# Patient Record
Sex: MALE | Race: WHITE | NOT HISPANIC OR LATINO | URBAN - METROPOLITAN AREA
[De-identification: names, ages, dates, MRNs, and addresses within clinical notes are randomized per-mention and may not be internally consistent; named-entity substitution may affect disease eponyms.]

---

## 2020-10-19 RX ORDER — RILUZOLE 50 MG/1
1 TABLET ORAL
Qty: 0 | Refills: 0 | DISCHARGE
Start: 2020-10-19

## 2020-11-09 ENCOUNTER — INPATIENT (INPATIENT)
Facility: HOSPITAL | Age: 70
LOS: 2 days | Discharge: HOME | DRG: 683 | End: 2020-11-12
Attending: FAMILY MEDICINE | Admitting: FAMILY MEDICINE
Payer: MEDICARE

## 2020-11-09 VITALS
TEMPERATURE: 98 F | RESPIRATION RATE: 18 BRPM | DIASTOLIC BLOOD PRESSURE: 78 MMHG | SYSTOLIC BLOOD PRESSURE: 139 MMHG | HEIGHT: 68 IN | OXYGEN SATURATION: 96 % | WEIGHT: 184.97 LBS | HEART RATE: 54 BPM

## 2020-11-09 DIAGNOSIS — G12.21 AMYOTROPHIC LATERAL SCLEROSIS: ICD-10-CM

## 2020-11-09 DIAGNOSIS — I10 ESSENTIAL (PRIMARY) HYPERTENSION: ICD-10-CM

## 2020-11-09 DIAGNOSIS — Z95.5 PRESENCE OF CORONARY ANGIOPLASTY IMPLANT AND GRAFT: Chronic | ICD-10-CM

## 2020-11-09 DIAGNOSIS — N28.9 DISORDER OF KIDNEY AND URETER, UNSPECIFIED: ICD-10-CM

## 2020-11-09 DIAGNOSIS — Z90.89 ACQUIRED ABSENCE OF OTHER ORGANS: Chronic | ICD-10-CM

## 2020-11-09 DIAGNOSIS — R33.9 RETENTION OF URINE, UNSPECIFIED: ICD-10-CM

## 2020-11-09 DIAGNOSIS — N40.1 BENIGN PROSTATIC HYPERPLASIA WITH LOWER URINARY TRACT SYMPTOMS: ICD-10-CM

## 2020-11-09 LAB
ALBUMIN SERPL ELPH-MCNC: 3.5 G/DL — SIGNIFICANT CHANGE UP (ref 3.3–5)
ALP SERPL-CCNC: 65 U/L — SIGNIFICANT CHANGE UP (ref 40–120)
ALT FLD-CCNC: 46 U/L — SIGNIFICANT CHANGE UP (ref 12–78)
ANION GAP SERPL CALC-SCNC: 10 MMOL/L — SIGNIFICANT CHANGE UP (ref 5–17)
APPEARANCE UR: ABNORMAL
AST SERPL-CCNC: 36 U/L — SIGNIFICANT CHANGE UP (ref 15–37)
BACTERIA # UR AUTO: ABNORMAL
BASOPHILS # BLD AUTO: 0 K/UL — SIGNIFICANT CHANGE UP (ref 0–0.2)
BASOPHILS NFR BLD AUTO: 0 % — SIGNIFICANT CHANGE UP (ref 0–2)
BILIRUB SERPL-MCNC: 1.2 MG/DL — SIGNIFICANT CHANGE UP (ref 0.2–1.2)
BILIRUB UR-MCNC: NEGATIVE — SIGNIFICANT CHANGE UP
BUN SERPL-MCNC: 97 MG/DL — HIGH (ref 7–23)
CALCIUM SERPL-MCNC: 10.2 MG/DL — HIGH (ref 8.5–10.1)
CHLORIDE SERPL-SCNC: 104 MMOL/L — SIGNIFICANT CHANGE UP (ref 96–108)
CO2 SERPL-SCNC: 22 MMOL/L — SIGNIFICANT CHANGE UP (ref 22–31)
COLOR SPEC: YELLOW — SIGNIFICANT CHANGE UP
CREAT SERPL-MCNC: 6.3 MG/DL — HIGH (ref 0.5–1.3)
DIFF PNL FLD: ABNORMAL
EOSINOPHIL # BLD AUTO: 0 K/UL — SIGNIFICANT CHANGE UP (ref 0–0.5)
EOSINOPHIL NFR BLD AUTO: 0 % — SIGNIFICANT CHANGE UP (ref 0–6)
GLUCOSE SERPL-MCNC: 125 MG/DL — HIGH (ref 70–99)
GLUCOSE UR QL: NEGATIVE — SIGNIFICANT CHANGE UP
HCT VFR BLD CALC: 44.1 % — SIGNIFICANT CHANGE UP (ref 39–50)
HGB BLD-MCNC: 15.3 G/DL — SIGNIFICANT CHANGE UP (ref 13–17)
KETONES UR-MCNC: NEGATIVE — SIGNIFICANT CHANGE UP
LEUKOCYTE ESTERASE UR-ACNC: ABNORMAL
LYMPHOCYTES # BLD AUTO: 0.95 K/UL — LOW (ref 1–3.3)
LYMPHOCYTES # BLD AUTO: 8 % — LOW (ref 13–44)
MCHC RBC-ENTMCNC: 30.8 PG — SIGNIFICANT CHANGE UP (ref 27–34)
MCHC RBC-ENTMCNC: 34.7 GM/DL — SIGNIFICANT CHANGE UP (ref 32–36)
MCV RBC AUTO: 88.7 FL — SIGNIFICANT CHANGE UP (ref 80–100)
MONOCYTES # BLD AUTO: 0.47 K/UL — SIGNIFICANT CHANGE UP (ref 0–0.9)
MONOCYTES NFR BLD AUTO: 4 % — SIGNIFICANT CHANGE UP (ref 2–14)
NEUTROPHILS # BLD AUTO: 10.45 K/UL — HIGH (ref 1.8–7.4)
NEUTROPHILS NFR BLD AUTO: 87 % — HIGH (ref 43–77)
NITRITE UR-MCNC: NEGATIVE — SIGNIFICANT CHANGE UP
NRBC # BLD: SIGNIFICANT CHANGE UP /100 WBCS (ref 0–0)
PH UR: 5 — SIGNIFICANT CHANGE UP (ref 5–8)
PLATELET # BLD AUTO: 210 K/UL — SIGNIFICANT CHANGE UP (ref 150–400)
POTASSIUM SERPL-MCNC: 4.8 MMOL/L — SIGNIFICANT CHANGE UP (ref 3.5–5.3)
POTASSIUM SERPL-SCNC: 4.8 MMOL/L — SIGNIFICANT CHANGE UP (ref 3.5–5.3)
PROT SERPL-MCNC: 7.2 G/DL — SIGNIFICANT CHANGE UP (ref 6–8.3)
PROT UR-MCNC: 100
RBC # BLD: 4.97 M/UL — SIGNIFICANT CHANGE UP (ref 4.2–5.8)
RBC # FLD: 12.6 % — SIGNIFICANT CHANGE UP (ref 10.3–14.5)
RBC CASTS # UR COMP ASSIST: ABNORMAL /HPF (ref 0–4)
SARS-COV-2 RNA SPEC QL NAA+PROBE: SIGNIFICANT CHANGE UP
SODIUM SERPL-SCNC: 136 MMOL/L — SIGNIFICANT CHANGE UP (ref 135–145)
SP GR SPEC: 1.02 — SIGNIFICANT CHANGE UP (ref 1.01–1.02)
UROBILINOGEN FLD QL: NEGATIVE — SIGNIFICANT CHANGE UP
WBC # BLD: 11.87 K/UL — HIGH (ref 3.8–10.5)
WBC # FLD AUTO: 11.87 K/UL — HIGH (ref 3.8–10.5)
WBC UR QL: SIGNIFICANT CHANGE UP /HPF (ref 0–5)

## 2020-11-09 PROCEDURE — 76775 US EXAM ABDO BACK WALL LIM: CPT | Mod: 26

## 2020-11-09 PROCEDURE — 93010 ELECTROCARDIOGRAM REPORT: CPT

## 2020-11-09 PROCEDURE — 71045 X-RAY EXAM CHEST 1 VIEW: CPT | Mod: 26

## 2020-11-09 PROCEDURE — 99285 EMERGENCY DEPT VISIT HI MDM: CPT

## 2020-11-09 RX ORDER — METOPROLOL TARTRATE 50 MG
50 TABLET ORAL DAILY
Refills: 0 | Status: DISCONTINUED | OUTPATIENT
Start: 2020-11-09 | End: 2020-11-11

## 2020-11-09 RX ORDER — TAMSULOSIN HYDROCHLORIDE 0.4 MG/1
0.4 CAPSULE ORAL AT BEDTIME
Refills: 0 | Status: DISCONTINUED | OUTPATIENT
Start: 2020-11-09 | End: 2020-11-11

## 2020-11-09 RX ORDER — CLOPIDOGREL BISULFATE 75 MG/1
75 TABLET, FILM COATED ORAL DAILY
Refills: 0 | Status: DISCONTINUED | OUTPATIENT
Start: 2020-11-09 | End: 2020-11-12

## 2020-11-09 RX ORDER — METOPROLOL TARTRATE 50 MG
1 TABLET ORAL
Qty: 0 | Refills: 0 | DISCHARGE

## 2020-11-09 RX ORDER — SODIUM CHLORIDE 9 MG/ML
1000 INJECTION INTRAMUSCULAR; INTRAVENOUS; SUBCUTANEOUS ONCE
Refills: 0 | Status: COMPLETED | OUTPATIENT
Start: 2020-11-09 | End: 2020-11-09

## 2020-11-09 RX ORDER — ROSUVASTATIN CALCIUM 5 MG/1
1 TABLET ORAL
Qty: 0 | Refills: 0 | DISCHARGE

## 2020-11-09 RX ORDER — SODIUM CHLORIDE 9 MG/ML
1000 INJECTION INTRAMUSCULAR; INTRAVENOUS; SUBCUTANEOUS
Refills: 0 | Status: DISCONTINUED | OUTPATIENT
Start: 2020-11-09 | End: 2020-11-10

## 2020-11-09 RX ORDER — ATORVASTATIN CALCIUM 80 MG/1
40 TABLET, FILM COATED ORAL AT BEDTIME
Refills: 0 | Status: DISCONTINUED | OUTPATIENT
Start: 2020-11-09 | End: 2020-11-12

## 2020-11-09 RX ORDER — FENOFIBRATE,MICRONIZED 130 MG
145 CAPSULE ORAL DAILY
Refills: 0 | Status: DISCONTINUED | OUTPATIENT
Start: 2020-11-09 | End: 2020-11-12

## 2020-11-09 RX ORDER — CEFTRIAXONE 500 MG/1
1000 INJECTION, POWDER, FOR SOLUTION INTRAMUSCULAR; INTRAVENOUS ONCE
Refills: 0 | Status: COMPLETED | OUTPATIENT
Start: 2020-11-09 | End: 2020-11-09

## 2020-11-09 RX ADMIN — ATORVASTATIN CALCIUM 40 MILLIGRAM(S): 80 TABLET, FILM COATED ORAL at 22:43

## 2020-11-09 RX ADMIN — SODIUM CHLORIDE 100 MILLILITER(S): 9 INJECTION INTRAMUSCULAR; INTRAVENOUS; SUBCUTANEOUS at 22:43

## 2020-11-09 RX ADMIN — CEFTRIAXONE 100 MILLIGRAM(S): 500 INJECTION, POWDER, FOR SOLUTION INTRAMUSCULAR; INTRAVENOUS at 15:37

## 2020-11-09 RX ADMIN — CEFTRIAXONE 1000 MILLIGRAM(S): 500 INJECTION, POWDER, FOR SOLUTION INTRAMUSCULAR; INTRAVENOUS at 16:34

## 2020-11-09 RX ADMIN — TAMSULOSIN HYDROCHLORIDE 0.4 MILLIGRAM(S): 0.4 CAPSULE ORAL at 22:43

## 2020-11-09 RX ADMIN — SODIUM CHLORIDE 1000 MILLILITER(S): 9 INJECTION INTRAMUSCULAR; INTRAVENOUS; SUBCUTANEOUS at 15:38

## 2020-11-09 NOTE — H&P ADULT - PROBLEM SELECTOR PLAN 1
MARLEY post obstructive, jo placed.   Renal consult called, follow up recs.   Patient not fluid overloaded, gentle hydration.

## 2020-11-09 NOTE — PATIENT PROFILE ADULT - NSPROGENSOURCEINFO_GEN_A_NUR
Chronic GERD  Evaluate with EGD bx for surveillance of BE      Summers's esophagus  EGD with bx    Personal history of colonic polyps  Evaluate with Colonoscopy for surveillance, 45 min, Suprep split        patient

## 2020-11-09 NOTE — ED ADULT NURSE REASSESSMENT NOTE - NS ED NURSE REASSESS COMMENT FT1
Pt is calm and cooperative. Wife appears anxious and is pacing. Wife along with pt were educated about the disease process by Dr. Singleton and RN. Manager notified.

## 2020-11-09 NOTE — ED PROVIDER NOTE - OBJECTIVE STATEMENT
69 yo male with h/o CAD with stent, HTN, HLD, BPH presents to the ED c/o urinary retention x 4 days. Patient admits he ran out of prescription for tamsulosin x 1 week. Associated with abdominal distention and discomfort. Patient also c/o constipation x 4 days. Patient also c/o generalized weakness and fatigue. Denies fever, chills, chest pain, sob, vomiting, diarrhea, flank pain. Patient currently being worked up with neurology for possible ALS. no back pain, no fall or trauma.   pmd: Dr. Curtis Zavala

## 2020-11-09 NOTE — ED PROVIDER NOTE - CLINICAL SUMMARY MEDICAL DECISION MAKING FREE TEXT BOX
69 yo male with h/o CAD with stent, HTN, HLD, BPH presents to the ED c/o urinary retention x 4 days. Patient admits he ran out of prescription for tamsulosin x 1 week. Associated with abdominal distention and discomfort. Patient also c/o constipation x 4 days. Patient also c/o generalized weakness and fatigue. Denies fever, chills, chest pain, sob, vomiting, diarrhea, flank pain. Patient currently being worked up with neurology for possible ALS. A/P: urinary retention, jo place. + renal insuffiency. Case discussed with Dr. Ayers, renal aware of consult. Case discussed with Dr. Otero, will admit to Dr. Culver

## 2020-11-09 NOTE — H&P ADULT - NSICDXPASTMEDICALHX_GEN_ALL_CORE_FT
PAST MEDICAL HISTORY:  BPH (benign prostatic hyperplasia)     Hyperlipidemia     Hypertension     Myocardial infarct

## 2020-11-09 NOTE — H&P ADULT - HISTORY OF PRESENT ILLNESS
71 yo male with h/o cad s/p stent, htn, hld, bph presents to the ED c/o urinary retention x 4 days.   Patient reports he ran out of Tamulosin, associated with abdominal distention and discomfort with constipation.   Patient endorses loss of appetite. Denies fever, chills, chest pain, sob, vomiting, diarrhea, flank pain. Patient currently being worked up with neurology for possible ALS. 71 yo male with h/o cad s/p stent, htn, hld, bph presents to the ED c/o urinary retention x 4 days.   Patient reports he ran out of Tamulosin, associated with abdominal distention and discomfort with constipation.   Patient endorses loss of appetite. Denies fever, chills, chest pain, sob, vomiting, diarrhea, flank pain. Patient currently being worked up with neurology for possible ALS.     In thee d patient noted to have MARLEY with urine retention, jo was placed. 69 yo male with h/o cad s/p stent, htn, hld, bph presents to the ED c/o urinary retention x 4 days.   Patient reports he ran out of Tamulosin, associated with abdominal distention and discomfort with constipation.   Patient endorses loss of appetite. Denies fever, chills, chest pain, sob, vomiting, diarrhea, flank pain. Patient currently being worked up with neurology for possible ALS.     In the ed patient noted to have MARLEY with urine retention, jo was placed.

## 2020-11-09 NOTE — PROGRESS NOTE ADULT - SUBJECTIVE AND OBJECTIVE BOX
Renal consult called for MARLEY. Pt with post renal causes of MARLEY.   Shukla placed. Good UO. Labs as ordered.   Full consult to follow. Thank you.

## 2020-11-09 NOTE — H&P ADULT - ASSESSMENT
69 yo male with h/o cad s/p stent, htn, hld, bph presents to the ED c/o urinary retention x 4 days.

## 2020-11-10 LAB
ALBUMIN SERPL ELPH-MCNC: 2.8 G/DL — LOW (ref 3.3–5)
ALP SERPL-CCNC: 54 U/L — SIGNIFICANT CHANGE UP (ref 40–120)
ALT FLD-CCNC: 38 U/L — SIGNIFICANT CHANGE UP (ref 12–78)
ANION GAP SERPL CALC-SCNC: 3 MMOL/L — LOW (ref 5–17)
AST SERPL-CCNC: 26 U/L — SIGNIFICANT CHANGE UP (ref 15–37)
BILIRUB SERPL-MCNC: 1 MG/DL — SIGNIFICANT CHANGE UP (ref 0.2–1.2)
BUN SERPL-MCNC: 48 MG/DL — HIGH (ref 7–23)
CALCIUM SERPL-MCNC: 9.4 MG/DL — SIGNIFICANT CHANGE UP (ref 8.5–10.1)
CHLORIDE SERPL-SCNC: 112 MMOL/L — HIGH (ref 96–108)
CO2 SERPL-SCNC: 28 MMOL/L — SIGNIFICANT CHANGE UP (ref 22–31)
CREAT SERPL-MCNC: 1.3 MG/DL — SIGNIFICANT CHANGE UP (ref 0.5–1.3)
GLUCOSE SERPL-MCNC: 93 MG/DL — SIGNIFICANT CHANGE UP (ref 70–99)
HCT VFR BLD CALC: 39.9 % — SIGNIFICANT CHANGE UP (ref 39–50)
HCV AB S/CO SERPL IA: 0.08 S/CO — SIGNIFICANT CHANGE UP (ref 0–0.99)
HCV AB SERPL-IMP: SIGNIFICANT CHANGE UP
HGB BLD-MCNC: 13.3 G/DL — SIGNIFICANT CHANGE UP (ref 13–17)
MCHC RBC-ENTMCNC: 30.4 PG — SIGNIFICANT CHANGE UP (ref 27–34)
MCHC RBC-ENTMCNC: 33.3 GM/DL — SIGNIFICANT CHANGE UP (ref 32–36)
MCV RBC AUTO: 91.1 FL — SIGNIFICANT CHANGE UP (ref 80–100)
NRBC # BLD: 0 /100 WBCS — SIGNIFICANT CHANGE UP (ref 0–0)
PHOSPHATE SERPL-MCNC: 2.1 MG/DL — LOW (ref 2.5–4.5)
PLATELET # BLD AUTO: 173 K/UL — SIGNIFICANT CHANGE UP (ref 150–400)
POTASSIUM SERPL-MCNC: 3.9 MMOL/L — SIGNIFICANT CHANGE UP (ref 3.5–5.3)
POTASSIUM SERPL-SCNC: 3.9 MMOL/L — SIGNIFICANT CHANGE UP (ref 3.5–5.3)
PROT SERPL-MCNC: 5.8 G/DL — LOW (ref 6–8.3)
RBC # BLD: 4.38 M/UL — SIGNIFICANT CHANGE UP (ref 4.2–5.8)
RBC # FLD: 12.2 % — SIGNIFICANT CHANGE UP (ref 10.3–14.5)
SARS-COV-2 IGG SERPL QL IA: NEGATIVE — SIGNIFICANT CHANGE UP
SARS-COV-2 IGM SERPL IA-ACNC: 0.09 INDEX — SIGNIFICANT CHANGE UP
SODIUM SERPL-SCNC: 143 MMOL/L — SIGNIFICANT CHANGE UP (ref 135–145)
URATE SERPL-MCNC: 5.4 MG/DL — SIGNIFICANT CHANGE UP (ref 3.4–8.8)
WBC # BLD: 7.32 K/UL — SIGNIFICANT CHANGE UP (ref 3.8–10.5)
WBC # FLD AUTO: 7.32 K/UL — SIGNIFICANT CHANGE UP (ref 3.8–10.5)

## 2020-11-10 RX ORDER — SODIUM CHLORIDE 9 MG/ML
1000 INJECTION, SOLUTION INTRAVENOUS
Refills: 0 | Status: DISCONTINUED | OUTPATIENT
Start: 2020-11-10 | End: 2020-11-11

## 2020-11-10 RX ADMIN — SODIUM CHLORIDE 60 MILLILITER(S): 9 INJECTION, SOLUTION INTRAVENOUS at 11:59

## 2020-11-10 RX ADMIN — ATORVASTATIN CALCIUM 40 MILLIGRAM(S): 80 TABLET, FILM COATED ORAL at 21:30

## 2020-11-10 RX ADMIN — SODIUM CHLORIDE 100 MILLILITER(S): 9 INJECTION INTRAMUSCULAR; INTRAVENOUS; SUBCUTANEOUS at 07:50

## 2020-11-10 RX ADMIN — CLOPIDOGREL BISULFATE 75 MILLIGRAM(S): 75 TABLET, FILM COATED ORAL at 11:58

## 2020-11-10 RX ADMIN — TAMSULOSIN HYDROCHLORIDE 0.4 MILLIGRAM(S): 0.4 CAPSULE ORAL at 21:30

## 2020-11-10 RX ADMIN — Medication 50 MILLIGRAM(S): at 05:43

## 2020-11-10 RX ADMIN — Medication 145 MILLIGRAM(S): at 11:59

## 2020-11-10 NOTE — PROGRESS NOTE ADULT - ASSESSMENT
71 yo male with h/o cad s/p stent, htn, hld, bph presents to the ED c/o urinary retention x 4 days.

## 2020-11-10 NOTE — CONSULT NOTE ADULT - ASSESSMENT
MARLEY: Post renal causes, On ACEI.   Hypertension  BPH    Improved renal indices with jo. Continue IVF. Change to half NS. Hold ACEI for one more day. Monitor BP trend. Titrate BP meds as needed. Salt restriction.    evaluation. Avoid nephrotoxic meds as possible. Will follow electrolytes and renal function trend.   Further recommendations pending clinical course. Thank you for the courtesy of this referral.

## 2020-11-10 NOTE — PROGRESS NOTE ADULT - SUBJECTIVE AND OBJECTIVE BOX
Patient is a 70y old  Male who presents with a chief complaint of     SUBJECTIVE / OVERNIGHT EVENTS: no events over night     MEDICATIONS  (STANDING):  atorvastatin 40 milliGRAM(s) Oral at bedtime  clopidogrel Tablet 75 milliGRAM(s) Oral daily  fenofibrate Tablet 145 milliGRAM(s) Oral daily  metoprolol tartrate 50 milliGRAM(s) Oral daily  sodium chloride 0.45%. 1000 milliLiter(s) (60 mL/Hr) IV Continuous <Continuous>  tamsulosin 0.4 milliGRAM(s) Oral at bedtime    MEDICATIONS  (PRN):      CAPILLARY BLOOD GLUCOSE        I&O's Summary    09 Nov 2020 07:01  -  10 Nov 2020 07:00  --------------------------------------------------------  IN: 1100 mL / OUT: 1200 mL / NET: -100 mL    10 Nov 2020 07:01  -  10 Nov 2020 17:05  --------------------------------------------------------  IN: 1240 mL / OUT: 700 mL / NET: 540 mL      T(C): 36.9 (11-10-20 @ 12:36), Max: 36.9 (11-10-20 @ 05:28)  HR: 63 (11-10-20 @ 12:36) (63 - 66)  BP: 117/71 (11-10-20 @ 12:36) (113/64 - 117/71)  RR: 20 (11-10-20 @ 12:36) (18 - 20)  SpO2: 95% (11-10-20 @ 12:36) (92% - 95%)    PHYSICAL EXAM:  GENERAL: NAD, well-developed  HEAD:  Atraumatic, Normocephalic  EYES: EOMI, PERRLA, conjunctiva and sclera clear  NECK: Supple, No JVD  CHEST/LUNG: Clear to auscultation bilaterally; No wheeze  HEART: Regular rate and rhythm; No murmurs, rubs, or gallops  ABDOMEN: Soft, Nontender, Nondistended; Bowel sounds present  EXTREMITIES:  2+ Peripheral Pulses, No clubbing, cyanosis, or edema  PSYCH: AAOx3  NEUROLOGY: non-focal  SKIN: No rashes or lesions                          13.3   7.32  )-----------( 173      ( 10 Nov 2020 08:28 )             39.9           LIVER FUNCTIONS - ( 10 Nov 2020 08:28 )  Alb: 2.8 g/dL / Pro: 5.8 g/dL / ALK PHOS: 54 U/L / ALT: 38 U/L / AST: 26 U/L / GGT: x             143|112|48<93  3.9|28|1.30  9.4,--,2.1  11-10 @ 08:28        RADIOLOGY & ADDITIONAL TESTS:    Imaging Personally Reviewed:    Consultant(s) Notes Reviewed:      Care Discussed with Consultants/Other Providers:

## 2020-11-10 NOTE — PROGRESS NOTE ADULT - PROBLEM SELECTOR PLAN 1
MARLEY post obstructive, jo placed, resolving.   Renal following. Patient not fluid overloaded, gentle hydration.  D/C planning on jo.

## 2020-11-10 NOTE — PHYSICAL THERAPY INITIAL EVALUATION ADULT - ADDITIONAL COMMENTS
Patient lives in private ranch home with wife, 3 DILCIA with railings.  Patient was independent in all ADLs and ambulated independently without device

## 2020-11-11 LAB
ANION GAP SERPL CALC-SCNC: 5 MMOL/L — SIGNIFICANT CHANGE UP (ref 5–17)
BUN SERPL-MCNC: 30 MG/DL — HIGH (ref 7–23)
CALCIUM SERPL-MCNC: 9.3 MG/DL — SIGNIFICANT CHANGE UP (ref 8.5–10.1)
CHLORIDE SERPL-SCNC: 106 MMOL/L — SIGNIFICANT CHANGE UP (ref 96–108)
CO2 SERPL-SCNC: 28 MMOL/L — SIGNIFICANT CHANGE UP (ref 22–31)
CREAT SERPL-MCNC: 1 MG/DL — SIGNIFICANT CHANGE UP (ref 0.5–1.3)
CULTURE RESULTS: SIGNIFICANT CHANGE UP
GLUCOSE SERPL-MCNC: 116 MG/DL — HIGH (ref 70–99)
HCT VFR BLD CALC: 45.2 % — SIGNIFICANT CHANGE UP (ref 39–50)
HGB BLD-MCNC: 15.4 G/DL — SIGNIFICANT CHANGE UP (ref 13–17)
MCHC RBC-ENTMCNC: 30.7 PG — SIGNIFICANT CHANGE UP (ref 27–34)
MCHC RBC-ENTMCNC: 34.1 GM/DL — SIGNIFICANT CHANGE UP (ref 32–36)
MCV RBC AUTO: 90.2 FL — SIGNIFICANT CHANGE UP (ref 80–100)
NRBC # BLD: 0 /100 WBCS — SIGNIFICANT CHANGE UP (ref 0–0)
PLATELET # BLD AUTO: 179 K/UL — SIGNIFICANT CHANGE UP (ref 150–400)
POTASSIUM SERPL-MCNC: 3.8 MMOL/L — SIGNIFICANT CHANGE UP (ref 3.5–5.3)
POTASSIUM SERPL-SCNC: 3.8 MMOL/L — SIGNIFICANT CHANGE UP (ref 3.5–5.3)
RBC # BLD: 5.01 M/UL — SIGNIFICANT CHANGE UP (ref 4.2–5.8)
RBC # FLD: 12.3 % — SIGNIFICANT CHANGE UP (ref 10.3–14.5)
SODIUM SERPL-SCNC: 139 MMOL/L — SIGNIFICANT CHANGE UP (ref 135–145)
SPECIMEN SOURCE: SIGNIFICANT CHANGE UP
WBC # BLD: 6.76 K/UL — SIGNIFICANT CHANGE UP (ref 3.8–10.5)
WBC # FLD AUTO: 6.76 K/UL — SIGNIFICANT CHANGE UP (ref 3.8–10.5)

## 2020-11-11 RX ORDER — METOPROLOL TARTRATE 50 MG
50 TABLET ORAL DAILY
Refills: 0 | Status: DISCONTINUED | OUTPATIENT
Start: 2020-11-12 | End: 2020-11-12

## 2020-11-11 RX ORDER — LISINOPRIL 2.5 MG/1
2.5 TABLET ORAL DAILY
Refills: 0 | Status: DISCONTINUED | OUTPATIENT
Start: 2020-11-11 | End: 2020-11-12

## 2020-11-11 RX ORDER — TAMSULOSIN HYDROCHLORIDE 0.4 MG/1
0.8 CAPSULE ORAL AT BEDTIME
Refills: 0 | Status: DISCONTINUED | OUTPATIENT
Start: 2020-11-11 | End: 2020-11-12

## 2020-11-11 RX ADMIN — TAMSULOSIN HYDROCHLORIDE 0.8 MILLIGRAM(S): 0.4 CAPSULE ORAL at 21:50

## 2020-11-11 RX ADMIN — CLOPIDOGREL BISULFATE 75 MILLIGRAM(S): 75 TABLET, FILM COATED ORAL at 12:07

## 2020-11-11 RX ADMIN — Medication 145 MILLIGRAM(S): at 12:07

## 2020-11-11 RX ADMIN — Medication 50 MILLIGRAM(S): at 06:26

## 2020-11-11 RX ADMIN — ATORVASTATIN CALCIUM 40 MILLIGRAM(S): 80 TABLET, FILM COATED ORAL at 21:50

## 2020-11-11 RX ADMIN — SODIUM CHLORIDE 60 MILLILITER(S): 9 INJECTION, SOLUTION INTRAVENOUS at 06:34

## 2020-11-11 RX ADMIN — LISINOPRIL 2.5 MILLIGRAM(S): 2.5 TABLET ORAL at 21:51

## 2020-11-11 NOTE — DISCHARGE NOTE NURSING/CASE MANAGEMENT/SOCIAL WORK - PATIENT PORTAL LINK FT
You can access the FollowMyHealth Patient Portal offered by [facility] by registering at the following website: [email or web address]. By joining [employer]’s FollowMyHealth portal, you will also be able to view your health information using other applications (apps) compatible with our system.

## 2020-11-11 NOTE — PROGRESS NOTE ADULT - SUBJECTIVE AND OBJECTIVE BOX
Patient is a 70y old  Male who presents with a chief complaint of   Patient seen in follow up for MARLEY, obstructive uropathy.    Improved renal indices.        PAST MEDICAL HISTORY:  BPH (benign prostatic hyperplasia)    Hyperlipidemia    Hypertension    Myocardial infarct      MEDICATIONS  (STANDING):  atorvastatin 40 milliGRAM(s) Oral at bedtime  clopidogrel Tablet 75 milliGRAM(s) Oral daily  fenofibrate Tablet 145 milliGRAM(s) Oral daily  metoprolol tartrate 50 milliGRAM(s) Oral daily  sodium chloride 0.45%. 1000 milliLiter(s) (60 mL/Hr) IV Continuous <Continuous>  tamsulosin 0.4 milliGRAM(s) Oral at bedtime    MEDICATIONS  (PRN):    T(C): 36.6 (20 @ 05:32), Max: 36.9 (20 @ 21:20)  HR: 62 (20 @ 05:32) (57 - 66)  BP: 124/64 (20 @ 05:32) (113/64 - 133/71)  RR: 16 (20 @ 05:32) (16 - 20)  SpO2: 93% (20 @ 05:32) (92% - 95%)  Wt(kg): --  I&O's Detail    10 Nov 2020 07:01  -  2020 07:00  --------------------------------------------------------  IN:    Oral Fluid: 750 mL    sodium chloride 0.45%: 1080 mL    sodium chloride 0.9%: 500 mL  Total IN: 2330 mL    OUT:    Indwelling Catheter - Urethral (mL): 2350 mL  Total OUT: 2350 mL    Total NET: -20 mL      2020 07:01  -  2020 09:08  --------------------------------------------------------  IN:    Oral Fluid: 240 mL  Total IN: 240 mL    OUT:  Total OUT: 0 mL    Total NET: 240 mL          PHYSICAL EXAM:  General: No distress  Respiratory: b/l air entry  Cardiovascular: S1 S2  Gastrointestinal: soft  Extremities:  no edema                              15.4   6.76  )-----------( 179      ( 2020 08:46 )             45.2         139  |  106  |  30<H>  ----------------------------<  116<H>  3.8   |  28  |  1.00    Ca    9.3      2020 08:46  Phos  2.1     11-10    TPro  5.8<L>  /  Alb  2.8<L>  /  TBili  1.0  /  DBili  x   /  AST  26  /  ALT  38  /  AlkPhos  54  11-10        LIVER FUNCTIONS - ( 10 Nov 2020 08:28 )  Alb: 2.8 g/dL / Pro: 5.8 g/dL / ALK PHOS: 54 U/L / ALT: 38 U/L / AST: 26 U/L / GGT: x           Urinalysis Basic - ( 2020 13:52 )    Color: Yellow / Appearance: Turbid / S.020 / pH: x  Gluc: x / Ketone: Negative  / Bili: Negative / Urobili: Negative   Blood: x / Protein: 100 / Nitrite: Negative   Leuk Esterase: Moderate / RBC: 6-10 /HPF / WBC TNTC /HPF   Sq Epi: x / Non Sq Epi: x / Bacteria: TNTC        Sodium, Serum: 139 ( @ 08:46)  Sodium, Serum: 143 (11-10 @ 08:28)  Sodium, Serum: 136 ( 14:03)    Creatinine, Serum: 1.00 ( 08:46)  Creatinine, Serum: 1.30 (11-10 @ 08:28)  Creatinine, Serum: 6.30 ( 14:03)    Potassium, Serum: 3.8 ( 08:46)  Potassium, Serum: 3.9 (11-10 @ 08:28)  Potassium, Serum: 4.8 ( 14:03)    Hemoglobin: 15.4 ( 08:46)  Hemoglobin: 13.3 (11-10 @ 08:28)  Hemoglobin: 15.3 ( 14:03)

## 2020-11-11 NOTE — PROGRESS NOTE ADULT - ASSESSMENT
MARLEY: Post renal causes, On ACEI.   Hypertension  BPH    Improved renal indices with jo. Will d/c IVF. Resume ACEI. Monitor BP trend. Titrate BP meds as needed. Salt restriction.    evaluation as out pt. ? TOV as out pt. Avoid nephrotoxic meds as possible. D/c planing.

## 2020-11-11 NOTE — PROGRESS NOTE ADULT - PROBLEM SELECTOR PLAN 1
MARLEY post obstructive, jo placed, resolving.   Renal following. Patient not fluid overloaded, gentle hydration.

## 2020-11-11 NOTE — PROGRESS NOTE ADULT - SUBJECTIVE AND OBJECTIVE BOX
Neurology follow up note    SHARITA [id number]      Interval History:    Patient feels ok no new complaints.    MEDICATIONS    atorvastatin 40 milliGRAM(s) Oral at bedtime  clopidogrel Tablet 75 milliGRAM(s) Oral daily  fenofibrate Tablet 145 milliGRAM(s) Oral daily  lisinopril 2.5 milliGRAM(s) Oral daily  metoprolol tartrate 50 milliGRAM(s) Oral daily  tamsulosin 0.8 milliGRAM(s) Oral at bedtime      Allergies    fish (Short breath; Anaphylaxis)  No Known Drug Allergies    Intolerances            Vital Signs Last 24 Hrs  T(C): 36.8 (2020 13:20), Max: 36.9 (10 Nov 2020 21:15)  T(F): 98.3 (:20), Max: 98.4 (10 Nov 2020 21:15)  HR: 62 (:20) (62 - 63)  BP: 106/65 (:20) (106/65 - 133/71)  BP(mean): --  RR: 16 (:20) (16 - 18)  SpO2: 95% (:20) (93% - 95%)        REVIEW OF SYSTEMS:  Constitutional:  The patient denies fever, chills, or night sweats.  Head:  No headache.  Eyes:  No double vision or blurry vision.  Ears:  No ringing in the ears.  Neck:  No neck pain.  Respiratory:  No shortness of breath.  Cardiovascular:  No chest pain.  Abdomen:  No nausea, vomiting, or abdominal pain, but abdominal distention.  Genitourinary:  Positive problems passing his urine.  General:  Positive history of weakness of his right hand and positive history of increased salivation.    PHYSICAL EXAMINATION:  HEENT:  Head:  Normocephalic and atraumatic.  Eyes:  No scleral icterus.  Ears:  Hearing bilaterally intact.  NECK:  Supple.  RESPIRATORY:  Good air entry bilaterally.  CARDIOVASCULAR:  S1 and S2 heard.  ABDOMEN:  Soft and nontender.  EXTREMITIES:  No clubbing or cyanosis were noted.      NEUROLOGIC:  The patient is awake, alert, and oriented x3.  Extraocular movements were intact.  Pupils were equal, round, and reactive bilaterally, 3 mm to 2 mm.  Speech was fluent.  Smile was symmetric.  Motor:  Bilateral proximally bilateral upper 5/5, right hand grasp was 4/5.  The patient does have thenar atrophy of his right hand.  Bilateral lower extremities were 4+/5.  Sensory:  Bilateral upper and lower intact to light touch.  No fasciculations were noted.          LABS:  CBC Full  -  ( 2020 08:46 )  WBC Count : 6.76 K/uL  RBC Count : 5.01 M/uL  Hemoglobin : 15.4 g/dL  Hematocrit : 45.2 %  Platelet Count - Automated : 179 K/uL  Mean Cell Volume : 90.2 fl  Mean Cell Hemoglobin : 30.7 pg  Mean Cell Hemoglobin Concentration : 34.1 gm/dL  Auto Neutrophil # : x  Auto Lymphocyte # : x  Auto Monocyte # : x  Auto Eosinophil # : x  Auto Basophil # : x  Auto Neutrophil % : x  Auto Lymphocyte % : x  Auto Monocyte % : x  Auto Eosinophil % : x  Auto Basophil % : x    Urinalysis Basic - ( 2020 13:52 )    Color: Yellow / Appearance: Turbid / S.020 / pH: x  Gluc: x / Ketone: Negative  / Bili: Negative / Urobili: Negative   Blood: x / Protein: 100 / Nitrite: Negative   Leuk Esterase: Moderate / RBC: 6-10 /HPF / WBC TNTC /HPF   Sq Epi: x / Non Sq Epi: x / Bacteria: TNTC      -    139  |  106  |  30<H>  ----------------------------<  116<H>  3.8   |  28  |  1.00    Ca    9.3      2020 08:46  Phos  2.1     11-10    TPro  5.8<L>  /  Alb  2.8<L>  /  TBili  1.0  /  DBili  x   /  AST  26  /  ALT  38  /  AlkPhos  54  11-10    Hemoglobin A1C:     LIVER FUNCTIONS - ( 10 Nov 2020 08:28 )  Alb: 2.8 g/dL / Pro: 5.8 g/dL / ALK PHOS: 54 U/L / ALT: 38 U/L / AST: 26 U/L / GGT: x           Vitamin B12         RADIOLOGY      ANALYSIS AND PLAN:  This is a 70-year-old with a history of presumed diagnosis of amyotrophic lateral sclerosis.  For history of possibly amyotrophic lateral sclerosis, the patient does have excessive salivation.  Spoke with the patient in regards to restarting his Rilutek.  At present, the patient does not want to start it.  The patient does have a second opinion Neurology appointment at another institution.  The patient's neurologic examination at present appears to be nonfocal.  The patient appears to be neurologic-wise stable.  For acute renal failure, Nephrology follow-up.  For history of hypertension, monitor systolic blood pressure.  For history of high cholesterol, continue on home medications.  From a neurology standpoint only, the patient appears to be stable.  monitor urine out put as needed     Greater than 45 minutes of time was spent with the patient, plan of care, reviewing data, speaking to the family and  50% of the visit was spent counseling and/or coordinating care with multidisciplinary healthcare team

## 2020-11-11 NOTE — PROGRESS NOTE ADULT - SUBJECTIVE AND OBJECTIVE BOX
Patient is a 70y old  Male who presents with a chief complaint of difficulty peeing    INTERVAL /OVERNIGHT EVENTS: still with jo    MEDICATIONS  (STANDING):  atorvastatin 40 milliGRAM(s) Oral at bedtime  clopidogrel Tablet 75 milliGRAM(s) Oral daily  fenofibrate Tablet 145 milliGRAM(s) Oral daily  lisinopril 2.5 milliGRAM(s) Oral daily  metoprolol tartrate 50 milliGRAM(s) Oral daily  tamsulosin 0.8 milliGRAM(s) Oral at bedtime    MEDICATIONS  (PRN):      Allergies    fish (Short breath; Anaphylaxis)  No Known Drug Allergies    Intolerances        REVIEW OF SYSTEMS:  CONSTITUTIONAL: No fever, weight loss, or fatigue  EYES: No eye pain, visual disturbances, or discharge  ENMT:  No difficulty hearing, tinnitus, vertigo; No sinus or throat pain  NECK: No pain or stiffness  RESPIRATORY: No cough, wheezing, chills or hemoptysis; No shortness of breath  CARDIOVASCULAR: No chest pain, palpitations, dizziness, or leg swelling  GASTROINTESTINAL: No abdominal or epigastric pain. No nausea, vomiting, or hematemesis; No diarrhea or constipation. No melena or hematochezia.  GENITOURINARY: No dysuria, frequency, hematuria, or incontinence  NEUROLOGICAL: No headaches, memory loss, loss of strength, numbness, or tremors  SKIN: No itching, burning, rashes, or lesions   LYMPH NODES: No enlarged glands  ENDOCRINE: No heat or cold intolerance; No hair loss; No polydipsia or polyuria  MUSCULOSKELETAL: No joint pain or swelling; No muscle, back, or extremity pain  PSYCHIATRIC: No depression, anxiety, mood swings, or difficulty sleeping  HEME/LYMPH: No easy bruising, or bleeding gums  ALLERGY AND IMMUNOLOGIC: No hives or eczema    Vital Signs Last 24 Hrs  T(C): 36.8 (11 Nov 2020 13:20), Max: 36.9 (10 Nov 2020 21:15)  T(F): 98.3 (11 Nov 2020 13:20), Max: 98.4 (10 Nov 2020 21:15)  HR: 62 (11 Nov 2020 13:20) (62 - 63)  BP: 106/65 (11 Nov 2020 13:20) (106/65 - 133/71)  BP(mean): --  RR: 16 (11 Nov 2020 13:20) (16 - 18)  SpO2: 95% (11 Nov 2020 13:20) (93% - 95%)    PHYSICAL EXAM:  GENERAL: NAD, well-groomed, well-developed  HEAD:  Atraumatic, Normocephalic  EYES: EOMI, PERRLA, conjunctiva and sclera clear  ENMT: No tonsillar erythema, exudates, or enlargement; Moist mucous membranes, Good dentition, No lesions  NECK: Supple, No JVD, Normal thyroid  NERVOUS SYSTEM:  Alert & Oriented X3, Good concentration; Motor Strength 5/5 B/L upper and lower extremities; DTRs 2+ intact and symmetric  CHEST/LUNG: Clear to auscultation bilaterally; No rales, rhonchi, wheezing, or rubs  HEART: Regular rate and rhythm; No murmurs, rubs, or gallops  ABDOMEN: Soft, Nontender, Nondistended; Bowel sounds present  EXTREMITIES:  2+ Peripheral Pulses, No clubbing, cyanosis, or edema  LYMPH: No lymphadenopathy noted  SKIN: No rashes or lesions    LABS:                        15.4   6.76  )-----------( 179      ( 11 Nov 2020 08:46 )             45.2     11 Nov 2020 08:46    139    |  106    |  30     ----------------------------<  116    3.8     |  28     |  1.00     Ca    9.3        11 Nov 2020 08:46          CAPILLARY BLOOD GLUCOSE          RADIOLOGY & ADDITIONAL TESTS:    Notes Reviewed:  [x ] YES  [ ] NO    Care Discussed with Consultants/Other Providers [x ] YES  [ ] NO

## 2020-11-12 VITALS
OXYGEN SATURATION: 95 % | SYSTOLIC BLOOD PRESSURE: 121 MMHG | RESPIRATION RATE: 16 BRPM | TEMPERATURE: 98 F | DIASTOLIC BLOOD PRESSURE: 67 MMHG | HEART RATE: 72 BPM

## 2020-11-12 PROCEDURE — 96365 THER/PROPH/DIAG IV INF INIT: CPT

## 2020-11-12 PROCEDURE — U0003: CPT

## 2020-11-12 PROCEDURE — 36415 COLL VENOUS BLD VENIPUNCTURE: CPT

## 2020-11-12 PROCEDURE — 86769 SARS-COV-2 COVID-19 ANTIBODY: CPT

## 2020-11-12 PROCEDURE — 84100 ASSAY OF PHOSPHORUS: CPT

## 2020-11-12 PROCEDURE — 97161 PT EVAL LOW COMPLEX 20 MIN: CPT

## 2020-11-12 PROCEDURE — 80053 COMPREHEN METABOLIC PANEL: CPT

## 2020-11-12 PROCEDURE — 84550 ASSAY OF BLOOD/URIC ACID: CPT

## 2020-11-12 PROCEDURE — 85027 COMPLETE CBC AUTOMATED: CPT

## 2020-11-12 PROCEDURE — 87086 URINE CULTURE/COLONY COUNT: CPT

## 2020-11-12 PROCEDURE — 81001 URINALYSIS AUTO W/SCOPE: CPT

## 2020-11-12 PROCEDURE — 80048 BASIC METABOLIC PNL TOTAL CA: CPT

## 2020-11-12 PROCEDURE — 85025 COMPLETE CBC W/AUTO DIFF WBC: CPT

## 2020-11-12 PROCEDURE — 71045 X-RAY EXAM CHEST 1 VIEW: CPT

## 2020-11-12 PROCEDURE — 76775 US EXAM ABDO BACK WALL LIM: CPT

## 2020-11-12 PROCEDURE — 93005 ELECTROCARDIOGRAM TRACING: CPT

## 2020-11-12 PROCEDURE — 86803 HEPATITIS C AB TEST: CPT

## 2020-11-12 PROCEDURE — 99285 EMERGENCY DEPT VISIT HI MDM: CPT

## 2020-11-12 RX ADMIN — Medication 145 MILLIGRAM(S): at 13:28

## 2020-11-12 RX ADMIN — Medication 50 MILLIGRAM(S): at 06:22

## 2020-11-12 RX ADMIN — LISINOPRIL 2.5 MILLIGRAM(S): 2.5 TABLET ORAL at 06:22

## 2020-11-12 RX ADMIN — CLOPIDOGREL BISULFATE 75 MILLIGRAM(S): 75 TABLET, FILM COATED ORAL at 13:28

## 2020-11-12 NOTE — DISCHARGE NOTE PROVIDER - NSDCCPCAREPLAN_GEN_ALL_CORE_FT
PRINCIPAL DISCHARGE DIAGNOSIS  Diagnosis: Renal insufficiency  Assessment and Plan of Treatment: follow up with kidney Dr. DEVLIN      SECONDARY DISCHARGE DIAGNOSES  Diagnosis: Urinary retention  Assessment and Plan of Treatment:

## 2020-11-12 NOTE — PROGRESS NOTE ADULT - SUBJECTIVE AND OBJECTIVE BOX
Neurology follow up note    SHARITA [id number]      Interval History:    Patient feels ok no new complaints.    MEDICATIONS    atorvastatin 40 milliGRAM(s) Oral at bedtime  clopidogrel Tablet 75 milliGRAM(s) Oral daily  fenofibrate Tablet 145 milliGRAM(s) Oral daily  lisinopril 2.5 milliGRAM(s) Oral daily  metoprolol tartrate 50 milliGRAM(s) Oral daily  tamsulosin 0.8 milliGRAM(s) Oral at bedtime      Allergies    fish (Short breath; Anaphylaxis)  No Known Drug Allergies    Intolerances            Vital Signs Last 24 Hrs  T(C): 36.6 (12 Nov 2020 04:47), Max: 36.8 (11 Nov 2020 13:20)  T(F): 97.9 (12 Nov 2020 04:47), Max: 98.3 (11 Nov 2020 13:20)  HR: 69 (12 Nov 2020 04:47) (62 - 69)  BP: 106/65 (12 Nov 2020 04:47) (106/65 - 106/65)  BP(mean): --  RR: 16 (12 Nov 2020 04:47) (16 - 16)  SpO2: 94% (12 Nov 2020 04:47) (94% - 95%)        REVIEW OF SYSTEMS:  Constitutional:  The patient denies fever, chills, or night sweats.  Head:  No headache.  Eyes:  No double vision or blurry vision.  Ears:  No ringing in the ears.  Neck:  No neck pain.  Respiratory:  No shortness of breath.  Cardiovascular:  No chest pain.  Abdomen:  No nausea, vomiting, or abdominal pain, but abdominal distention.  Genitourinary:  Positive problems passing his urine.  General:  Positive history of weakness of his right hand and positive history of increased salivation.    PHYSICAL EXAMINATION:  HEENT:  Head:  Normocephalic and atraumatic.  Eyes:  No scleral icterus.  Ears:  Hearing bilaterally intact.  NECK:  Supple.  RESPIRATORY:  Good air entry bilaterally.  CARDIOVASCULAR:  S1 and S2 heard.  ABDOMEN:  Soft and nontender.  EXTREMITIES:  No clubbing or cyanosis were noted.      NEUROLOGIC:  The patient is awake, alert, and oriented x3.  Extraocular movements were intact.  Pupils were equal, round, and reactive bilaterally, 3 mm to 2 mm.  Speech was fluent.  Smile was symmetric.  Motor:  Bilateral proximally bilateral upper 5/5, right hand grasp was 4/5.  The patient does have thenar atrophy of his right hand.  Bilateral lower extremities were 4+/5.  Sensory:  Bilateral upper and lower intact to light touch.  No fasciculations were noted.           LABS:  CBC Full  -  ( 11 Nov 2020 08:46 )  WBC Count : 6.76 K/uL  RBC Count : 5.01 M/uL  Hemoglobin : 15.4 g/dL  Hematocrit : 45.2 %  Platelet Count - Automated : 179 K/uL  Mean Cell Volume : 90.2 fl  Mean Cell Hemoglobin : 30.7 pg  Mean Cell Hemoglobin Concentration : 34.1 gm/dL  Auto Neutrophil # : x  Auto Lymphocyte # : x  Auto Monocyte # : x  Auto Eosinophil # : x  Auto Basophil # : x  Auto Neutrophil % : x  Auto Lymphocyte % : x  Auto Monocyte % : x  Auto Eosinophil % : x  Auto Basophil % : x      11-11    139  |  106  |  30<H>  ----------------------------<  116<H>  3.8   |  28  |  1.00    Ca    9.3      11 Nov 2020 08:46      Hemoglobin A1C:       Vitamin B12         RADIOLOGY      ANALYSIS AND PLAN:  This is a 70-year-old with a history of presumed diagnosis of amyotrophic lateral sclerosis.  For history of possibly amyotrophic lateral sclerosis, the patient does have excessive salivation.  Spoke with the patient in regards to restarting his Rilutek.  At present, the patient does not want to start it.  The patient does have a second opinion Neurology appointment at another institution.  The patient's neurologic examination at present appears to be nonfocal.  The patient appears to be neurologic-wise stable.  For acute renal failure, Nephrology follow-up.  For history of hypertension, monitor systolic blood pressure.  For history of high cholesterol, continue on home medications.  From a neurology standpoint only, the patient appears to be stable.  monitor urine out put as needed   no new events    Greater than 45 minutes of time was spent with the patient, plan of care, reviewing data, speaking to the family and  50% of the visit was spent counseling and/or coordinating care with multidisciplinary healthcare team

## 2020-11-12 NOTE — PROGRESS NOTE ADULT - SUBJECTIVE AND OBJECTIVE BOX
Patient is a 70y old  Male who presents with a chief complaint of   Patient seen in follow up for MARLEY, obstructive uropathy.    Improved renal indices. TOV today.        PAST MEDICAL HISTORY:  BPH (benign prostatic hyperplasia)    Hyperlipidemia    Hypertension    Myocardial infarct          MEDICATIONS  (STANDING):  atorvastatin 40 milliGRAM(s) Oral at bedtime  clopidogrel Tablet 75 milliGRAM(s) Oral daily  fenofibrate Tablet 145 milliGRAM(s) Oral daily  lisinopril 2.5 milliGRAM(s) Oral daily  metoprolol tartrate 50 milliGRAM(s) Oral daily  tamsulosin 0.8 milliGRAM(s) Oral at bedtime    MEDICATIONS  (PRN):    T(C): 36.6 (11-12-20 @ 04:47), Max: 36.9 (11-10-20 @ 12:36)  HR: 69 (11-12-20 @ 04:47) (62 - 69)  BP: 106/65 (11-12-20 @ 04:47) (106/65 - 133/71)  RR: 16 (11-12-20 @ 04:47)  SpO2: 94% (11-12-20 @ 04:47)  Wt(kg): --  I&O's Detail    11 Nov 2020 07:01  -  12 Nov 2020 07:00  --------------------------------------------------------  IN:    Oral Fluid: 620 mL  Total IN: 620 mL    OUT:    Indwelling Catheter - Urethral (mL): 1975 mL  Total OUT: 1975 mL    Total NET: -1355 mL      12 Nov 2020 07:01  -  12 Nov 2020 10:27  --------------------------------------------------------  IN:  Total IN: 0 mL    OUT:    Indwelling Catheter - Urethral (mL): 350 mL  Total OUT: 350 mL    Total NET: -350 mL          PHYSICAL EXAM:  General: No distress  Respiratory: b/l air entry  Cardiovascular: S1 S2  Gastrointestinal: soft  Extremities:  no edema                          LABORATORY:                        15.4   6.76  )-----------( 179      ( 11 Nov 2020 08:46 )             45.2     11-11    139  |  106  |  30<H>  ----------------------------<  116<H>  3.8   |  28  |  1.00    Ca    9.3      11 Nov 2020 08:46      Sodium, Serum: 139 mmol/L (11-11 @ 08:46)    Potassium, Serum: 3.8 mmol/L (11-11 @ 08:46)    Hemoglobin: 15.4 g/dL (11-11 @ 08:46)  Hemoglobin: 13.3 g/dL (11-10 @ 08:28)  Hemoglobin: 15.3 g/dL (11-09 @ 14:03)    Creatinine, Serum 1.00 (11-11 @ 08:46)  Creatinine, Serum 1.30 (11-10 @ 08:28)  Creatinine, Serum 6.30 (11-09 @ 14:03)

## 2020-11-12 NOTE — PROGRESS NOTE ADULT - ASSESSMENT
MARLEY: Post renal causes, On ACEI.   Hypertension  BPH    Improved renal indices. TOV today. To continue current meds. Monitor BP trend. Titrate BP meds as needed. Salt restriction.    follow up as out pt. Avoid nephrotoxic meds as possible. D/c planing if stable.

## 2020-11-12 NOTE — DISCHARGE NOTE PROVIDER - HOSPITAL COURSE
admitted for urinary retention  non compliant with FLOMAX  jo for urinary drainage  MARLEY - IV hydration  DC after renal clearance

## 2020-11-12 NOTE — DISCHARGE NOTE PROVIDER - CARE PROVIDERS DIRECT ADDRESSES
[email or web address],DirectAddress_Unknown,DirectAddress_Unknown

## 2020-11-12 NOTE — DISCHARGE NOTE PROVIDER - PROVIDER TOKENS
PROVIDER:[TOKEN:[[id number]]],PROVIDER:[TOKEN:[[id number]]],PROVIDER:[TOKEN:[[id number]]]

## 2020-11-12 NOTE — DISCHARGE NOTE PROVIDER - CARE PROVIDER_API CALL
Curtis Zavala)  Internal Medicine  [address] [place] [address]  Phone: [phone]  Fax: [phone]  Follow Up Time:     Srinivas Ayers  MEDICINE  [address], [address]  [place] [address]  Phone: [phone]  Fax: [phone]  Follow Up Time:     Clint Ruiz)  Urology  [address], [address]  [place] [address]  Phone: [phone]  Fax: [phone]  Follow Up Time:

## 2020-11-12 NOTE — DISCHARGE NOTE PROVIDER - NSDCHHNEEDSERVICE_GEN_ALL_CORE
Teaching and training/Observation and assessment/Rehabilitation services/Medication teaching and assessment

## 2020-11-12 NOTE — DISCHARGE NOTE PROVIDER - NSDCMRMEDTOKEN_GEN_ALL_CORE_FT
Crestor 40 mg oral tablet: 1 tab(s) orally once a day  fenofibrate 160 mg oral tablet: 1 tab(s) orally once a day  lisinopril 2.5 mg oral tablet: 1 tab(s) orally once a day  metoprolol tartrate 50 mg oral tablet: 1 tab(s) orally once a day  Plavix 75 mg oral tablet: 1 tab(s) orally once a day    *as per patient, he takes 1/2 tablet daily  riluzole 50 mg oral tablet: 1 tab(s) orally once daily for 3 days and then 1 tab(s) orally twice daily.    *Pt states he discontinued use due to nausea.  tamsulosin 0.4 mg oral capsule: 1 cap(s) orally once a day

## 2020-11-12 NOTE — CHART NOTE - NSCHARTNOTEFT_GEN_A_CORE
Do you have Advance Directives (HCP / LV / Organ donation / Documentation of oral advance Directive):   (  x  )  yes    (      )    NO                                                                            Do you have LV - Living will :                                                                                                                                             (    )  yes    (  x    )   No    Do you have HCP - Health Care Proxy:                                                                                                                            (   x  )  yes   (       ) N0    Do you have DNR- Do Not Resuscitate :                                                                                                                           (      )  yes  (    x    )  No    Do you have DNI- Do Not intubate  :                                                                                                                               (      )  yes   (  x     ) No    Do you have MOLST - Medical orders for Life sustaining treatment  :                                                                    (      ) yes    (    x   ) No    Decision Maker :  (  x   ) Patient     (      )  HCA   (     ) Public Health Law Surrogate     (      ) Surrogate  (       ) Guardian    Goals of Care :  (   x   )   Complete Care     (       ) No Limitations                              (       )   Comfort Care       (       )  Hospice                               (      )   Limited medical Intervention / s    Medical Interventions :   (   x     )   CPR       (        )  DNR                                               (    x    )  Intubation with MV - Mechanical Ventilation  (   x   ) BIPAP/CPAP    (         )   DNI                                               (     x    )  Artificial Nutrition -  IVF, TPN / PPN, Tube Feeds             (         )   No Feeding Tube                                                (    x    ) Use Antibiotics                         (          ) No Antibiotics                                                (    x     ) Blood and Blood Products     (         )   No Blood or Blood products                                                (    x      )  Dialysis                                    (         )  No Dialysis                                                (          )  Medical Management only  (         )  No Invasive Interventions or Surgery  Time spent :                        (   x    ) up to 30 minutes                       (           )   more than 30 minutes  ACP reviewed and discussed

## 2020-12-31 ENCOUNTER — EMERGENCY (EMERGENCY)
Facility: HOSPITAL | Age: 70
LOS: 1 days | Discharge: HOME | End: 2020-12-31
Attending: EMERGENCY MEDICINE | Admitting: EMERGENCY MEDICINE
Payer: MEDICARE

## 2020-12-31 VITALS
HEART RATE: 76 BPM | TEMPERATURE: 98 F | DIASTOLIC BLOOD PRESSURE: 70 MMHG | OXYGEN SATURATION: 97 % | RESPIRATION RATE: 16 BRPM | SYSTOLIC BLOOD PRESSURE: 126 MMHG

## 2020-12-31 VITALS
WEIGHT: 171.08 LBS | TEMPERATURE: 97 F | SYSTOLIC BLOOD PRESSURE: 168 MMHG | OXYGEN SATURATION: 96 % | HEIGHT: 68 IN | DIASTOLIC BLOOD PRESSURE: 91 MMHG | HEART RATE: 88 BPM | RESPIRATION RATE: 18 BRPM

## 2020-12-31 DIAGNOSIS — Z90.89 ACQUIRED ABSENCE OF OTHER ORGANS: Chronic | ICD-10-CM

## 2020-12-31 DIAGNOSIS — Z95.5 PRESENCE OF CORONARY ANGIOPLASTY IMPLANT AND GRAFT: Chronic | ICD-10-CM

## 2020-12-31 PROCEDURE — 99283 EMERGENCY DEPT VISIT LOW MDM: CPT | Mod: 25

## 2020-12-31 PROCEDURE — 51702 INSERT TEMP BLADDER CATH: CPT

## 2020-12-31 NOTE — ED PROVIDER NOTE - NSFOLLOWUPINSTRUCTIONS_ED_ALL_ED_FT
Follow up with Dr. Ruiz on 1/6 as planned. Return to ED if jo becomes clogged, fever, chills, blood in urine or ANY other concerns.

## 2020-12-31 NOTE — ED PROVIDER NOTE - CHPI ED SYMPTOMS NEG
no blood in stool/no dysuria/no fever/no hematuria/no nausea/no vomiting/no burning urination/no chills

## 2020-12-31 NOTE — ED ADULT NURSE REASSESSMENT NOTE - NS ED NURSE REASSESS COMMENT FT1
PA ordered for Shukla catheter as existing F/C catheter cannot be flushed and clogged; inserted 16 g F/C draining to bedside urine bag drained 500 cc of urine; patient refused to change to leg bag as stated he needs a bigger bag when he sleeps.

## 2020-12-31 NOTE — ED PROVIDER NOTE - CARE PROVIDER_API CALL
Clint Ruiz)  Urology  [address], [address]  [place] [address]  Phone: [phone]  Fax: [phone]  Follow Up Time: 1-3 Days

## 2020-12-31 NOTE — ED PROVIDER NOTE - OBJECTIVE STATEMENT
70 male with h/o urinary retention and BPH present to ED c/o blocked jo catheter. Has appt with Dr. Ruiz on 1/6/2021. Pat states was draining fine and then began feeling discomfort after dinner this evening. + associated abdominal distention. Denies fever, chills.

## 2020-12-31 NOTE — ED ADULT NURSE NOTE - OBJECTIVE STATEMENT
Patient presents to Ed with complaint of suprapubic pain and urinary retention started 2 hours ago received patient with jo catheter noted with cloudy urine sediments in the drainage bag as stated it was inserted by Dr. Ruiz 3 weeks ago and was prescribed antibiotic waiting for MD evaluation at this time. Patient presents to Ed with complaint of suprapubic pain and urinary retention started 2 hours ago received patient with jo catheter noted with cloudy urine sediments in the drainage bag as stated it was inserted by Dr. Ruiz 3 weeks ago and was prescribed antibiotic waiting for MD evaluation at this time. Bladder scanned patient noted with 200 cc of urine.

## 2020-12-31 NOTE — ED PROVIDER NOTE - PATIENT PORTAL LINK FT
You can access the FollowMyHealth Patient Portal offered by [facility] by registering at the following website: [email or web address]. By joining [employer]’s FollowMyHealth portal, you will also be able to view your health information using other applications (apps) compatible with our system.

## 2020-12-31 NOTE — ED PROVIDER NOTE - ATTENDING CONTRIBUTION TO CARE
71 y/o M with c/o jo malfunction.  pt with indwelling jo cath for urinary retention.  Urology Dr. Ruiz    PE: well appearing, no distress  heart/lungs wnl  abd soft, mild suprapubic tenderness to palpation  jo in place    flush jo or replace

## 2021-01-01 PROBLEM — I10 ESSENTIAL (PRIMARY) HYPERTENSION: Chronic | Status: ACTIVE | Noted: 2020-11-09

## 2021-01-01 PROBLEM — N40.0 BENIGN PROSTATIC HYPERPLASIA WITHOUT LOWER URINARY TRACT SYMPTOMS: Chronic | Status: ACTIVE | Noted: 2020-11-09

## 2021-01-01 PROBLEM — E78.5 HYPERLIPIDEMIA, UNSPECIFIED: Chronic | Status: ACTIVE | Noted: 2020-11-09

## 2021-06-26 ENCOUNTER — EMERGENCY (EMERGENCY)
Facility: HOSPITAL | Age: 71
LOS: 1 days | Discharge: HOME | End: 2021-06-26
Attending: EMERGENCY MEDICINE | Admitting: EMERGENCY MEDICINE
Payer: MEDICARE

## 2021-06-26 VITALS
TEMPERATURE: 98 F | WEIGHT: 164.91 LBS | RESPIRATION RATE: 18 BRPM | HEIGHT: 68 IN | DIASTOLIC BLOOD PRESSURE: 79 MMHG | SYSTOLIC BLOOD PRESSURE: 95 MMHG | HEART RATE: 99 BPM | OXYGEN SATURATION: 98 %

## 2021-06-26 VITALS
RESPIRATION RATE: 18 BRPM | OXYGEN SATURATION: 99 % | SYSTOLIC BLOOD PRESSURE: 98 MMHG | TEMPERATURE: 98 F | HEART RATE: 96 BPM | DIASTOLIC BLOOD PRESSURE: 74 MMHG

## 2021-06-26 DIAGNOSIS — Z95.5 PRESENCE OF CORONARY ANGIOPLASTY IMPLANT AND GRAFT: Chronic | ICD-10-CM

## 2021-06-26 DIAGNOSIS — Z90.89 ACQUIRED ABSENCE OF OTHER ORGANS: Chronic | ICD-10-CM

## 2021-06-26 LAB
ALBUMIN SERPL ELPH-MCNC: 4.3 G/DL — SIGNIFICANT CHANGE UP (ref 3.3–5)
ALP SERPL-CCNC: 68 U/L — SIGNIFICANT CHANGE UP (ref 40–120)
ALT FLD-CCNC: 31 U/L — SIGNIFICANT CHANGE UP (ref 12–78)
ANION GAP SERPL CALC-SCNC: 8 MMOL/L — SIGNIFICANT CHANGE UP (ref 5–17)
AST SERPL-CCNC: 26 U/L — SIGNIFICANT CHANGE UP (ref 15–37)
BASOPHILS # BLD AUTO: 0.05 K/UL — SIGNIFICANT CHANGE UP (ref 0–0.2)
BASOPHILS NFR BLD AUTO: 0.5 % — SIGNIFICANT CHANGE UP (ref 0–2)
BILIRUB SERPL-MCNC: 0.7 MG/DL — SIGNIFICANT CHANGE UP (ref 0.2–1.2)
BUN SERPL-MCNC: 39 MG/DL — HIGH (ref 7–23)
CALCIUM SERPL-MCNC: 10.6 MG/DL — HIGH (ref 8.5–10.1)
CHLORIDE SERPL-SCNC: 107 MMOL/L — SIGNIFICANT CHANGE UP (ref 96–108)
CO2 SERPL-SCNC: 27 MMOL/L — SIGNIFICANT CHANGE UP (ref 22–31)
CREAT SERPL-MCNC: 1.1 MG/DL — SIGNIFICANT CHANGE UP (ref 0.5–1.3)
EOSINOPHIL # BLD AUTO: 0.04 K/UL — SIGNIFICANT CHANGE UP (ref 0–0.5)
EOSINOPHIL NFR BLD AUTO: 0.4 % — SIGNIFICANT CHANGE UP (ref 0–6)
GLUCOSE SERPL-MCNC: 114 MG/DL — HIGH (ref 70–99)
HCT VFR BLD CALC: 45.5 % — SIGNIFICANT CHANGE UP (ref 39–50)
HGB BLD-MCNC: 15.1 G/DL — SIGNIFICANT CHANGE UP (ref 13–17)
IMM GRANULOCYTES NFR BLD AUTO: 0.3 % — SIGNIFICANT CHANGE UP (ref 0–1.5)
LIDOCAIN IGE QN: 114 U/L — SIGNIFICANT CHANGE UP (ref 73–393)
LYMPHOCYTES # BLD AUTO: 0.7 K/UL — LOW (ref 1–3.3)
LYMPHOCYTES # BLD AUTO: 6.6 % — LOW (ref 13–44)
MCHC RBC-ENTMCNC: 30.8 PG — SIGNIFICANT CHANGE UP (ref 27–34)
MCHC RBC-ENTMCNC: 33.2 GM/DL — SIGNIFICANT CHANGE UP (ref 32–36)
MCV RBC AUTO: 92.9 FL — SIGNIFICANT CHANGE UP (ref 80–100)
MONOCYTES # BLD AUTO: 0.48 K/UL — SIGNIFICANT CHANGE UP (ref 0–0.9)
MONOCYTES NFR BLD AUTO: 4.5 % — SIGNIFICANT CHANGE UP (ref 2–14)
NEUTROPHILS # BLD AUTO: 9.25 K/UL — HIGH (ref 1.8–7.4)
NEUTROPHILS NFR BLD AUTO: 87.7 % — HIGH (ref 43–77)
NRBC # BLD: 0 /100 WBCS — SIGNIFICANT CHANGE UP (ref 0–0)
PLATELET # BLD AUTO: 238 K/UL — SIGNIFICANT CHANGE UP (ref 150–400)
POTASSIUM SERPL-MCNC: 4.1 MMOL/L — SIGNIFICANT CHANGE UP (ref 3.5–5.3)
POTASSIUM SERPL-SCNC: 4.1 MMOL/L — SIGNIFICANT CHANGE UP (ref 3.5–5.3)
PROT SERPL-MCNC: 8 G/DL — SIGNIFICANT CHANGE UP (ref 6–8.3)
RBC # BLD: 4.9 M/UL — SIGNIFICANT CHANGE UP (ref 4.2–5.8)
RBC # FLD: 12.9 % — SIGNIFICANT CHANGE UP (ref 10.3–14.5)
SODIUM SERPL-SCNC: 142 MMOL/L — SIGNIFICANT CHANGE UP (ref 135–145)
WBC # BLD: 10.55 K/UL — HIGH (ref 3.8–10.5)
WBC # FLD AUTO: 10.55 K/UL — HIGH (ref 3.8–10.5)

## 2021-06-26 PROCEDURE — 80053 COMPREHEN METABOLIC PANEL: CPT

## 2021-06-26 PROCEDURE — 85025 COMPLETE CBC W/AUTO DIFF WBC: CPT

## 2021-06-26 PROCEDURE — 96360 HYDRATION IV INFUSION INIT: CPT | Mod: XU

## 2021-06-26 PROCEDURE — 93010 ELECTROCARDIOGRAM REPORT: CPT

## 2021-06-26 PROCEDURE — 83690 ASSAY OF LIPASE: CPT

## 2021-06-26 PROCEDURE — 74177 CT ABD & PELVIS W/CONTRAST: CPT | Mod: 26,MB

## 2021-06-26 PROCEDURE — 99284 EMERGENCY DEPT VISIT MOD MDM: CPT | Mod: 25

## 2021-06-26 PROCEDURE — 93005 ELECTROCARDIOGRAM TRACING: CPT

## 2021-06-26 PROCEDURE — 99284 EMERGENCY DEPT VISIT MOD MDM: CPT

## 2021-06-26 PROCEDURE — 36415 COLL VENOUS BLD VENIPUNCTURE: CPT

## 2021-06-26 PROCEDURE — 96361 HYDRATE IV INFUSION ADD-ON: CPT

## 2021-06-26 PROCEDURE — 74177 CT ABD & PELVIS W/CONTRAST: CPT | Mod: MB

## 2021-06-26 RX ORDER — MULTIVIT WITH MIN/MFOLATE/K2 340-15/3 G
1 POWDER (GRAM) ORAL ONCE
Refills: 0 | Status: COMPLETED | OUTPATIENT
Start: 2021-06-26 | End: 2021-06-26

## 2021-06-26 RX ORDER — SODIUM CHLORIDE 9 MG/ML
1000 INJECTION INTRAMUSCULAR; INTRAVENOUS; SUBCUTANEOUS ONCE
Refills: 0 | Status: COMPLETED | OUTPATIENT
Start: 2021-06-26 | End: 2021-06-26

## 2021-06-26 RX ADMIN — SODIUM CHLORIDE 1000 MILLILITER(S): 9 INJECTION INTRAMUSCULAR; INTRAVENOUS; SUBCUTANEOUS at 12:46

## 2021-06-26 RX ADMIN — Medication 1 BOTTLE: at 15:35

## 2021-06-26 RX ADMIN — SODIUM CHLORIDE 1000 MILLILITER(S): 9 INJECTION INTRAMUSCULAR; INTRAVENOUS; SUBCUTANEOUS at 15:19

## 2021-06-26 NOTE — ED PROVIDER NOTE - NSFOLLOWUPINSTRUCTIONS_ED_ALL_ED_FT
Constipation    Constipation is when a person has fewer than three bowel movements a week, has difficulty having a bowel movement, or has stools that are dry, hard, or larger than normal. Other symptoms can include abdominal pain or bloating. As people grow older, constipation is more common. A low-fiber diet, not taking in enough fluids, and taking certain medicines, including opioid painkillers, may make constipation worse. Treatment varies but may include dietary modifications (more fiber-rich foods), lifestyle modifications, and possible medications.     SEEK IMMEDIATE MEDICAL CARE IF YOU HAVE ANY OF THE FOLLOWING SYMPTOMS: bright red blood in your stool, constipation for longer than 4 days, abdominal or rectal pain, unexplained weight loss, or inability to pass gas.      follow up with Dr Ruiz as scheduled  follow up with GI Dr Busch   call monday to arrange follow up  drink fluids, add fiber to diet  recommend over the counter miralax ad directed for constipation  any concerns, worsening symptoms return to ED

## 2021-06-26 NOTE — ED PROVIDER NOTE - PROVIDER TOKENS
PROVIDER:[TOKEN:[[id number]],FOLLOWUP:[1-3 Days]],PROVIDER:[TOKEN:[[id number]],FOLLOWUP:[1-3 Days]]

## 2021-06-26 NOTE — ED PROVIDER NOTE - PATIENT PORTAL LINK FT
You can access the FollowMyHealth Patient Portal offered by [facility] by registering at the following website: [email or web address]. By joining [employer]’s FollowMyHealth portal, you will also be able to view your health information using other applications (apps) compatible with our system.

## 2021-06-26 NOTE — ED ADULT NURSE NOTE - PMH
ALS (amyotrophic lateral sclerosis)    BPH (benign prostatic hyperplasia)    Hyperlipidemia    Hypertension    Myocardial infarct

## 2021-06-26 NOTE — ED ADULT NURSE NOTE - CHIEF COMPLAINT QUOTE
Patient is a 70yo male complaining of constipation. Patient states he had a bowel movement a few days ago. Patient has tried over the counter remedies

## 2021-06-26 NOTE — ED PROVIDER NOTE - CLINICAL SUMMARY MEDICAL DECISION MAKING FREE TEXT BOX
constipation, dull lower abdominal pain x 5 days, hx of hernia surgery many years ago, hx of ALS, will obtain labs, ct, give ivf, reassess

## 2021-06-26 NOTE — ED PROVIDER NOTE - OBJECTIVE STATEMENT
71 y male presents with constipation, lower abdominal pain x 5 days, denies nvd, states he has tried several otc medications for the constipation, but is only having small bowel movements.  states he had hernia surgery 20 years ago.  nonsmoker, no etoh.   No GI    PMH: ALS (amyotrophic lateral sclerosis)    BPH (benign prostatic hyperplasia)    Hyperlipidemia    Hypertension    Myocardial infarct

## 2021-06-26 NOTE — ED PROVIDER NOTE - PROGRESS NOTE DETAILS
Reevaluated patient at bedside.  Patient feeling much improved.  Discussed the results of all diagnostic testing in ED and copies of all reports given.   An opportunity to ask questions was given.  Discussed the importance of prompt, close medical follow-up.  Patient will return with any changes, concerns or persistent / worsening symptoms.  Understanding of all instructions verbalized.  patient states he had bowel movement in ED, feels better and is requesting discharge, wife at bedside. discussed result of ct bladder diverticulum vs abscess,  patient states he has no bladder symptoms, patients jo catheter was changed in ED, wife states patient has appt with Dr Ruiz on Tuesday, copy of results given.  advised if any concerns, worsening symptoms return to ED.  given information for Dr Busch, call monday to arrange follow up

## 2021-06-26 NOTE — ED ADULT NURSE REASSESSMENT NOTE - NS ED NURSE REASSESS COMMENT FT1
pt updated on the plan on care, MD at this bedside, pt denies any constipation anymore. pt was able to elimate w/o any problems. pt resting comfortably, ,nad noted, will continue to monitor

## 2021-06-26 NOTE — ED ADULT NURSE REASSESSMENT NOTE - NS ED NURSE REASSESS COMMENT FT1
pt is A&Ox4, pt denies any chest pain, dizziness, headache, sob at this moment. pt presented to the ER for impacted constipation, abd soft on touch, non distended/ nontender. pt resting comfortably, nad noted, resp even and unlabored, will continue to monitor

## 2021-06-26 NOTE — ED PROVIDER NOTE - ATTENDING CONTRIBUTION TO CARE
72 yo white male awoke this morning with crampy abdominal pains and inability to easily have BM. No nausea and or vomiting. Exam revealed white male in NAD with soft non tender abdomen. I agree with plan and management outlined by PA.

## 2021-07-29 ENCOUNTER — INPATIENT (INPATIENT)
Facility: HOSPITAL | Age: 71
LOS: 6 days | Discharge: NURSING, REHAB OR LONG-TERM CARE | DRG: 853 | End: 2021-08-05
Attending: FAMILY MEDICINE | Admitting: HOSPITALIST
Payer: MEDICARE

## 2021-07-29 VITALS
TEMPERATURE: 98 F | DIASTOLIC BLOOD PRESSURE: 69 MMHG | RESPIRATION RATE: 18 BRPM | HEIGHT: 68 IN | HEART RATE: 116 BPM | OXYGEN SATURATION: 90 % | SYSTOLIC BLOOD PRESSURE: 102 MMHG

## 2021-07-29 DIAGNOSIS — E78.5 HYPERLIPIDEMIA, UNSPECIFIED: ICD-10-CM

## 2021-07-29 DIAGNOSIS — Z95.5 PRESENCE OF CORONARY ANGIOPLASTY IMPLANT AND GRAFT: Chronic | ICD-10-CM

## 2021-07-29 DIAGNOSIS — Z90.89 ACQUIRED ABSENCE OF OTHER ORGANS: Chronic | ICD-10-CM

## 2021-07-29 DIAGNOSIS — N17.9 ACUTE KIDNEY FAILURE, UNSPECIFIED: ICD-10-CM

## 2021-07-29 DIAGNOSIS — Z98.890 OTHER SPECIFIED POSTPROCEDURAL STATES: Chronic | ICD-10-CM

## 2021-07-29 DIAGNOSIS — G12.21 AMYOTROPHIC LATERAL SCLEROSIS: ICD-10-CM

## 2021-07-29 DIAGNOSIS — I10 ESSENTIAL (PRIMARY) HYPERTENSION: ICD-10-CM

## 2021-07-29 DIAGNOSIS — Z93.1 GASTROSTOMY STATUS: ICD-10-CM

## 2021-07-29 DIAGNOSIS — I25.10 ATHEROSCLEROTIC HEART DISEASE OF NATIVE CORONARY ARTERY WITHOUT ANGINA PECTORIS: ICD-10-CM

## 2021-07-29 DIAGNOSIS — N39.0 URINARY TRACT INFECTION, SITE NOT SPECIFIED: ICD-10-CM

## 2021-07-29 DIAGNOSIS — Z71.89 OTHER SPECIFIED COUNSELING: ICD-10-CM

## 2021-07-29 DIAGNOSIS — E87.0 HYPEROSMOLALITY AND HYPERNATREMIA: ICD-10-CM

## 2021-07-29 DIAGNOSIS — N20.1 CALCULUS OF URETER: ICD-10-CM

## 2021-07-29 DIAGNOSIS — Z29.9 ENCOUNTER FOR PROPHYLACTIC MEASURES, UNSPECIFIED: ICD-10-CM

## 2021-07-29 DIAGNOSIS — A41.9 SEPSIS, UNSPECIFIED ORGANISM: ICD-10-CM

## 2021-07-29 DIAGNOSIS — J69.0 PNEUMONITIS DUE TO INHALATION OF FOOD AND VOMIT: ICD-10-CM

## 2021-07-29 LAB
ALBUMIN SERPL ELPH-MCNC: 2.3 G/DL — LOW (ref 3.3–5)
ALP SERPL-CCNC: 223 U/L — HIGH (ref 40–120)
ALT FLD-CCNC: 57 U/L — SIGNIFICANT CHANGE UP (ref 12–78)
ANION GAP SERPL CALC-SCNC: 12 MMOL/L — SIGNIFICANT CHANGE UP (ref 5–17)
APPEARANCE UR: ABNORMAL
APTT BLD: 28.4 SEC — SIGNIFICANT CHANGE UP (ref 27.5–35.5)
AST SERPL-CCNC: 70 U/L — HIGH (ref 15–37)
BASE EXCESS BLDA CALC-SCNC: 0.4 MMOL/L — SIGNIFICANT CHANGE UP (ref -2–2)
BASOPHILS # BLD AUTO: 0 K/UL — SIGNIFICANT CHANGE UP (ref 0–0.2)
BASOPHILS NFR BLD AUTO: 0 % — SIGNIFICANT CHANGE UP (ref 0–2)
BILIRUB SERPL-MCNC: 1.8 MG/DL — HIGH (ref 0.2–1.2)
BILIRUB UR-MCNC: NEGATIVE — SIGNIFICANT CHANGE UP
BLOOD GAS COMMENTS ARTERIAL: SIGNIFICANT CHANGE UP
BUN SERPL-MCNC: 113 MG/DL — HIGH (ref 7–23)
CALCIUM SERPL-MCNC: 10.1 MG/DL — SIGNIFICANT CHANGE UP (ref 8.5–10.1)
CHLORIDE SERPL-SCNC: 111 MMOL/L — HIGH (ref 96–108)
CO2 SERPL-SCNC: 24 MMOL/L — SIGNIFICANT CHANGE UP (ref 22–31)
COLOR SPEC: ABNORMAL
CREAT SERPL-MCNC: 5.7 MG/DL — HIGH (ref 0.5–1.3)
DIFF PNL FLD: ABNORMAL
EOSINOPHIL # BLD AUTO: 0 K/UL — SIGNIFICANT CHANGE UP (ref 0–0.5)
EOSINOPHIL NFR BLD AUTO: 0 % — SIGNIFICANT CHANGE UP (ref 0–6)
GLUCOSE SERPL-MCNC: 292 MG/DL — HIGH (ref 70–99)
GLUCOSE UR QL: NEGATIVE — SIGNIFICANT CHANGE UP
HCO3 BLDA-SCNC: 25 MMOL/L — SIGNIFICANT CHANGE UP (ref 23–27)
HCT VFR BLD CALC: 44.5 % — SIGNIFICANT CHANGE UP (ref 39–50)
HGB BLD-MCNC: 14.5 G/DL — SIGNIFICANT CHANGE UP (ref 13–17)
HOROWITZ INDEX BLDA+IHG-RTO: 100 — SIGNIFICANT CHANGE UP
INR BLD: 1 RATIO — SIGNIFICANT CHANGE UP (ref 0.88–1.16)
KETONES UR-MCNC: ABNORMAL
LACTATE SERPL-SCNC: 2.1 MMOL/L — HIGH (ref 0.7–2)
LACTATE SERPL-SCNC: 2.7 MMOL/L — HIGH (ref 0.7–2)
LEUKOCYTE ESTERASE UR-ACNC: ABNORMAL
LYMPHOCYTES # BLD AUTO: 0.29 K/UL — LOW (ref 1–3.3)
LYMPHOCYTES # BLD AUTO: 1 % — LOW (ref 13–44)
MCHC RBC-ENTMCNC: 30.5 PG — SIGNIFICANT CHANGE UP (ref 27–34)
MCHC RBC-ENTMCNC: 32.6 GM/DL — SIGNIFICANT CHANGE UP (ref 32–36)
MCV RBC AUTO: 93.7 FL — SIGNIFICANT CHANGE UP (ref 80–100)
MONOCYTES # BLD AUTO: 0.88 K/UL — SIGNIFICANT CHANGE UP (ref 0–0.9)
MONOCYTES NFR BLD AUTO: 3 % — SIGNIFICANT CHANGE UP (ref 2–14)
NEUTROPHILS # BLD AUTO: 27.41 K/UL — HIGH (ref 1.8–7.4)
NEUTROPHILS NFR BLD AUTO: 85 % — HIGH (ref 43–77)
NITRITE UR-MCNC: NEGATIVE — SIGNIFICANT CHANGE UP
NRBC # BLD: SIGNIFICANT CHANGE UP /100 WBCS (ref 0–0)
PCO2 BLDA: 38 MMHG — SIGNIFICANT CHANGE UP (ref 32–46)
PH BLDA: 7.42 — SIGNIFICANT CHANGE UP (ref 7.35–7.45)
PH UR: 5 — SIGNIFICANT CHANGE UP (ref 5–8)
PLATELET # BLD AUTO: 154 K/UL — SIGNIFICANT CHANGE UP (ref 150–400)
PO2 BLDA: 76 MMHG — SIGNIFICANT CHANGE UP (ref 74–108)
POTASSIUM SERPL-MCNC: 3.8 MMOL/L — SIGNIFICANT CHANGE UP (ref 3.5–5.3)
POTASSIUM SERPL-SCNC: 3.8 MMOL/L — SIGNIFICANT CHANGE UP (ref 3.5–5.3)
PROCALCITONIN SERPL-MCNC: 301.8 NG/ML — SIGNIFICANT CHANGE UP (ref 0–0.04)
PROT SERPL-MCNC: 7.4 G/DL — SIGNIFICANT CHANGE UP (ref 6–8.3)
PROT UR-MCNC: 100
PROTHROM AB SERPL-ACNC: 11.7 SEC — SIGNIFICANT CHANGE UP (ref 10.6–13.6)
RAPID RVP RESULT: SIGNIFICANT CHANGE UP
RBC # BLD: 4.75 M/UL — SIGNIFICANT CHANGE UP (ref 4.2–5.8)
RBC # FLD: 13.2 % — SIGNIFICANT CHANGE UP (ref 10.3–14.5)
SAO2 % BLDA: 96 % — SIGNIFICANT CHANGE UP (ref 92–96)
SARS-COV-2 RNA SPEC QL NAA+PROBE: SIGNIFICANT CHANGE UP
SODIUM SERPL-SCNC: 147 MMOL/L — HIGH (ref 135–145)
SP GR SPEC: 1.02 — SIGNIFICANT CHANGE UP (ref 1.01–1.02)
UROBILINOGEN FLD QL: 1
WBC # BLD: 29.47 K/UL — HIGH (ref 3.8–10.5)
WBC # FLD AUTO: 29.47 K/UL — HIGH (ref 3.8–10.5)

## 2021-07-29 PROCEDURE — 99291 CRITICAL CARE FIRST HOUR: CPT

## 2021-07-29 PROCEDURE — 71250 CT THORAX DX C-: CPT | Mod: 26

## 2021-07-29 PROCEDURE — 76775 US EXAM ABDO BACK WALL LIM: CPT | Mod: 26

## 2021-07-29 PROCEDURE — 76604 US EXAM CHEST: CPT | Mod: 26

## 2021-07-29 PROCEDURE — 93010 ELECTROCARDIOGRAM REPORT: CPT

## 2021-07-29 PROCEDURE — 71045 X-RAY EXAM CHEST 1 VIEW: CPT | Mod: 26

## 2021-07-29 PROCEDURE — 99285 EMERGENCY DEPT VISIT HI MDM: CPT | Mod: CS

## 2021-07-29 PROCEDURE — 74176 CT ABD & PELVIS W/O CONTRAST: CPT | Mod: 26

## 2021-07-29 PROCEDURE — 93308 TTE F-UP OR LMTD: CPT | Mod: 26

## 2021-07-29 PROCEDURE — 99223 1ST HOSP IP/OBS HIGH 75: CPT | Mod: GC

## 2021-07-29 PROCEDURE — 99223 1ST HOSP IP/OBS HIGH 75: CPT

## 2021-07-29 RX ORDER — SODIUM CHLORIDE 9 MG/ML
1000 INJECTION INTRAMUSCULAR; INTRAVENOUS; SUBCUTANEOUS ONCE
Refills: 0 | Status: COMPLETED | OUTPATIENT
Start: 2021-07-29 | End: 2021-07-29

## 2021-07-29 RX ORDER — PIPERACILLIN AND TAZOBACTAM 4; .5 G/20ML; G/20ML
3.38 INJECTION, POWDER, LYOPHILIZED, FOR SOLUTION INTRAVENOUS ONCE
Refills: 0 | Status: COMPLETED | OUTPATIENT
Start: 2021-07-29 | End: 2021-07-29

## 2021-07-29 RX ORDER — ACETAMINOPHEN 500 MG
1000 TABLET ORAL ONCE
Refills: 0 | Status: COMPLETED | OUTPATIENT
Start: 2021-07-29 | End: 2021-07-29

## 2021-07-29 RX ORDER — SODIUM CHLORIDE 9 MG/ML
2000 INJECTION, SOLUTION INTRAVENOUS ONCE
Refills: 0 | Status: DISCONTINUED | OUTPATIENT
Start: 2021-07-29 | End: 2021-07-31

## 2021-07-29 RX ORDER — LANOLIN ALCOHOL/MO/W.PET/CERES
3 CREAM (GRAM) TOPICAL AT BEDTIME
Refills: 0 | Status: DISCONTINUED | OUTPATIENT
Start: 2021-07-29 | End: 2021-08-03

## 2021-07-29 RX ORDER — PANTOPRAZOLE SODIUM 20 MG/1
40 TABLET, DELAYED RELEASE ORAL DAILY
Refills: 0 | Status: DISCONTINUED | OUTPATIENT
Start: 2021-07-29 | End: 2021-07-30

## 2021-07-29 RX ORDER — METOPROLOL TARTRATE 50 MG
50 TABLET ORAL DAILY
Refills: 0 | Status: DISCONTINUED | OUTPATIENT
Start: 2021-07-29 | End: 2021-07-29

## 2021-07-29 RX ORDER — ACETAMINOPHEN 500 MG
650 TABLET ORAL EVERY 6 HOURS
Refills: 0 | Status: DISCONTINUED | OUTPATIENT
Start: 2021-07-29 | End: 2021-08-05

## 2021-07-29 RX ORDER — SODIUM CHLORIDE 9 MG/ML
1000 INJECTION INTRAMUSCULAR; INTRAVENOUS; SUBCUTANEOUS ONCE
Refills: 0 | Status: DISCONTINUED | OUTPATIENT
Start: 2021-07-29 | End: 2021-07-31

## 2021-07-29 RX ORDER — ONDANSETRON 8 MG/1
4 TABLET, FILM COATED ORAL EVERY 8 HOURS
Refills: 0 | Status: DISCONTINUED | OUTPATIENT
Start: 2021-07-29 | End: 2021-08-03

## 2021-07-29 RX ORDER — ATORVASTATIN CALCIUM 80 MG/1
80 TABLET, FILM COATED ORAL AT BEDTIME
Refills: 0 | Status: DISCONTINUED | OUTPATIENT
Start: 2021-07-29 | End: 2021-08-05

## 2021-07-29 RX ORDER — SODIUM CHLORIDE 9 MG/ML
1000 INJECTION, SOLUTION INTRAVENOUS
Refills: 0 | Status: DISCONTINUED | OUTPATIENT
Start: 2021-07-29 | End: 2021-07-30

## 2021-07-29 RX ORDER — CHLORHEXIDINE GLUCONATE 213 G/1000ML
1 SOLUTION TOPICAL
Refills: 0 | Status: DISCONTINUED | OUTPATIENT
Start: 2021-07-29 | End: 2021-08-02

## 2021-07-29 RX ORDER — PIPERACILLIN AND TAZOBACTAM 4; .5 G/20ML; G/20ML
INJECTION, POWDER, LYOPHILIZED, FOR SOLUTION INTRAVENOUS
Refills: 0 | Status: DISCONTINUED | OUTPATIENT
Start: 2021-07-29 | End: 2021-08-01

## 2021-07-29 RX ORDER — PIPERACILLIN AND TAZOBACTAM 4; .5 G/20ML; G/20ML
3.38 INJECTION, POWDER, LYOPHILIZED, FOR SOLUTION INTRAVENOUS EVERY 12 HOURS
Refills: 0 | Status: DISCONTINUED | OUTPATIENT
Start: 2021-07-30 | End: 2021-08-01

## 2021-07-29 RX ORDER — TAMSULOSIN HYDROCHLORIDE 0.4 MG/1
1 CAPSULE ORAL
Qty: 0 | Refills: 0 | DISCHARGE

## 2021-07-29 RX ORDER — FENOFIBRATE,MICRONIZED 130 MG
145 CAPSULE ORAL AT BEDTIME
Refills: 0 | Status: DISCONTINUED | OUTPATIENT
Start: 2021-07-29 | End: 2021-07-29

## 2021-07-29 RX ORDER — CLOPIDOGREL BISULFATE 75 MG/1
75 TABLET, FILM COATED ORAL DAILY
Refills: 0 | Status: DISCONTINUED | OUTPATIENT
Start: 2021-07-29 | End: 2021-07-29

## 2021-07-29 RX ORDER — CLOPIDOGREL BISULFATE 75 MG/1
75 TABLET, FILM COATED ORAL DAILY
Refills: 0 | Status: DISCONTINUED | OUTPATIENT
Start: 2021-07-29 | End: 2021-08-03

## 2021-07-29 RX ORDER — SODIUM CHLORIDE 9 MG/ML
1000 INJECTION INTRAMUSCULAR; INTRAVENOUS; SUBCUTANEOUS
Refills: 0 | Status: DISCONTINUED | OUTPATIENT
Start: 2021-07-29 | End: 2021-07-29

## 2021-07-29 RX ORDER — SODIUM CHLORIDE 9 MG/ML
1000 INJECTION, SOLUTION INTRAVENOUS
Refills: 0 | Status: DISCONTINUED | OUTPATIENT
Start: 2021-07-29 | End: 2021-07-29

## 2021-07-29 RX ORDER — CEFTRIAXONE 500 MG/1
1000 INJECTION, POWDER, FOR SOLUTION INTRAMUSCULAR; INTRAVENOUS ONCE
Refills: 0 | Status: COMPLETED | OUTPATIENT
Start: 2021-07-29 | End: 2021-07-29

## 2021-07-29 RX ORDER — ESCITALOPRAM OXALATE 10 MG/1
10 TABLET, FILM COATED ORAL DAILY
Refills: 0 | Status: DISCONTINUED | OUTPATIENT
Start: 2021-07-29 | End: 2021-08-05

## 2021-07-29 RX ADMIN — CEFTRIAXONE 100 MILLIGRAM(S): 500 INJECTION, POWDER, FOR SOLUTION INTRAMUSCULAR; INTRAVENOUS at 13:48

## 2021-07-29 RX ADMIN — SODIUM CHLORIDE 100 MILLILITER(S): 9 INJECTION, SOLUTION INTRAVENOUS at 22:43

## 2021-07-29 RX ADMIN — Medication 400 MILLIGRAM(S): at 21:08

## 2021-07-29 RX ADMIN — PIPERACILLIN AND TAZOBACTAM 25 GRAM(S): 4; .5 INJECTION, POWDER, LYOPHILIZED, FOR SOLUTION INTRAVENOUS at 17:00

## 2021-07-29 RX ADMIN — SODIUM CHLORIDE 150 MILLILITER(S): 9 INJECTION, SOLUTION INTRAVENOUS at 21:10

## 2021-07-29 RX ADMIN — ATORVASTATIN CALCIUM 80 MILLIGRAM(S): 80 TABLET, FILM COATED ORAL at 22:14

## 2021-07-29 RX ADMIN — SODIUM CHLORIDE 1000 MILLILITER(S): 9 INJECTION INTRAMUSCULAR; INTRAVENOUS; SUBCUTANEOUS at 13:48

## 2021-07-29 NOTE — CONSULT NOTE ADULT - ATTENDING COMMENTS
71M h/o ALS, neurogenic bladder (self-caths), CAD s/p stent (2006), HTN, HLD, BPH p/w fever, SOB, and urinary symptoms. Per son, pt had PEG placed ~2wks ago but still was eating by mouth and had been aspirating more. Noted worsening respiratory status throughout the past week prompting ED visit. Labs notable for leukocytosis of 30, lactate 2.7, Cr 5.7 (baseline 1.1), +UA with CT chest/abd/pelvis with bibasilar aspiration PNA and left sided 7mm stone with hydroureteronephrosis with b/l mandie-nephric stranding. Received 1L NS and ceftriaxone x1. Afebrile, normotensive, tachypnic and sat 95% on venturi mask.     Problem List  - Sepsis 2/2 UTI/pyelo with left sided 7mm obstructing stones  - Multifocal pneumonia 2/2 aspiration  - Acute kidney injury  - lactic acidosis  - chronic dysphagia with PEG  - neurogenic bladder    Plan  - to go to OR with urology for stent and relief of obstruction  - give additional 2L LR now  - continue zosyn pending urine culture; follow-up blood cultures  - obtain sputum culture if able  - admit to ICU after OR, further recs to be given post-procedure
The patient is profoundly septic.  He is in mild shock.  This is likely from an obstructing ureteral stone.  He will need an emergent ureteral stent placement tonight.  He is optimized from a cardiac standpoint as best as possible.  Continue IV fluids in order to maintain his hemodynamics.  Low threshold for pressor support. Abx per primary  No signs of ischemia on his EKG.  His hypoxia is largely driven by possible aspiration pneumonia.  Does not appear to be volume overloaded.  Continue single antiplatelet for CAD.  Hold all hypertensive medications at this time.  Continue supplemental oxygen with Ventimask.  If worsens may need BiPAP therapy.  Will need ICU evaluation.  He is at extremely high risk for decompensation.    Upon my evaluation, this patient is at high risk for imminent or life threatening deterioration due to septic shock  and other active medical issues which require my direct attention, intervention, and personal management.  I have personally spent >30 minutes  of critical care time exclusive of time spent on separate billing procedures. This includes review of laboratory data, radiology results, discussion with primary team\patient, and monitoring for potential decompensation. Interventions were performed as documented above.

## 2021-07-29 NOTE — CONSULT NOTE ADULT - ASSESSMENT
72 yo M PMHx ALS, CAD (s/p MI 1 stent placed 2006), HTN, HLD, BPH presents to [facility] with fever, SOB, urinary symptoms. Found to have b/l PNA, and septic urologic stone.     Plan discussed with ICU attending Dr. Lozano     Problem List:  1) acute hypoxic respiratory failure  2) Severe sepsis   3) dehydration  4) ARF  5) Hypernatremia  6) UTI/pyelo  7) Hydronephrosis L   8) Aspiration PNA    Admit to ICU level of care after OR and recovery   Appears severely dehydrated, needs IVF, bolus additional 2L LR now then continue at 150ml/hr   Cover with IV abx, zosyn should cover, no reported history of MDR, nothing in chart as well   Also with aspiration PNA, b/l infiltrates on CXR  Obtain blood and urine cultures   Place jo for I/os  7mm stone with hydronephrosis  going to the OR now for stent placement   Hypernatremia should improve with IVF  Trend labs, lactate, Cr

## 2021-07-29 NOTE — H&P ADULT - PROBLEM SELECTOR PLAN 7
Hypotension upon admission due to sepsis   - hold lisinopril and metoprolol due to hypotension CAD, s/p MI in 2006, with one stent placed   - on plavix   - continue plavix daily CAD, s/p MI in 2006, with one stent placed   - on plavix   - continue plavix daily  - [facility] consulted PEG tube placement, on tube feedings at home  - history taken from wife, who is unsure which feedings he receives; she admits that he dose take PO but liquids/broths  - will hold feedings for now, pending nutrition consult PEG tube placement, on tube feedings at home  - history taken from wife, who is unsure which feedings he receives; she admits that he dose take PO but liquids/broths  - will hold feedings for now due to aspiration risk  - patient takes Nutrin 1.5 up to 5 times a day, with goal 2000 calories/day PEG tube placement, on tube feedings at home  - history taken from wife, who is unsure which feedings he receives; she admits that he dose take PO but liquids/broths  - will hold feedings for now due to aspiration risk  - patient takes Nutrin 1.5 up to 5 times a day, with goal 2000 calories/day  - IV PPI PEG tube placement, on tube feedings at hom[facility] placed 2 weeks ago at [facility] due to progressive ALS and nutritional supplementation as per son.   - will hold feedings for now due to aspiration risk  - patient takes Nutrin 1.5 up to 5 times a day, with goal 2000 calories/day (as per son Caesar)   - IV PPI

## 2021-07-29 NOTE — ED ADULT NURSE NOTE - OBJECTIVE STATEMENT
Pt BIBA from home for shortness of breath and dark foul smelling urine - pt with hx of ALS family caring for pt at home - pt is nonverbal but alert and oriented - pt with PEG in place

## 2021-07-29 NOTE — H&P ADULT - NSHPSOCIALHISTORY_GEN_ALL_CORE
Lives with wife, son at home  Ambulates with walker/cane  ADLS with assistance   Diet: liquids, broths, boost  COVID X 2 moderna Lives with wife, son at home  Ambulates with walker/cane  ADLS with assistance   Diet: liquids, broths, boost  COVID X 2 moderna  denies tobacco, alcohol or drug use

## 2021-07-29 NOTE — H&P ADULT - PROBLEM SELECTOR PLAN 6
CAD, s/p MI in 2006, with one stent placed   - on plavix   - continue plavix daily PEG tube placement, on tube feedings at home  - history taken from wife, who is unsure which feedings he receieves; she admits that he dose take PO but liquids/broths  - will hold feedings for now, pending nutrition consult PEG tube placement, on tube feedings at home  - history taken from wife, who is unsure which feedings he receives; she admits that he dose take PO but liquids/broths  - will hold feedings for now, pending nutrition consult Complaining of worsening generalized weakness, likely in the setting of sepsis and urinary tract infection, however, patient not currently on riluzole therapy as per spouse, due to intolerance;  - Neuro consulted [facility]  - PT consulted Complaining of worsening generalized weakness, likely in the setting of sepsis and urinary tract infection, however, patient not currently on riluzole therapy as per spouse, due to intolerance  - Neuro consulted [facility]  - PT consulted

## 2021-07-29 NOTE — CONSULT NOTE ADULT - ASSESSMENT
70 yo M PMHx ALS, CAD (s/p MI 1 stent placed 2006 on DAPT), HTN, HLD, BPH admitted for urosepsis    #Urosepsis   - Pt profoundly septic upon admission with source UTI vs aspiration PNA  - Tachycardic with generalized weakness/diaphoresis in setting of sepsis  - CT Chest/Abomen/Pelvis: Bibasilar aspiration pneumonia. Fluid-filled upper thoracic esophagus may reflect gastroesophageal reflux.  Mild left hydroureteronephrosis secondary to a 7 mm left UVJ calculus. Bilateral nonspecific perirenal stranding. Correlate clinically for infection  Prostatomegaly.  - IV antibiotics and fluids per primary team     #CAD  - hx of CAD s/p stent in 2006  - Continue home plavix    #HTN  - Hx of HTN on metoprolol and Lisinopril at home  - Can hold in setting of soft BP, latest BP per flowsheets 102/69  - Avoid Acei or arbs especially with MARLEY  - Monitor hemodynamics     #HLD   - Continue home statin     - Monitor and replete lytes, keep K>4, Mg>2.  - Other cardiovascular workup will depend on clinical course.  - All other workup per primary team.  - Will continue to follow.               72 yo M PMHx ALS, CAD (s/p MI 1 stent placed 2006), HTN, HLD, BPH admitted for urosepsis    #Urosepsis   - Pt profoundly septic upon admission with source UTI vs aspiration PNA  - Tachycardic with generalized weakness/diaphoresis in setting of sepsis  - CT Chest/Abomen/Pelvis: Bibasilar aspiration pneumonia. Fluid-filled upper thoracic esophagus may reflect gastroesophageal reflux.  Mild left hydroureteronephrosis secondary to a 7 mm left UVJ calculus. Bilateral nonspecific perirenal stranding. Correlate clinically for infection  Prostatomegaly.  - IV antibiotics and fluids per primary team   - Recommend ICU consult    #CAD  - hx of CAD s/p stent in 2006  - Continue home plavix    #HTN  - Hx of HTN on metoprolol and Lisinopril at home  - Can hold in setting of soft BP, latest BP per flowsheets 102/69  - Avoid Acei or arbs especially with MARLEY  - Monitor hemodynamics     #HLD   - Continue home statin     - Monitor and replete lytes, keep K>4, Mg>2.  - Other cardiovascular workup will depend on clinical course.  - All other workup per primary team.  - Will continue to follow.               70 yo M PMHx ALS, CAD (s/p MI 1 stent placed 2006), HTN, HLD, BPH admitted for urosepsis    #Urosepsis   - Pt profoundly septic upon admission with source UTI vs aspiration PNA  - Tachycardic with generalized weakness/diaphoresis in setting of sepsis  - CT Chest/Abomen/Pelvis: Bibasilar aspiration pneumonia. Fluid-filled upper thoracic esophagus may reflect gastroesophageal reflux.  Mild left hydroureteronephrosis secondary to a 7 mm left UVJ calculus. Bilateral nonspecific perirenal stranding. Correlate clinically for infection  Prostatomegaly.  - IV antibiotics and fluids per primary team   - Continue O2 support and needed, wean as tolerated   - Recommend ICU consult    #CAD  - hx of CAD s/p stent in 2006  - Continue home plavix    #HTN  - Hx of HTN on metoprolol and Lisinopril at home  - Can hold in setting of soft BP, latest BP per flowsheets 102/69  - Avoid Acei or arbs especially with MARLEY  - Monitor hemodynamics     #HLD   - Continue home statin     - Monitor and replete lytes, keep K>4, Mg>2.  - Other cardiovascular workup will depend on clinical course.  - All other workup per primary team.  - Will continue to follow.               72 yo M PMHx ALS, CAD (s/p MI 1 stent placed 2006), HTN, HLD, BPH admitted for urosepsis with septic stone    #Urosepsis   - Pt profoundly septic upon admission with source UTI vs aspiration PNA  - Tachycardic with generalized weakness/diaphoresis in setting of sepsis  - CT Chest/Abomen/Pelvis: Bibasilar aspiration pneumonia. Fluid-filled upper thoracic esophagus may reflect gastroesophageal reflux.  Mild left hydroureteronephrosis secondary to a 7 mm left UVJ calculus. Bilateral nonspecific perirenal stranding. Correlate clinically for infection  Prostatomegaly.  - IV antibiotics and fluids per primary team   - To OR with emergent left ureteral stent insertion with urology   - Patient to be closely monitored in the ICU after procedure   - Continue O2 support and needed, wean as tolerated   - Pt has no active ischemia, decompensated heart failure, unstable arrythmia, or severe stenotic valvular disease. In the setting of an emergent procedure he is optimized from cardiovascular standpoint to proceed with emergent procedure     #CAD  - hx of CAD s/p stent in 2006, on plavix at home  - Plan for OR today for emergent procedure as above  - Hold plavix, can continue with single anti-plt agent post procedurally , ASA 81 is sufficient   - Check TTE    #HTN  - Hx of HTN on metoprolol and Lisinopril at home  - Can hold in setting of hypotension  - Avoid Acei or arbs especially with MARLEY  - Monitor hemodynamics closely, low threshold for pressors if needed     #HLD   - Continue home statin     - Monitor and replete lytes, keep K>4, Mg>2.  - Other cardiovascular workup will depend on clinical course.  - All other workup per primary team.  - Will continue to follow.             72 yo M PMHx ALS, CAD (s/p MI 1 stent placed 2006), HTN, HLD, BPH admitted for urosepsis with septic stone    #Urosepsis   - Pt profoundly septic upon admission with source UTI +/- aspiration PNA  - Tachycardic in setting of sepsis  - CT Chest/Abomen/Pelvis: Bibasilar aspiration pneumonia. Fluid-filled upper thoracic esophagus may reflect gastroesophageal reflux.  Mild left hydroureteronephrosis secondary to a 7 mm left UVJ calculus. Bilateral nonspecific perirenal stranding. Correlate clinically for infection  Prostatomegaly.  - IV antibiotics and fluids per primary team   - Possible OR for emergent left ureteral stent insertion with urology   - Pt has no active ischemia, decompensated heart failure, unstable arrythmia, or severe stenotic valvular disease. In the setting of an emergent procedure he is optimized from cardiovascular standpoint to proceed with emergent procedure   - Patient to be closely monitored in the ICU after procedure   - Continue O2 support and needed, wean as tolerated       #CAD  - hx of CAD s/p stent in 2006, on plavix at home  - Plan for OR today for emergent procedure as above  -  continue with single anti-plt agent post procedurally , ASA 81 is sufficient   - Check TTE    #HTN  - Hx of HTN on metoprolol and Lisinopril at home  - Can hold in setting of hypotension  - Avoid Acei or arbs especially with MARLEY  - Monitor hemodynamics closely, low threshold for pressors if needed     #HLD   - Continue home statin     - Monitor and replete lytes, keep K>4, Mg>2.  - Other cardiovascular workup will depend on clinical course.  - All other workup per primary team.  - Will continue to follow.             72 yo M PMHx ALS, CAD (s/p MI 1 stent placed 2006), HTN, HLD, BPH admitted for urosepsis with obstructing stone    #Urosepsis   - Pt profoundly septic upon admission with source UTI +/- aspiration PNA  - Tachycardic in setting of sepsis  - CT Chest/Abomen/Pelvis: Bibasilar aspiration pneumonia. Fluid-filled upper thoracic esophagus may reflect gastroesophageal reflux.  Mild left hydroureteronephrosis secondary to a 7 mm left UVJ calculus. Bilateral nonspecific perirenal stranding. Correlate clinically for infection  Prostatomegaly.  - IV antibiotics and fluids per primary team   - Possible OR for emergent left ureteral stent insertion with urology   - Pt has no active ischemia, decompensated heart failure, unstable arrythmia, or severe stenotic valvular disease. In the setting of an emergent procedure he is optimized from cardiovascular standpoint to proceed with emergent procedure   - Patient to be closely monitored in the ICU after procedure   - Continue O2 support and needed, wean as tolerated       #CAD  - hx of CAD s/p stent in 2006, on plavix at home  - Plan for OR today for emergent procedure as above  -  continue with single anti-plt agent post procedurally , ASA 81 is sufficient   - Check TTE    #HTN  - Hx of HTN on metoprolol and Lisinopril at home  - Can hold in setting of hypotension  - Avoid Acei or arbs especially with MARLEY  - Monitor hemodynamics closely, low threshold for pressors if needed     #HLD   - Continue home statin     - Monitor and replete lytes, keep K>4, Mg>2.  - Other cardiovascular workup will depend on clinical course.  - All other workup per primary team.  - Will continue to follow.

## 2021-07-29 NOTE — PROGRESS NOTE ADULT - SUBJECTIVE AND OBJECTIVE BOX
CHIEF COMPLAINT: Sepsis/renal failure/left ureteral stone    HISTORY OF PRESENT ILLNESS:    The patient is a 71 year old male with sepsis secondary to a left distal ureteral calculus. Patient has a h/o ALS and chronic urinary retention. He presented to the ED with increased weakness and diaphoresis. Evaluation in the ED demonstrated leukocytosis, increased lactate and renal failure. CT stone study demonstrated a 7 mm left distal ureteral calculus. On chest CT he was also noted to have b/l aspiration pneumonia. He has received Zosyn.    PAST MEDICAL & SURGICAL HISTORY:  Myocardial infarct    Hypertension    Hyperlipidemia    BPH (benign prostatic hyperplasia)    ALS (amyotrophic lateral sclerosis)    S/P tonsillectomy    S/P coronary artery stent placement    History of hip surgery    History of hernia repair  &gt; 20 years ago    H/O shoulder surgery        REVIEW OF SYSTEMS:    CONSTITUTIONAL: Weakness, fevers and chills  RESPIRATORY: No cough, wheezing, hemoptysis; No shortness of breath  CARDIOVASCULAR: No chest pain or palpitations  GASTROINTESTINAL: PEG  GENITOURINARY: Shukla in place  NEUROLOGICAL: No numbness or weakness  SKIN: No itching, burning, rashes, or lesions   All other review of systems is negative unless indicated above.    MEDICATIONS  (STANDING):  atorvastatin 80 milliGRAM(s) Oral at bedtime  chlorhexidine 2% Cloths 1 Application(s) Topical <User Schedule>  clopidogrel Tablet 75 milliGRAM(s) Oral daily  escitalopram 10 milliGRAM(s) Oral daily  lactated ringers Bolus 2000 milliLiter(s) IV Bolus once  lactated ringers. 1000 milliLiter(s) (100 mL/Hr) IV Continuous <Continuous>  pantoprazole  Injectable 40 milliGRAM(s) IV Push daily  piperacillin/tazobactam IVPB..      piperacillin/tazobactam IVPB.. 3.375 Gram(s) IV Intermittent once  sodium chloride 0.9% Bolus 1000 milliLiter(s) IV Bolus once    MEDICATIONS  (PRN):  acetaminophen   Tablet .. 650 milliGRAM(s) Oral every 6 hours PRN Temp greater or equal to 38.5C (101.3F), Mild Pain (1 - 3)  aluminum hydroxide/magnesium hydroxide/simethicone Suspension 30 milliLiter(s) Oral every 4 hours PRN Dyspepsia  melatonin 3 milliGRAM(s) Oral at bedtime PRN Insomnia  ondansetron Injectable 4 milliGRAM(s) IV Push every 8 hours PRN Nausea and/or Vomiting      Allergies    fish (Short breath; Anaphylaxis)  No Known Drug Allergies    Intolerances        SOCIAL HISTORY:    FAMILY HISTORY:  FHx: myocardial infarction (Father)    FHx: hyperlipidemia        Vital Signs Last 24 Hrs  T(C): 37.3 (2021 13:20), Max: 37.3 (2021 13:20)  T(F): 99.2 (2021 13:20), Max: 99.2 (2021 13:20)  HR: 96 (2021 17:40) (96 - 116)  BP: 124/87 (2021 17:40) (102/69 - 124/87)  BP(mean): --  RR: 26 (2021 17:40) (18 - 26)  SpO2: 95% (2021 17:40) (90% - 95%)    PHYSICAL EXAM:    Constitutional: NAD, well-developed  Back: Normal spine flexure, No CVA tenderness  Abd: Soft, NT/ND, No CVAT. PREEG in place  : Normal phallus,open meatus,bilateral descended testes, no masses  Skin: No rashes    LABS:                        14.5   29.47 )-----------( 154      ( 2021 13:35 )             44.5         147<H>  |  111<H>  |  113<H>  ----------------------------<  292<H>  3.8   |  24  |  5.70<H>    Ca    10.1      2021 13:35    TPro  7.4  /  Alb  2.3<L>  /  TBili  1.8<H>  /  DBili  x   /  AST  70<H>  /  ALT  57  /  AlkPhos  223<H>  07-29    PT/INR - ( 2021 13:35 )   PT: 11.7 sec;   INR: 1.00 ratio         PTT - ( 2021 13:35 )  PTT:28.4 sec  Urinalysis Basic - ( 2021 13:34 )    Color: Orange / Appearance: Turbid / S.020 / pH: x  Gluc: x / Ketone: Trace  / Bili: Negative / Urobili: 1   Blood: x / Protein: 100 / Nitrite: Negative   Leuk Esterase: Moderate / RBC: 25-50 /HPF / WBC >50   Sq Epi: x / Non Sq Epi: Occasional / Bacteria: TNTC      RADIOLOGY & ADDITIONAL STUDIES:    < from: CT Abdomen and Pelvis w/ IV Cont (21 @ 13:50) >    EXAM:  CT ABDOMEN AND PELVIS IC                            PROCEDURE DATE:  2021          INTERPRETATION:  CLINICAL INFORMATION: Abdominal pain and constipation    COMPARISON: None.    CONTRAST/COMPLICATIONS:  IV Contrast: Omnipaque 350  90 cc administered   10 cc discarded  Oral Contrast: NONE  Complications: None reported at time of study completion    PROCEDURE:  CT of the Abdomen and Pelvis was performed.  Sagittal and coronal reformats were performed.    FINDINGS:  LOWER CHEST: Clearlung bases. Coronary artery calcifications.    LIVER: Within normal limits.  BILE DUCTS: Normal caliber.  GALLBLADDER: Within normal limits.  SPLEEN: Within normal limits.  PANCREAS: Within normal limits.  ADRENALS: Within normal limits.  KIDNEYS/URETERS: 8 mm right renal hypodensity, too small to further characterize. No hydronephrosis. Nonobstructive left intrarenal calculi measuring up to 3 mm.    BLADDER: Shukla catheter. 5 x 2.4 cm fluid collection at the left lateral aspect of the urinary bladder, question diverticulum or abscess.  REPRODUCTIVE ORGANS: Prostate is enlarged.    BOWEL: Small hiatal hernia. No bowel obstruction. Appendix is normal.  PERITONEUM: No ascites.  VESSELS: Atherosclerotic changes.  RETROPERITONEUM/LYMPH NODES: No lymphadenopathy.  ABDOMINAL WALL: Fat-containing umbilical and left inguinal hernias.  BONES: Degenerative changes.    IMPRESSION:  No bowel obstruction.  Fluid collection at the left lateral aspect of the urinary bladder (5 x 2.4 cm). Finding may represent a urinary bladder diverticulum or pelvic abscess. Ultrasound may be obtained for further assessment.            FORTINO PEREZ MD; Attending Radiologist  This document has been electronically signed. 2021  2:11PM    < end of copied text >

## 2021-07-29 NOTE — ED PROVIDER NOTE - CONSTITUTIONAL, MLM
normal... Weak appearing white male, awake, unable to speak with any strength to voice in moderate apparent distress.

## 2021-07-29 NOTE — ED ADULT TRIAGE NOTE - MEANS OF ARRIVAL
Called pt, relayed Dr. Tian's message. Pt saw Naa Sprague NP at [facility] today, pt states Naa ordered MRI Brain/ CT Brain.   stretcher

## 2021-07-29 NOTE — H&P ADULT - PROBLEM SELECTOR PROBLEM 6
Hypertension CAD (coronary artery disease) S/P percutaneous endoscopic gastrostomy (PEG) tube placement Amyotrophic lateral sclerosis (ALS)

## 2021-07-29 NOTE — H&P ADULT - NSHPPHYSICALEXAM_GEN_ALL_CORE
VITALS:   T(C): 37.3 (07-29-21 @ 13:20), Max: 37.3 (07-29-21 @ 13:20)  HR: 116 (07-29-21 @ 12:57) (116 - 116)  BP: 102/69 (07-29-21 @ 12:57) (102/69 - 102/69)  RR: 18 (07-29-21 @ 12:57) (18 - 18)  SpO2: 90% (07-29-21 @ 12:57) (90% - 90%)    GENERAL: ill appearing, thin elderly male, + short of breath   EYES: conjunctiva and sclera clear  ENT: Moist mucous membranes  NECK: Supple, No JVD  CHEST/LUNG: Clear to auscultation bilaterally; No rales, rhonchi, wheezing, or rubs. + labored respirations   HEART: + tachycardic; No murmurs, rubs, or gallops  ABDOMEN: BSx4; Soft, nontender, nondistended, + PEG tube   EXTREMITIES:  2+ Peripheral Pulses, brisk capillary refill. No clubbing, cyanosis, or edema  NERVOUS SYSTEM:  A&Ox3, sensation intact, decreased muscle strenth in the right upper extremity VITALS:   T(C): 37.3 (07-29-21 @ 13:20), Max: 37.3 (07-29-21 @ 13:20)  HR: 116 (07-29-21 @ 12:57) (116 - 116)  BP: 102/69 (07-29-21 @ 12:57) (102/69 - 102/69)  RR: 18 (07-29-21 @ 12:57) (18 - 18)  SpO2: 90% (07-29-21 @ 12:57) (90% - 90%)    GENERAL: ill appearing, thin elderly male, + short of breath   EYES: conjunctiva and sclera clear  ENT: Moist mucous membranes  NECK: Supple, No JVD  CHEST/LUNG: Clear to auscultation bilaterally; No rales, rhonchi, wheezing, or rubs. + labored respirations   HEART: + tachycardic; No murmurs, rubs, or gallops  ABDOMEN: BSx4; Soft, nontender, nondistended, + PEG tube   EXTREMITIES:  2+ Peripheral Pulses, brisk capillary refill. No clubbing, cyanosis, or edema  NERVOUS SYSTEM:  A&Ox3, sensation intact, decreased muscle strenth in the right upper extremity, Cn 2-12 intact

## 2021-07-29 NOTE — H&P ADULT - PROBLEM SELECTOR PLAN 2
BUN/Cr: 113/5.70 (baseline appears to be 39/1.1)   - fu urine lytes   - likely in the setting of sepsis, hypoperfusion   - continue IVF   - will hold ramipril, fenofibrate and avoid nephrotoxic agents  - Nephro [facility] consulted CT Chest showing possible aspiration pneumonia   - start zosyn for anaerobic coverage   - aspiration precautions CT Chest showing possible aspiration pneumonia   - start zosyn for anaerobic coverage   - aspiration precautions  - NPO except meds  - fu speech and swallow

## 2021-07-29 NOTE — BRIEF OPERATIVE NOTE - NSICDXBRIEFPROCEDURE_GEN_ALL_CORE_FT
PROCEDURES:  Insertion, ureteral stent 29-Jul-2021 19:47:05 left and retrograde pyelogram Roe Verduzco

## 2021-07-29 NOTE — H&P ADULT - NSHPREVIEWOFSYSTEMS_GEN_ALL_CORE
Constitutional: ADMITs to fever, chills, sweating  HEENT: denies headache, dizziness, or lightheadedness  Respiratory: ADMITs to SOB, + productive cough,   Cardiovascular: denies CP, palpitations  Gastrointestinal: ADMITS to nausea, denies vomiting, diarrhea, constipation, abdominal pain  Genitourinary: denies painful urination, increased frequency, urgency, or bloody urine  Musculoskeletal: ADMITs to muscle weakness   Neurologic: denies loss of sensation, numbness, or tingling, facial droop or focal weakness   ROS negative except as noted above As per patients son Eleazar and wife  Constitutional: ADMITs to fever, chills, sweating  HEENT: denies headache, dizziness, or lightheadedness  Respiratory: ADMITs to SOB, + productive cough,   Cardiovascular: denies CP, palpitations  Gastrointestinal: ADMITS to nausea, denies vomiting, diarrhea, constipation, abdominal pain, melena, hematochezia  Genitourinary: denies painful urination, increased frequency, urgency, or bloody urine  Musculoskeletal: ADMITs to muscle weakness   Neurologic: denies loss of sensation, numbness, or tingling, facial droop or focal weakness   ROS negative except as noted above

## 2021-07-29 NOTE — H&P ADULT - PROBLEM SELECTOR PLAN 4
Complaining of worsening generalized weakness, likely in the setting of sepsis and urinary tract infection, however, patient not currently on riluzole therapy as per spouse, unknown; spouse admitted to 2 falls in the past several   - Neuro consulted [facility] BUN/Cr: 113/5.70 (baseline appears to be 39/1.1)   - fu urine lytes   - likely in the setting of sepsis, hypoperfusion   - continue IVF   - will hold ramipril, fenofibrate and avoid nephrotoxic agents  - Nephro [facility]ria consulted

## 2021-07-29 NOTE — ED PROVIDER NOTE - CARE PLAN
Principal Discharge DX:	Sepsis due to urinary tract infection  Secondary Diagnosis:	MARLEY (acute kidney injury)  Secondary Diagnosis:	ALS (amyotrophic lateral sclerosis)

## 2021-07-29 NOTE — H&P ADULT - PROBLEM SELECTOR PLAN 8
On crestor for hyperlipidemia and fenofibrate for hypertriglyceridemia   - continue crestor 40 mg --> lipitor 80 mg therapeutic interchange   - hold fenofibrate due to MARLEY Hypotension upon admission due to sepsis   - hold lisinopril and metoprolol due to hypotension CAD, s/p MI in 2006, with one stent placed   - on plavix   - continue plavix daily  - [facility] consulted CAD, s/p MI in 2006, with one stent placed   - on plavix   - continue plavix daily  - [facility] consulted  - will fu EKG

## 2021-07-29 NOTE — CONSULT NOTE ADULT - SUBJECTIVE AND OBJECTIVE BOX
[facility]  INFECTIOUS DISEASES   [address] [place] [address]  Tel: [phone]     Fax: [phone]  =======================================================    [id number]  SHARITA HOUGH     CC: shortness of breath     HPI:  72 yo man with PMH of HTN, BPH, CAD and ALS with PEG, was admitted with not looking right, change in urine color, diaphoresis and shortness of breath. She states that fell coming out of the bathroom 2 days ago, fell on his buttocks, no head injury. Since then he has felt generalized weakness, and profuse diaphoresis. Prior to this he was in his usual state of health.   Patient has a history of ALS previously on riluzole which was stopped due to intolerance. Patient had a PEG tube and began to straight cath several months ago due to progressive ALS. He uses a walker and cane at home with no difficulty until 2 days ago.   As per the spouse, his urine looked cloudy, dark yellow with odor. Denies hematuria.   Of note, patient last admitted to [place]  --> 2020 for urinary retention, jo placed.   In ED Spo2 was 70a5 so placed on NRB  WBC 29k  UA with high WBC >50 and also RBC 25-50  CT Chest/Abomen/Pelvis: Bibasilar aspiration pneumonia. Fluid-filled upper thoracic esophagus may reflect gastroesophageal reflux.  Mild left hydroureteronephrosis secondary to a 7 mm left UVJ calculus. Bilateral nonspecific perirenal stranding. Correlate clinically for infection, Prostatomegaly.      PAST MEDICAL & SURGICAL HISTORY:  Myocardial infarct  Hypertension  Hyperlipidemia  BPH (benign prostatic hyperplasia)  ALS (amyotrophic lateral sclerosis)  S/P tonsillectomy  S/P coronary artery stent placement  History of hip surgery  History of hernia repair  &gt; 20 years ago  H/O shoulder surgery    Social Hx: No smoking, ETOH Or drugs     FAMILY HISTORY:  FHx: myocardial infarction (Father)    FHx: hyperlipidemia    Allergies  fish (Short breath; Anaphylaxis)  No Known Drug Allergies    Antibiotics:     piperacillin/tazobactam IVPB.. 3.375 Gram(s) IV Intermittent once     REVIEW OF SYSTEMS:  CONSTITUTIONAL:  No Fever or chills, + diaphoresis   HEENT:  No diplopia or blurred vision.  No sore throat or runny nose.  CARDIOVASCULAR:  No chest pain, +SOB.  RESPIRATORY:  No cough, +shortness of breath  GASTROINTESTINAL:  No nausea, vomiting or diarrhea.  GENITOURINARY:  cloudy urine with odor   MUSCULOSKELETAL:  no joint aches, no muscle pain  SKIN:  No change in skin, hair or nails.  NEUROLOGIC: weakness in all extremities due to ALS  PSYCHIATRIC:  No disorder of thought or mood.  ENDOCRINE:  No heat or cold intolerance, polyuria or polydipsia.  HEMATOLOGICAL:  No easy bruising or bleeding.     Physical Exam:  Vital Signs Last 24 Hrs  T(C): 37.3 (2021 13:20), Max: 37.3 (2021 13:20)  T(F): 99.2 (2021 13:20), Max: 99.2 (2021 13:20)  HR: 116 (2021 12:57) (116 - 116)  BP: 102/69 (2021 12:57) (102/69 - 102/69)  RR: 18 (2021 12:57) (18 - 18)  SpO2: 90% (2021 12:57) (90% - 90%)  Height (cm): 172.7 ( @ 12:57)  GEN: NAD, NRB mask   HEENT: normocephalic and atraumatic. EOMI. PERRL.    NECK: Supple.  No lymphadenopathy   LUNGS: Clear to auscultation.  HEART: Regular rate and rhythm without murmur.  ABDOMEN: Soft, nontender, and nondistended.  Positive bowel sounds.  PEG in place   : No CVA tenderness, jo with cloudy urine   EXTREMITIES: Muscular atrophy, motor weakness in all extremities   NEUROLOGIC: grossly intact.  PSYCHIATRIC: Appropriate affect .  SKIN: No rash    Labs:      147<H>  |  111<H>  |  113<H>  ----------------------------<  292<H>  3.8   |  24  |  5.70<H>    Ca    10.1      2021 13:35    TPro  7.4  /  Alb  2.3<L>  /  TBili  1.8<H>  /  DBili  x   /  AST  70<H>  /  ALT  57  /  AlkPhos  223<H>                          14.5   29.47 )-----------( 154      ( 2021 13:35 )             44.5     PT/INR - ( 2021 13:35 )   PT: 11.7 sec;   INR: 1.00 ratio    PTT - ( 2021 13:35 )  PTT:28.4 sec  Urinalysis Basic - ( 2021 13:34 )    Color: Orange / Appearance: Turbid / S.020 / pH: x  Gluc: x / Ketone: Trace  / Bili: Negative / Urobili: 1   Blood: x / Protein: 100 / Nitrite: Negative   Leuk Esterase: Moderate / RBC: 25-50 /HPF / WBC >50   Sq Epi: x / Non Sq Epi: Occasional / Bacteria: TNTC    LIVER FUNCTIONS - ( 2021 13:35 )  Alb: 2.3 g/dL / Pro: 7.4 g/dL / ALK PHOS: 223 U/L / ALT: 57 U/L / AST: 70 U/L / GGT: x           ABG - ( 2021 13:44 )  pH, Arterial: 7.42  pH, Blood: x     /  pCO2: 38    /  pO2: 76    / HCO3: 25    / Base Excess: 0.4   /  SaO2: 96        Procalcitonin, Serum: 301.8 ng/mL (21 @ 13:35)    SARS-CoV-2: NotDetec (21 @ 13:33)  Rapid RVP Result: NotDetec (21 @ 13:33)    RECENT CULTURES:   @ 13:33      NotDetec    All imaging and other data have been reviewed.  < from: CT Chest No Cont (21 @ 15:12) >  IMPRESSION:  Bibasilar aspiration pneumonia.  Fluid-filled upper thoracic esophagus may reflect gastroesophageal reflux.  Mild left hydroureteronephrosis secondary to a 7 mm left UVJ calculus.  Bilateral nonspecific perirenal stranding. Correlate clinically for infection  Prostatomegaly.    Assessment and Plan:   72 yo man with PMH of HTN, BPH, CAD and ALS with PEG, was admitted with not looking right, change in urine color, diaphoresis and shortness of breath.  Had a fall 2 days ago since then not feeling well, possibly related to sepsis due to UTI/pyelonephritis and aspiration pneumonia.   As per the spouse, his urine looked cloudy, dark yellow with odor. Denies hematuria.   Of note, patient last admitted to [place]  --> 2020 for urinary retention, jo placed.   In ED Spo2 was 70a5 so placed on NRB  WBC 29k  Procalcitonin 301.8  UA with high WBC >50 and also RBC 25-50  CT Chest/Abomen/Pelvis: Bibasilar aspiration pneumonia. Fluid-filled upper thoracic esophagus may reflect gastroesophageal reflux.  Mild left hydroureteronephrosis secondary to a 7 mm left UVJ calculus. Bilateral nonspecific perirenal stranding. Correlate clinically for infection, Prostatomegaly.    1- Aspiration pneumonia  2- UTI/pyelonephritis  3- Renal stone with left hydronephrosis   4- MARLEY  5- ALS    Recommendations:  - Blood culture x 2   - Follow urine culture   - Zosyn 3.375gm q12 adjusted for renal function  - Renal and urology consults   - Follow WBC, Creat and lactate     Thank you for courtesy of this consult.     Will follow.    D/W Dr. Tapia.     Santhosh Burton MD  Division of Infectious Diseases   Cell [phone] between 8am and 6pm   After 6pm and weekends please call ID service at [phone].

## 2021-07-29 NOTE — H&P ADULT - PROBLEM SELECTOR PROBLEM 7
Hyperlipidemia Hypertension CAD (coronary artery disease) S/P percutaneous endoscopic gastrostomy (PEG) tube placement

## 2021-07-29 NOTE — H&P ADULT - PROBLEM SELECTOR PLAN 10
Will hold chemical prophylaxis in the setting of hematuria   - SCDs for now On crestor for hyperlipidemia and fenofibrate for hypertriglyceridemia   - continue crestor 40 mg --> lipitor 80 mg therapeutic interchange   - hold fenofibrate due to MARLEY On crestor for hyperlipidemia and fenofibrate for hypertriglyceridemia   - continue crestor 40 mg --> lipitor 80 mg therapeutic interchange   - hold fenofibrate due to MARLEY    11. Prophylactic measures  - SCDs for now due to hematuria    12. Goals of care conversation  - Patient has advanced directives, health care proxy, Son Caesar   - Full code for now   - son to bring paper work tomorrow On crestor for hyperlipidemia and fenofibrate for hypertriglyceridemia   - continue crestor 40 mg --> lipitor 80 mg therapeutic interchange   - hold fenofibrate due to MARLEY    11. Prophylactic measures  - SCDs for now due to hematuria    12. Goals of care conversation  - Patient has advanced directives, health care proxy, Son Caesar   - Full code for now   - son to bring paper work tomorrow    13. Nephrolithiasis  - Mild left hydroureteronephrosis secondary to a 7 mm left UVJ calculus. Bilateral nonspecific perirenal stranding.   - uro consulted, adarsh

## 2021-07-29 NOTE — CONSULT NOTE ADULT - SUBJECTIVE AND OBJECTIVE BOX
Patient is a 71y old  Male who presents with a chief complaint of Sepsis, UTI (2021 16:53)      HPI: 72 yo M PMHx ALS, CAD (s/p MI 1 stent placed ), HTN, HLD, BPH presents to [facility] with fever, SOB, urinary symptoms. Found to have b/l PNA, and septic urologic stone.     Patient seen and examined at the bedside. He is ill appearing, mildly SOB. Since MOnday he has been more lethargic and more SOB. He just had a PEG placed about 1 week ago and has not used it much yet. He is still having oral diet,. also straight caths himself for urine. No alcohol use, no smoking. Allergies to Fish.     PAST MEDICAL & SURGICAL HISTORY:  Myocardial infarct    Hypertension    Hyperlipidemia    BPH (benign prostatic hyperplasia)    ALS (amyotrophic lateral sclerosis)    S/P tonsillectomy    S/P coronary artery stent placement    History of hip surgery    History of hernia repair  &gt; 20 years ago    H/O shoulder surgery        Review of Systems:  CONSTITUTIONAL: + fever, chills, fatigue  EYES: No eye pain, visual disturbances, or discharge  ENMT:  No difficulty hearing, tinnitus, vertigo; No sinus or throat pain  NECK: No pain or stiffness  RESPIRATORY: No cough, wheezing, chills or hemoptysis; + shortness of breath  CARDIOVASCULAR: No chest pain, palpitations, dizziness, or leg swelling  GASTROINTESTINAL: No abdominal or epigastric pain. No nausea, vomiting, or hematemesis; No diarrhea or constipation. No melena or hematochezia.  GENITOURINARY: No dysuria, frequency, hematuria, or incontinence  NEUROLOGICAL: No headaches, memory loss, loss of strength, numbness, or tremors  SKIN: No itching, burning, rashes, or lesions   MUSCULOSKELETAL: No joint pain or swelling; No muscle, back, or extremity pain  PSYCHIATRIC: No depression, anxiety, mood swings, or difficulty sleeping      Medications:  piperacillin/tazobactam IVPB..      piperacillin/tazobactam IVPB.. 3.375 Gram(s) IV Intermittent once    acetaminophen   Tablet .. 650 milliGRAM(s) Oral every 6 hours PRN  escitalopram 10 milliGRAM(s) Oral daily  melatonin 3 milliGRAM(s) Oral at bedtime PRN  ondansetron Injectable 4 milliGRAM(s) IV Push every 8 hours DE    clopidogrel Tablet 75 milliGRAM(s) Oral daily  aluminum hydroxide/magnesium hydroxide/simethicone Suspension 30 milliLiter(s) Oral every 4 hours PRN  pantoprazole  Injectable 40 milliGRAM(s) IV Push daily    atorvastatin 80 milliGRAM(s) Oral at bedtime  lactated ringers Bolus 2000 milliLiter(s) IV Bolus once  lactated ringers. 1000 milliLiter(s) IV Continuous <Continuous>  sodium chloride 0.9% Bolus 1000 milliLiter(s) IV Bolus once      chlorhexidine 2% Cloths 1 Application(s) Topical <User Schedule>      ICU Vital Signs Last 24 Hrs  T(C): 37.3 (2021 13:20), Max: 37.3 (2021 13:20)  T(F): 99.2 (2021 13:20), Max: 99.2 (2021 13:20)  HR: 116 (2021 12:57) (116 - 116)  BP: 102/69 (2021 12:57) (102/69 - 102/69)    RR: 18 (2021 12:57) (18 - 18)  SpO2: 90% (2021 12:57) (90% - 90%)      ABG - ( 2021 13:44 )  pH, Arterial: 7.42  pH, Blood: x     /  pCO2: 38    /  pO2: 76    / HCO3: 25    / Base Excess: 0.4   /  SaO2: 96                  I&O's Detail        LABS:                        14.5   29.47 )-----------( 154      ( 2021 13:35 )             44.5         147<H>  |  111<H>  |  113<H>  ----------------------------<  292<H>  3.8   |  24  |  5.70<H>    Ca    10.1      2021 13:35    TPro  7.4  /  Alb  2.3<L>  /  TBili  1.8<H>  /  DBili  x   /  AST  70<H>  /  ALT  57  /  AlkPhos  223<H>            CAPILLARY BLOOD GLUCOSE        PT/INR - ( 2021 13:35 )   PT: 11.7 sec;   INR: 1.00 ratio         PTT - ( 2021 13:35 )  PTT:28.4 sec  Urinalysis Basic - ( 2021 13:34 )    Color: Orange / Appearance: Turbid / S.020 / pH: x  Gluc: x / Ketone: Trace  / Bili: Negative / Urobili: 1   Blood: x / Protein: 100 / Nitrite: Negative   Leuk Esterase: Moderate / RBC: 25-50 /HPF / WBC >50   Sq Epi: x / Non Sq Epi: Occasional / Bacteria: TNTC      CULTURES:  Rapid RVP Result: NotDetec (21 @ 13:33)      Physical Examination:    General: Ill appearing, lying in bed    HEENT: Pupils equal, reactive to light.  Symmetric.    PULM: rales b/l bases     CVS: Regular rate and rhythm, no murmurs, rubs, or gallops    ABD: Soft, nondistended, nontender, normoactive bowel sounds, mild erythema around PEG site     EXT: No edema, nontender    SKIN: Warm and well perfused    NEURO: Awake and alert      RADIOLOGY: EXAM:  CT CHEST                            PROCEDURE DATE:  2021          INTERPRETATION:  CLINICAL INFORMATION: Sepsis    COMPARISON: None.    CONTRAST/COMPLICATIONS:  IV Contrast: NONE  Oral Contrast: NONE  Complications: None reported at time of study completion    PROCEDURE:  CT of the Chest, Abdomen and Pelvis was performed.  Sagittal and coronal reformats were performed.    FINDINGS:  CHEST:  LUNGS AND LARGE AIRWAYS: Patent central airways. Extensive bibasilar dependent airspace infiltrates consistent with bilateral aspiration pneumonia  PLEURA: No pleural effusion.  VESSELS: Nonaneurysmal.  HEART: Coronary artery calcification. No pericardial effusion.  MEDIASTINUM AND GABRIELA: Mild fluid distention of the upper thoracic esophagussuggesting possible gastroesophageal reflux  CHEST WALL AND LOWER NECK: Within normal limits.    ABDOMEN AND PELVIS:  LIVER: Within normal limits.  BILE DUCTS: Normal caliber.  GALLBLADDER: Within normal limits.  SPLEEN: Within normal limits.  PANCREAS: Within normal limits.  ADRENALS: Within normal limits.  KIDNEYS/URETERS: Mild left hydroureteronephrosis secondary to a 7 mm left UVJ calculus. Punctate nonobstructive left renal calculus. Bilateral nonspecific perirenal stranding. Correlate for UTI.    BLADDER: Decompressed with Shukla. Fluid-filled left lateral bladder diverticulum.  REPRODUCTIVE ORGANS: Enlarged prostate    BOWEL: No bowel obstruction or inflammation. Gastrostomy tube in situ. Appendix normal  PERITONEUM: No ascites.  VESSELS: Within normal limits.  RETROPERITONEUM/LYMPH NODES: No lymphadenopathy.  ABDOMINAL WALL: Fat-containing left inguinal hernia. Tiny fat-containing umbilical hernia.  BONES: Degenerative change.    IMPRESSION:  Bibasilar aspiration pneumonia.  Fluid-filled upper thoracic esophagus may reflect gastroesophageal reflux.  Mild left hydroureteronephrosis secondary to a 7 mm left UVJ calculus.  Bilateral nonspecific perirenal stranding. Correlate clinically for infection  Prostatomegaly.  Additional findings as discussed    --- End of Report ---      AMANDEEP CLARK MD; Attending Radiologist  This document has been electronically signed. 2021  3:45PM      CRITICAL CARE TIME SPENT: 73 minutes assessing presenting problems of acute illness, which pose high probability of life threatening deterioration or end organ damage/dysfunction, as well as medical decision making including initiating plan of care, reviewing data, reviewing radiologic exams, discussing with multidisciplinary team,  discussing goals of care with patient/family, and writing this note.  Non-inclusive of procedures performed,

## 2021-07-29 NOTE — CONSULT NOTE ADULT - SUBJECTIVE AND OBJECTIVE BOX
Patient is a 71y old  Male who presents with a chief complaint of Sepsis, UTI (2021 17:47)    HPI:  70 yo M PMHx ALS, CAD (s/p MI 1 stent placed  on DAPT), HTN, HLD, BPH presenting with diaphoresis, shortness of breath. History taken by Shadia (wife) who lives with him. She states that fell coming out of the bathroom 2 days ago, fell on his buttocks, no head injury. Since then he has felt generalized weakness, and profuse diaphoresis. Prior to this he was in his usual state of health.   Patient has a history of ALS previously on riluzole which was stopped due to intolerance. Patient had a PEG tube and began to straight cath several months ago due to progressive ALS. He uses a walker and cane at home with no difficulty until 2 days ago.     As per the spouse, his urine looked cloudy, dark yellow with odor. Denies hematuria.     Of note, patient last admitted to [facility]  --> 2020 for urinary retention, jo placed, likely cause BPH and noncompliance with flomax.     ED Course:   Vital Signs: /69  T 98 F SpO2 70% on RA --> 90% on NRB   EKG: normal sinus rhythm, no acute ST or T wave changes   Labs:   White Count: 29.47, Lactate 2.7, BUN/Cr: 113/5.70 Alk Phos 223   Imaging:   CXR: Scattered and lower lung field interstitial infiltrates are presently seen anterior greater on the right at this time. These are new since 2020.  IMPRESSION: Bilateral infiltrates as above.    CT Chest/Abomen/Pelvis: Bibasilar aspiration pneumonia. Fluid-filled upper thoracic esophagus may reflect gastroesophageal reflux.  Mild left hydroureteronephrosis secondary to a 7 mm left UVJ calculus. Bilateral nonspecific perirenal stranding. Correlate clinically for infection  Prostatomegaly.    Renal consult called for MARLEY. History obtained from chart and patient's wife at beside.       PAST MEDICAL HISTORY:  Myocardial infarct    Hypertension    Hyperlipidemia    BPH (benign prostatic hyperplasia)    ALS (amyotrophic lateral sclerosis)        PAST SURGICAL HISTORY:  S/P tonsillectomy    S/P coronary artery stent placement    History of hip surgery    History of hernia repair    H/O shoulder surgery        FAMILY HISTORY:  FHx: myocardial infarction (Father)    FHx: hyperlipidemia        SOCIAL HISTORY: No smoking or alcohol use     Allergies    fish (Short breath; Anaphylaxis)  No Known Drug Allergies    Intolerances      Home Medications:  Crestor 40 mg oral tablet: 1 tab(s) orally once a day (2021 16:38)  fenofibrate 160 mg oral tablet: 1 tab(s) orally once a day (2021 16:38)  Lexapro 10 mg oral tablet: 1 tab(s) orally once a day (2021 16:38)  lisinopril 2.5 mg oral tablet: 1 tab(s) orally once a day (2021 16:38)  metoprolol tartrate 50 mg oral tablet: 1 tab(s) orally once a day (2021 16:38)  Plavix 75 mg oral tablet: 1 tab(s) orally once a day    *as per patient, he takes 1/2 tablet daily (2021 16:38)    MEDICATIONS  (STANDING):  atorvastatin 80 milliGRAM(s) Oral at bedtime  chlorhexidine 2% Cloths 1 Application(s) Topical <User Schedule>  clopidogrel Tablet 75 milliGRAM(s) Oral daily  escitalopram 10 milliGRAM(s) Oral daily  lactated ringers Bolus 2000 milliLiter(s) IV Bolus once  lactated ringers. 1000 milliLiter(s) (150 mL/Hr) IV Continuous <Continuous>  pantoprazole  Injectable 40 milliGRAM(s) IV Push daily  piperacillin/tazobactam IVPB..      piperacillin/tazobactam IVPB.. 3.375 Gram(s) IV Intermittent once  sodium chloride 0.9% Bolus 1000 milliLiter(s) IV Bolus once    MEDICATIONS  (PRN):  acetaminophen   Tablet .. 650 milliGRAM(s) Oral every 6 hours PRN Temp greater or equal to 38.5C (101.3F), Mild Pain (1 - 3)  aluminum hydroxide/magnesium hydroxide/simethicone Suspension 30 milliLiter(s) Oral every 4 hours PRN Dyspepsia  melatonin 3 milliGRAM(s) Oral at bedtime PRN Insomnia  ondansetron Injectable 4 milliGRAM(s) IV Push every 8 hours PRN Nausea and/or Vomiting      REVIEW OF SYSTEMS:  General: weak  Respiratory: + SOB  Cardiovascular: No CP or Palpitations	  Gastrointestinal: No nausea, Vomiting. No diarrhea  Genitourinary: No urinary complaints	  Musculoskeletal: No new rash or lesions		  all other systems negative    T(F): 98 (07-29-21 @ 17:29), Max: 99.2 (21 @ 13:20)  HR: 96 (21 @ 17:40) (95 - 116)  BP: 124/87 (21 @ 17:40) (102/69 - 125/58)  RR: 26 (21 @ 17:40) (18 - 26)  SpO2: 95% (21 @ 17:40) (90% - 95%)  Wt(kg): --    PHYSICAL EXAM:  General: NAD  Respiratory: b/l air entry  Cardiovascular: S1 S2  Gastrointestinal: soft  Extremities: no edema            147<H>  |  111<H>  |  113<H>  ----------------------------<  292<H>  3.8   |  24  |  5.70<H>    Ca    10.1      2021 13:35    T Pro  7.4  /  Alb  2.3<L>  /  T Bili  1.8<H>  /  D Bili  x   /  AST  70<H>  /  ALT  57  /  Alk Phos  223<H>                            14.5   29.47 )-----------( 154      ( 2021 13:35 )             44.5       Hemoglobin: 14.5 g/dL ( @ 13:35)  Hematocrit: 44.5 % ( @ 13:35)  Calcium, Total Serum: 10.1 mg/dL ( @ 13:35)  Blood Urea Nitrogen, Serum: 113 mg/dL ( @ 13:35)      Creatinine, Serum: 5.70 ( @ 13:35)      Urinalysis Basic - ( 2021 13:34 )    Color: Orange / Appearance: Turbid / S.020 / pH: x  Gluc: x / Ketone: Trace  / Bili: Negative / Urobili: 1   Blood: x / Protein: 100 / Nitrite: Negative   Leuk Esterase: Moderate / RBC: 25-50 /HPF / WBC >50   Sq Epi: x / Non Sq Epi: Occasional / Bacteria: TNTC      LIVER FUNCTIONS - ( 2021 13:35 )  Alb: 2.3 g/dL / Pro: 7.4 g/dL / ALK PHOS: 223 U/L / ALT: 57 U/L / AST: 70 U/L / GGT: x                     ABG - ( 2021 13:44 )  pH, Arterial: 7.42  pH, Blood: x     /  pCO2: 38    /  pO2: 76    / HCO3: 25    / Base Excess: 0.4   /  SaO2: 96              < from: CT Chest No Cont (21 @ 15:12) >    EXAM:  CT CHEST                            PROCEDURE DATE:  2021          INTERPRETATION:  CLINICAL INFORMATION: Sepsis    COMPARISON: None.    CONTRAST/COMPLICATIONS:  IV Contrast: NONE  Oral Contrast: NONE  Complications: None reported at time of study completion    PROCEDURE:  CT of the Chest, Abdomen and Pelvis was performed.  Sagittal and coronal reformats were performed.    FINDINGS:  CHEST:  LUNGS AND LARGE AIRWAYS: Patent central airways. Extensive bibasilar dependent airspace infiltrates consistent with bilateral aspiration pneumonia  PLEURA: No pleural effusion.  VESSELS: Nonaneurysmal.  HEART: Coronary artery calcification. No pericardial effusion.  MEDIASTINUM AND GABRIELA: Mild fluid distention of the upper thoracic esophagussuggesting possible gastroesophageal reflux  CHEST WALL AND LOWER NECK: Within normal limits.    ABDOMEN AND PELVIS:  LIVER: Within normal limits.  BILE DUCTS: Normal caliber.  GALLBLADDER: Within normal limits.  SPLEEN: Within normal limits.  PANCREAS: Within normal limits.  ADRENALS: Within normal limits.  KIDNEYS/URETERS: Mild left hydroureteronephrosis secondary to a 7 mm left UVJ calculus. Punctate nonobstructive left renal calculus. Bilateral nonspecific perirenal stranding. Correlate for UTI.    BLADDER: Decompressed with Jo. Fluid-filled left lateral bladder diverticulum.  REPRODUCTIVE ORGANS: Enlarged prostate    BOWEL: No bowel obstruction or inflammation. Gastrostomy tube in situ. Appendix normal  PERITONEUM: No ascites.  VESSELS: Within normal limits.  RETROPERITONEUM/LYMPH NODES: No lymphadenopathy.  ABDOMINAL WALL: Fat-containing left inguinal hernia. Tiny fat-containing umbilical hernia.  BONES: Degenerative change.    IMPRESSION:  Bibasilar aspiration pneumonia.  Fluid-filled upper thoracic esophagus may reflect gastroesophageal reflux.  Mild left hydroureteronephrosis secondary to a 7 mm left UVJ calculus.  Bilateral nonspecific perirenal stranding. Correlate clinically for infection  Prostatomegaly.  Additional findings as discussed    --- End of Report ---            AMANDEEP CLARK MD; Attending Radiologist  This document has been electronically signed. 2021  3:45PM    < end of copied text >  < from: US Renal (21 @ 15:37) >    EXAM:  US KIDNEY(S)                            PROCEDURE DATE:  2021          INTERPRETATION:  CLINICAL INFORMATION: Worsening renal failure    COMPARISON: 2020 and CT scan of earlier today.    TECHNIQUE: Sonography of the kidneys    FINDINGS:    Right kidney: 12.1 cm cm. No renal mass, hydronephrosis or calculi. A simple cyst in the upper to midpole measures 1.2 x 1.0 x 1.1 cm.    Left kidney: 12.4 cm. No renal mass or calculi. There is mild left hydronephrosis      IMPRESSION:    Mild left hydronephrosis as seen on the CAT scan of earlier today  Small right renal cyst again appreciated    --- End of Report ---            HERRERA LE MD; Attending Radiologist  This document has been electronically signed. 2021  3:49PM    < end of copied text >  < from: Xray Chest 1 View-PORTABLE IMMEDIATE (21 @ 14:10) >    EXAM:  XR CHEST PORTABLE IMMED 1V                            PROCEDURE DATE:  2021          INTERPRETATION:  AP semierect chest on 2021 at 1:58 PM. Patient has sepsis.    Heart magnified by technique.    Scattered and lower lung field interstitial infiltrates are presently seen anterior greater on the right at this time. These are new since 2020.    IMPRESSION: Bilateral infiltrates as above.    --- End of Report ---            CINTHIA MCKEON MD; Attending Radiologist  This document has been electronically signed. 2021  2:36PM    < end of copied text >

## 2021-07-29 NOTE — H&P ADULT - NSICDXFAMILYHX_GEN_ALL_CORE_FT
FAMILY HISTORY:  FHx: hyperlipidemia    Father  Still living? Unknown  FHx: myocardial infarction, Age at diagnosis: Age Unknown

## 2021-07-29 NOTE — CONSULT NOTE ADULT - ASSESSMENT
MARLEY: Prerenal azotemia, ATN (hemodynamic, Sepsis), Obstructive uropathy, On ACEI  Left UVJ calculus  R/o sepsis`  ALS    IV hydration. Check cultures, IV abx.  evaluation. Strict I & O. Avoid nephrotoxic meds as possible.   Will follow electrolytes and renal function trend. D/w family at bedside.   Further recommendations pending clinical course. Thank you for the courtesy of this referral.

## 2021-07-29 NOTE — H&P ADULT - PROBLEM SELECTOR PLAN 5
Complaining of worsening generalized weakness, likely in the setting of sepsis and urinary tract infection, however, patient not currently on riluzole therapy as per spouse, unknown; spouse admitted to 2 falls in the past several   - Neuro consulted [facility] Complaining of worsening generalized weakness, likely in the setting of sepsis and urinary tract infection, however, patient not currently on riluzole therapy as per spouse, due to intolerance;  - Neuro consulted Syl[facility]  - PT consulted [facility] 147 on admission  - likely in the setting of dehydration  - start IVF maintenance   - [place]ph[place] alexander Consulted

## 2021-07-29 NOTE — ED ADULT TRIAGE NOTE - CHIEF COMPLAINT QUOTE
pt to ED via ambulance from home  per wife, pt short of breath, has dark, cloudy urine and pt "feels warm" - per EMS, pt O2 sat in 70s on room air  vaccinated 2 doses Moderna

## 2021-07-29 NOTE — H&P ADULT - PROBLEM SELECTOR PROBLEM 5
CAD (coronary artery disease) Medical Necessity Clause: This procedure was medically necessary because the lesions that were treated were: Consent: The patient's consent was obtained including but not limited to risks of crusting, scabbing, blistering, scarring, darker or lighter pigmentary change, recurrence, incomplete removal and infection. Medical Necessity Information: It is in your best interest to select a reason for this procedure from the list below. All of these items fulfill various CMS LCD requirements except the new and changing color options. Add 52 Modifier (Optional): no Detail Level: Detailed Post-Care Instructions: I reviewed with the patient in detail post-care instructions. Patient is to wear sunprotection, and avoid picking at any of the treated lesions. Pt may apply Vaseline to crusted or scabbing areas. Duration Of Freeze Thaw-Cycle (Seconds): 0 Number Of Freeze-Thaw Cycles: 2 freeze-thaw cycles Amyotrophic lateral sclerosis (ALS) Hypernatremia

## 2021-07-29 NOTE — H&P ADULT - PROBLEM SELECTOR PLAN 1
Meets Sepsis Criteria: Lactate 2.7, , RR > 20, White Count 29.7   - source likely urinary tract infection  - s/p 2 L NS bolus in the ED   - start zosyn (renally dosed) 3.375 g q6 h X 7 days for broad spectrum coverage   - start  cc/hour maintenance fluids   - tyelenol PRN for fever   - ID Micheal Consulted appreciate recs Meets Sepsis Criteria: Lactate 2.7, , RR > 20, White Count 29.7   - source likely urinary tract infection  - s/p 2 L NS bolus in the ED   - start zosyn (renally dosed) 3.375 g q12 h X 7 days for broad spectrum coverage   - start  cc/hour maintenance fluids   - tyelenol PRN for fever   - ID Micheal Consulted appreciate recs Meets Sepsis Criteria: Lactate 2.7, , RR > 20, White Count 29.7   - source likely urinary tract infection  - s/p 2 L NS bolus in the ED   - start zosyn (renally dosed) 3.375 g q12 h X 7 days for broad spectrum coverage   - fu blood/urine cultures   - start  cc/hour maintenance fluids   - tyelenol PRN for fever   - ID Micheal Consulted appreciate recs Meets Sepsis Criteria: Lactate 2.7, , RR > 20, White Count 29.7   - source likely urinary tract infection  - s/p 2 L NS bolus in the ED   - start zosyn (renally dosed) 3.375 g q12 h X 7 days for broad spectrum coverage   - fu blood/urine cultures . F/u repeat lactic acid  - start  cc/hour maintenance fluids   - tyelenol PRN for fever   - ID Micheal Consulted appreciate recs Meets Sepsis Criteria: Lactate 2.7, , RR > 20, White Count 29.7   - source likely urinary tract infection  - s/p 2 L NS bolus in the ED   - start zosyn (renally dosed) 3.375 g q12 h X 7 days for broad spectrum coverage   - fu blood/urine cultures . F/u repeat lactic acid  - start  cc/hour maintenance fluids   - fu repeat lactate   - tyelenol PRN for fever   - ID Micheal Consulted appreciate recs  - ICU consulted Meets Sepsis Criteria: Lactate 2.7, , RR > 20, White Count 29.7   - source likely urinary tract infection + aspiration pneumonia   - s/p 2 L NS bolus in the ED   - start zosyn (renally dosed) 3.375 g q12 h X 7 days for broad spectrum coverage   - fu blood/urine cultures . F/u repeat lactic acid  - start  cc/hour maintenance fluids   - fu repeat lactate   - tyelenol PRN for fever   - ID Micheal Consulted appreciate recs  - ICU consulted

## 2021-07-29 NOTE — H&P ADULT - NSICDXPASTSURGICALHX_GEN_ALL_CORE_FT
PAST SURGICAL HISTORY:  S/P coronary artery stent placement     S/P tonsillectomy      PAST SURGICAL HISTORY:  H/O shoulder surgery     History of hernia repair > 20 years ago    History of hip surgery     S/P coronary artery stent placement     S/P tonsillectomy

## 2021-07-29 NOTE — ED PROVIDER NOTE - NEUROLOGICAL, MLM
No acute focal motor or sensory deficits but globally/diffusely weak 3/5 proximal and distal all extremities.

## 2021-07-29 NOTE — ED PROVIDER NOTE - CLINICAL SUMMARY MEDICAL DECISION MAKING FREE TEXT BOX
Weakness, subjective fever and cloudy urine in this male adult with ALS requiring evaluation, labs, imaging and IVFs and meds.

## 2021-07-29 NOTE — ED PROVIDER NOTE - OBJECTIVE STATEMENT
72 yo white male with H/O ALS (amyotrophic lateral sclerosis)    BPH (benign prostatic hyperplasia)    Hyperlipidemia    Hypertension and    Myocardial infarct who was at his baseline state up to a week ago at which time he was able to walk w/o assistance, speak, bath, toilet and feed himself.  Patient did have a non syncopal fall a few days ago and was able to get up himself. Over the past 2-3 days it was noted that his urine was cloudy and that he seemed to be slightly SOB and may be running a fever. In addition it was noted that he rapidly was weakening and now unable to walk, talk well or feed himself and he may be a little SOB. No vomiting, diarrhea, cough, chills.

## 2021-07-29 NOTE — CONSULT NOTE ADULT - SUBJECTIVE AND OBJECTIVE BOX
Patient is a 71y old  Male who presents with a chief complaint of Sepsis, UTI (2021 15:13)    Charting in progress    HPI:  70 yo M PMHx ALS, CAD (s/p MI 1 stent placed  on DAPT), HTN, HLD, BPH presenting with diaphoresis, shortness of breath. History taken by Shadia (wife) who lives with him. She states that fell coming out of the bathroom 2 days ago, fell on his buttocks, no head injury. Since then he has felt generalized weakness, and profuse diaphoresis. Prior to this he was in his usual state of health.   Patient has a history of ALS previously on riluzole which was stopped due to intolerance. Patient had a PEG tube and began to straight cath several months ago due to progressive ALS. He uses a walker and cane at home with no difficulty until 2 days ago.     As per the spouse, his urine looked cloudy, dark yellow with odor. Denies hematuria.     Of note, patient last admitted to [facility]  -->  for urinary retention, jo placed, likely cause BPH and noncompliance with flomax.     ED Course:   Vital Signs: /69  T 98 F SpO2 70% on RA --> 90% on NRB   EKG:   Labs:   White Count: 29.47, Lactate 2.7, BUN/Cr: 113/5.70 Alk Phos 223   Imaging:   CXR: Scattered and lower lung field interstitial infiltrates are presently seen anterior greater on the right at this time. These are new since 2020.  IMPRESSION: Bilateral infiltrates as above.    CT Chest/Abomen/Pelvis: Bibasilar aspiration pneumonia. Fluid-filled upper thoracic esophagus may reflect gastroesophageal reflux.  Mild left hydroureteronephrosis secondary to a 7 mm left UVJ calculus. Bilateral nonspecific perirenal stranding. Correlate clinically for infection  Prostatomegaly.    Given:   2 L NS Bolus, Rocephin X 1   Jo Placed in ED  (2021 15:13)      PAST MEDICAL & SURGICAL HISTORY:  Myocardial infarct    Hypertension    Hyperlipidemia    BPH (benign prostatic hyperplasia)    ALS (amyotrophic lateral sclerosis)    S/P tonsillectomy    S/P coronary artery stent placement                  MEDICATIONS  (STANDING):  atorvastatin 80 milliGRAM(s) Oral at bedtime  clopidogrel Tablet 75 milliGRAM(s) Oral daily  escitalopram 10 milliGRAM(s) Oral daily  lactated ringers. 1000 milliLiter(s) (100 mL/Hr) IV Continuous <Continuous>  piperacillin/tazobactam IVPB..      piperacillin/tazobactam IVPB.. 3.375 Gram(s) IV Intermittent once  sodium chloride 0.9% Bolus 1000 milliLiter(s) IV Bolus once    MEDICATIONS  (PRN):  acetaminophen   Tablet .. 650 milliGRAM(s) Oral every 6 hours PRN Temp greater or equal to 38.5C (101.3F), Mild Pain (1 - 3)  aluminum hydroxide/magnesium hydroxide/simethicone Suspension 30 milliLiter(s) Oral every 4 hours PRN Dyspepsia  melatonin 3 milliGRAM(s) Oral at bedtime PRN Insomnia  ondansetron Injectable 4 milliGRAM(s) IV Push every 8 hours PRN Nausea and/or Vomiting      FAMILY HISTORY:    Denies Family history of CAD or early MI      Constitutional: denies fever, chills  HEENT: denies blurry vision, difficulty hearing  Respiratory: denies SOB, WILEY, cough  Cardiovascular: denies CP, palpitations, orthopnea, PND, LE edema  Gastrointestinal: denies nausea, vomiting, abdominal pain  Genitourinary: denies urinary changes  Skin: Denies rashes, itching  Neurologic: denies headache, weakness, dizziness  Hematology/Oncology: denies bleeding, easy bruising  ROS negative except as noted above      SOCIAL HISTORY:    No tobacco, Alcohol or Ddrug use    Vital Signs Last 24 Hrs  T(C): 37.3 (2021 13:20), Max: 37.3 (2021 13:20)  T(F): 99.2 (2021 13:20), Max: 99.2 (2021 13:20)  HR: 116 (2021 12:57) (116 - 116)  BP: 102/69 (2021 12:57) (102/69 - 102/69)  BP(mean): --  RR: 18 (2021 12:57) (18 - 18)  SpO2: 90% (2021 12:57) (90% - 90%)    Physical Exam:  General: Well developed, well nourished, NAD  HEENT: NCAT, PERRLA, EOMI bl, moist mucous membranes   Neck: Supple, nontender, no mass  Neurology: A&Ox3, nonfocal, sensation intact   Respiratory: CTA B/L, No W/R/R  CV: RRR, +S1/S2, no murmurs, rubs or gallops  Abdominal: Soft, NT, ND +BSx4, no palpable masses  Extremities: No C/C/E, + peripheral pulses  MSK: Normal ROM, no joint erythema or warmth, no joint swelling   Heme: No obvious ecchymosis or petechiae   Skin: warm, dry, normal color      ECG:    I&O's Detail      LABS:                        14.5   29.47 )-----------( 154      ( 2021 13:35 )             44.5         147<H>  |  111<H>  |  113<H>  ----------------------------<  292<H>  3.8   |  24  |  5.70<H>    Ca    10.1      2021 13:35    TPro  7.4  /  Alb  2.3<L>  /  TBili  1.8<H>  /  DBili  x   /  AST  70<H>  /  ALT  57  /  AlkPhos  223<H>  07-        PT/INR - ( 2021 13:35 )   PT: 11.7 sec;   INR: 1.00 ratio         PTT - ( 2021 13:35 )  PTT:28.4 sec  Urinalysis Basic - ( 2021 13:34 )    Color: Orange / Appearance: Turbid / S.020 / pH: x  Gluc: x / Ketone: Trace  / Bili: Negative / Urobili: 1   Blood: x / Protein: 100 / Nitrite: Negative   Leuk Esterase: Moderate / RBC: 25-50 /HPF / WBC >50   Sq Epi: x / Non Sq Epi: Occasional / Bacteria: TNTC      I&O's Summary    BNP  RADIOLOGY & ADDITIONAL STUDIES: Patient is a 71y old  Male who presents with a chief complaint of Sepsis, UTI (2021 15:13)    Charting in progress    HPI:  72 yo M PMHx ALS, CAD (s/p MI 1 stent placed ), HTN, HLD, BPH presenting with diaphoresis, shortness of breath. History taken by Shadia (wife) who lives with him. She states that fell coming out of the bathroom 2 days ago, fell on his buttocks, no head injury. Since then he has felt generalized weakness, and profuse diaphoresis. Prior to this he was in his usual state of health.   Patient has a history of ALS previously on riluzole which was stopped due to intolerance. Patient had a PEG tube and began to straight cath several months ago due to progressive ALS. He uses a walker and cane at home with no difficulty until 2 days ago.     As per the spouse, his urine looked cloudy, dark yellow with odor. Denies hematuria.     Of note, patient last admitted to [facility]  -->  for urinary retention, jo placed, likely cause BPH and noncompliance with flomax.     ED Course:   Vital Signs: /69  T 98 F SpO2 70% on RA --> 90% on NRB   EKG:   Labs:   White Count: 29.47, Lactate 2.7, BUN/Cr: 113/5.70 Alk Phos 223   Imaging:   CXR: Scattered and lower lung field interstitial infiltrates are presently seen anterior greater on the right at this time. These are new since 2020.  IMPRESSION: Bilateral infiltrates as above.    CT Chest/Abomen/Pelvis: Bibasilar aspiration pneumonia. Fluid-filled upper thoracic esophagus may reflect gastroesophageal reflux.  Mild left hydroureteronephrosis secondary to a 7 mm left UVJ calculus. Bilateral nonspecific perirenal stranding. Correlate clinically for infection  Prostatomegaly.    Given:   2 L NS Bolus, Rocephin X 1   Jo Placed in ED  (2021 15:13)      PAST MEDICAL & SURGICAL HISTORY:  Myocardial infarct    Hypertension    Hyperlipidemia    BPH (benign prostatic hyperplasia)    ALS (amyotrophic lateral sclerosis)    S/P tonsillectomy    S/P coronary artery stent placement                  MEDICATIONS  (STANDING):  atorvastatin 80 milliGRAM(s) Oral at bedtime  clopidogrel Tablet 75 milliGRAM(s) Oral daily  escitalopram 10 milliGRAM(s) Oral daily  lactated ringers. 1000 milliLiter(s) (100 mL/Hr) IV Continuous <Continuous>  piperacillin/tazobactam IVPB..      piperacillin/tazobactam IVPB.. 3.375 Gram(s) IV Intermittent once  sodium chloride 0.9% Bolus 1000 milliLiter(s) IV Bolus once    MEDICATIONS  (PRN):  acetaminophen   Tablet .. 650 milliGRAM(s) Oral every 6 hours PRN Temp greater or equal to 38.5C (101.3F), Mild Pain (1 - 3)  aluminum hydroxide/magnesium hydroxide/simethicone Suspension 30 milliLiter(s) Oral every 4 hours PRN Dyspepsia  melatonin 3 milliGRAM(s) Oral at bedtime PRN Insomnia  ondansetron Injectable 4 milliGRAM(s) IV Push every 8 hours PRN Nausea and/or Vomiting      FAMILY HISTORY:    Family history of CAD       Constitutional: denies fever, chills  HEENT: denies blurry vision, difficulty hearing  Respiratory: denies SOB, WILEY, cough  Cardiovascular: denies CP, palpitations, orthopnea, PND, LE edema  Gastrointestinal: denies nausea, vomiting, abdominal pain  Genitourinary: denies urinary changes  Skin: Denies rashes, itching  Neurologic: denies headache, weakness, dizziness  Hematology/Oncology: denies bleeding, easy bruising  ROS negative except as noted above      SOCIAL HISTORY:    No tobacco, Alcohol or Ddrug use    Vital Signs Last 24 Hrs  T(C): 37.3 (2021 13:20), Max: 37.3 (2021 13:20)  T(F): 99.2 (2021 13:20), Max: 99.2 (2021 13:20)  HR: 116 (2021 12:57) (116 - 116)  BP: 102/69 (2021 12:57) (102/69 - 102/69)  BP(mean): --  RR: 18 (2021 12:57) (18 - 18)  SpO2: 90% (2021 12:57) (90% - 90%)    Physical Exam:  General: appears short of breath on venturi mask   HEENT: NCAT, EOMI bl  Neck: Supple, nontender, no mass  Neurology: A&Ox3, sensation intact   Lung: CTA B/L, No W/R/R, labored breathing on venturi mask   Heart: tachycardic, +S1/S2, no murmurs, rubs or gallops  GI: Soft, NT, ND, +PEG tube  Lymph: No C/C/E, + peripheral pulses  Skin: warm, dry, normal color      ECG:  NSR @ 96 bpm, LAD, possible anterior infarct age undetermined    I&O's Detail      LABS:                        14.5   29.47 )-----------( 154      ( 2021 13:35 )             44.5         147<H>  |  111<H>  |  113<H>  ----------------------------<  292<H>  3.8   |  24  |  5.70<H>    Ca    10.1      2021 13:35    TPro  7.4  /  Alb  2.3<L>  /  TBili  1.8<H>  /  DBili  x   /  AST  70<H>  /  ALT  57  /  AlkPhos  223<H>          PT/INR - ( 2021 13:35 )   PT: 11.7 sec;   INR: 1.00 ratio         PTT - ( 2021 13:35 )  PTT:28.4 sec  Urinalysis Basic - ( 2021 13:34 )    Color: Orange / Appearance: Turbid / S.020 / pH: x  Gluc: x / Ketone: Trace  / Bili: Negative / Urobili: 1   Blood: x / Protein: 100 / Nitrite: Negative   Leuk Esterase: Moderate / RBC: 25-50 /HPF / WBC >50   Sq Epi: x / Non Sq Epi: Occasional / Bacteria: TNTC      I&O's Summary    BNP  RADIOLOGY & ADDITIONAL STUDIES:  < from: CT Chest No Cont (21 @ 15:12) >  EXAM:  CT CHEST                            PROCEDURE DATE:  2021          INTERPRETATION:  CLINICAL INFORMATION: Sepsis    COMPARISON: None.    CONTRAST/COMPLICATIONS:  IV Contrast: NONE  Oral Contrast: NONE  Complications: None reported at time of study completion    PROCEDURE:  CT of the Chest, Abdomen and Pelvis was performed.  Sagittal and coronal reformats were performed.    FINDINGS:  CHEST:  LUNGS AND LARGE AIRWAYS: Patent central airways. Extensive bibasilar dependent airspace infiltrates consistent with bilateral aspiration pneumonia  PLEURA: No pleural effusion.  VESSELS: Nonaneurysmal.  HEART: Coronary artery calcification. No pericardial effusion.  MEDIASTINUM AND GABRIELA: Mild fluid distention of the upper thoracic esophagussuggesting possible gastroesophageal reflux  CHEST WALL AND LOWER NECK: Within normal limits.    ABDOMEN AND PELVIS:  LIVER: Within normal limits.  BILE DUCTS: Normal caliber.  GALLBLADDER: Within normal limits.  SPLEEN: Within normal limits.  PANCREAS: Within normal limits.  ADRENALS: Within normal limits.  KIDNEYS/URETERS: Mild left hydroureteronephrosis secondary to a 7 mm left UVJ calculus. Punctate nonobstructive left renal calculus. Bilateral nonspecific perirenal stranding. Correlate for UTI.    BLADDER: Decompressed with Jo. Fluid-filled left lateral bladder diverticulum.  REPRODUCTIVE ORGANS: Enlarged prostate    BOWEL: No bowel obstruction or inflammation. Gastrostomy tube in situ. Appendix normal  PERITONEUM: No ascites.  VESSELS: Within normal limits.  RETROPERITONEUM/LYMPH NODES: No lymphadenopathy.  ABDOMINAL WALL: Fat-containing left inguinal hernia. Tiny fat-containing umbilical hernia.  BONES: Degenerative change.    IMPRESSION:  Bibasilar aspiration pneumonia.  Fluid-filled upper thoracic esophagus may reflect gastroesophageal reflux.  Mild left hydroureteronephrosis secondary to a 7 mm left UVJ calculus.  Bilateral nonspecific perirenal stranding. Correlate clinically for infection  Prostatomegaly.  Additional findings as discussed    --- End of Report ---            AMANDEEP CLARK MD; Attending Radiologist  This document has been electronically signed. 2021  3:45PM    < end of copied text >   Patient is a 71y old  Male who presents with a chief complaint of Sepsis, UTI (2021 15:13)    HPI:  70 yo M PMHx ALS, CAD (s/p MI 1 stent placed ), HTN, HLD, BPH presenting with diaphoresis, shortness of breath. History taken by Shadia (wife) who lives with him. She states that fell coming out of the bathroom 2 days ago, fell on his buttocks, no head injury. Since then he has felt generalized weakness, and profuse diaphoresis. Prior to this he was in his usual state of health.   Patient has a history of ALS previously on riluzole which was stopped due to intolerance. Patient had a PEG tube and began to straight cath several months ago due to progressive ALS. He uses a walker and cane at home with no difficulty until 2 days ago.     As per the spouse, his urine looked cloudy, dark yellow with odor. Denies hematuria.     Of note, patient last admitted to [facility]  -->  for urinary retention, jo placed, likely cause BPH and noncompliance with flomax.     ED Course:   Vital Signs: /69  T 98 F SpO2 70% on RA --> 90% on NRB   EKG:   Labs:   White Count: 29.47, Lactate 2.7, BUN/Cr: 113/5.70 Alk Phos 223   Imaging:   CXR: Scattered and lower lung field interstitial infiltrates are presently seen anterior greater on the right at this time. These are new since 2020.  IMPRESSION: Bilateral infiltrates as above.    CT Chest/Abomen/Pelvis: Bibasilar aspiration pneumonia. Fluid-filled upper thoracic esophagus may reflect gastroesophageal reflux.  Mild left hydroureteronephrosis secondary to a 7 mm left UVJ calculus. Bilateral nonspecific perirenal stranding. Correlate clinically for infection  Prostatomegaly.    Given:   2 L NS Bolus, Rocephin X 1   Jo Placed in ED  (2021 15:13)      PAST MEDICAL & SURGICAL HISTORY:  Myocardial infarct    Hypertension    Hyperlipidemia    BPH (benign prostatic hyperplasia)    ALS (amyotrophic lateral sclerosis)    S/P tonsillectomy    S/P coronary artery stent placement                  MEDICATIONS  (STANDING):  atorvastatin 80 milliGRAM(s) Oral at bedtime  clopidogrel Tablet 75 milliGRAM(s) Oral daily  escitalopram 10 milliGRAM(s) Oral daily  lactated ringers. 1000 milliLiter(s) (100 mL/Hr) IV Continuous <Continuous>  piperacillin/tazobactam IVPB..      piperacillin/tazobactam IVPB.. 3.375 Gram(s) IV Intermittent once  sodium chloride 0.9% Bolus 1000 milliLiter(s) IV Bolus once    MEDICATIONS  (PRN):  acetaminophen   Tablet .. 650 milliGRAM(s) Oral every 6 hours PRN Temp greater or equal to 38.5C (101.3F), Mild Pain (1 - 3)  aluminum hydroxide/magnesium hydroxide/simethicone Suspension 30 milliLiter(s) Oral every 4 hours PRN Dyspepsia  melatonin 3 milliGRAM(s) Oral at bedtime PRN Insomnia  ondansetron Injectable 4 milliGRAM(s) IV Push every 8 hours PRN Nausea and/or Vomiting      FAMILY HISTORY:    Family history of CAD       Constitutional: denies fever, chills  HEENT: denies blurry vision, difficulty hearing  Respiratory: denies SOB, WILEY, cough  Cardiovascular: denies CP, palpitations, orthopnea, PND, LE edema  Gastrointestinal: denies nausea, vomiting, abdominal pain  Genitourinary: denies urinary changes  Skin: Denies rashes, itching  Neurologic: denies headache, weakness, dizziness  Hematology/Oncology: denies bleeding, easy bruising  ROS negative except as noted above      SOCIAL HISTORY:    No tobacco, Alcohol or Ddrug use    Vital Signs Last 24 Hrs  T(C): 37.3 (2021 13:20), Max: 37.3 (2021 13:20)  T(F): 99.2 (2021 13:20), Max: 99.2 (2021 13:20)  HR: 116 (2021 12:57) (116 - 116)  BP: 102/69 (2021 12:57) (102/69 - 102/69)  BP(mean): --  RR: 18 (2021 12:57) (18 - 18)  SpO2: 90% (2021 12:57) (90% - 90%)    Physical Exam:  General: appears short of breath on venturi mask   HEENT: NCAT, EOMI bl  Neck: Supple, nontender, no mass  Neurology: A&Ox3, sensation intact   Lung: CTA B/L, No W/R/R, labored breathing on venturi mask   Heart: tachycardic, +S1/S2, no murmurs, rubs or gallops  GI: Soft, NT, ND, +PEG tube  Lymph: No C/C/E, + peripheral pulses  Skin: warm, dry, normal color      ECG:  NSR @ 96 bpm, LAD, possible anterior infarct age undetermined    I&O's Detail      LABS:                        14.5   29.47 )-----------( 154      ( 2021 13:35 )             44.5         147<H>  |  111<H>  |  113<H>  ----------------------------<  292<H>  3.8   |  24  |  5.70<H>    Ca    10.1      2021 13:35    TPro  7.4  /  Alb  2.3<L>  /  TBili  1.8<H>  /  DBili  x   /  AST  70<H>  /  ALT  57  /  AlkPhos  223<H>          PT/INR - ( 2021 13:35 )   PT: 11.7 sec;   INR: 1.00 ratio         PTT - ( 2021 13:35 )  PTT:28.4 sec  Urinalysis Basic - ( 2021 13:34 )    Color: Orange / Appearance: Turbid / S.020 / pH: x  Gluc: x / Ketone: Trace  / Bili: Negative / Urobili: 1   Blood: x / Protein: 100 / Nitrite: Negative   Leuk Esterase: Moderate / RBC: 25-50 /HPF / WBC >50   Sq Epi: x / Non Sq Epi: Occasional / Bacteria: TNTC      I&O's Summary    BNP  RADIOLOGY & ADDITIONAL STUDIES:  < from: CT Chest No Cont (21 @ 15:12) >  EXAM:  CT CHEST                            PROCEDURE DATE:  2021          INTERPRETATION:  CLINICAL INFORMATION: Sepsis    COMPARISON: None.    CONTRAST/COMPLICATIONS:  IV Contrast: NONE  Oral Contrast: NONE  Complications: None reported at time of study completion    PROCEDURE:  CT of the Chest, Abdomen and Pelvis was performed.  Sagittal and coronal reformats were performed.    FINDINGS:  CHEST:  LUNGS AND LARGE AIRWAYS: Patent central airways. Extensive bibasilar dependent airspace infiltrates consistent with bilateral aspiration pneumonia  PLEURA: No pleural effusion.  VESSELS: Nonaneurysmal.  HEART: Coronary artery calcification. No pericardial effusion.  MEDIASTINUM AND GABRIELA: Mild fluid distention of the upper thoracic esophagussuggesting possible gastroesophageal reflux  CHEST WALL AND LOWER NECK: Within normal limits.    ABDOMEN AND PELVIS:  LIVER: Within normal limits.  BILE DUCTS: Normal caliber.  GALLBLADDER: Within normal limits.  SPLEEN: Within normal limits.  PANCREAS: Within normal limits.  ADRENALS: Within normal limits.  KIDNEYS/URETERS: Mild left hydroureteronephrosis secondary to a 7 mm left UVJ calculus. Punctate nonobstructive left renal calculus. Bilateral nonspecific perirenal stranding. Correlate for UTI.    BLADDER: Decompressed with Jo. Fluid-filled left lateral bladder diverticulum.  REPRODUCTIVE ORGANS: Enlarged prostate    BOWEL: No bowel obstruction or inflammation. Gastrostomy tube in situ. Appendix normal  PERITONEUM: No ascites.  VESSELS: Within normal limits.  RETROPERITONEUM/LYMPH NODES: No lymphadenopathy.  ABDOMINAL WALL: Fat-containing left inguinal hernia. Tiny fat-containing umbilical hernia.  BONES: Degenerative change.    IMPRESSION:  Bibasilar aspiration pneumonia.  Fluid-filled upper thoracic esophagus may reflect gastroesophageal reflux.  Mild left hydroureteronephrosis secondary to a 7 mm left UVJ calculus.  Bilateral nonspecific perirenal stranding. Correlate clinically for infection  Prostatomegaly.  Additional findings as discussed    --- End of Report ---            AMANDEEP CLARK MD; Attending Radiologist  This document has been electronically signed. 2021  3:45PM    < end of copied text >   Patient is a 71y old  Male who presents with a chief complaint of Sepsis, UTI (2021 15:13)    HPI: HPI limited as pt on ventimask and dyspneic  70 yo M PMHx ALS, CAD (s/p MI 1 stent placed ), HTN, HLD, BPH presenting with diaphoresis, shortness of breath. History taken by Shadia (wife) who lives with him. She states that fell coming out of the bathroom 2 days ago, fell on his buttocks, no head injury. Since then he has felt generalized weakness, and profuse diaphoresis. Prior to this he was in his usual state of health.   Patient has a history of ALS previously on riluzole which was stopped due to intolerance. Patient had a PEG tube and began to straight cath several months ago due to progressive ALS. He uses a walker and cane at home with no difficulty until 2 days ago.     As per the spouse, his urine looked cloudy, dark yellow with odor. Denies hematuria.     Of note, patient last admitted to [facility]  -->  for urinary retention, jo placed, likely cause BPH and noncompliance with flomax.     ED Course:   Vital Signs: /69  T 98 F SpO2 70% on RA --> 90% on NRB   EKG:   Labs:   White Count: 29.47, Lactate 2.7, BUN/Cr: 113/5.70 Alk Phos 223   Imaging:   CXR: Scattered and lower lung field interstitial infiltrates are presently seen anterior greater on the right at this time. These are new since 2020.  IMPRESSION: Bilateral infiltrates as above.    CT Chest/Abomen/Pelvis: Bibasilar aspiration pneumonia. Fluid-filled upper thoracic esophagus may reflect gastroesophageal reflux.  Mild left hydroureteronephrosis secondary to a 7 mm left UVJ calculus. Bilateral nonspecific perirenal stranding. Correlate clinically for infection  Prostatomegaly.    Given:   2 L NS Bolus, Rocephin X 1   Jo Placed in ED  (2021 15:13)      PAST MEDICAL & SURGICAL HISTORY:  Myocardial infarct    Hypertension    Hyperlipidemia    BPH (benign prostatic hyperplasia)    ALS (amyotrophic lateral sclerosis)    S/P tonsillectomy    S/P coronary artery stent placement                  MEDICATIONS  (STANDING):  atorvastatin 80 milliGRAM(s) Oral at bedtime  clopidogrel Tablet 75 milliGRAM(s) Oral daily  escitalopram 10 milliGRAM(s) Oral daily  lactated ringers. 1000 milliLiter(s) (100 mL/Hr) IV Continuous <Continuous>  piperacillin/tazobactam IVPB..      piperacillin/tazobactam IVPB.. 3.375 Gram(s) IV Intermittent once  sodium chloride 0.9% Bolus 1000 milliLiter(s) IV Bolus once    MEDICATIONS  (PRN):  acetaminophen   Tablet .. 650 milliGRAM(s) Oral every 6 hours PRN Temp greater or equal to 38.5C (101.3F), Mild Pain (1 - 3)  aluminum hydroxide/magnesium hydroxide/simethicone Suspension 30 milliLiter(s) Oral every 4 hours PRN Dyspepsia  melatonin 3 milliGRAM(s) Oral at bedtime PRN Insomnia  ondansetron Injectable 4 milliGRAM(s) IV Push every 8 hours PRN Nausea and/or Vomiting      FAMILY HISTORY:    Family history of CAD   NO scd    Constitutional: denies fever, chills  HEENT: denies blurry vision, difficulty hearing  Respiratory: denies SOB, WILEY, cough  Cardiovascular: denies CP, palpitations, orthopnea, PND, LE edema  Gastrointestinal: denies nausea, vomiting, abdominal pain  Genitourinary: denies urinary changes  Skin: Denies rashes, itching  Neurologic: denies headache, weakness, dizziness  Hematology/Oncology: denies bleeding, easy bruising  ROS negative except as noted above      SOCIAL HISTORY:    No tobacco, Alcohol or Ddrug use    Vital Signs Last 24 Hrs  T(C): 37.3 (2021 13:20), Max: 37.3 (2021 13:20)  T(F): 99.2 (2021 13:20), Max: 99.2 (2021 13:20)  HR: 116 (2021 12:57) (116 - 116)  BP: 102/69 (2021 12:57) (102/69 - 102/69)  BP(mean): --  RR: 18 (2021 12:57) (18 - 18)  SpO2: 90% (2021 12:57) (90% - 90%)    Physical Exam:  General: appears short of breath on venturi mask   HEENT: NCAT, EOMI bl  Neck: Supple, nontender, no mass  Neurology: A&Ox3, sensation intact   Lung: Decreased breath sounds b/l. No rales, crackles or wheeze appreciated. poor effort  Heart: tachycardic, +S1/S2, no murmurs, rubs or gallops  GI: Soft, NT, ND, +PEG tube  Lymph: No C/C/E, + peripheral pulses  Skin: warm, dry, normal color  psych unable to assess       ECG:  NSR @ 96 bpm, LAD, possible anterior infarct age undetermined    I&O's Detail      LABS:                        14.5   29.47 )-----------( 154      ( 2021 13:35 )             44.5         147<H>  |  111<H>  |  113<H>  ----------------------------<  292<H>  3.8   |  24  |  5.70<H>    Ca    10.1      2021 13:35    TPro  7.4  /  Alb  2.3<L>  /  TBili  1.8<H>  /  DBili  x   /  AST  70<H>  /  ALT  57  /  AlkPhos  223<H>          PT/INR - ( 2021 13:35 )   PT: 11.7 sec;   INR: 1.00 ratio         PTT - ( 2021 13:35 )  PTT:28.4 sec  Urinalysis Basic - ( 2021 13:34 )    Color: Orange / Appearance: Turbid / S.020 / pH: x  Gluc: x / Ketone: Trace  / Bili: Negative / Urobili: 1   Blood: x / Protein: 100 / Nitrite: Negative   Leuk Esterase: Moderate / RBC: 25-50 /HPF / WBC >50   Sq Epi: x / Non Sq Epi: Occasional / Bacteria: TNTC      I&O's Summary    BNP  RADIOLOGY & ADDITIONAL STUDIES:  < from: CT Chest No Cont (21 @ 15:12) >  EXAM:  CT CHEST                            PROCEDURE DATE:  2021          INTERPRETATION:  CLINICAL INFORMATION: Sepsis    COMPARISON: None.    CONTRAST/COMPLICATIONS:  IV Contrast: NONE  Oral Contrast: NONE  Complications: None reported at time of study completion    PROCEDURE:  CT of the Chest, Abdomen and Pelvis was performed.  Sagittal and coronal reformats were performed.    FINDINGS:  CHEST:  LUNGS AND LARGE AIRWAYS: Patent central airways. Extensive bibasilar dependent airspace infiltrates consistent with bilateral aspiration pneumonia  PLEURA: No pleural effusion.  VESSELS: Nonaneurysmal.  HEART: Coronary artery calcification. No pericardial effusion.  MEDIASTINUM AND GABRIELA: Mild fluid distention of the upper thoracic esophagussuggesting possible gastroesophageal reflux  CHEST WALL AND LOWER NECK: Within normal limits.    ABDOMEN AND PELVIS:  LIVER: Within normal limits.  BILE DUCTS: Normal caliber.  GALLBLADDER: Within normal limits.  SPLEEN: Within normal limits.  PANCREAS: Within normal limits.  ADRENALS: Within normal limits.  KIDNEYS/URETERS: Mild left hydroureteronephrosis secondary to a 7 mm left UVJ calculus. Punctate nonobstructive left renal calculus. Bilateral nonspecific perirenal stranding. Correlate for UTI.    BLADDER: Decompressed with Jo. Fluid-filled left lateral bladder diverticulum.  REPRODUCTIVE ORGANS: Enlarged prostate    BOWEL: No bowel obstruction or inflammation. Gastrostomy tube in situ. Appendix normal  PERITONEUM: No ascites.  VESSELS: Within normal limits.  RETROPERITONEUM/LYMPH NODES: No lymphadenopathy.  ABDOMINAL WALL: Fat-containing left inguinal hernia. Tiny fat-containing umbilical hernia.  BONES: Degenerative change.    IMPRESSION:  Bibasilar aspiration pneumonia.  Fluid-filled upper thoracic esophagus may reflect gastroesophageal reflux.  Mild left hydroureteronephrosis secondary to a 7 mm left UVJ calculus.  Bilateral nonspecific perirenal stranding. Correlate clinically for infection  Prostatomegaly.  Additional findings as discussed    --- End of Report ---            AMANDEEP CLARK MD; Attending Radiologist  This document has been electronically signed. 2021  3:45PM    < end of copied text >

## 2021-07-29 NOTE — H&P ADULT - ATTENDING COMMENTS
no 70 yo M PMHx ALS, CAD (s/p MI 1 stent placed 2006 on DAPT), HTN, HLD, BPH admitted for urosepsis.     -admit due to sepsis 2/2 UTI  -continue ZOsyn which will cover for aspiration PNA and UTI  -urology consulted due to presence of 7mm UVJ stone and elevated WBC Count and lactic acid. Patient of Dr Castellanos.   -trend WBC count and monitor for fever. F/u culture results  -trend lactic acid  -ICU consulted 70 yo M PMHx ALS, CAD (s/p MI 1 stent placed 2006 on DAPT), HTN, HLD, BPH admitted for urosepsis.     -admit due to sepsis 2/2 UTI  -continue ZOsyn which will cover for aspiration PNA and UTI  -urology consulted due to presence of 7mm UVJ stone and elevated WBC Count and lactic acid. Discussed with Dr Verduzco.   -trend WBC count and monitor for fever. F/u culture results  -trend lactic acid  -ICU consulted 72 yo M PMHx ALS, CAD (s/p MI 1 stent placed 2006 on DAPT), HTN, HLD, BPH admitted for urosepsis.     -admit due to sepsis 2/2 UTI  -continue ZOsyn which will cover for aspiration PNA and UTI  -urology consulted due to presence of 7mm UVJ stone and elevated WBC Count and lactic acid. Discussed with Dr Verduzco.   Patient is medically optimized for ureteral stent placement if clinically indicated.   -trend WBC count and monitor for fever. F/u culture results  -trend lactic acid  -ICU consulted

## 2021-07-29 NOTE — H&P ADULT - PROBLEM SELECTOR PLAN 9
Will hold chemical prophylaxis in the setting of hematuria   - SCDs for now On crestor for hyperlipidemia and fenofibrate for hypertriglyceridemia   - continue crestor 40 mg --> lipitor 80 mg therapeutic interchange   - hold fenofibrate due to MARLEY Hypotension upon admission due to sepsis   - hold lisinopril and metoprolol due to hypotension

## 2021-07-29 NOTE — H&P ADULT - PROBLEM SELECTOR PLAN 3
UA: moderate LE and large blood   - s/p rocephin X 1 in the ED   - start zosyn q 6 h (renally dosed)   - THUY Burton consulted appreciate recs UA: moderate LE and large blood   - s/p rocephin X 1 in the ED   - start zosyn q 12 h (renally dosed)   - THUY Burton consulted appreciate recs UA: moderate LE and large blood   - s/p rocephin X 1 in the ED   - start zosyn q 12 h (renally dosed)   -consult urology due to presence of kidney stone  - THUY Burton consulted appreciate recs UA: moderate LE and large blood   - CT showing 7 mm calculus, causing   - s/p rocephin X 1 in the ED   - start zosyn q 12 h (renally dosed)   -consult urology due to presence of kidney stone  - THUY Burton consulted appreciate recs UA: moderate LE and large blood   - CT showing 7 mm calculus, uro consulted; emergent OR for septic stone   - s/p rocephin X 1 in the ED   - start zosyn q 12 h (renally dosed)   - THUY Burton consulted appreciate recs

## 2021-07-29 NOTE — H&P ADULT - HISTORY OF PRESENT ILLNESS
72 yo M PMHx ALS, CAD (s/p MI 1 stent placed 2006 on DAPT), HTN, HLD, BPH presenting with diaphoresis, shortness of breath. History taken by Shadia (wife) who lives with him. She states that fell coming out of the bathroom 2 days ago, fell on his buttocks, no head injury. Since then he has felt generalized weakness, and profuse diaphoresis. Prior to this he was in his usual state of health.   Patient has a history of ALS previously on riluzole which was stopped due to intolerance. Patient had a PEG tube and began to straight cath several months ago due to progressive ALS. He uses a walker and cane at home with no difficulty until 2 days ago.     As per the spouse, his urine looked cloudy, dark yellow with odor. Denies hematuria.     ED Course:   Vital Signs:  70 yo M PMHx ALS, CAD (s/p MI 1 stent placed 2006 on DAPT), HTN, HLD, BPH presenting with diaphoresis, shortness of breath. History taken by Shadia (wife) who lives with him. She states that fell coming out of the bathroom 2 days ago, fell on his buttocks, no head injury. Since then he has felt generalized weakness, and profuse diaphoresis. Prior to this he was in his usual state of health.   Patient has a history of ALS previously on riluzole which was stopped due to intolerance. Patient had a PEG tube and began to straight cath several months ago due to progressive ALS. He uses a walker and cane at home with no difficulty until 2 days ago.     As per the spouse, his urine looked cloudy, dark yellow with odor. Denies hematuria.     ED Course:   Vital Signs: /69  T 98 F SpO2 70% on RA --> 90% on NRB   Labs:   White Count: 29.47, Lactate 2.7, BUN/Cr: 113/5.70 Alk Phos 223   Imaging:   CXR: Scattered and lower lung field interstitial infiltrates are presently seen anterior greater on the right at this time. These are new since November 9, 2020.  IMPRESSION: Bilateral infiltrates as above.    CT Chest/Abomen/Pelvis: possible aspiration pneumonia     Given:   2 L NS Bolus, Rocephin X 1   Shukla Placed in ED  72 yo M PMHx ALS, CAD (s/p MI 1 stent placed 2006 on DAPT), HTN, HLD, BPH presenting with diaphoresis, shortness of breath. History taken by Shadia (wife) who lives with him. She states that fell coming out of the bathroom 2 days ago, fell on his buttocks, no head injury. Since then he has felt generalized weakness, and profuse diaphoresis. Prior to this he was in his usual state of health.   Patient has a history of ALS previously on riluzole which was stopped due to intolerance. Patient had a PEG tube and began to straight cath several months ago due to progressive ALS. He uses a walker and cane at home with no difficulty until 2 days ago.     As per the spouse, his urine looked cloudy, dark yellow with odor. Denies hematuria.     ED Course:   Vital Signs: /69  T 98 F SpO2 70% on RA --> 90% on NRB   EKG:   Labs:   White Count: 29.47, Lactate 2.7, BUN/Cr: 113/5.70 Alk Phos 223   Imaging:   CXR: Scattered and lower lung field interstitial infiltrates are presently seen anterior greater on the right at this time. These are new since November 9, 2020.  IMPRESSION: Bilateral infiltrates as above.    CT Chest/Abomen/Pelvis: Bibasilar aspiration pneumonia. Fluid-filled upper thoracic esophagus may reflect gastroesophageal reflux.  Mild left hydroureteronephrosis secondary to a 7 mm left UVJ calculus. Bilateral nonspecific perirenal stranding. Correlate clinically for infection  Prostatomegaly.    Given:   2 L NS Bolus, Rocephin X 1   Shukla Placed in ED  70 yo M PMHx ALS, CAD (s/p MI 1 stent placed 2006 on DAPT), HTN, HLD, BPH presenting with diaphoresis, shortness of breath. History taken by Shadia (wife) who lives with him. She states that fell coming out of the bathroom 2 days ago, fell on his buttocks, no head injury. Since then he has felt generalized weakness, and profuse diaphoresis. Prior to this he was in his usual state of health.   Patient has a history of ALS previously on riluzole which was stopped due to intolerance. Patient had a PEG tube and began to straight cath several months ago due to progressive ALS. He uses a walker and cane at home with no difficulty until 2 days ago.     As per the spouse, his urine looked cloudy, dark yellow with odor. Denies hematuria.   Of note, patient last admitted to [facility] for urinary retention, jo placed, likely cause BPH and noncompliance with flomax.     ED Course:   Vital Signs: /69  T 98 F SpO2 70% on RA --> 90% on NRB   EKG:   Labs:   White Count: 29.47, Lactate 2.7, BUN/Cr: 113/5.70 Alk Phos 223   Imaging:   CXR: Scattered and lower lung field interstitial infiltrates are presently seen anterior greater on the right at this time. These are new since November 9, 2020.  IMPRESSION: Bilateral infiltrates as above.    CT Chest/Abomen/Pelvis: Bibasilar aspiration pneumonia. Fluid-filled upper thoracic esophagus may reflect gastroesophageal reflux.  Mild left hydroureteronephrosis secondary to a 7 mm left UVJ calculus. Bilateral nonspecific perirenal stranding. Correlate clinically for infection  Prostatomegaly.    Given:   2 L NS Bolus, Rocephin X 1   Jo Placed in ED  70 yo M PMHx ALS, CAD (s/p MI 1 stent placed 2006 on DAPT), HTN, HLD, BPH presenting with diaphoresis, shortness of breath. History taken by Shadia (wife) who lives with him. She states that fell coming out of the bathroom 2 days ago, fell on his buttocks, no head injury. Since then he has felt generalized weakness, and profuse diaphoresis. Prior to this he was in his usual state of health.   Patient has a history of ALS previously on riluzole which was stopped due to intolerance. Patient had a PEG tube and began to straight cath several months ago due to progressive ALS. He uses a walker and cane at home with no difficulty until 2 days ago.     As per the spouse, his urine looked cloudy, dark yellow with odor. Denies hematuria.     Of note, patient last admitted to [facility] [place] 11/9 --> 11/12 for urinary retention, jo placed, likely cause BPH and noncompliance with flomax.     ED Course:   Vital Signs: /69  T 98 F SpO2 70% on RA --> 90% on NRB   EKG:   Labs:   White Count: 29.47, Lactate 2.7, BUN/Cr: 113/5.70 Alk Phos 223   Imaging:   CXR: Scattered and lower lung field interstitial infiltrates are presently seen anterior greater on the right at this time. These are new since November 9, 2020.  IMPRESSION: Bilateral infiltrates as above.    CT Chest/Abomen/Pelvis: Bibasilar aspiration pneumonia. Fluid-filled upper thoracic esophagus may reflect gastroesophageal reflux.  Mild left hydroureteronephrosis secondary to a 7 mm left UVJ calculus. Bilateral nonspecific perirenal stranding. Correlate clinically for infection  Prostatomegaly.    Given:   2 L NS Bolus, Rocephin X 1   Jo Placed in ED  70 yo M PMHx ALS, CAD (s/p MI 1 stent placed 2006 on DAPT), HTN, HLD, BPH presenting with diaphoresis, shortness of breath. History taken by Shadia (wife) who lives with him. She states that fell coming out of the bathroom 2 days ago, fell on his buttocks, no head injury. Since then he has felt generalized weakness, and profuse diaphoresis. Prior to this he was in his usual state of health.   Patient has a history of ALS previously on riluzole which was stopped due to intolerance. Patient had a PEG tube and began to straight cath several months ago due to progressive ALS. He uses a walker and cane at home with no difficulty until 2 days ago.     As per the spouse, his urine looked cloudy, dark yellow with odor. Denies hematuria.     Of note, patient last admitted to [facility] 11/9 --> 11/12/2020 for urinary retention, jo placed, likely cause BPH and noncompliance with flomax.     ED Course:   Vital Signs: /69  T 98 F SpO2 70% on RA --> 90% on NRB   EKG:   Labs:   White Count: 29.47, Lactate 2.7, BUN/Cr: 113/5.70 Alk Phos 223   Imaging:   CXR: Scattered and lower lung field interstitial infiltrates are presently seen anterior greater on the right at this time. These are new since November 9, 2020.  IMPRESSION: Bilateral infiltrates as above.    CT Chest/Abomen/Pelvis: Bibasilar aspiration pneumonia. Fluid-filled upper thoracic esophagus may reflect gastroesophageal reflux.  Mild left hydroureteronephrosis secondary to a 7 mm left UVJ calculus. Bilateral nonspecific perirenal stranding. Correlate clinically for infection  Prostatomegaly.    Given:   2 L NS Bolus, Rocephin X 1   Jo Placed in ED  70 yo M PMHx ALS, CAD (s/p MI 1 stent placed 2006 on DAPT), HTN, HLD, BPH presenting with diaphoresis, shortness of breath. History taken by Shadia (wife) who lives with him. She states that fell coming out of the bathroom 2 days ago, fell on his buttocks, no head injury. Since then he has felt generalized weakness, and profuse diaphoresis. Prior to this he was in his usual state of health.   Patient has a history of ALS previously on riluzole which was stopped due to intolerance. Patient had a PEG tube and began to straight cath several months ago due to progressive ALS. He uses a walker and cane at home with no difficulty until 2 days ago.     As per the spouse, his urine looked cloudy, dark yellow with odor. Denies hematuria.     Of note, patient last admitted to [facility] 11/9 --> 11/12/2020 for urinary retention, jo placed, likely cause BPH and noncompliance with flomax.     ED Course:   Vital Signs: /69  T 98 F SpO2 70% on RA --> 90% on NRB   Labs:   White Count: 29.47, Lactate 2.7, BUN/Cr: 113/5.70 Alk Phos 223   Imaging:   CXR: Scattered and lower lung field interstitial infiltrates are presently seen anterior greater on the right at this time. These are new since November 9, 2020.  IMPRESSION: Bilateral infiltrates as above.    CT Chest/Abomen/Pelvis: Bibasilar aspiration pneumonia. Fluid-filled upper thoracic esophagus may reflect gastroesophageal reflux.  Mild left hydroureteronephrosis secondary to a 7 mm left UVJ calculus. Bilateral nonspecific perirenal stranding. Correlate clinically for infection  Prostatomegaly.    Given:   2 L NS Bolus, Rocephin X 1   Jo Placed in ED  72 yo M PMHx ALS, CAD (s/p MI 1 stent placed 2006 on DAPT), HTN, HLD, BPH presenting with diaphoresis, shortness of breath. History taken by Shadia (wife) who lives with him. She states that fell coming out of the bathroom 2 days ago, fell on his buttocks, no head injury. Since then he has felt generalized weakness, and profuse diaphoresis. Prior to this he was in his usual state of health.   Patient has a history of ALS previously on riluzole which was stopped due to intolerance. Patient had a PEG tube and began to straight cath several months ago due to progressive ALS. He uses a walker and cane at home with no difficulty until 2 days ago.     As per the spouse, his urine looked cloudy, dark yellow with odor. Denies hematuria.     Of note, patient last admitted to [facility] 11/9 --> 11/12/2020 for urinary retention, jo placed, likely cause BPH and noncompliance with flomax.     ED Course:   Vital Signs: /69  T 98 F SpO2 70% on RA --> 90% on NRB   EKG: normal sinus rhythm, no acute ST or T wave changes   Labs:   White Count: 29.47, Lactate 2.7, BUN/Cr: 113/5.70 Alk Phos 223   Imaging:   CXR: Scattered and lower lung field interstitial infiltrates are presently seen anterior greater on the right at this time. These are new since November 9, 2020.  IMPRESSION: Bilateral infiltrates as above.    CT Chest/Abomen/Pelvis: Bibasilar aspiration pneumonia. Fluid-filled upper thoracic esophagus may reflect gastroesophageal reflux.  Mild left hydroureteronephrosis secondary to a 7 mm left UVJ calculus. Bilateral nonspecific perirenal stranding. Correlate clinically for infection  Prostatomegaly.    Given:   2 L NS Bolus, Rocephin X 1   Jo Placed in ED

## 2021-07-30 LAB
-  STREPTOCOCCUS ANGINOSUS GROUP: SIGNIFICANT CHANGE UP
ALBUMIN SERPL ELPH-MCNC: 1.6 G/DL — LOW (ref 3.3–5)
ALP SERPL-CCNC: 215 U/L — HIGH (ref 40–120)
ALT FLD-CCNC: 45 U/L — SIGNIFICANT CHANGE UP (ref 12–78)
ANION GAP SERPL CALC-SCNC: 10 MMOL/L — SIGNIFICANT CHANGE UP (ref 5–17)
AST SERPL-CCNC: 63 U/L — HIGH (ref 15–37)
BASOPHILS # BLD AUTO: 0.04 K/UL — SIGNIFICANT CHANGE UP (ref 0–0.2)
BASOPHILS NFR BLD AUTO: 0.2 % — SIGNIFICANT CHANGE UP (ref 0–2)
BILIRUB SERPL-MCNC: 1.2 MG/DL — SIGNIFICANT CHANGE UP (ref 0.2–1.2)
BUN SERPL-MCNC: 120 MG/DL — HIGH (ref 7–23)
CALCIUM SERPL-MCNC: 9.2 MG/DL — SIGNIFICANT CHANGE UP (ref 8.5–10.1)
CHLORIDE SERPL-SCNC: 113 MMOL/L — HIGH (ref 96–108)
CO2 SERPL-SCNC: 25 MMOL/L — SIGNIFICANT CHANGE UP (ref 22–31)
COVID-19 SPIKE DOMAIN AB INTERP: POSITIVE
COVID-19 SPIKE DOMAIN ANTIBODY RESULT: >250 U/ML — HIGH
CREAT ?TM UR-MCNC: 58 MG/DL — SIGNIFICANT CHANGE UP
CREAT SERPL-MCNC: 5.7 MG/DL — HIGH (ref 0.5–1.3)
EOSINOPHIL # BLD AUTO: 0 K/UL — SIGNIFICANT CHANGE UP (ref 0–0.5)
EOSINOPHIL NFR BLD AUTO: 0 % — SIGNIFICANT CHANGE UP (ref 0–6)
GLUCOSE SERPL-MCNC: 142 MG/DL — HIGH (ref 70–99)
GRAM STN FLD: SIGNIFICANT CHANGE UP
GRAM STN FLD: SIGNIFICANT CHANGE UP
HCT VFR BLD CALC: 37.6 % — LOW (ref 39–50)
HGB BLD-MCNC: 12.3 G/DL — LOW (ref 13–17)
IMM GRANULOCYTES NFR BLD AUTO: 1.2 % — SIGNIFICANT CHANGE UP (ref 0–1.5)
K OXYTOCA DNA BLD POS QL NAA+NON-PROBE: SIGNIFICANT CHANGE UP
LACTATE SERPL-SCNC: 1.4 MMOL/L — SIGNIFICANT CHANGE UP (ref 0.7–2)
LYMPHOCYTES # BLD AUTO: 0.31 K/UL — LOW (ref 1–3.3)
LYMPHOCYTES # BLD AUTO: 1.5 % — LOW (ref 13–44)
MAGNESIUM SERPL-MCNC: 2.6 MG/DL — SIGNIFICANT CHANGE UP (ref 1.6–2.6)
MCHC RBC-ENTMCNC: 30.9 PG — SIGNIFICANT CHANGE UP (ref 27–34)
MCHC RBC-ENTMCNC: 32.7 GM/DL — SIGNIFICANT CHANGE UP (ref 32–36)
MCV RBC AUTO: 94.5 FL — SIGNIFICANT CHANGE UP (ref 80–100)
METHOD TYPE: SIGNIFICANT CHANGE UP
MONOCYTES # BLD AUTO: 0.54 K/UL — SIGNIFICANT CHANGE UP (ref 0–0.9)
MONOCYTES NFR BLD AUTO: 2.6 % — SIGNIFICANT CHANGE UP (ref 2–14)
NEUTROPHILS # BLD AUTO: 19.3 K/UL — HIGH (ref 1.8–7.4)
NEUTROPHILS NFR BLD AUTO: 94.5 % — HIGH (ref 43–77)
NRBC # BLD: 0 /100 WBCS — SIGNIFICANT CHANGE UP (ref 0–0)
PHOSPHATE SERPL-MCNC: 3.3 MG/DL — SIGNIFICANT CHANGE UP (ref 2.5–4.5)
PLATELET # BLD AUTO: 119 K/UL — LOW (ref 150–400)
POTASSIUM SERPL-MCNC: 4 MMOL/L — SIGNIFICANT CHANGE UP (ref 3.5–5.3)
POTASSIUM SERPL-SCNC: 4 MMOL/L — SIGNIFICANT CHANGE UP (ref 3.5–5.3)
PROT SERPL-MCNC: 5.8 G/DL — LOW (ref 6–8.3)
RBC # BLD: 3.98 M/UL — LOW (ref 4.2–5.8)
RBC # FLD: 13.1 % — SIGNIFICANT CHANGE UP (ref 10.3–14.5)
SARS-COV-2 IGG+IGM SERPL QL IA: >250 U/ML — HIGH
SARS-COV-2 IGG+IGM SERPL QL IA: POSITIVE
SODIUM SERPL-SCNC: 148 MMOL/L — HIGH (ref 135–145)
SODIUM UR-SCNC: 79 MMOL/L — SIGNIFICANT CHANGE UP
SPECIMEN SOURCE: SIGNIFICANT CHANGE UP
WBC # BLD: 20.44 K/UL — HIGH (ref 3.8–10.5)
WBC # FLD AUTO: 20.44 K/UL — HIGH (ref 3.8–10.5)

## 2021-07-30 PROCEDURE — 99233 SBSQ HOSP IP/OBS HIGH 50: CPT

## 2021-07-30 PROCEDURE — 93010 ELECTROCARDIOGRAM REPORT: CPT

## 2021-07-30 PROCEDURE — 71045 X-RAY EXAM CHEST 1 VIEW: CPT | Mod: 26

## 2021-07-30 RX ORDER — HEPARIN SODIUM 5000 [USP'U]/ML
5000 INJECTION INTRAVENOUS; SUBCUTANEOUS EVERY 8 HOURS
Refills: 0 | Status: DISCONTINUED | OUTPATIENT
Start: 2021-07-30 | End: 2021-08-03

## 2021-07-30 RX ADMIN — PIPERACILLIN AND TAZOBACTAM 25 GRAM(S): 4; .5 INJECTION, POWDER, LYOPHILIZED, FOR SOLUTION INTRAVENOUS at 05:15

## 2021-07-30 RX ADMIN — CHLORHEXIDINE GLUCONATE 1 APPLICATION(S): 213 SOLUTION TOPICAL at 05:35

## 2021-07-30 RX ADMIN — CLOPIDOGREL BISULFATE 75 MILLIGRAM(S): 75 TABLET, FILM COATED ORAL at 14:46

## 2021-07-30 RX ADMIN — HEPARIN SODIUM 5000 UNIT(S): 5000 INJECTION INTRAVENOUS; SUBCUTANEOUS at 21:19

## 2021-07-30 RX ADMIN — ATORVASTATIN CALCIUM 80 MILLIGRAM(S): 80 TABLET, FILM COATED ORAL at 21:19

## 2021-07-30 RX ADMIN — SODIUM CHLORIDE 100 MILLILITER(S): 9 INJECTION, SOLUTION INTRAVENOUS at 04:21

## 2021-07-30 RX ADMIN — HEPARIN SODIUM 5000 UNIT(S): 5000 INJECTION INTRAVENOUS; SUBCUTANEOUS at 14:46

## 2021-07-30 RX ADMIN — PIPERACILLIN AND TAZOBACTAM 25 GRAM(S): 4; .5 INJECTION, POWDER, LYOPHILIZED, FOR SOLUTION INTRAVENOUS at 18:11

## 2021-07-30 RX ADMIN — ESCITALOPRAM OXALATE 10 MILLIGRAM(S): 10 TABLET, FILM COATED ORAL at 14:46

## 2021-07-30 NOTE — DIETITIAN INITIAL EVALUATION ADULT. - PROBLEM SELECTOR PLAN 4
BUN/Cr: 113/5.70 (baseline appears to be 39/1.1)   - fu urine lytes   - likely in the setting of sepsis, hypoperfusion   - continue IVF   - will hold ramipril, fenofibrate and avoid nephrotoxic agents  - Nephro Andria consulted

## 2021-07-30 NOTE — DIETITIAN INITIAL EVALUATION ADULT. - PROBLEM SELECTOR PLAN 2
CT Chest showing possible aspiration pneumonia   - start zosyn for anaerobic coverage   - aspiration precautions  - NPO except meds  - fu speech and swallow

## 2021-07-30 NOTE — PROGRESS NOTE ADULT - SUBJECTIVE AND OBJECTIVE BOX
Neurology follow up note    SHARITA [id number]      Interval History:    Patient feels better    Allergies    fish (Short breath; Anaphylaxis)  No Known Drug Allergies    Intolerances        MEDICATIONS    acetaminophen   Tablet .. 650 milliGRAM(s) Oral every 6 hours PRN  aluminum hydroxide/magnesium hydroxide/simethicone Suspension 30 milliLiter(s) Oral every 4 hours PRN  atorvastatin 80 milliGRAM(s) Oral at bedtime  chlorhexidine 2% Cloths 1 Application(s) Topical <User Schedule>  clopidogrel Tablet 75 milliGRAM(s) Oral daily  escitalopram 10 milliGRAM(s) Oral daily  lactated ringers Bolus 2000 milliLiter(s) IV Bolus once  lactated ringers. 1000 milliLiter(s) IV Continuous <Continuous>  melatonin 3 milliGRAM(s) Oral at bedtime PRN  ondansetron Injectable 4 milliGRAM(s) IV Push every 8 hours PRN  pantoprazole  Injectable 40 milliGRAM(s) IV Push daily  piperacillin/tazobactam IVPB..      piperacillin/tazobactam IVPB.. 3.375 Gram(s) IV Intermittent every 12 hours  sodium chloride 0.9% Bolus 1000 milliLiter(s) IV Bolus once          Height (cm): 165.1 ( @ 20:55)  Weight (kg): 73.4 ( @ 20:55)  BMI (kg/m2): 26.9 ( @ 20:55)    Vital Signs Last 24 Hrs  T(C): 36.6 (2021 08:26), Max: 38.2 (2021 20:58)  T(F): 97.9 (2021 08:26), Max: 100.8 (2021 20:58)  HR: 81 (2021 07:00) (81 - 137)  BP: 107/58 (2021 07:00) (92/54 - 165/90)  BP(mean): 77 (2021 07:00) (66 - 108)  RR: 25 (2021 07:00) (18 - 39)  SpO2: 100% (2021 07:00) (90% - 100%)    REVIEW OF SYSTEMS:  Slightly limited secondary to the patient's speech is hypophonic, but had been in a normal state of health, but in the last few days has been having increased generalized weakness with decreased oral intake. feels better less SOB and stronger  today      PHYSICAL EXAMINATION:   HEENT:  Head:  Normocephalic, atraumatic.  Eyes:  No scleral icterus.  Ears:  Hearing intact.  NECK:  Supple.  RESPIRATORY:  Decreased breath sounds bilaterally.  CARDIOVASCULAR:  S1 and S2 heard.  ABDOMEN:  Soft and nontender.  EXTREMITIES:  No clubbing or cyanosis was noted.      NEUROLOGIC:  The patient was awake and alert.  Extraocular movements were intact.  The patient has slight bilateral temporal wasting.  Speech was hypophonic able to speak few words.  Motor:  Bilateral upper were 4-/5.  The patient has bilateral hand thenar atrophy noted.  Hand grasp bilaterally was 3/5, has decreased range of motion of bilateral hands.  Bilateral lower extremities, hip flexors, knee extensors were 3+/5.  I did not notice any fasciculations in the patient's extremities, but does have a history of fasciculations of his arms and legs.  Sensory:  Bilateral upper and lower intact to light touch.              LABS:  CBC Full  -  ( 2021 05:33 )  WBC Count : 20.44 K/uL  RBC Count : 3.98 M/uL  Hemoglobin : 12.3 g/dL  Hematocrit : 37.6 %  Platelet Count - Automated : 119 K/uL  Mean Cell Volume : 94.5 fl  Mean Cell Hemoglobin : 30.9 pg  Mean Cell Hemoglobin Concentration : 32.7 gm/dL  Auto Neutrophil # : 19.30 K/uL  Auto Lymphocyte # : 0.31 K/uL  Auto Monocyte # : 0.54 K/uL  Auto Eosinophil # : 0.00 K/uL  Auto Basophil # : 0.04 K/uL  Auto Neutrophil % : 94.5 %  Auto Lymphocyte % : 1.5 %  Auto Monocyte % : 2.6 %  Auto Eosinophil % : 0.0 %  Auto Basophil % : 0.2 %    Urinalysis Basic - ( 2021 13:34 )    Color: Orange / Appearance: Turbid / S.020 / pH: x  Gluc: x / Ketone: Trace  / Bili: Negative / Urobili: 1   Blood: x / Protein: 100 / Nitrite: Negative   Leuk Esterase: Moderate / RBC: 25-50 /HPF / WBC >50   Sq Epi: x / Non Sq Epi: Occasional / Bacteria: TNTC      07-    148<H>  |  113<H>  |  120<H>  ----------------------------<  142<H>  4.0   |  25  |  5.70<H>    Ca    9.2      2021 05:33  Phos  3.3       Mg     2.6         TPro  5.8<L>  /  Alb  1.6<L>  /  TBili  1.2  /  DBili  x   /  AST  63<H>  /  ALT  45  /  AlkPhos  215<H>      Hemoglobin A1C:     LIVER FUNCTIONS - ( 2021 05:33 )  Alb: 1.6 g/dL / Pro: 5.8 g/dL / ALK PHOS: 215 U/L / ALT: 45 U/L / AST: 63 U/L / GGT: x           Vitamin B12   PT/INR - ( 2021 13:35 )   PT: 11.7 sec;   INR: 1.00 ratio         PTT - ( 2021 13:35 )  PTT:28.4 sec      RADIOLOGY    ANALYSIS AND PLAN:  This is a 71-year-old with an episode of altered mental status in regards to lethargy and history of amyotrophic lateral sclerosis.  For episode of altered mental status in regards to lethargy, suspect most likely metabolic encephalopathy which is multifactorial secondary to underlying acute kidney injury, underlying possibly urinary tract infection, and pneumonia, which can lead to generalized weakness.  Amyotrophic lateral sclerosis is more of a steady progressive disease rather than acute onset of symptoms.  For possibly septic type process, antibiotics as needed.  For history of amyotrophic lateral sclerosis, supportive therapy.  For history of hypertension, monitor systolic blood pressure.  For hyperlipidemia, continue the patient on statin.  For history of secretions, as per my conversation with the family, Lexapro does apparently appear to help him, continue that if possible.  For acute kidney failure, monitor renal function    Spoke with two sons  The primary contact will be sonCaesar, [phone] 23  Second son, Eduard, [phone].    Greater than 45 minutes of time was spent with the patient, plan of care, reviewing data, with greater than 50% of the visit was spent counseling and/or coordinating care with multidisciplinary healthcare team

## 2021-07-30 NOTE — PROGRESS NOTE ADULT - SUBJECTIVE AND OBJECTIVE BOX
Patient is a 71y old  Male who presents with a chief complaint of AMS  (30 Jul 2021 09:06)      INTERVAL HPI/OVERNIGHT EVENTS: Seen and examined. Awake, alert, non verbal but nods to all ROS except nausea     MEDICATIONS  (STANDING):  atorvastatin 80 milliGRAM(s) Oral at bedtime  chlorhexidine 2% Cloths 1 Application(s) Topical <User Schedule>  clopidogrel Tablet 75 milliGRAM(s) Oral daily  escitalopram 10 milliGRAM(s) Oral daily  lactated ringers Bolus 2000 milliLiter(s) IV Bolus once  lactated ringers. 1000 milliLiter(s) (100 mL/Hr) IV Continuous <Continuous>  pantoprazole  Injectable 40 milliGRAM(s) IV Push daily  piperacillin/tazobactam IVPB..      piperacillin/tazobactam IVPB.. 3.375 Gram(s) IV Intermittent every 12 hours  sodium chloride 0.9% Bolus 1000 milliLiter(s) IV Bolus once    MEDICATIONS  (PRN):  acetaminophen   Tablet .. 650 milliGRAM(s) Oral every 6 hours PRN Temp greater or equal to 38.5C (101.3F), Mild Pain (1 - 3)  aluminum hydroxide/magnesium hydroxide/simethicone Suspension 30 milliLiter(s) Oral every 4 hours PRN Dyspepsia  melatonin 3 milliGRAM(s) Oral at bedtime PRN Insomnia  ondansetron Injectable 4 milliGRAM(s) IV Push every 8 hours PRN Nausea and/or Vomiting      Allergies    fish (Short breath; Anaphylaxis)  No Known Drug Allergies    Intolerances        REVIEW OF SYSTEMS:  Patient nods no to all ROS except yes for nausea   Vital Signs Last 24 Hrs  T(C): 36.6 (30 Jul 2021 08:26), Max: 38.2 (29 Jul 2021 20:58)  T(F): 97.9 (30 Jul 2021 08:26), Max: 100.8 (29 Jul 2021 20:58)  HR: 81 (30 Jul 2021 07:00) (81 - 137)  BP: 107/58 (30 Jul 2021 07:00) (92/54 - 165/90)  BP(mean): 77 (30 Jul 2021 07:00) (66 - 108)  RR: 25 (30 Jul 2021 07:00) (18 - 39)  SpO2: 100% (30 Jul 2021 07:00) (90% - 100%)    PHYSICAL EXAM:  GENERAL: NAD, Ill appearing  HEAD:  Atraumatic, Normocephalic  EYES: EOMI, PERRLA, conjunctiva and sclera clear  ENMT: No tonsillar erythema, exudates, or enlargement; Moist mucous membranes  NECK: Supple, No JVD, Normal thyroid  NERVOUS SYSTEM:  Alert & Awake, CN 2-12 grossly intact  CHEST/LUNG: Clear to auscultation bilaterally; No rales, rhonchi, wheezing, or rubs  HEART: S1S2+, Regular rate and rhythm  ABDOMEN: Soft, Nontender, Nondistended; Bowel sounds present  EXTREMITIES:  2+ Peripheral Pulses, No clubbing, cyanosis  LYMPH: No lymphadenopathy noted  SKIN: No rashes, warm, dry     LABS:                        12.3   20.44 )-----------( 119      ( 30 Jul 2021 05:33 )             37.6     30 Jul 2021 05:33    148    |  113    |  120    ----------------------------<  142    4.0     |  25     |  5.70     Ca    9.2        30 Jul 2021 05:33  Phos  3.3       30 Jul 2021 05:33  Mg     2.6       30 Jul 2021 05:33    TPro  5.8    /  Alb  1.6    /  TBili  1.2    /  DBili  x      /  AST  63     /  ALT  45     /  AlkPhos  215    30 Jul 2021 05:33    PT/INR - ( 29 Jul 2021 13:35 )   PT: 11.7 sec;   INR: 1.00 ratio         PTT - ( 29 Jul 2021 13:35 )  PTT:28.4 sec  CAPILLARY BLOOD GLUCOSE        BLOOD CULTURE    RADIOLOGY & ADDITIONAL TESTS:    Imaging Personally Reviewed:  [ ] YES     Consultant(s) Notes Reviewed:      Care Discussed with Consultants/Other Providers:

## 2021-07-30 NOTE — PROGRESS NOTE ADULT - PROBLEM SELECTOR PLAN 6
Complaining of worsening generalized weakness, likely in the setting of sepsis and urinary tract infection, however, patient not currently on riluzole therapy as per spouse, due to intolerance  - Neuro consulted Sylvester  - PT consulted

## 2021-07-30 NOTE — DIETITIAN INITIAL EVALUATION ADULT. - PROBLEM SELECTOR PLAN 8
CAD, s/p MI in 2006, with one stent placed   - on plavix   - continue plavix daily  - [facility] consulted

## 2021-07-30 NOTE — PROGRESS NOTE ADULT - PROBLEM SELECTOR PLAN 7
PEG tube placement, on tube feedings at homel placed 2 weeks ago at [facility] due to progressive ALS and nutritional supplementation as per son.   - will hold feedings for now due to aspiration risk  - patient takes Nutrin 1.5 up to 5 times a day, with goal 2000 calories/day (as per son Caesar)   - IV PPI

## 2021-07-30 NOTE — DIETITIAN INITIAL EVALUATION ADULT. - PROBLEM SELECTOR PLAN 10
On crestor for hyperlipidemia and fenofibrate for hypertriglyceridemia   - continue crestor 40 mg --> lipitor 80 mg therapeutic interchange   - hold fenofibrate due to MARLEY    11. Prophylactic measures  - SCDs for now due to hematuria    12. Goals of care conversation  - Patient has advanced directives, health care proxy, Son Caesar   - Full code for now   - son to bring paper work tomorrow    13. Nephrolithiasis  - Mild left hydroureteronephrosis secondary to a 7 mm left UVJ calculus. Bilateral nonspecific perirenal stranding.   - uro consulted, adarsh

## 2021-07-30 NOTE — PROGRESS NOTE ADULT - ASSESSMENT
MARLEY: Prerenal azotemia, ATN (hemodynamic, Sepsis), Obstructive uropathy, On ACEI  Left UVJ calculus  R/o sepsis`  ALS    s/p ureteral stent placement. IV hydration. IV abx.  follow up. Strict I & O. Avoid nephrotoxic meds as possible.   Will follow electrolytes and renal function trend. Avoid nephrotoxic meds as possible. Avoid ACEI, ARB, NSAIDs and IV contrast.

## 2021-07-30 NOTE — SWALLOW BEDSIDE ASSESSMENT ADULT - SWALLOW EVAL: DIAGNOSIS
1. The patient demonstrated a mild-moderate oral dysphagia for honey thick liquids via teaspoon marked by reduced stripping of the bolus from the utensil with subsequent prolonged oral manipulation resulting in delayed bolus collection/cohesion and delayed AP transit time. 2. The patient demonstrated a moderate pharyngeal dysphagia for honey thick liquids marked by a suspected delayed pharyngeal swallow trigger with weak hyolaryngeal elevation noted upon digital palpation resulting in multiple swallows suggestive of pharyngeal stasis and/or airway penetration, +facial grimacing during deglutition, and patient reporting complaints of pharyngeal stasis. *It should be noted that the patient refused puree, nectar thick, and thin liquid PO trials despite maximum verbal encouragement and prompting.

## 2021-07-30 NOTE — PROGRESS NOTE ADULT - ASSESSMENT
72 yo M PMHx ALS, CAD (s/p MI 1 stent placed 2006 on DAPT), HTN, HLD, BPH presented with AMS and shortness of breath, admitted for  sepsis secondary to aspiration pneumonia, UTI/pyelonephritis,  Renal stone with left hydronephrosis s/p stent placement by Urolgy, MARLEY

## 2021-07-30 NOTE — DIETITIAN INITIAL EVALUATION ADULT. - PROBLEM SELECTOR PLAN 7
PEG tube placement, on tube feedings at homel placed 2 weeks ago at [facility] due to progressive ALS and nutritional supplementation as per son.   - will hold feedings for now due to aspiration risk  - patient takes Nutrin 1.5 up to 5 times a day, with goal 2000 calories/day (as per son Caesar)   - IV PPI

## 2021-07-30 NOTE — SWALLOW BEDSIDE ASSESSMENT ADULT - SWALLOW EVAL: RECOMMENDED DIET
1. Initiate PEG as primary means of nutrition/hydration, 2. Oral nutrition/hydration is contraindicated at this time given patient is at an increased nutritional and aspiration risk at this time

## 2021-07-30 NOTE — PROGRESS NOTE ADULT - SUBJECTIVE AND OBJECTIVE BOX
Patient is a 71y old  Male who presents with a chief complaint of Sepsis, UTI (2021 12:52)  Patient seen in follow up for MARLEY.        PAST MEDICAL HISTORY:  Myocardial infarct    Hypertension    Hyperlipidemia    BPH (benign prostatic hyperplasia)    ALS (amyotrophic lateral sclerosis)      MEDICATIONS  (STANDING):  atorvastatin 80 milliGRAM(s) Oral at bedtime  chlorhexidine 2% Cloths 1 Application(s) Topical <User Schedule>  clopidogrel Tablet 75 milliGRAM(s) Oral daily  escitalopram 10 milliGRAM(s) Oral daily  heparin   Injectable 5000 Unit(s) SubCutaneous every 8 hours  lactated ringers Bolus 2000 milliLiter(s) IV Bolus once  piperacillin/tazobactam IVPB..      piperacillin/tazobactam IVPB.. 3.375 Gram(s) IV Intermittent every 12 hours  sodium chloride 0.9% Bolus 1000 milliLiter(s) IV Bolus once    MEDICATIONS  (PRN):  acetaminophen   Tablet .. 650 milliGRAM(s) Oral every 6 hours PRN Temp greater or equal to 38.5C (101.3F), Mild Pain (1 - 3)  aluminum hydroxide/magnesium hydroxide/simethicone Suspension 30 milliLiter(s) Oral every 4 hours PRN Dyspepsia  melatonin 3 milliGRAM(s) Oral at bedtime PRN Insomnia  ondansetron Injectable 4 milliGRAM(s) IV Push every 8 hours PRN Nausea and/or Vomiting    T(C): 36.7 (21 @ 15:22), Max: 38.2 (21 @ 20:58)  HR: 81 (21 @ 15:00) (71 - 137)  BP: 127/69 (21 @ 15:00) (92/54 - 165/90)  RR: 31 (21 @ 15:00) (18 - 39)  SpO2: 94% (21 @ 15:00) (90% - 100%)  Wt(kg): --  I&O's Detail    2021 07:01  -  2021 07:00  --------------------------------------------------------  IN:    IV PiggyBack: 75 mL    IV PiggyBack: 100 mL    Lactated Ringers: 150 mL    Lactated Ringers: 1000 mL    Oral Fluid: 40 mL  Total IN: 1365 mL    OUT:    Indwelling Catheter - Urethral (mL): 200 mL  Total OUT: 200 mL    Total NET: 1165 mL      2021 07:01  -  2021 15:35  --------------------------------------------------------  IN:    Free Water: 250 mL    Lactated Ringers: 400 mL    Osmolite: 260 mL  Total IN: 910 mL    OUT:    Indwelling Catheter - Urethral (mL): 400 mL  Total OUT: 400 mL    Total NET: 510 mL          PHYSICAL EXAM:  General: No distress  Respiratory: b/l air entry  Cardiovascular: S1 S2  Gastrointestinal: soft  Extremities:  no edema                              12.3   20.44 )-----------( 119      ( 2021 05:33 )             37.6         148<H>  |  113<H>  |  120<H>  ----------------------------<  142<H>  4.0   |  25  |  5.70<H>    Ca    9.2      2021 05:33  Phos  3.3       Mg     2.6         TPro  5.8<L>  /  Alb  1.6<L>  /  TBili  1.2  /  DBili  x   /  AST  63<H>  /  ALT  45  /  AlkPhos  215<H>          LIVER FUNCTIONS - ( 2021 05:33 )  Alb: 1.6 g/dL / Pro: 5.8 g/dL / ALK PHOS: 215 U/L / ALT: 45 U/L / AST: 63 U/L / GGT: x           Urinalysis Basic - ( 2021 13:34 )    Color: Orange / Appearance: Turbid / S.020 / pH: x  Gluc: x / Ketone: Trace  / Bili: Negative / Urobili: 1   Blood: x / Protein: 100 / Nitrite: Negative   Leuk Esterase: Moderate / RBC: 25-50 /HPF / WBC >50   Sq Epi: x / Non Sq Epi: Occasional / Bacteria: TNTC      ABG - ( 2021 13:44 )  pH, Arterial: 7.42  pH, Blood: x     /  pCO2: 38    /  pO2: 76    / HCO3: 25    / Base Excess: 0.4   /  SaO2: 96                Sodium, Serum: 148 ( @ 05:33)  Sodium, Serum: 147 ( @ 13:35)    Creatinine, Serum: 5.70 ( @ 05:33)  Creatinine, Serum: 5.70 ( @ 13:35)    Potassium, Serum: 4.0 ( @ 05:33)  Potassium, Serum: 3.8 ( @ 13:35)    Hemoglobin: 12.3 ( @ 05:33)  Hemoglobin: 14.5 ( @ 13:35)

## 2021-07-30 NOTE — PROGRESS NOTE ADULT - ASSESSMENT
70 yo M PMHx ALS, CAD (s/p MI 1 stent placed 2006), HTN, HLD, BPH admitted for urosepsis with obstructing stone.  He is now s/p stent placement for obstructing renal stone.    #Urosepsis   - Pt profoundly septic upon admission with source UTI +/- aspiration PNA  - Tachycardic in setting of sepsis, now improved  - CT Chest/Abomen/Pelvis: Bibasilar aspiration pneumonia. Fluid-filled upper thoracic esophagus may reflect gastroesophageal reflux.  Mild left hydroureteronephrosis secondary to a 7 mm left UVJ calculus. Bilateral nonspecific perirenal stranding. Correlate clinically for infection  Prostatomegaly.  - IV antibiotics and fluids per primary team   - remains in sr overnight without ectopy  - bp has been generally stable overnight, without need for pressors  - Continue O2 support and needed, wean as tolerated   - sig rise in creatinine, hopefully has peaked. Monitor volume closely with ivf.    #CAD  - hx of CAD s/p stent in 2006, on plavix at home  - continue with single anti-plt agent post procedurally ,  - Check routine echocardiogram    #HTN  - Hx of HTN on metoprolol and Lisinopril at home  - Can hold in setting of hypotension  - Avoid Acei or arbs especially with MARLEY  - Monitor hemodynamics closely, low threshold for pressors if needed     #HLD   - Continue home statin     - Monitor and replete lytes, keep K>4, Mg>2.  - Other cardiovascular workup will depend on clinical course.  - All other workup per primary team.  - Will continue to follow.

## 2021-07-30 NOTE — DIETITIAN INITIAL EVALUATION ADULT. - OTHER INFO
Pt admit with sepsis, s/p ureteral stent. Hx ALS, asp Pn, s/p PEG. Sacral st 1 pressure injury. PTA was on Nutren 1.5, ~2000 cals/day with fluids as tolerated po. Seen by SLP today, pt hesitant to participate, rec NPO with TF until pt can pass swallow eval. Discussed with wife/son/RN that additional education/practice with bolus feeds would be helpful so pt does not miss feeds at home. BMI 24. Elevated cr noted with sepsis/MARLEY, but K+/phos acceptable at present.

## 2021-07-30 NOTE — PROGRESS NOTE ADULT - SUBJECTIVE AND OBJECTIVE BOX
[facility]  INFECTIOUS DISEASES   [address] [place] [address]  Tel: [phone]     Fax: [phone]  =======================================================    [id number]  SHARITA HOUGH     Follow up: Urosepsis and aspiration pneumonia    In ICU, last night had left stent placed by urology.   No fever. Awake and alert. Breathing is better, no distress.  No on NC O2 with good sat. BP better.     PAST MEDICAL & SURGICAL HISTORY:  Myocardial infarct  Hypertension  Hyperlipidemia  BPH (benign prostatic hyperplasia)  ALS (amyotrophic lateral sclerosis)  S/P tonsillectomy  S/P coronary artery stent placement  History of hip surgery  History of hernia repair  &gt; 20 years ago  H/O shoulder surgery    Social Hx: No smoking, ETOH Or drugs     FAMILY HISTORY:  FHx: myocardial infarction (Father)    FHx: hyperlipidemia    Allergies  fish (Short breath; Anaphylaxis)  No Known Drug Allergies    Antibiotics:     piperacillin/tazobactam IVPB.. 3.375 Gram(s) IV Intermittent once     REVIEW OF SYSTEMS:  CONSTITUTIONAL:  No Fever or chills, + diaphoresis   HEENT:  No diplopia or blurred vision.  No sore throat or runny nose.  CARDIOVASCULAR:  No chest pain, SOB better.  RESPIRATORY:  No cough, no shortness of breath  GASTROINTESTINAL:  No nausea, vomiting or diarrhea.  GENITOURINARY:  cloudy urine with odor   MUSCULOSKELETAL:  no joint aches, no muscle pain  SKIN:  No change in skin, hair or nails.  NEUROLOGIC: weakness in all extremities due to ALS  PSYCHIATRIC:  No disorder of thought or mood.  ENDOCRINE:  No heat or cold intolerance, polyuria or polydipsia.  HEMATOLOGICAL:  No easy bruising or bleeding.     Physical Exam:  Vital Signs Last 24 Hrs  T(C): 37.3 (29 Jul 2021 13:20), Max: 37.3 (29 Jul 2021 13:20)  T(F): 99.2 (29 Jul 2021 13:20), Max: 99.2 (29 Jul 2021 13:20)  HR: 116 (29 Jul 2021 12:57) (116 - 116)  BP: 102/69 (29 Jul 2021 12:57) (102/69 - 102/69)  RR: 18 (29 Jul 2021 12:57) (18 - 18)  SpO2: 90% (29 Jul 2021 12:57) (90% - 90%)  Height (cm): 172.7 (07-29 @ 12:57)  GEN: NAD,  HEENT: normocephalic and atraumatic. EOMI. PERRL.    NECK: Supple.  No lymphadenopathy   LUNGS: Clear to auscultation.  HEART: Regular rate and rhythm without murmur.  ABDOMEN: Soft, nontender, and nondistended.  Positive bowel sounds.  PEG in place   : No CVA tenderness, jo with cloudy urine   EXTREMITIES: Muscular atrophy, motor weakness in all extremities   NEUROLOGIC: grossly intact.  PSYCHIATRIC: Appropriate affect .  SKIN: No rash    Labs:                        12.3   20.44 )-----------( 119      ( 30 Jul 2021 05:33 )             37.6     07-30    148<H>  |  113<H>  |  120<H>  ----------------------------<  142<H>  4.0   |  25  |  5.70<H>    Ca    9.2      30 Jul 2021 05:33  Phos  3.3     07-30  Mg     2.6     07-30    TPro  5.8<L>  /  Alb  1.6<L>  /  TBili  1.2  /  DBili  x   /  AST  63<H>  /  ALT  45  /  AlkPhos  215<H>  07-30    Culture - Blood (collected 07-29-21 @ 18:10)  Source: .Blood Blood-Peripheral  Gram Stain (07-30-21 @ 10:52):    Growth in anaerobic bottle: Gram Negative Rods    WBC Count: 20.44 K/uL (07-30-21 @ 05:33)  WBC Count: 29.47 K/uL (07-29-21 @ 13:35)    Creatinine, Serum: 5.70 mg/dL (07-30-21 @ 05:33)  Creatinine, Serum: 5.70 mg/dL (07-29-21 @ 13:35)    Procalcitonin, Serum: 301.8 ng/mL (07-29-21 @ 13:35)     SARS-CoV-2: NotDetec (07-29-21 @ 13:33)  Rapid RVP Result: NotDetec (07-29-21 @ 13:33)    All imaging and other data have been reviewed.  < from: CT Chest No Cont (07.29.21 @ 15:12) >  IMPRESSION:  Bibasilar aspiration pneumonia.  Fluid-filled upper thoracic esophagus may reflect gastroesophageal reflux.  Mild left hydroureteronephrosis secondary to a 7 mm left UVJ calculus.  Bilateral nonspecific perirenal stranding. Correlate clinically for infection  Prostatomegaly.    Assessment and Plan:   70 yo man with PMH of HTN, BPH, CAD and ALS with PEG, was admitted with not looking right, change in urine color, diaphoresis and shortness of breath.  Had a fall 2 days ago since then not feeling well, possibly related to sepsis due to UTI/pyelonephritis and aspiration pneumonia.   As per the spouse, his urine looked cloudy, dark yellow with odor. Denies hematuria.   Of note, patient last admitted to [facility] 11/9 --> 11/12/2020 for urinary retention, jo placed.   In ED Spo2 was 70a5 so placed on NRB  WBC 29k  Procalcitonin 301.8  UA with high WBC >50 and also RBC 25-50  CT Chest/Abomen/Pelvis: Bibasilar aspiration pneumonia. Fluid-filled upper thoracic esophagus may reflect gastroesophageal reflux.  Mild left hydroureteronephrosis secondary to a 7 mm left UVJ calculus. Bilateral nonspecific perirenal stranding. Correlate clinically for infection, Prostatomegaly.  S/P Left stent placed by urology on 7/29    1- Aspiration pneumonia  2- UTI/pyelonephritis  3- Renal stone with left hydronephrosis   4- MARLEY  5- ALS    Recommendations:  - Blood culture with GNR will follow ID and sensitivity  - Repeat blood culture tomorrow morning  - Follow urine culture, also UC was sent from OR by urology intraoperatively   - Continue with Zosyn 3.375gm q12 adjusted for renal function until cultures are back  - Follow WBC 29k-->20k,  - Follow Creat still 5.7    Will follow.    D/W Dr. Lozano.     Santhosh Burton MD  Division of Infectious Diseases   Cell [phone] between 8am and 6pm   After 6pm and weekends please call ID service at [phone].

## 2021-07-30 NOTE — DIETITIAN INITIAL EVALUATION ADULT. - PROBLEM SELECTOR PLAN 5
Na 147 on admission  - likely in the setting of dehydration  - start IVF maintenance   - NephAndria alexander Consulted

## 2021-07-30 NOTE — DIETITIAN INITIAL EVALUATION ADULT. - ORAL INTAKE PTA/DIET HISTORY
Per wife, pt was on mostly liquids PTA, ie broth, Boost. PEG placed 2 weeks ago, on bolus feeds 5x day, but wife not comfortable with feeds, only receiving feeds when private aide there. Son does not live home, but states he hopes pt able to tolerate po with supplemental TF at d/c. Pt with wt loss since fall, ~20#.Actual ht 5'8".

## 2021-07-30 NOTE — PROGRESS NOTE ADULT - SUBJECTIVE AND OBJECTIVE BOX
[facility] - Santi Nair, Alfreda, Shara, Kobi, Ashley Perez  Office Number:  [phone]    Patient resting comfortably in bed in NAD.  Laying flat with no respiratory distress.  No complaints of chest pain, dyspnea, palpitations, PND, or orthopnea. says he feels ok  reports some nausea    ROS: negative unless otherwise mentioned.    Telemetry:  sr    MEDICATIONS  (STANDING):  atorvastatin 80 milliGRAM(s) Oral at bedtime  chlorhexidine 2% Cloths 1 Application(s) Topical <User Schedule>  clopidogrel Tablet 75 milliGRAM(s) Oral daily  escitalopram 10 milliGRAM(s) Oral daily  lactated ringers Bolus 2000 milliLiter(s) IV Bolus once  lactated ringers. 1000 milliLiter(s) (100 mL/Hr) IV Continuous <Continuous>  pantoprazole  Injectable 40 milliGRAM(s) IV Push daily  piperacillin/tazobactam IVPB..      piperacillin/tazobactam IVPB.. 3.375 Gram(s) IV Intermittent every 12 hours  sodium chloride 0.9% Bolus 1000 milliLiter(s) IV Bolus once    MEDICATIONS  (PRN):  acetaminophen   Tablet .. 650 milliGRAM(s) Oral every 6 hours PRN Temp greater or equal to 38.5C (101.3F), Mild Pain (1 - 3)  aluminum hydroxide/magnesium hydroxide/simethicone Suspension 30 milliLiter(s) Oral every 4 hours PRN Dyspepsia  melatonin 3 milliGRAM(s) Oral at bedtime PRN Insomnia  ondansetron Injectable 4 milliGRAM(s) IV Push every 8 hours PRN Nausea and/or Vomiting      Allergies    fish (Short breath; Anaphylaxis)  No Known Drug Allergies    Intolerances        Vital Signs Last 24 Hrs  T(C): 36.7 (30 Jul 2021 04:22), Max: 38.2 (29 Jul 2021 20:58)  T(F): 98.1 (30 Jul 2021 04:22), Max: 100.8 (29 Jul 2021 20:58)  HR: 81 (30 Jul 2021 07:00) (81 - 137)  BP: 107/58 (30 Jul 2021 07:00) (92/54 - 165/90)  BP(mean): 77 (30 Jul 2021 07:00) (66 - 108)  RR: 25 (30 Jul 2021 07:00) (18 - 39)  SpO2: 100% (30 Jul 2021 07:00) (90% - 100%)    I&O's Summary    29 Jul 2021 07:01  -  30 Jul 2021 07:00  --------------------------------------------------------  IN: 1365 mL / OUT: 200 mL / NET: 1165 mL        ON EXAM:    General: comfortable, and awake  HEENT: NCAT, EOMI bl  Neck: Supple, nontender, no mass  Neurology: A&Ox3, sensation intact   Lung: Decreased breath sounds b/l. No rales, crackles or wheeze appreciated. poor effort  Heart: tachycardic, +S1/S2, no murmurs, rubs or gallops  GI: Soft, NT, ND, +PEG tube  Lymph: No C/C/E, + peripheral pulses  Skin: warm, dry, normal color  psych: seems awake/appropriate    LABS: All Labs Reviewed:                        12.3   20.44 )-----------( 119      ( 30 Jul 2021 05:33 )             37.6                         14.5   29.47 )-----------( 154      ( 29 Jul 2021 13:35 )             44.5     30 Jul 2021 05:33    148    |  113    |  120    ----------------------------<  142    4.0     |  25     |  5.70   29 Jul 2021 13:35    147    |  111    |  113    ----------------------------<  292    3.8     |  24     |  5.70     Ca    9.2        30 Jul 2021 05:33  Ca    10.1       29 Jul 2021 13:35  Phos  3.3       30 Jul 2021 05:33  Mg     2.6       30 Jul 2021 05:33    TPro  5.8    /  Alb  1.6    /  TBili  1.2    /  DBili  x      /  AST  63     /  ALT  45     /  AlkPhos  215    30 Jul 2021 05:33  TPro  7.4    /  Alb  2.3    /  TBili  1.8    /  DBili  x      /  AST  70     /  ALT  57     /  AlkPhos  223    29 Jul 2021 13:35    PT/INR - ( 29 Jul 2021 13:35 )   PT: 11.7 sec;   INR: 1.00 ratio         PTT - ( 29 Jul 2021 13:35 )  PTT:28.4 sec      Blood Culture:

## 2021-07-30 NOTE — DIETITIAN INITIAL EVALUATION ADULT. - PROBLEM SELECTOR PLAN 1
Meets Sepsis Criteria: Lactate 2.7, , RR > 20, White Count 29.7   - source likely urinary tract infection + aspiration pneumonia   - s/p 2 L NS bolus in the ED   - start zosyn (renally dosed) 3.375 g q12 h X 7 days for broad spectrum coverage   - fu blood/urine cultures . F/u repeat lactic acid  - start  cc/hour maintenance fluids   - fu repeat lactate   - tyelenol PRN for fever   - ID Micheal Consulted appreciate recs  - ICU consulted

## 2021-07-30 NOTE — PROGRESS NOTE ADULT - ASSESSMENT
Patient is a 71M w/PMHx ALS, CAD (MI in 2006, s/p stent x1, on DAPT), HTN, HLD, and BPH who presented to the [facility] ED on 7/29 with shortness of breath and diaphoresis in the context of 2d of generalized weakness. Per wife, patient fell coming out of the bathroom 2 days prior to presentation, fell on his buttocks with no accompanying head trauma. Since then, patient persistently weak and diaphoretic. She also adds that his urine looked cloudy, dark yellow with odor, but denies any accompanying hematuria. Of note, patient last admitted to [facility] 11/9-12/2020 for urinary retention, likely 2/2 BPH and non-compliance with Flomax. Patient has been straight catheter for the last several months and has also had a PEG tube for the same duration. ED labs showed WBC 29.47, Lactate 2.7, BUN/Cr: 113/5.70 Alk Phos 223. CXR with scattered bilateral lower lung field interstitial infiltrates; CT Chest/Abomen/Pelvis with Bibasilar aspiration pneumonia, mild left hydroureteronephrosis secondary to a 7 mm left UVJ calculus, and prostatomegaly. Patient was given 2 L NS Bolus x1 and  Rocephin X 1  and Shukla was placed. Now s/p Left ureteral stent for impacted Left distal ureteral stone POD1, recovering well.     #Neurologic  - Acetaminophen 650 mg PO q6h PRN for pain (Mild 1-3) or temp > 100.4.  - Escitalopram 10 mg PO QD for depression.     #Cardiovascular  - Plavix 75 mg PO QD for CAD.  - Atorvastatin 80 mg PO HS.    #Respiratory  - Decreased NC to 1L.   - Zosyn 3.375 g IV q12h for 7 days for PNA (Day 1/7 = 7/30; End on 8/6).     #RUFE  -  S/p Left ureteral stent for impacted Left distal ureteral stone POD1 recovering well.   - S/p 1L NS, 2L LR.    #Gastrointestinal/Diet  - PEG in place.  - Osmolite, TF at 200 mL bolus q4-5 hours.    #Heme/AC  - SQH 5000U SubQ q8h for DVT ppx.     #Dispotion  - ICU, Possible TTF tomorrow.

## 2021-07-30 NOTE — DIETITIAN NUTRITION RISK NOTIFICATION - TREATMENT: THE FOLLOWING DIET HAS BEEN RECOMMENDED
Diet, NPO with Tube Feed:   Tube Feeding Modality: Gastrostomy  Osmolite 1.5 Kelvin  Total Volume for 24 Hours (mL): 1560  Bolus  Total Volume of Bolus (mL):  260  Total # of Feeds: 5  Tube Feed Frequency: Every 4 hours   Tube Feed Start Time: 06:00  Bolus Feed Rate (mL per Hour): 260   Bolus Feed Duration (in Hours): 1  Bolus Feed Instructions:  start TF at 200 ml bolus feed every 4-5 hrs,increase to goal of 260 ml/feed.( total 1300 ml per day.) Flush with 750 total ml free water/day. Please instruct wife on bolus feeds.  Free Water Flush  Free Water Flush Instructions:  total free water needed/day ~750 ml (07-30-21 @ 10:35) [Pending Verification By Attending]  Diet, NPO:   Except Medications  With Ice Chips/Sips of Water (07-29-21 @ 14:59) [Active]

## 2021-07-30 NOTE — PROGRESS NOTE ADULT - SUBJECTIVE AND OBJECTIVE BOX
Patient is a 71y old  Male who presents with a chief complaint of Sepsis, UTI (2021 07:28)    24 hour events: ***    REVIEW OF SYSTEMS  Constitutional: No fever, chills, fatigue  Neuro: No headache, numbness, weakness  Resp: No cough, wheezing, shortness of breath  CVS: No chest pain, palpitations, leg swelling  GI: No abdominal pain, nausea, vomiting, diarrhea   : No dysuria, frequency, incontinence  Skin: No itching, burning, rashes, or lesions   Msk: No joint pain or swelling  Psych: No depression, anxiety, mood swings  Heme: No bleeding    T(F): 97.9 (21 @ 08:26), Max: 100.8 (21 @ 20:58)  HR: 81 (21 @ 07:00) (81 - 137)  BP: 107/58 (21 @ 07:00) (92/54 - 165/90)  RR: 25 (21 @ 07:00) (18 - 39)  SpO2: 100% (21 @ 07:00) (90% - 100%)  Wt(kg): --            I&O's Summary     @ 07:01  -   @ 07:00  --------------------------------------------------------  IN: 1365 mL / OUT: 200 mL / NET: 1165 mL      PHYSICAL EXAM  General:   CNS:   HEENT:   Resp:   CVS:   Abd:   Ext:   Skin:     MEDICATIONS  piperacillin/tazobactam IVPB..   piperacillin/tazobactam IVPB.. IV Intermittent      atorvastatin Oral      acetaminophen   Tablet .. Oral PRN  escitalopram Oral  melatonin Oral PRN  ondansetron Injectable IV Push PRN      clopidogrel Tablet Oral    aluminum hydroxide/magnesium hydroxide/simethicone Suspension Oral PRN  pantoprazole  Injectable IV Push      lactated ringers Bolus IV Bolus  lactated ringers. IV Continuous  sodium chloride 0.9% Bolus IV Bolus      chlorhexidine 2% Cloths Topical                            12.3   20.44 )-----------( 119      ( 2021 05:33 )             37.6     Bands 8.0        148<H>  |  113<H>  |  120<H>  ----------------------------<  142<H>  4.0   |  25  |  5.70<H>    Ca    9.2      2021 05:33  Phos  3.3       Mg     2.6         TPro  5.8<L>  /  Alb  1.6<L>  /  TBili  1.2  /  DBili  x   /  AST  63<H>  /  ALT  45  /  AlkPhos  215<H>      Lactate 1.4            @ 05:33    Lactate 2.1            @ 17:53    Lactate 2.7            @ 13:35          PT/INR - ( 2021 13:35 )   PT: 11.7 sec;   INR: 1.00 ratio         PTT - ( 2021 13:35 )  PTT:28.4 sec  Urinalysis Basic - ( 2021 13:34 )    Color: Orange / Appearance: Turbid / S.020 / pH: x  Gluc: x / Ketone: Trace  / Bili: Negative / Urobili: 1   Blood: x / Protein: 100 / Nitrite: Negative   Leuk Esterase: Moderate / RBC: 25-50 /HPF / WBC >50   Sq Epi: x / Non Sq Epi: Occasional / Bacteria: TNTC          Rapid RVP Result: NotDetec ( @ 13:33)    Radiology: ***  Bedside lung ultrasound: ***  Bedside ECHO: ***    CENTRAL LINE: Y/N          DATE INSERTED:              REMOVE: Y/N  WILL: Y/N                        DATE INSERTED:              REMOVE: Y/N  A-LINE: Y/N                       DATE INSERTED:              REMOVE: Y/N    GLOBAL ISSUE/BEST PRACTICE  Analgesia:   Sedation:   CAM-ICU:   HOB elevation: yes  Stress ulcer prophylaxis:   VTE prophylaxis:   Glycemic control:   Nutrition:     CODE STATUS: ***  [facility] discussion: Y       Patient is a 71M who presents with a chief complaint of Sepsis, UTI (2021 07:28).    24 Hour Events:  Patient s/p placement of Left ureteral stent for impacted Left distal ureteral stone POD1. Patient endorses mild nausea overnight, but reports non other issues.     REVIEW OF SYSTEMS  Constitutional: No fever, chills, fatigue.  Neuro: No headache, numbness, weakness.  Resp: No cough, wheezing, shortness of breath.  CVS: No chest pain, palpitations, leg swelling.  GI: No abdominal pain, nausea, vomiting, diarrhea.  : No dysuria, frequency, incontinence.    VITAL SIGNS  [Today's VS have been reviewed by provider]  T(F): 97.9 (21 @ 08:26), Max: 100.8 (21 @ 20:58)  HR: 81 (21 @ 07:00) (81 - 137)  BP: 107/58 (21 @ 07:00) (92/54 - 165/90)  RR: 25 (21 @ 07:00) (18 - 39)  SpO2: 100% (21 @ 07:00) (90% - 100%)  Wt(kg): --            I&O's Summary     @ 07:01  -   @ 07:00  --------------------------------------------------------  IN: 1365 mL / OUT: 200 mL / NET: 1165 mL      PHYSICAL EXAM  General: Fatigued and with poor muscular tone and effort c/w dx. ALS. Otherwise, NAD.   CNS: ANOx3, NCAT. Unable/unwilling to speak, uncertain if c/w patient baseline.  HEENT: PERRLA; MMM.  Resp: CTABL; No wheezing, stridor, rhonchi, or rales.  CVS: RRR; Normal S1, S2; No m/r/g.   Abd: Soft, NTND. PEG site clean, dry, and intact without any irritation or other signs of compromise.   Ext: No C/C/E.  Skin: WWP.     MEDICATIONS  piperacillin/tazobactam IVPB..   piperacillin/tazobactam IVPB.. IV Intermittent      atorvastatin Oral      acetaminophen   Tablet .. Oral PRN  escitalopram Oral  melatonin Oral PRN  ondansetron Injectable IV Push PRN      clopidogrel Tablet Oral    aluminum hydroxide/magnesium hydroxide/simethicone Suspension Oral PRN  pantoprazole  Injectable IV Push      lactated ringers Bolus IV Bolus  lactated ringers. IV Continuous  sodium chloride 0.9% Bolus IV Bolus      chlorhexidine 2% Cloths Topical                            12.3   20.44 )-----------( 119      ( 2021 05:33 )             37.6     Bands 8.0    07-30    148<H>  |  113<H>  |  120<H>  ----------------------------<  142<H>  4.0   |  25  |  5.70<H>    Ca    9.2      2021 05:33  Phos  3.3       Mg     2.6         TPro  5.8<L>  /  Alb  1.6<L>  /  TBili  1.2  /  DBili  x   /  AST  63<H>  /  ALT  45  /  AlkPhos  215<H>      Lactate 1.4           30 @ 05:33    Lactate 2.1            @ 17:53    Lactate 2.7            @ 13:35          PT/INR - ( 2021 13:35 )   PT: 11.7 sec;   INR: 1.00 ratio         PTT - ( 2021 13:35 )  PTT:28.4 sec  Urinalysis Basic - ( 2021 13:34 )    Color: Orange / Appearance: Turbid / S.020 / pH: x  Gluc: x / Ketone: Trace  / Bili: Negative / Urobili: 1   Blood: x / Protein: 100 / Nitrite: Negative   Leuk Esterase: Moderate / RBC: 25-50 /HPF / WBC >50   Sq Epi: x / Non Sq Epi: Occasional / Bacteria: TNTC          Rapid RVP Result: NotDetec ( @ 13:33)    Radiology: Yes  < from: US Renal (21 @ 15:37) >  INTERPRETATION:  CLINICAL INFORMATION: Worsening renal failure  COMPARISON: 2020 and CT scan of earlier today.  TECHNIQUE: Sonography of the kidneys  FINDINGS:  Right kidney: 12.1 cm cm. No renal mass, hydronephrosis or calculi. A simple cyst in the upper to midpole measures 1.2 x 1.0 x 1.1 cm.  Left kidney: 12.4 cm. No renal mass or calculi. There is mild left hydronephrosis  IMPRESSION:  Mild left hydronephrosis as seen on the CAT scan of earlier today  Small right renal cyst again appreciated  < end of copied text >    < from: CT Chest No Cont (21 @ 15:12) >  INTERPRETATION:  CLINICAL INFORMATION: Sepsis  COMPARISON: None.  CONTRAST/COMPLICATIONS:  IV Contrast: NONE  Oral Contrast: NONE  Complications: None reported at time of study completion  PROCEDURE:  CT of the Chest, Abdomen and Pelvis was performed.  Sagittal and coronal reformats were performed.  FINDINGS:  CHEST:  LUNGS AND LARGE AIRWAYS: Patent central airways. Extensive bibasilar dependent airspace infiltrates consistent with bilateral aspiration pneumonia  PLEURA: No pleural effusion.  VESSELS: Nonaneurysmal.  HEART: Coronary artery calcification. No pericardial effusion.  MEDIASTINUM AND GABRIELA: Mild fluid distention of the upper thoracic esophagussuggesting possible gastroesophageal reflux  CHEST WALL AND LOWER NECK: Within normal limits.  ABDOMEN AND PELVIS:  LIVER: Within normal limits.  BILE DUCTS: Normal caliber.  GALLBLADDER: Within normal limits.  SPLEEN: Within normal limits.  PANCREAS: Within normal limits.  ADRENALS: Within normal limits.  KIDNEYS/URETERS: Mild left hydroureteronephrosis secondary to a 7 mm left UVJ calculus. Punctate nonobstructive left renal calculus. Bilateral nonspecific perirenal stranding. Correlate for UTI.  BLADDER: Decompressed with Will. Fluid-filled left lateral bladder diverticulum.  REPRODUCTIVE ORGANS: Enlarged prostate  BOWEL: No bowel obstruction or inflammation. Gastrostomy tube in situ. Appendix normal  PERITONEUM: No ascites.  VESSELS: Within normal limits.  RETROPERITONEUM/LYMPH NODES: No lymphadenopathy.  ABDOMINAL WALL: Fat-containing left inguinal hernia. Tiny fat-containing umbilical hernia.  BONES: Degenerative change.  IMPRESSION:  Bibasilar aspiration pneumonia.  Fluid-filled upper thoracic esophagus may reflect gastroesophageal reflux.  Mild left hydroureteronephrosis secondary to a 7 mm left UVJ calculus.  Bilateral nonspecific perirenal stranding. Correlate clinically for infection  Prostatomegaly.  Additional findings as discussed  < end of copied text >    < from: Xray Chest 1 View-PORTABLE IMMEDIATE (21 @ 14:10) >  INTERPRETATION:  AP semierect chest on 2021 at 1:58 PM. Patient has sepsis.  Heart magnified by technique.  Scattered and lower lung field interstitial infiltrates are presently seen anterior greater on the right at this time. These are new since 2020.  IMPRESSION: Bilateral infiltrates as above.  < end of copied text >    Bedside lung ultrasound: No.  Bedside ECHO: No.     CENTRAL LINE: N  WILL: Y  A-LINE: N    GLOBAL ISSUE/BEST PRACTICE  Analgesia: Yes.  Sedation: No.  HOB Elevation: Yes.  Stress Ulcer Prophylaxis: Yes.   VTE Prophylaxis: Yes.   Glycemic Control: NO.    Nutrition:     CODE STATUS: Full Code.

## 2021-07-30 NOTE — SWALLOW BEDSIDE ASSESSMENT ADULT - PHARYNGEAL PHASE
Delayed pharyngeal swallow/Decreased laryngeal elevation/Complaints of pharyngeal stasis/Multiple swallows

## 2021-07-30 NOTE — DIETITIAN INITIAL EVALUATION ADULT. - PROBLEM SELECTOR PLAN 3
UA: moderate LE and large blood   - CT showing 7 mm calculus, uro consulted; emergent OR for septic stone   - s/p rocephin X 1 in the ED   - start zosyn q 12 h (renally dosed)   - THUY Burton consulted appreciate recs

## 2021-07-31 LAB
ALBUMIN SERPL ELPH-MCNC: 1.7 G/DL — LOW (ref 3.3–5)
ALP SERPL-CCNC: 249 U/L — HIGH (ref 40–120)
ALT FLD-CCNC: 49 U/L — SIGNIFICANT CHANGE UP (ref 12–78)
ANION GAP SERPL CALC-SCNC: 8 MMOL/L — SIGNIFICANT CHANGE UP (ref 5–17)
AST SERPL-CCNC: 82 U/L — HIGH (ref 15–37)
BASOPHILS # BLD AUTO: 0.01 K/UL — SIGNIFICANT CHANGE UP (ref 0–0.2)
BASOPHILS NFR BLD AUTO: 0.1 % — SIGNIFICANT CHANGE UP (ref 0–2)
BILIRUB SERPL-MCNC: 0.9 MG/DL — SIGNIFICANT CHANGE UP (ref 0.2–1.2)
BUN SERPL-MCNC: 127 MG/DL — HIGH (ref 7–23)
CALCIUM SERPL-MCNC: 9.7 MG/DL — SIGNIFICANT CHANGE UP (ref 8.5–10.1)
CHLORIDE SERPL-SCNC: 115 MMOL/L — HIGH (ref 96–108)
CO2 SERPL-SCNC: 27 MMOL/L — SIGNIFICANT CHANGE UP (ref 22–31)
CREAT SERPL-MCNC: 4.9 MG/DL — HIGH (ref 0.5–1.3)
CULTURE RESULTS: SIGNIFICANT CHANGE UP
EOSINOPHIL # BLD AUTO: 0.01 K/UL — SIGNIFICANT CHANGE UP (ref 0–0.5)
EOSINOPHIL NFR BLD AUTO: 0.1 % — SIGNIFICANT CHANGE UP (ref 0–6)
GLUCOSE SERPL-MCNC: 186 MG/DL — HIGH (ref 70–99)
GRAM STN FLD: SIGNIFICANT CHANGE UP
GRAM STN FLD: SIGNIFICANT CHANGE UP
HCT VFR BLD CALC: 42.5 % — SIGNIFICANT CHANGE UP (ref 39–50)
HGB BLD-MCNC: 13.6 G/DL — SIGNIFICANT CHANGE UP (ref 13–17)
IMM GRANULOCYTES NFR BLD AUTO: 0.9 % — SIGNIFICANT CHANGE UP (ref 0–1.5)
LYMPHOCYTES # BLD AUTO: 0.35 K/UL — LOW (ref 1–3.3)
LYMPHOCYTES # BLD AUTO: 3.3 % — LOW (ref 13–44)
MAGNESIUM SERPL-MCNC: 2.9 MG/DL — HIGH (ref 1.6–2.6)
MCHC RBC-ENTMCNC: 30.4 PG — SIGNIFICANT CHANGE UP (ref 27–34)
MCHC RBC-ENTMCNC: 32 GM/DL — SIGNIFICANT CHANGE UP (ref 32–36)
MCV RBC AUTO: 95.1 FL — SIGNIFICANT CHANGE UP (ref 80–100)
MONOCYTES # BLD AUTO: 0.35 K/UL — SIGNIFICANT CHANGE UP (ref 0–0.9)
MONOCYTES NFR BLD AUTO: 3.3 % — SIGNIFICANT CHANGE UP (ref 2–14)
NEUTROPHILS # BLD AUTO: 9.78 K/UL — HIGH (ref 1.8–7.4)
NEUTROPHILS NFR BLD AUTO: 92.3 % — HIGH (ref 43–77)
NRBC # BLD: 0 /100 WBCS — SIGNIFICANT CHANGE UP (ref 0–0)
PHOSPHATE SERPL-MCNC: 3.4 MG/DL — SIGNIFICANT CHANGE UP (ref 2.5–4.5)
PLATELET # BLD AUTO: 126 K/UL — LOW (ref 150–400)
POTASSIUM SERPL-MCNC: 3.6 MMOL/L — SIGNIFICANT CHANGE UP (ref 3.5–5.3)
POTASSIUM SERPL-SCNC: 3.6 MMOL/L — SIGNIFICANT CHANGE UP (ref 3.5–5.3)
PROT SERPL-MCNC: 6.3 G/DL — SIGNIFICANT CHANGE UP (ref 6–8.3)
RBC # BLD: 4.47 M/UL — SIGNIFICANT CHANGE UP (ref 4.2–5.8)
RBC # FLD: 13.2 % — SIGNIFICANT CHANGE UP (ref 10.3–14.5)
SODIUM SERPL-SCNC: 150 MMOL/L — HIGH (ref 135–145)
SPECIMEN SOURCE: SIGNIFICANT CHANGE UP
WBC # BLD: 10.6 K/UL — HIGH (ref 3.8–10.5)
WBC # FLD AUTO: 10.6 K/UL — HIGH (ref 3.8–10.5)

## 2021-07-31 PROCEDURE — 99233 SBSQ HOSP IP/OBS HIGH 50: CPT

## 2021-07-31 RX ORDER — SODIUM CHLORIDE 9 MG/ML
1000 INJECTION, SOLUTION INTRAVENOUS
Refills: 0 | Status: DISCONTINUED | OUTPATIENT
Start: 2021-07-31 | End: 2021-08-01

## 2021-07-31 RX ADMIN — PIPERACILLIN AND TAZOBACTAM 25 GRAM(S): 4; .5 INJECTION, POWDER, LYOPHILIZED, FOR SOLUTION INTRAVENOUS at 05:30

## 2021-07-31 RX ADMIN — CHLORHEXIDINE GLUCONATE 1 APPLICATION(S): 213 SOLUTION TOPICAL at 10:07

## 2021-07-31 RX ADMIN — PIPERACILLIN AND TAZOBACTAM 25 GRAM(S): 4; .5 INJECTION, POWDER, LYOPHILIZED, FOR SOLUTION INTRAVENOUS at 17:27

## 2021-07-31 RX ADMIN — CLOPIDOGREL BISULFATE 75 MILLIGRAM(S): 75 TABLET, FILM COATED ORAL at 12:42

## 2021-07-31 RX ADMIN — SODIUM CHLORIDE 75 MILLILITER(S): 9 INJECTION, SOLUTION INTRAVENOUS at 19:56

## 2021-07-31 RX ADMIN — HEPARIN SODIUM 5000 UNIT(S): 5000 INJECTION INTRAVENOUS; SUBCUTANEOUS at 21:38

## 2021-07-31 RX ADMIN — ATORVASTATIN CALCIUM 80 MILLIGRAM(S): 80 TABLET, FILM COATED ORAL at 21:38

## 2021-07-31 RX ADMIN — ESCITALOPRAM OXALATE 10 MILLIGRAM(S): 10 TABLET, FILM COATED ORAL at 12:42

## 2021-07-31 RX ADMIN — HEPARIN SODIUM 5000 UNIT(S): 5000 INJECTION INTRAVENOUS; SUBCUTANEOUS at 05:29

## 2021-07-31 RX ADMIN — HEPARIN SODIUM 5000 UNIT(S): 5000 INJECTION INTRAVENOUS; SUBCUTANEOUS at 14:18

## 2021-07-31 NOTE — PROGRESS NOTE ADULT - ASSESSMENT
70 yo M PMHx ALS, CAD (s/p MI 1 stent placed 2006 on DAPT), HTN, HLD, BPH presented with AMS and shortness of breath, admitted for  sepsis secondary to aspiration pneumonia, UTI/pyelonephritis,  Renal stone with left hydronephrosis s/p stent placement by Urology, MARLEY

## 2021-07-31 NOTE — PROGRESS NOTE ADULT - PROBLEM SELECTOR PLAN 6
Complaining of worsening generalized weakness, likely in the setting of sepsis and urinary tract infection, however, patient not currently on riluzole therapy as per spouse, due to intolerance  - Neuro following   - PT consulted

## 2021-07-31 NOTE — SWALLOW BEDSIDE ASSESSMENT ADULT - DIET PRIOR TO ADMI
Puree with Thin per pt
PEG in place, however, per charting and discussion with patient, patient was consuming PO at home

## 2021-07-31 NOTE — PROGRESS NOTE ADULT - SUBJECTIVE AND OBJECTIVE BOX
INTERVAL HPI/OVERNIGHT EVENTS: mentation improving, no pain    MEDICATIONS  (STANDING):  atorvastatin 80 milliGRAM(s) Oral at bedtime  chlorhexidine 2% Cloths 1 Application(s) Topical <User Schedule>  clopidogrel Tablet 75 milliGRAM(s) Oral daily  escitalopram 10 milliGRAM(s) Oral daily  heparin   Injectable 5000 Unit(s) SubCutaneous every 8 hours  lactated ringers Bolus 2000 milliLiter(s) IV Bolus once  piperacillin/tazobactam IVPB..      piperacillin/tazobactam IVPB.. 3.375 Gram(s) IV Intermittent every 12 hours  sodium chloride 0.9% Bolus 1000 milliLiter(s) IV Bolus once    MEDICATIONS  (PRN):  acetaminophen   Tablet .. 650 milliGRAM(s) Oral every 6 hours PRN Temp greater or equal to 38.5C (101.3F), Mild Pain (1 - 3)  aluminum hydroxide/magnesium hydroxide/simethicone Suspension 30 milliLiter(s) Oral every 4 hours PRN Dyspepsia  melatonin 3 milliGRAM(s) Oral at bedtime PRN Insomnia  ondansetron Injectable 4 milliGRAM(s) IV Push every 8 hours PRN Nausea and/or Vomiting        Vital Signs Last 24 Hrs  T(C): 36.6 (2021 05:29), Max: 36.7 (2021 11:55)  T(F): 97.8 (2021 05:29), Max: 98.1 (2021 11:55)  HR: 81 (2021 05:29) (71 - 82)  BP: 116/70 (2021 05:29) (106/62 - 131/67)  BP(mean): 89 (2021 19:00) (79 - 92)  RR: 18 (2021 05:29) (18 - 33)  SpO2: 92% (2021 05:29) (92% - 100%)    PHYSICAL EXAM:    ABDOMEN: sodt, NT  GENITALIA: jo draining clear urine    LABS:                        13.6   10.60 )-----------( 126      ( 2021 08:14 )             42.5     07-31    150<H>  |  115<H>  |  127<H>  ----------------------------<  186<H>  3.6   |  27  |  4.90<H>    Ca    9.7      2021 08:14  Phos  3.4       Mg     2.9         TPro  6.3  /  Alb  1.7<L>  /  TBili  0.9  /  DBili  x   /  AST  82<H>  /  ALT  49  /  AlkPhos  249<H>      PT/INR - ( 2021 13:35 )   PT: 11.7 sec;   INR: 1.00 ratio         PTT - ( 2021 13:35 )  PTT:28.4 sec  Urinalysis Basic - ( 2021 13:34 )    Color: Orange / Appearance: Turbid / S.020 / pH: x  Gluc: x / Ketone: Trace  / Bili: Negative / Urobili: 1   Blood: x / Protein: 100 / Nitrite: Negative   Leuk Esterase: Moderate / RBC: 25-50 /HPF / WBC >50   Sq Epi: x / Non Sq Epi: Occasional / Bacteria: TNTC      Urine culture:   @ 00:49 --   Moderate Gram Negative Rods  Urine culture:   @ 18:10 --   Growth in anaerobic bottle: Gram Negative Rods  ***Blood Panel PCR results on this specimen are available  approximately 3 hours after the Gram stain result.***  Gram stain, PCR, and/or culture results may not always  correspond due to difference in methodologies.  ************************************************************  This PCR assay was performed by multiplex PCR. This  Assay tests for 66 bacterial and resistance gene targets.  Please refer to the [facility] test directory  at [email or web address] for details.  Urine culture:   @ 17:54 --   >=3 organisms. Probable collection contamination.    Culture - Blood (21 @ 18:10)   - Klebsiella oxytoca: Detec   - Streptococcus anginosus group: Detec   Gram Stain:   Upon re-evaluation of gram stain:   Growth in anaerobic bottle: Gram Negative Rods   Growth in anaerobic bottle: Gram positive cocci in pairs   Specimen Source: .Blood Blood-Peripheral   Organism: Blood Culture PCR   Culture Results:   Growth in anaerobic bottle: Gram Negative Rods   ***Blood Panel PCR results on this specimen are available   approximately 3 hours after the Gram stain result.***   Gram stain, PCR, and/or culture results may not always   correspond due to difference in methodologies.   ************************************************************   This PCR assay was performed by multiplex PCR. This   Assay tests for 66 bacterial and resistance gene targets.   Please refer to the [facility] test directory   at [email or web address] for details.   Organism Identification: Blood Culture PCR   Method Type: PCR       Historical Values  Culture - Blood (21 @ 18:10)   Gram Stain:   Growth in anaerobic bottle: Gram Negative Rods and Gram positive cocci in   pairs   Growth in aerobic bottle: Gram Negative Rods   Specimen Source: .Blood Blood-Peripheral   Culture Results:   Growth in anaerobic bottle: Gram Negative Rods and Gram positive cocci in   pairs   Growth in aerobic bottle: Gram Negative Rods   Culture - Blood (21 @ 18:10)   - Klebsiella oxytoca: Detec   - Streptococcus anginosus group: Detec   Gram Stain:   Upon re-evaluation of gram stain:   Growth in anaerobic bottle: Gram Negative Rods   Growth in anaerobic bottle: Gram positive cocci in pairs   Specimen Source: .Blood Blood-Peripheral   Organism: Blood Culture PCR   Culture Results:   Growth in anaerobic bottle: Gram Negative Rods   ***Blood Panel PCR results on this specimen are available   approximately 3 hours after the Gram stain result.***   Gram stain, PCR, and/or culture results may not always   correspond due to difference in methodologies.   ************************************************************   This PCR assay was performed by multiplex PCR. This   Assay tests for 66 bacterial and resistance gene targets.   Please refer to the [facility] test directory   at [email or web address] for details.   Organism Identification: Blood Culture PCR   Method Type: PCR       RADIOLOGY & ADDITIONAL TESTS:

## 2021-07-31 NOTE — SWALLOW BEDSIDE ASSESSMENT ADULT - COMMENTS
Chart reviewed, attempted to reassess swallow this morning, pt received awake in bed A&A, +O2NC, upon receipt of pt, pt noted to be diaphoretic with increased WOB noted, pt denied SOB and denied via head nod and thumbs up/down when asked yes/no, pt declined to accept PO, RACIEL Tariq notified of current presentation and PO declined by pt.  Pt left as received NAD, call bell placed in right hand.  Discussed with Dr. Trevizo who reported current presentation is consistent with pt since ICU stay, MD reported will continue NPO with nutrition/hydration via PEG and reconsult for swallow evaluation when pt with overall improvement in medical status.  No further follow up at this time.    70 yo M PMHx ALS, CAD (s/p MI 1 stent placed 2006 on DAPT), HTN, HLD, BPH presented with AMS and shortness of breath, admitted for  sepsis secondary to aspiration pneumonia, UTI/pyelonephritis,  Renal stone with left hydronephrosis s/p stent placement by Urology, MARLEY
Consult received and chart reviewed. The patient was seen at bedside this AM for an initial assessment of swallow function, at which time he was awake, though requiring maximum prompting to participate in today's evaluation. Patient currently receiving supplemental O2 via 3L NC in place. SpO2 remained at 100% throughout today's session. The patient communicated with this clinician via head nods, thumbs up/down, and via slow, effortful speech production. The patient denied pain pre/post today's evaluation.    Per charting, the patient is a "70 yo M PMHx ALS, CAD (s/p MI 1 stent placed 2006 on DAPT), HTN, HLD, BPH presented with AMS and shortness of breath, admitted for  sepsis secondary to aspiration pneumonia, UTI/pyelonephritis,  Renal stone with left hydronephrosis s/p stent placement by Urolgy, MARLEY."    WBC is elevated. CT of the chest revealed, "Bibasilar aspiration pneumonia. Fluid-filled upper thoracic esophagus may reflect gastroesophageal reflux. Mild left hydroureteronephrosis secondary to a 7 mm left UVJ calculus. Bilateral nonspecific perirenal stranding. Correlate clinically for infection. Prostatomegaly."    Discussed results and recommendations from this evaluation with the patient, RN, RD on unit, and call out to ICU PA.

## 2021-07-31 NOTE — PROGRESS NOTE ADULT - SUBJECTIVE AND OBJECTIVE BOX
[facility] -- Santi Nair, Shara Gant, Chris Peace, Harvinder Ramirez: Office # [phone]    Follow Up:  Urosepsis, Hx of CAD    Subjective/Observations: Patient seen and examined. Patient awake, alert, diaphoretic shaking head yes/no to questions. No complaints of chest pain, dyspnea, palpitations or dizziness. Tolerating O2 via nasal cannula.     REVIEW OF SYSTEMS: All review of systems is negative for eye, ENT, GI, , allergic, dermatologic, musculoskeletal and neurologic except as described above    PAST MEDICAL & SURGICAL HISTORY:  Myocardial infarct    Hypertension    Hyperlipidemia    BPH (benign prostatic hyperplasia)    ALS (amyotrophic lateral sclerosis)    S/P tonsillectomy    S/P coronary artery stent placement    History of hip surgery    History of hernia repair  &gt; 20 years ago    H/O shoulder surgery        MEDICATIONS  (STANDING):  atorvastatin 80 milliGRAM(s) Oral at bedtime  chlorhexidine 2% Cloths 1 Application(s) Topical <User Schedule>  clopidogrel Tablet 75 milliGRAM(s) Oral daily  escitalopram 10 milliGRAM(s) Oral daily  heparin   Injectable 5000 Unit(s) SubCutaneous every 8 hours  lactated ringers Bolus 2000 milliLiter(s) IV Bolus once  piperacillin/tazobactam IVPB..      piperacillin/tazobactam IVPB.. 3.375 Gram(s) IV Intermittent every 12 hours  sodium chloride 0.9% Bolus 1000 milliLiter(s) IV Bolus once    MEDICATIONS  (PRN):  acetaminophen   Tablet .. 650 milliGRAM(s) Oral every 6 hours PRN Temp greater or equal to 38.5C (101.3F), Mild Pain (1 - 3)  aluminum hydroxide/magnesium hydroxide/simethicone Suspension 30 milliLiter(s) Oral every 4 hours PRN Dyspepsia  melatonin 3 milliGRAM(s) Oral at bedtime PRN Insomnia  ondansetron Injectable 4 milliGRAM(s) IV Push every 8 hours PRN Nausea and/or Vomiting    Allergies    fish (Short breath; Anaphylaxis)  No Known Drug Allergies    Intolerances      Vital Signs Last 24 Hrs  T(C): 36.6 (31 Jul 2021 05:29), Max: 36.7 (30 Jul 2021 15:22)  T(F): 97.8 (31 Jul 2021 05:29), Max: 98.1 (30 Jul 2021 15:22)  HR: 81 (31 Jul 2021 05:29) (76 - 82)  BP: 116/70 (31 Jul 2021 05:29) (112/57 - 131/67)  BP(mean): 89 (30 Jul 2021 19:00) (79 - 92)  RR: 18 (31 Jul 2021 05:29) (18 - 31)  SpO2: 92% (31 Jul 2021 05:29) (92% - 98%)  I&O's Summary    30 Jul 2021 07:01  -  31 Jul 2021 07:00  --------------------------------------------------------  IN: 910 mL / OUT: 1500 mL / NET: -590 mL          TELE: Not on telemetry   PHYSICAL EXAM:  Appearance: NAD, no distress, alert, Well developed   HEENT: Moist Mucous Membranes, Anicteric  Cardiovascular: Regular rate and rhythm, Normal S1 S2, No JVD, No murmurs, No rubs, gallops or clicks  Respiratory: Non-labored, poor effort diminished at bases  No rales, No rhonchi, No wheezing.   Gastrointestinal:  Soft, Non-tender, + BS  Skin: Warm and dry, No visible rashes or ulcers, No ecchymosis, No cyanosis  Musculoskeletal: No clubbing, No cyanosis, No joint swelling/tenderness  Lymph: No peripheral edema.     LABS: All Labs Reviewed:                        13.6   10.60 )-----------( 126      ( 31 Jul 2021 08:14 )             42.5                         12.3   20.44 )-----------( 119      ( 30 Jul 2021 05:33 )             37.6                         14.5   29.47 )-----------( 154      ( 29 Jul 2021 13:35 )             44.5     31 Jul 2021 08:14    150    |  115    |  127    ----------------------------<  186    3.6     |  27     |  4.90   30 Jul 2021 05:33    148    |  113    |  120    ----------------------------<  142    4.0     |  25     |  5.70   29 Jul 2021 13:35    147    |  111    |  113    ----------------------------<  292    3.8     |  24     |  5.70     Ca    9.7        31 Jul 2021 08:14  Ca    9.2        30 Jul 2021 05:33  Ca    10.1       29 Jul 2021 13:35  Phos  3.4       31 Jul 2021 08:14  Phos  3.3       30 Jul 2021 05:33  Mg     2.9       31 Jul 2021 08:14  Mg     2.6       30 Jul 2021 05:33    TPro  6.3    /  Alb  1.7    /  TBili  0.9    /  DBili  x      /  AST  82     /  ALT  49     /  AlkPhos  249    31 Jul 2021 08:14  TPro  5.8    /  Alb  1.6    /  TBili  1.2    /  DBili  x      /  AST  63     /  ALT  45     /  AlkPhos  215    30 Jul 2021 05:33  TPro  7.4    /  Alb  2.3    /  TBili  1.8    /  DBili  x      /  AST  70     /  ALT  57     /  AlkPhos  223    29 Jul 2021 13:35    PT/INR - ( 29 Jul 2021 13:35 )   PT: 11.7 sec;   INR: 1.00 ratio         PTT - ( 29 Jul 2021 13:35 )  PTT:28.4 sec    Lactate, Blood: 1.4 mmol/L (07-30-21 @ 05:33)  Lactate, Blood: 2.1 mmol/L (07-29-21 @ 17:53)  Lactate, Blood: 2.7 mmol/L (07-29-21 @ 13:35)    12 Lead ECG:   Ventricular Rate 76 BPM  Atrial Rate 76 BPM  P-R Interval 136 ms  QRS Duration 80 ms  Q-T Interval 362 ms  QTC Calculation(Bazett) 407 ms  P Axis 39 degrees  R Axis -27 degrees  T Axis 21 degrees  Diagnosis Line Normal sinus rhythm  Inferior infarct , age undetermined  Possible Anterior infarct (cited on or before 29-JUL-2021)  Abnormal ECG  Confirmed by sonya Ramirez ([id number]) on 7/30/2021 4:21:24 PM (07-30-21 @ 08:01)    < from: Xray Chest 1 View- PORTABLE-Urgent (Xray Chest 1 View- PORTABLE-Urgent .) (07.30.21 @ 19:25) >  EXAM:  XR CHEST PORTABLE URGENT 1V                        PROCEDURE DATE:  07/30/2021    INTERPRETATION:  Portable chest radiograph  CLINICAL INFORMATION: Tachypnea.  TECHNIQUE:  Portable  AP view of the chest was obtained.  COMPARISON: 11/9/2020 chest available for review.  FINDINGS:  The lungs show RIGHT perihilar linear airspace disease. Remaining visualized lung grossly clear. No pneumothorax.  The heart and mediastinum size and configuration are within normal limits.  Visualized osseous structures are intact.  IMPRESSION:   RIGHT perihilar linear airspace disease..  --- End ofReport ---  < end of copied text >    < from: CT Chest No Cont (07.29.21 @ 15:12) >    IMPRESSION:  Bibasilar aspiration pneumonia.  Fluid-filled upper thoracic esophagus may reflect gastroesophageal reflux.  Mild left hydroureteronephrosis secondary to a 7 mm left UVJ calculus.  Bilateral nonspecific perirenal stranding. Correlate clinically for infection  Prostatomegaly.  Additional findings as discussed  --- End of Report ---  < end of copied text >

## 2021-07-31 NOTE — PROGRESS NOTE ADULT - SUBJECTIVE AND OBJECTIVE BOX
Resting    Vital Signs Last 24 Hrs  T(C): 36.6 (07-31-21 @ 13:24), Max: 36.6 (07-31-21 @ 05:29)  T(F): 97.8 (07-31-21 @ 13:24), Max: 97.8 (07-31-21 @ 05:29)  HR: 82 (07-31-21 @ 13:24) (81 - 82)  BP: 128/76 (07-31-21 @ 13:24) (116/70 - 129/65)  BP(mean): 89 (07-30-21 @ 19:00) (89 - 89)  RR: 24 (07-31-21 @ 13:24) (18 - 30)  SpO2: 92% (07-31-21 @ 13:24) (92% - 97%)    I&O's Detail    30 Jul 2021 07:01  -  31 Jul 2021 07:00  --------------------------------------------------------  IN:    Free Water: 250 mL    Lactated Ringers: 400 mL    Osmolite: 260 mL  Total IN: 910 mL    OUT:    Indwelling Catheter - Urethral (mL): 1500 mL  Total OUT: 1500 mL    Total NET: -590 mL    31 Jul 2021 07:01  -  31 Jul 2021 18:52  --------------------------------------------------------  OUT:    Indwelling Catheter - Urethral (mL): 1100 mL  Total OUT: 1100 mL    Respiratory: b/l air entry  Cardiovascular: S1 S2  Gastrointestinal: soft, ND  Extremities:  no edema                        13.6   10.60 )-----------( 126      ( 31 Jul 2021 08:14 )             42.5     31 Jul 2021 08:14    150    |  115    |  127    ----------------------------<  186    3.6     |  27     |  4.90     Ca    9.7        31 Jul 2021 08:14  Phos  3.4       31 Jul 2021 08:14  Mg     2.9       31 Jul 2021 08:14    TPro  6.3    /  Alb  1.7    /  TBili  0.9    /  DBili  x      /  AST  82     /  ALT  49     /  AlkPhos  249    31 Jul 2021 08:14    LIVER FUNCTIONS - ( 31 Jul 2021 08:14 )  Alb: 1.7 g/dL / Pro: 6.3 g/dL / ALK PHOS: 249 U/L / ALT: 49 U/L / AST: 82 U/L / GGT: x           Culture - Urine (collected 30 Jul 2021 00:49)  Source: Kidney Kidney  Preliminary Report (31 Jul 2021 13:39):    Moderate Klebsiella oxytoca/Raoutella ornithinolytica    Culture - Blood (collected 29 Jul 2021 18:10)  Source: .Blood Blood-Peripheral  Gram Stain (31 Jul 2021 09:01):    Growth in anaerobic bottle: Gram Negative Rods and Gram positive cocci in    pairs    Growth in aerobic bottle: Gram Negative Rods  Preliminary Report (31 Jul 2021 09:01):    Growth in anaerobic bottle: Gram Negative Rods and Gram positive cocci in    pairs    Growth in aerobic bottle: Gram Negative Rods    Culture - Blood (collected 29 Jul 2021 18:10)  Source: .Blood Blood-Peripheral  Gram Stain (30 Jul 2021 15:15):    Upon re-evaluation of gram stain:    Growth in anaerobic bottle: Gram Negative Rods    Growth in anaerobic bottle: Gram positive cocci in pairs  Preliminary Report (31 Jul 2021 13:38):    Growth in anaerobic bottle: Klebsiella oxytoca/Raoutella ornithinolytica    ***Blood Panel PCR results on this specimen are available    approximately 3 hours after the Gram stain result.***    Gram stain, PCR, and/or culture results may not always    correspond due to difference in methodologies.    ************************************************************    This PCR assay was performed by multiplex PCR. This    Assay tests for 66 bacterial and resistance gene targets.    Please refer to the [facility] test directory    at [email or web address] for details.  Organism: Blood Culture PCR (30 Jul 2021 15:13)  Organism: Blood Culture PCR (30 Jul 2021 15:13)    Culture - Urine (collected 29 Jul 2021 17:54)  Source: Clean Catch Clean Catch (Midstream)  Final Report (31 Jul 2021 10:38):    >=3 organisms. Probable collection contamination.    acetaminophen   Tablet .. 650 milliGRAM(s) Oral every 6 hours PRN  aluminum hydroxide/magnesium hydroxide/simethicone Suspension 30 milliLiter(s) Oral every 4 hours PRN  atorvastatin 80 milliGRAM(s) Oral at bedtime  chlorhexidine 2% Cloths 1 Application(s) Topical <User Schedule>  clopidogrel Tablet 75 milliGRAM(s) Oral daily  escitalopram 10 milliGRAM(s) Oral daily  heparin   Injectable 5000 Unit(s) SubCutaneous every 8 hours  lactated ringers Bolus 2000 milliLiter(s) IV Bolus once  melatonin 3 milliGRAM(s) Oral at bedtime PRN  ondansetron Injectable 4 milliGRAM(s) IV Push every 8 hours PRN  piperacillin/tazobactam IVPB..      piperacillin/tazobactam IVPB.. 3.375 Gram(s) IV Intermittent every 12 hours  sodium chloride 0.9% Bolus 1000 milliLiter(s) IV Bolus once    Home Medications:  Colace 100 mg oral capsule: 1 cap(s) orally once a day, As Needed (30 Jul 2021 09:10)  Crestor 40 mg oral tablet: 1 tab(s) orally once a day (30 Jul 2021 09:10)  fenofibrate 160 mg oral tablet: 1 tab(s) orally once a day (30 Jul 2021 09:10)  Lexapro 10 mg oral tablet: 1 tab(s) orally once a day (30 Jul 2021 09:10)  lisinopril 2.5 mg oral tablet: 1 tab(s) orally once a day (30 Jul 2021 09:10)  metoprolol tartrate 50 mg oral tablet: 1 tab(s) orally once a day (30 Jul 2021 09:10)  Plavix 75 mg oral tablet: 1 tab(s) orally once a day    Assessment and Plan:   	  Obstructive MARLEY exacerbated by ACE as op  Left UVJ calculus  Kleb urosepsis  S/p ureteral stent placement.   Abx per ID  Avoid ACE/ARB  Avoid nephrotoxins  Shukla, I/Os  Free water deficit ~ 3L  D5W at 75cc/hr  Cr is improving  F/u BMP, Mg    [phone]

## 2021-07-31 NOTE — PROGRESS NOTE ADULT - ASSESSMENT
70 yo M PMHx ALS, CAD (s/p MI 1 stent placed 2006), HTN, HLD, BPH admitted for urosepsis with obstructing stone.  He is now s/p stent placement for obstructing renal stone.    Urosepsis   - Pt profoundly septic upon admission with source UTI +/- aspiration PNA  - Tachycardic in setting of sepsis, now improved  - CT Chest/Abomen/Pelvis: Bibasilar aspiration pneumonia. Fluid-filled upper thoracic esophagus may reflect gastroesophageal reflux.  Mild left hydroureteronephrosis secondary to a 7 mm left UVJ calculus. Bilateral nonspecific perirenal stranding. Correlate clinically for infection  Prostatomegaly.  - IV antibiotics and fluids per primary team   - remains in sr overnight without ectopy  - BP stable : 116/70 (07-31-21 @ 05:29) (112/57 - 131/67)  - Continue O2 support and needed, wean as tolerated   - Creatinine trend  downtrending :  <--4.90,  <--5.70,  <--5.70  - sig rise in creatinine, hopefully has peaked.   - Monitor volume closely with IVF    CAD  - hx of CAD s/p stent in 2006,   - Continue Plavix 75 mg QD  - Follow up Echo   #HTN  - BP: 128/76 (07-31-21 @ 13:24) (112/57 - 131/67)  - Hx of HTN on metoprolol and Lisinopril at home  - Holding  in setting of hypotension, restart Metoprolol when able  - Avoid Acei or arbs especially with MARLEY  - Monitor hemodynamics closely, low threshold for pressors if needed     HLD   - Continue home statin     - Monitor and replete lytes, keep K>4, Mg>2.  - All other medical needs as per primary team.  - Other cardiovascular workup will depend on clinical course.  - Will continue to follow.    Laquita Montiel, MS ANP, AGACNP  Nurse Practitioner- Cardiology   Spectra #[phone] 72 yo M PMHx ALS, CAD (s/p MI 1 stent placed 2006), HTN, HLD, BPH admitted for urosepsis with obstructing stone.  He is now s/p stent placement for obstructing renal stone.    Urosepsis   - Pt profoundly septic upon admission with source UTI +/- aspiration PNA  - Tachycardic in setting of sepsis, now improved  - CT Chest/Abomen/Pelvis: Bibasilar aspiration pneumonia. Fluid-filled upper thoracic esophagus may reflect gastroesophageal reflux.  Mild left hydroureteronephrosis secondary to a 7 mm left UVJ calculus. Bilateral nonspecific perirenal stranding. Correlate clinically for infection  - IV antibiotics and fluids per primary team   - remains in sr overnight without ectopy  - BP stable : 116/70 (07-31-21 @ 05:29) (112/57 - 131/67)  - Continue O2 support and needed, wean as tolerated   - Creatinine trend  downtrending :  <--4.90,  <--5.70,  <--5.70  - sig rise in creatinine, hopefully has peaked.   - Monitor volume closely with IVF    CAD  - hx of CAD s/p stent in 2006,   - Continue Plavix 75 mg QD  - routine echo    #HTN  - BP: 128/76 (07-31-21 @ 13:24) (112/57 - 131/67)  - Hx of HTN on metoprolol and Lisinopril at home  - Holding in setting of hypotension, restart Metoprolol when able  - Avoid Acei or arbs especially with MARLEY  - Monitor hemodynamics closely, low threshold for pressors if needed     HLD   - Continue home statin     - Monitor and replete lytes, keep K>4, Mg>2.  - All other medical needs as per primary team.  - Other cardiovascular workup will depend on clinical course.  - Will continue to follow.    Laquita Montiel, MS ANP, AGAP  Nurse Practitioner- Cardiology   Spectra #[phone]

## 2021-07-31 NOTE — PROGRESS NOTE ADULT - PROBLEM SELECTOR PLAN 7
PEG tube placement, on tube feedings at homel placed 2 weeks ago at [facility] due to progressive ALS and nutritional supplementation as per son.   - will hold feedings for now due to aspiration risk  - patient takes Nutrin 1.5 up to 5 times a day, with goal 2000 calories/day (as per son Caesar)   - IV PPI

## 2021-07-31 NOTE — PROGRESS NOTE ADULT - SUBJECTIVE AND OBJECTIVE BOX
Patient is a 71y old  Male who presents with a chief complaint of Sepsis, UTI (30 Jul 2021 15:34)      INTERVAL HPI/OVERNIGHT EVENTS: No new events. Awake, alert, non verbal but nods no to ROS     MEDICATIONS  (STANDING):  atorvastatin 80 milliGRAM(s) Oral at bedtime  chlorhexidine 2% Cloths 1 Application(s) Topical <User Schedule>  clopidogrel Tablet 75 milliGRAM(s) Oral daily  escitalopram 10 milliGRAM(s) Oral daily  heparin   Injectable 5000 Unit(s) SubCutaneous every 8 hours  lactated ringers Bolus 2000 milliLiter(s) IV Bolus once  piperacillin/tazobactam IVPB..      piperacillin/tazobactam IVPB.. 3.375 Gram(s) IV Intermittent every 12 hours  sodium chloride 0.9% Bolus 1000 milliLiter(s) IV Bolus once    MEDICATIONS  (PRN):  acetaminophen   Tablet .. 650 milliGRAM(s) Oral every 6 hours PRN Temp greater or equal to 38.5C (101.3F), Mild Pain (1 - 3)  aluminum hydroxide/magnesium hydroxide/simethicone Suspension 30 milliLiter(s) Oral every 4 hours PRN Dyspepsia  melatonin 3 milliGRAM(s) Oral at bedtime PRN Insomnia  ondansetron Injectable 4 milliGRAM(s) IV Push every 8 hours PRN Nausea and/or Vomiting      Allergies    fish (Short breath; Anaphylaxis)  No Known Drug Allergies    Intolerances        REVIEW OF SYSTEMS:  All 10 systems reviewed with patient, non verbal, wont answer but nods no to all questions   Vital Signs Last 24 Hrs  T(C): 36.6 (31 Jul 2021 05:29), Max: 36.7 (30 Jul 2021 11:55)  T(F): 97.8 (31 Jul 2021 05:29), Max: 98.1 (30 Jul 2021 11:55)  HR: 81 (31 Jul 2021 05:29) (71 - 82)  BP: 116/70 (31 Jul 2021 05:29) (106/58 - 131/67)  BP(mean): 89 (30 Jul 2021 19:00) (77 - 92)  RR: 18 (31 Jul 2021 05:29) (18 - 33)  SpO2: 92% (31 Jul 2021 05:29) (92% - 100%)    PHYSICAL EXAM:  GENERAL: NAD, Ill appearing  HEAD:  Atraumatic, Normocephalic  EYES: EOMI, PERRLA, conjunctiva and sclera clear  ENMT: No tonsillar erythema, exudates, or enlargement; Moist mucous membranes  NECK: Supple, No JVD, Normal thyroid  NERVOUS SYSTEM:  Alert & Awake, CN 2-12 grossly intact  CHEST/LUNG: Clear to auscultation bilaterally; No rales, rhonchi, wheezing, or rubs  HEART: S1S2+, Regular rate and rhythm  ABDOMEN: Soft, Nontender, Nondistended; Bowel sounds present  EXTREMITIES:  2+ Peripheral Pulses, No clubbing, cyanosis  LYMPH: No lymphadenopathy noted  SKIN: No rashes, warm, dry     LABS:                        13.6   10.60 )-----------( 126      ( 31 Jul 2021 08:14 )             42.5     31 Jul 2021 08:14    150    |  115    |  127    ----------------------------<  186    3.6     |  27     |  4.90     Ca    9.7        31 Jul 2021 08:14  Phos  3.4       31 Jul 2021 08:14  Mg     2.9       31 Jul 2021 08:14    TPro  6.3    /  Alb  1.7    /  TBili  0.9    /  DBili  x      /  AST  82     /  ALT  49     /  AlkPhos  249    31 Jul 2021 08:14    PT/INR - ( 29 Jul 2021 13:35 )   PT: 11.7 sec;   INR: 1.00 ratio         PTT - ( 29 Jul 2021 13:35 )  PTT:28.4 sec  CAPILLARY BLOOD GLUCOSE        BLOOD CULTURE  07-29 @ 18:10   Growth in anaerobic bottle: Gram Negative Rods  ***Blood Panel PCR results on this specimen are available  approximately 3 hours after the Gram stain result.***  Gram stain, PCR, and/or culture results may not always  correspond due to difference in methodologies.  ************************************************************  This PCR assay was performed by multiplex PCR. This  Assay tests for 66 bacterial and resistance gene targets.  Please refer to the [facility] test directory  at [email or web address] for details.  --  Blood Culture PCR    RADIOLOGY & ADDITIONAL TESTS:    Imaging Personally Reviewed:  [ ] YES     Consultant(s) Notes Reviewed:      Care Discussed with Consultants/Other Providers:

## 2021-08-01 DIAGNOSIS — R33.9 RETENTION OF URINE, UNSPECIFIED: ICD-10-CM

## 2021-08-01 LAB
-  AMIKACIN: SIGNIFICANT CHANGE UP
-  AMIKACIN: SIGNIFICANT CHANGE UP
-  AMOXICILLIN/CLAVULANIC ACID: SIGNIFICANT CHANGE UP
-  AMPICILLIN/SULBACTAM: SIGNIFICANT CHANGE UP
-  AMPICILLIN/SULBACTAM: SIGNIFICANT CHANGE UP
-  AMPICILLIN: SIGNIFICANT CHANGE UP
-  AMPICILLIN: SIGNIFICANT CHANGE UP
-  AZTREONAM: SIGNIFICANT CHANGE UP
-  AZTREONAM: SIGNIFICANT CHANGE UP
-  CEFAZOLIN: SIGNIFICANT CHANGE UP
-  CEFAZOLIN: SIGNIFICANT CHANGE UP
-  CEFEPIME: SIGNIFICANT CHANGE UP
-  CEFEPIME: SIGNIFICANT CHANGE UP
-  CEFOXITIN: SIGNIFICANT CHANGE UP
-  CEFOXITIN: SIGNIFICANT CHANGE UP
-  CEFTRIAXONE: SIGNIFICANT CHANGE UP
-  CEFTRIAXONE: SIGNIFICANT CHANGE UP
-  CIPROFLOXACIN: SIGNIFICANT CHANGE UP
-  CIPROFLOXACIN: SIGNIFICANT CHANGE UP
-  ERTAPENEM: SIGNIFICANT CHANGE UP
-  ERTAPENEM: SIGNIFICANT CHANGE UP
-  GENTAMICIN: SIGNIFICANT CHANGE UP
-  GENTAMICIN: SIGNIFICANT CHANGE UP
-  IMIPENEM: SIGNIFICANT CHANGE UP
-  IMIPENEM: SIGNIFICANT CHANGE UP
-  LEVOFLOXACIN: SIGNIFICANT CHANGE UP
-  LEVOFLOXACIN: SIGNIFICANT CHANGE UP
-  MEROPENEM: SIGNIFICANT CHANGE UP
-  MEROPENEM: SIGNIFICANT CHANGE UP
-  PIPERACILLIN/TAZOBACTAM: SIGNIFICANT CHANGE UP
-  PIPERACILLIN/TAZOBACTAM: SIGNIFICANT CHANGE UP
-  TIGECYCLINE: SIGNIFICANT CHANGE UP
-  TOBRAMYCIN: SIGNIFICANT CHANGE UP
-  TOBRAMYCIN: SIGNIFICANT CHANGE UP
-  TRIMETHOPRIM/SULFAMETHOXAZOLE: SIGNIFICANT CHANGE UP
-  TRIMETHOPRIM/SULFAMETHOXAZOLE: SIGNIFICANT CHANGE UP
ALBUMIN SERPL ELPH-MCNC: 1.6 G/DL — LOW (ref 3.3–5)
ALP SERPL-CCNC: 260 U/L — HIGH (ref 40–120)
ALT FLD-CCNC: 62 U/L — SIGNIFICANT CHANGE UP (ref 12–78)
ANION GAP SERPL CALC-SCNC: 8 MMOL/L — SIGNIFICANT CHANGE UP (ref 5–17)
AST SERPL-CCNC: 97 U/L — HIGH (ref 15–37)
BASOPHILS # BLD AUTO: 0.01 K/UL — SIGNIFICANT CHANGE UP (ref 0–0.2)
BASOPHILS NFR BLD AUTO: 0.1 % — SIGNIFICANT CHANGE UP (ref 0–2)
BILIRUB SERPL-MCNC: 1 MG/DL — SIGNIFICANT CHANGE UP (ref 0.2–1.2)
BUN SERPL-MCNC: 109 MG/DL — HIGH (ref 7–23)
CALCIUM SERPL-MCNC: 9.3 MG/DL — SIGNIFICANT CHANGE UP (ref 8.4–10.5)
CALCIUM SERPL-MCNC: 9.6 MG/DL — SIGNIFICANT CHANGE UP (ref 8.5–10.1)
CHLORIDE SERPL-SCNC: 118 MMOL/L — HIGH (ref 96–108)
CO2 SERPL-SCNC: 29 MMOL/L — SIGNIFICANT CHANGE UP (ref 22–31)
CREAT SERPL-MCNC: 3.5 MG/DL — HIGH (ref 0.5–1.3)
EOSINOPHIL # BLD AUTO: 0.05 K/UL — SIGNIFICANT CHANGE UP (ref 0–0.5)
EOSINOPHIL NFR BLD AUTO: 0.7 % — SIGNIFICANT CHANGE UP (ref 0–6)
GLUCOSE SERPL-MCNC: 199 MG/DL — HIGH (ref 70–99)
GRAM STN FLD: SIGNIFICANT CHANGE UP
HCT VFR BLD CALC: 39.2 % — SIGNIFICANT CHANGE UP (ref 39–50)
HGB BLD-MCNC: 12.9 G/DL — LOW (ref 13–17)
IMM GRANULOCYTES NFR BLD AUTO: 0.7 % — SIGNIFICANT CHANGE UP (ref 0–1.5)
LYMPHOCYTES # BLD AUTO: 0.41 K/UL — LOW (ref 1–3.3)
LYMPHOCYTES # BLD AUTO: 5.4 % — LOW (ref 13–44)
MAGNESIUM SERPL-MCNC: 2.6 MG/DL — SIGNIFICANT CHANGE UP (ref 1.6–2.6)
MCHC RBC-ENTMCNC: 30.9 PG — SIGNIFICANT CHANGE UP (ref 27–34)
MCHC RBC-ENTMCNC: 32.9 GM/DL — SIGNIFICANT CHANGE UP (ref 32–36)
MCV RBC AUTO: 93.8 FL — SIGNIFICANT CHANGE UP (ref 80–100)
METHOD TYPE: SIGNIFICANT CHANGE UP
METHOD TYPE: SIGNIFICANT CHANGE UP
MONOCYTES # BLD AUTO: 0.52 K/UL — SIGNIFICANT CHANGE UP (ref 0–0.9)
MONOCYTES NFR BLD AUTO: 6.8 % — SIGNIFICANT CHANGE UP (ref 2–14)
NEUTROPHILS # BLD AUTO: 6.6 K/UL — SIGNIFICANT CHANGE UP (ref 1.8–7.4)
NEUTROPHILS NFR BLD AUTO: 86.3 % — HIGH (ref 43–77)
NRBC # BLD: 0 /100 WBCS — SIGNIFICANT CHANGE UP (ref 0–0)
PLATELET # BLD AUTO: 108 K/UL — LOW (ref 150–400)
POTASSIUM SERPL-MCNC: 3.6 MMOL/L — SIGNIFICANT CHANGE UP (ref 3.5–5.3)
POTASSIUM SERPL-SCNC: 3.6 MMOL/L — SIGNIFICANT CHANGE UP (ref 3.5–5.3)
PROT SERPL-MCNC: 5.9 G/DL — LOW (ref 6–8.3)
PTH-INTACT FLD-MCNC: 76 PG/ML — HIGH (ref 15–65)
RBC # BLD: 4.18 M/UL — LOW (ref 4.2–5.8)
RBC # FLD: 13.2 % — SIGNIFICANT CHANGE UP (ref 10.3–14.5)
SODIUM SERPL-SCNC: 155 MMOL/L — HIGH (ref 135–145)
WBC # BLD: 7.64 K/UL — SIGNIFICANT CHANGE UP (ref 3.8–10.5)
WBC # FLD AUTO: 7.64 K/UL — SIGNIFICANT CHANGE UP (ref 3.8–10.5)

## 2021-08-01 PROCEDURE — 99232 SBSQ HOSP IP/OBS MODERATE 35: CPT

## 2021-08-01 PROCEDURE — 99233 SBSQ HOSP IP/OBS HIGH 50: CPT

## 2021-08-01 RX ORDER — SODIUM CHLORIDE 9 MG/ML
1000 INJECTION, SOLUTION INTRAVENOUS
Refills: 0 | Status: DISCONTINUED | OUTPATIENT
Start: 2021-08-01 | End: 2021-08-05

## 2021-08-01 RX ORDER — CEFEPIME 1 G/1
1000 INJECTION, POWDER, FOR SOLUTION INTRAMUSCULAR; INTRAVENOUS ONCE
Refills: 0 | Status: COMPLETED | OUTPATIENT
Start: 2021-08-01 | End: 2021-08-01

## 2021-08-01 RX ORDER — METOPROLOL TARTRATE 50 MG
25 TABLET ORAL DAILY
Refills: 0 | Status: DISCONTINUED | OUTPATIENT
Start: 2021-08-01 | End: 2021-08-05

## 2021-08-01 RX ORDER — CEFEPIME 1 G/1
1000 INJECTION, POWDER, FOR SOLUTION INTRAMUSCULAR; INTRAVENOUS EVERY 24 HOURS
Refills: 0 | Status: DISCONTINUED | OUTPATIENT
Start: 2021-08-02 | End: 2021-08-03

## 2021-08-01 RX ORDER — CEFEPIME 1 G/1
INJECTION, POWDER, FOR SOLUTION INTRAMUSCULAR; INTRAVENOUS
Refills: 0 | Status: DISCONTINUED | OUTPATIENT
Start: 2021-08-01 | End: 2021-08-03

## 2021-08-01 RX ADMIN — SODIUM CHLORIDE 100 MILLILITER(S): 9 INJECTION, SOLUTION INTRAVENOUS at 21:18

## 2021-08-01 RX ADMIN — SODIUM CHLORIDE 75 MILLILITER(S): 9 INJECTION, SOLUTION INTRAVENOUS at 10:17

## 2021-08-01 RX ADMIN — ESCITALOPRAM OXALATE 10 MILLIGRAM(S): 10 TABLET, FILM COATED ORAL at 11:06

## 2021-08-01 RX ADMIN — CHLORHEXIDINE GLUCONATE 1 APPLICATION(S): 213 SOLUTION TOPICAL at 05:07

## 2021-08-01 RX ADMIN — CEFEPIME 100 MILLIGRAM(S): 1 INJECTION, POWDER, FOR SOLUTION INTRAMUSCULAR; INTRAVENOUS at 12:06

## 2021-08-01 RX ADMIN — PIPERACILLIN AND TAZOBACTAM 25 GRAM(S): 4; .5 INJECTION, POWDER, LYOPHILIZED, FOR SOLUTION INTRAVENOUS at 05:07

## 2021-08-01 RX ADMIN — HEPARIN SODIUM 5000 UNIT(S): 5000 INJECTION INTRAVENOUS; SUBCUTANEOUS at 14:35

## 2021-08-01 RX ADMIN — SODIUM CHLORIDE 100 MILLILITER(S): 9 INJECTION, SOLUTION INTRAVENOUS at 14:50

## 2021-08-01 RX ADMIN — CLOPIDOGREL BISULFATE 75 MILLIGRAM(S): 75 TABLET, FILM COATED ORAL at 11:06

## 2021-08-01 RX ADMIN — HEPARIN SODIUM 5000 UNIT(S): 5000 INJECTION INTRAVENOUS; SUBCUTANEOUS at 05:07

## 2021-08-01 RX ADMIN — ATORVASTATIN CALCIUM 80 MILLIGRAM(S): 80 TABLET, FILM COATED ORAL at 21:18

## 2021-08-01 RX ADMIN — HEPARIN SODIUM 5000 UNIT(S): 5000 INJECTION INTRAVENOUS; SUBCUTANEOUS at 21:18

## 2021-08-01 NOTE — PROGRESS NOTE ADULT - ASSESSMENT
72 yo M PMHx ALS, CAD (s/p MI 1 stent placed 2006 on DAPT), HTN, HLD, BPH presented with AMS and shortness of breath, admitted for  sepsis secondary to aspiration pneumonia, UTI/pyelonephritis,  Renal stone with left hydronephrosis s/p stent placement by Urology, MARLEY

## 2021-08-01 NOTE — PROVIDER CONTACT NOTE (CRITICAL VALUE NOTIFICATION) - TEST AND RESULT REPORTED:
Blood Culture - Anaerobic Bottle has positive growth with gram negative rods and gram positive cocci in pairs. Also positive growth in aerobic bottle with gram negative rods
blood culture prelim 7/29 anaerobic gram neg rods and gram pos cocci in pairs
BC from 7/29 growth in anaerobic bottle GNR
+ Blood cultures in anerobic nbwyhfoubu-iywnkvu-Rspvbmaw-ornithinoytica, in aerobic bottle gram + cocci in pairs
lactate 2.7

## 2021-08-01 NOTE — PROGRESS NOTE ADULT - SUBJECTIVE AND OBJECTIVE BOX
INTERVAL HPI/OVERNIGHT EVENTS: awake and alert, cant really speak yet    MEDICATIONS  (STANDING):  atorvastatin 80 milliGRAM(s) Oral at bedtime  chlorhexidine 2% Cloths 1 Application(s) Topical <User Schedule>  clopidogrel Tablet 75 milliGRAM(s) Oral daily  dextrose 5%. 1000 milliLiter(s) (75 mL/Hr) IV Continuous <Continuous>  escitalopram 10 milliGRAM(s) Oral daily  heparin   Injectable 5000 Unit(s) SubCutaneous every 8 hours  piperacillin/tazobactam IVPB..      piperacillin/tazobactam IVPB.. 3.375 Gram(s) IV Intermittent every 12 hours    MEDICATIONS  (PRN):  acetaminophen   Tablet .. 650 milliGRAM(s) Oral every 6 hours PRN Temp greater or equal to 38.5C (101.3F), Mild Pain (1 - 3)  melatonin 3 milliGRAM(s) Oral at bedtime PRN Insomnia  ondansetron Injectable 4 milliGRAM(s) IV Push every 8 hours PRN Nausea and/or Vomiting        Vital Signs Last 24 Hrs  T(C): 36.7 (01 Aug 2021 06:00), Max: 37.1 (31 Jul 2021 19:25)  T(F): 98 (01 Aug 2021 06:00), Max: 98.8 (31 Jul 2021 19:25)  HR: 71 (01 Aug 2021 06:00) (71 - 100)  BP: 141/78 (01 Aug 2021 06:00) (128/76 - 141/78)  BP(mean): --  RR: 19 (01 Aug 2021 06:00) (19 - 24)  SpO2: 93% (01 Aug 2021 06:00) (92% - 93%)    PHYSICAL EXAM:    ABDOMEN: soft, NT  GENITALIA: urine clear via jo    LABS:                        12.9   7.64  )-----------( 108      ( 01 Aug 2021 07:40 )             39.2     08-01    155<H>  |  118<H>  |  109<H>  ----------------------------<  199<H>  3.6   |  29  |  3.50<H>    Ca    9.6      01 Aug 2021 07:40  Phos  3.4     07-31  Mg     2.6     08-01    TPro  5.9<L>  /  Alb  1.6<L>  /  TBili  1.0  /  DBili  x   /  AST  97<H>  /  ALT  62  /  AlkPhos  260<H>  08-01        Urine culture:  07-30 @ 00:49 --   Moderate Klebsiella oxytoca/Raoutella ornithinolytica  Urine culture:  07-29 @ 18:10 --   Growth in anaerobic bottle: Klebsiella oxytoca/Raoutella ornithinolytica  Growth in aerobic bottle: Gram positive cocci in pairs  ***Blood Panel PCR results on this specimen are available  approximately 3 hours after the Gram stain result.***  Gramstain, PCR, and/or culture results may not always  correspond due to difference in methodologies.  ************************************************************  This PCR assay was performed by multiplex PCR. This  Assay tests for 66 bacterial and resistance gene targets.  Please refer to the [facility] test directory  at [email or web address] for details.  Urine culture:  07-29 @ 17:54 --   >=3 organisms. Probable collection contamination.      RADIOLOGY & ADDITIONAL TESTS:

## 2021-08-01 NOTE — PROGRESS NOTE ADULT - PROBLEM SELECTOR PLAN 7
PEG tube placement, on tube feedings at homel placed 2 weeks ago at [facility] due to progressive ALS and nutritional supplementation as per son.   - will hold feedings for now due to aspiration risk  - patient takes Nutrin 1.5 up to 5 times a day, with goal 2000 calories/day (as per son Caesar)   - IV PPI  -Nutrition and S&s consult as patient was some oral feeds at home but rafita has aspiration pneumonia so will be careful PEG tube placement, on tube feedings at homel placed 2 weeks ago at [facility] due to progressive ALS and nutritional supplementation as per son.   - will hold feedings for now due to aspiration risk  - patient takes Nutrin 1.5 up to 5 times a day, with goal 2000 calories/day (as per son Caesar)   - IV PPI  -Nutrition and S&s consult as patient was some oral feeds at home but now has aspiration pneumonia so will be careful  -Daily oral care/ hygiene

## 2021-08-01 NOTE — PROGRESS NOTE ADULT - SUBJECTIVE AND OBJECTIVE BOX
[facility] -- Santi Nair, Shara Gant, Chris Peace Savella, Goodger: Office # [phone]    Follow Up:  Urosepsis Hx of CAD    Subjective/Observations: Patient seen and examined. Patient awake, alert, shaking head yes and no, following commands. Denies chest pain, SOB or palpitations. Tolerating O2 via nasal cannula.      REVIEW OF SYSTEMS: All review of systems is negative for eye, ENT, GI, , allergic, dermatologic, musculoskeletal and neurologic except as described above    PAST MEDICAL & SURGICAL HISTORY:  Myocardial infarct    Hypertension    Hyperlipidemia    BPH (benign prostatic hyperplasia)    ALS (amyotrophic lateral sclerosis)    S/P tonsillectomy    S/P coronary artery stent placement    History of hip surgery    History of hernia repair  &gt; 20 years ago    H/O shoulder surgery        MEDICATIONS  (STANDING):  atorvastatin 80 milliGRAM(s) Oral at bedtime  cefepime   IVPB      chlorhexidine 2% Cloths 1 Application(s) Topical <User Schedule>  clopidogrel Tablet 75 milliGRAM(s) Oral daily  dextrose 5%. 1000 milliLiter(s) (100 mL/Hr) IV Continuous <Continuous>  escitalopram 10 milliGRAM(s) Oral daily  heparin   Injectable 5000 Unit(s) SubCutaneous every 8 hours    MEDICATIONS  (PRN):  acetaminophen   Tablet .. 650 milliGRAM(s) Oral every 6 hours PRN Temp greater or equal to 38.5C (101.3F), Mild Pain (1 - 3)  melatonin 3 milliGRAM(s) Oral at bedtime PRN Insomnia  ondansetron Injectable 4 milliGRAM(s) IV Push every 8 hours PRN Nausea and/or Vomiting    Allergies    fish (Short breath; Anaphylaxis)  No Known Drug Allergies    Intolerances      Vital Signs Last 24 Hrs  T(C): 36.7 (01 Aug 2021 13:07), Max: 37.1 (31 Jul 2021 19:25)  T(F): 98.1 (01 Aug 2021 13:07), Max: 98.8 (31 Jul 2021 19:25)  HR: 77 (01 Aug 2021 13:07) (71 - 100)  BP: 139/76 (01 Aug 2021 13:07) (136/77 - 141/78)  BP(mean): --  RR: 20 (01 Aug 2021 13:07) (19 - 20)  SpO2: 95% (01 Aug 2021 13:07) (93% - 95%)  I&O's Summary    31 Jul 2021 07:01  -  01 Aug 2021 07:00  --------------------------------------------------------  IN: 1630 mL / OUT: 2900 mL / NET: -1270 mL    01 Aug 2021 07:01  -  01 Aug 2021 14:55  --------------------------------------------------------  IN: 0 mL / OUT: 1100 mL / NET: -1100 mL          TELE: Not on telemetry   PHYSICAL EXAM:  Appearance: NAD, no distress, alert, Well developed   HEENT: Moist Mucous Membranes, Anicteric  Cardiovascular: Regular rate and rhythm, Normal S1 S2, No JVD, No murmurs, No rubs, gallops or clicks  Respiratory: Non-labored, Clear to auscultation, No rales, No rhonchi, No wheezing.   Gastrointestinal:  Soft, Non-tender, + BS PEG  Skin: Warm and dry, No visible rashes or ulcers, No ecchymosis, No cyanosis  Musculoskeletal: No clubbing, No cyanosis, No joint swelling/tenderness  Lymph: No peripheral edema.     LABS: All Labs Reviewed:                        12.9   7.64  )-----------( 108      ( 01 Aug 2021 07:40 )             39.2                         13.6   10.60 )-----------( 126      ( 31 Jul 2021 08:14 )             42.5                         12.3   20.44 )-----------( 119      ( 30 Jul 2021 05:33 )             37.6     01 Aug 2021 07:40    155    |  118    |  109    ----------------------------<  199    3.6     |  29     |  3.50   31 Jul 2021 08:14    150    |  115    |  127    ----------------------------<  186    3.6     |  27     |  4.90   30 Jul 2021 05:33    148    |  113    |  120    ----------------------------<  142    4.0     |  25     |  5.70     Ca    9.6        01 Aug 2021 07:40  Ca    9.7        31 Jul 2021 08:14  Ca    9.2        30 Jul 2021 05:33  Phos  3.4       31 Jul 2021 08:14  Phos  3.3       30 Jul 2021 05:33  Mg     2.6       01 Aug 2021 07:40  Mg     2.9       31 Jul 2021 08:14  Mg     2.6       30 Jul 2021 05:33    TPro  5.9    /  Alb  1.6    /  TBili  1.0    /  DBili  x      /  AST  97     /  ALT  62     /  AlkPhos  260    01 Aug 2021 07:40  TPro  6.3    /  Alb  1.7    /  TBili  0.9    /  DBili  x      /  AST  82     /  ALT  49     /  AlkPhos  249    31 Jul 2021 08:14  TPro  5.8    /  Alb  1.6    /  TBili  1.2    /  DBili  x      /  AST  63     /  ALT  45     /  AlkPhos  215    30 Jul 2021 05:33      Lactate, Blood: 1.4 mmol/L (07-30-21 @ 05:33)  Lactate, Blood: 2.1 mmol/L (07-29-21 @ 17:53)    12 Lead ECG:   Ventricular Rate 76 BPM  Atrial Rate 76 BPM  P-R Interval 136 ms  QRS Duration 80 ms  Q-T Interval 362 ms  QTC Calculation(Bazett) 407 ms  P Axis 39 degrees  R Axis -27 degrees  T Axis 21 degrees  Diagnosis Line Normal sinus rhythm  Inferior infarct , age undetermined  Possible Anterior infarct (cited on or before 29-JUL-2021)  Abnormal ECG  Confirmed by sonya Ramirez ([id number]) on 7/30/2021 4:21:24 PM (07-30-21 @ 08:01)    < from: Xray Chest 1 View- PORTABLE-Urgent (Xray Chest 1 View- PORTABLE-Urgent .) (07.30.21 @ 19:25) >  EXAM:  XR CHEST PORTABLE URGENT 1V                        PROCEDURE DATE:  07/30/2021    INTERPRETATION:  Portable chest radiograph  CLINICAL INFORMATION: Tachypnea.  TECHNIQUE:  Portable  AP view of the chest was obtained.  COMPARISON: 11/9/2020 chest available for review.  FINDINGS:  The lungs show RIGHT perihilar linear airspace disease. Remaining visualized lung grossly clear. No pneumothorax.  The heart and mediastinum size and configuration are within normal limits.  Visualized osseous structures are intact.  IMPRESSION:   RIGHT perihilar linear airspace disease..  --- End ofReport ---  < end of copied text >    < from: CT Chest No Cont (07.29.21 @ 15:12) >    IMPRESSION:  Bibasilar aspiration pneumonia.  Fluid-filled upper thoracic esophagus may reflect gastroesophageal reflux.  Mild left hydroureteronephrosis secondary to a 7 mm left UVJ calculus.  Bilateral nonspecific perirenal stranding. Correlate clinically for infection  Prostatomegaly.  Additional findings as discussed  --- End of Report ---  < end of copied text >

## 2021-08-01 NOTE — PROGRESS NOTE ADULT - TIME BILLING
Patient seen and examined at bedside. Chart, meds, labs, vitals reviewed. Prognosis is guarded
Patient seen and examined at bedside. Chart, meds, labs, vitals reviewed. Prognosis is guarded
Patient seen and examined at bedside. Chart, meds, labs, vitals reviewed. D/w ISAIAH Verduzco at bed side.

## 2021-08-01 NOTE — PROGRESS NOTE ADULT - SUBJECTIVE AND OBJECTIVE BOX
Patient is a 71y old  Male who presents with a chief complaint of Sepsis, UTI (31 Jul 2021 18:52)       INTERVAL HPI/OVERNIGHT EVENTS: Patient seen and examined at bedside. Non verbal. No new events     MEDICATIONS  (STANDING):  atorvastatin 80 milliGRAM(s) Oral at bedtime  chlorhexidine 2% Cloths 1 Application(s) Topical <User Schedule>  clopidogrel Tablet 75 milliGRAM(s) Oral daily  dextrose 5%. 1000 milliLiter(s) (75 mL/Hr) IV Continuous <Continuous>  escitalopram 10 milliGRAM(s) Oral daily  heparin   Injectable 5000 Unit(s) SubCutaneous every 8 hours  piperacillin/tazobactam IVPB..      piperacillin/tazobactam IVPB.. 3.375 Gram(s) IV Intermittent every 12 hours    MEDICATIONS  (PRN):  acetaminophen   Tablet .. 650 milliGRAM(s) Oral every 6 hours PRN Temp greater or equal to 38.5C (101.3F), Mild Pain (1 - 3)  melatonin 3 milliGRAM(s) Oral at bedtime PRN Insomnia  ondansetron Injectable 4 milliGRAM(s) IV Push every 8 hours PRN Nausea and/or Vomiting      Allergies    fish (Short breath; Anaphylaxis)  No Known Drug Allergies    Intolerances        REVIEW OF SYSTEMS: Unable to obtain, non verbal but nods no to all ROS     Vital Signs Last 24 Hrs  T(C): 36.7 (01 Aug 2021 06:00), Max: 37.1 (31 Jul 2021 19:25)  T(F): 98 (01 Aug 2021 06:00), Max: 98.8 (31 Jul 2021 19:25)  HR: 71 (01 Aug 2021 06:00) (71 - 100)  BP: 141/78 (01 Aug 2021 06:00) (128/76 - 141/78)  BP(mean): --  RR: 19 (01 Aug 2021 06:00) (19 - 24)  SpO2: 93% (01 Aug 2021 06:00) (92% - 93%)    PHYSICAL EXAM:  GENERAL: NAD, Ill appearing  HEAD:  Atraumatic, Normocephalic  EYES: EOMI, PERRLA, conjunctiva and sclera clear  ENMT: No tonsillar erythema, exudates, or enlargement; Moist mucous membranes  NECK: Supple, No JVD, Normal thyroid  NERVOUS SYSTEM:  Alert & Awake, CN 2-12 grossly intact  CHEST/LUNG: Clear to auscultation bilaterally; No rales, rhonchi, wheezing, or rubs  HEART: S1S2+, Regular rate and rhythm  ABDOMEN: Soft, Nontender, Nondistended; Bowel sounds present  EXTREMITIES:  2+ Peripheral Pulses, No clubbing, cyanosis  LYMPH: No lymphadenopathy noted  SKIN: No rashes, warm, dry     LABS:                        12.9   7.64  )-----------( 108      ( 01 Aug 2021 07:40 )             39.2     01 Aug 2021 07:40    155    |  118    |  109    ----------------------------<  199    3.6     |  29     |  3.50     Ca    9.6        01 Aug 2021 07:40  Mg     2.6       01 Aug 2021 07:40    TPro  5.9    /  Alb  1.6    /  TBili  1.0    /  DBili  x      /  AST  97     /  ALT  62     /  AlkPhos  260    01 Aug 2021 07:40      CAPILLARY BLOOD GLUCOSE        BLOOD CULTURE  07-30 @ 00:49   Moderate Klebsiella oxytoca/Raoutella ornithinolytica  --  Klebsiella oxytoca /Raoutella ornithinolytica  07-29 @ 18:10   Growth in anaerobic bottle: Klebsiella oxytoca/Raoutella ornithinolytica  Growth in aerobic bottle: Gram positive cocci in pairs  ***Blood Panel PCR results on this specimen are available  approximately 3 hours after the Gram stain result.***  Gramstain, PCR, and/or culture results may not always  correspond due to difference in methodologies.  ************************************************************  This PCR assay was performed by multiplex PCR. This  Assay tests for 66 bacterial and resistance gene targets.  Please refer to the [facility] Labs test directory  at [email or web address] for details.  --  Blood Culture PCR  07-29 @ 17:54   >=3 organisms. Probable collection contamination.  --  --    RADIOLOGY & ADDITIONAL TESTS:    Imaging Personally Reviewed:  [ ] YES     Consultant(s) Notes Reviewed:      Care Discussed with Consultants/Other Providers: Patient is a 71y old  Male who presents with a chief complaint of Sepsis, UTI (31 Jul 2021 18:52)       INTERVAL HPI/OVERNIGHT EVENTS: Patient seen and examined at bedside. Non verbal. No new events     MEDICATIONS  (STANDING):  atorvastatin 80 milliGRAM(s) Oral at bedtime  chlorhexidine 2% Cloths 1 Application(s) Topical <User Schedule>  clopidogrel Tablet 75 milliGRAM(s) Oral daily  dextrose 5%. 1000 milliLiter(s) (75 mL/Hr) IV Continuous <Continuous>  escitalopram 10 milliGRAM(s) Oral daily  heparin   Injectable 5000 Unit(s) SubCutaneous every 8 hours  piperacillin/tazobactam IVPB..      piperacillin/tazobactam IVPB.. 3.375 Gram(s) IV Intermittent every 12 hours    MEDICATIONS  (PRN):  acetaminophen   Tablet .. 650 milliGRAM(s) Oral every 6 hours PRN Temp greater or equal to 38.5C (101.3F), Mild Pain (1 - 3)  melatonin 3 milliGRAM(s) Oral at bedtime PRN Insomnia  ondansetron Injectable 4 milliGRAM(s) IV Push every 8 hours PRN Nausea and/or Vomiting      Allergies    fish (Short breath; Anaphylaxis)  No Known Drug Allergies    Intolerances        REVIEW OF SYSTEMS: Unable to obtain, non verbal but nods no to all ROS     Vital Signs Last 24 Hrs  T(C): 36.7 (01 Aug 2021 06:00), Max: 37.1 (31 Jul 2021 19:25)  T(F): 98 (01 Aug 2021 06:00), Max: 98.8 (31 Jul 2021 19:25)  HR: 71 (01 Aug 2021 06:00) (71 - 100)  BP: 141/78 (01 Aug 2021 06:00) (128/76 - 141/78)  BP(mean): --  RR: 19 (01 Aug 2021 06:00) (19 - 24)  SpO2: 93% (01 Aug 2021 06:00) (92% - 93%)    PHYSICAL EXAM:  GENERAL: NAD, Ill appearing  HEAD:  Atraumatic, Normocephalic  EYES: EOMI, PERRLA, conjunctiva and sclera clear  ENMT: No tonsillar erythema, exudates, or enlargement  NECK: Supple, No JVD, Normal thyroid  NERVOUS SYSTEM:  Alert & Awake, CN 2-12 grossly intact  CHEST/LUNG: Clear to auscultation bilaterally; No rales, rhonchi, wheezing, or rubs  HEART: S1S2+, Regular rate and rhythm  ABDOMEN: Soft, Nontender, Nondistended; Bowel sounds present  EXTREMITIES:  2+ Peripheral Pulses, No clubbing, cyanosis  LYMPH: No lymphadenopathy noted  SKIN: No rashes, warm, dry     LABS:                        12.9   7.64  )-----------( 108      ( 01 Aug 2021 07:40 )             39.2     01 Aug 2021 07:40    155    |  118    |  109    ----------------------------<  199    3.6     |  29     |  3.50     Ca    9.6        01 Aug 2021 07:40  Mg     2.6       01 Aug 2021 07:40    TPro  5.9    /  Alb  1.6    /  TBili  1.0    /  DBili  x      /  AST  97     /  ALT  62     /  AlkPhos  260    01 Aug 2021 07:40      CAPILLARY BLOOD GLUCOSE        BLOOD CULTURE  07-30 @ 00:49   Moderate Klebsiella oxytoca/Raoutella ornithinolytica  --  Klebsiella oxytoca /Raoutella ornithinolytica  07-29 @ 18:10   Growth in anaerobic bottle: Klebsiella oxytoca/Raoutella ornithinolytica  Growth in aerobic bottle: Gram positive cocci in pairs  ***Blood Panel PCR results on this specimen are available  approximately 3 hours after the Gram stain result.***  Gramstain, PCR, and/or culture results may not always  correspond due to difference in methodologies.  ************************************************************  This PCR assay was performed by multiplex PCR. This  Assay tests for 66 bacterial and resistance gene targets.  Please refer to the [facility] Labs test directory  at [email or web address] for details.  --  Blood Culture PCR  07-29 @ 17:54   >=3 organisms. Probable collection contamination.  --  --    RADIOLOGY & ADDITIONAL TESTS:    Imaging Personally Reviewed:  [ ] YES     Consultant(s) Notes Reviewed:      Care Discussed with Consultants/Other Providers:

## 2021-08-01 NOTE — PROGRESS NOTE ADULT - SUBJECTIVE AND OBJECTIVE BOX
Resting    Vital Signs Last 24 Hrs  T(C): 36.7 (08-01-21 @ 13:07), Max: 36.8 (07-31-21 @ 21:38)  T(F): 98.1 (08-01-21 @ 13:07), Max: 98.3 (07-31-21 @ 21:38)  HR: 77 (08-01-21 @ 13:07) (71 - 77)  BP: 139/76 (08-01-21 @ 13:07) (139/76 - 141/78)  RR: 20 (08-01-21 @ 13:07) (19 - 20)  SpO2: 95% (08-01-21 @ 13:07) (93% - 95%)    I&O's Detail    31 Jul 2021 07:01  -  01 Aug 2021 07:00  --------------------------------------------------------  IN:    Free Water: 750 mL    IV PiggyBack: 100 mL    Osmolite: 780 mL  Total IN: 1630 mL    OUT:    Indwelling Catheter - Urethral (mL): 2900 mL  Total OUT: 2900 mL    Total NET: -1270 mL    01 Aug 2021 07:01  -  01 Aug 2021 20:14  --------------------------------------------------------  OUT:    Indwelling Catheter - Urethral (mL): 1100 mL  Total OUT: 1100 mL    Respiratory: b/l air entry  Cardiovascular: S1 S2  Gastrointestinal: soft, ND  Extremities:  no edema                                 12.9   7.64  )-----------( 108      ( 01 Aug 2021 07:40 )             39.2     01 Aug 2021 07:40    155    |  118    |  109    ----------------------------<  199    3.6     |  29     |  3.50     Ca    9.6        01 Aug 2021 07:40  Phos  3.4       31 Jul 2021 08:14  Mg     2.6       01 Aug 2021 07:40    TPro  5.9    /  Alb  1.6    /  TBili  1.0    /  DBili  x      /  AST  97     /  ALT  62     /  AlkPhos  260    01 Aug 2021 07:40    LIVER FUNCTIONS - ( 01 Aug 2021 07:40 )  Alb: 1.6 g/dL / Pro: 5.9 g/dL / ALK PHOS: 260 U/L / ALT: 62 U/L / AST: 97 U/L / GGT: x           Culture - Blood (collected 31 Jul 2021 12:23)  Source: .Blood Blood-Venous  Preliminary Report (01 Aug 2021 13:01):    No growth to date.    Culture - Blood (collected 31 Jul 2021 12:23)  Source: .Blood Blood  Preliminary Report (01 Aug 2021 13:01):    No growth to date.    Culture - Urine (collected 30 Jul 2021 00:49)  Source: Kidney Kidney  Preliminary Report (01 Aug 2021 14:12):    Moderate Klebsiella oxytoca/Raoutella ornithinolytica    Numerous Streptococcus constellatus "Susceptibilities not performed"    Few Enterobacter cloacae complex Susceptibility to follow.  Organism: Klebsiella oxytoca /Raoutella ornithinolytica (01 Aug 2021 08:00)  Organism: Klebsiella oxytoca /Raoutella ornithinolytica (01 Aug 2021 08:00)    acetaminophen   Tablet .. 650 milliGRAM(s) Oral every 6 hours PRN  atorvastatin 80 milliGRAM(s) Oral at bedtime  cefepime   IVPB      chlorhexidine 2% Cloths 1 Application(s) Topical <User Schedule>  clopidogrel Tablet 75 milliGRAM(s) Oral daily  dextrose 5%. 1000 milliLiter(s) IV Continuous <Continuous>  escitalopram 10 milliGRAM(s) Oral daily  heparin   Injectable 5000 Unit(s) SubCutaneous every 8 hours  melatonin 3 milliGRAM(s) Oral at bedtime PRN  metoprolol succinate ER 25 milliGRAM(s) Oral daily  ondansetron Injectable 4 milliGRAM(s) IV Push every 8 hours PRN    Home Medications:  Colace 100 mg oral capsule: 1 cap(s) orally once a day, As Needed (30 Jul 2021 09:10)  Crestor 40 mg oral tablet: 1 tab(s) orally once a day (30 Jul 2021 09:10)  fenofibrate 160 mg oral tablet: 1 tab(s) orally once a day (30 Jul 2021 09:10)  Lexapro 10 mg oral tablet: 1 tab(s) orally once a day (30 Jul 2021 09:10)  lisinopril 2.5 mg oral tablet: 1 tab(s) orally once a day (30 Jul 2021 09:10)  metoprolol tartrate 50 mg oral tablet: 1 tab(s) orally once a day (30 Jul 2021 09:10)  Plavix 75 mg oral tablet: 1 tab(s) orally once a day    Assessment and Plan:   	  Obstructive MARLEY exacerbated by ACE as op  Left UVJ calculus  Kleb urosepsis  S/p ureteral stent placement  Cr is improving  Abx per ID  Avoid ACE/ARB  Avoid nephrotoxins  Shukla, I/Os  Free water deficit ~ 4L  D5W at 100cc/hr  F/u BMP, Mg    [phone]

## 2021-08-01 NOTE — PROGRESS NOTE ADULT - SUBJECTIVE AND OBJECTIVE BOX
Neurology Follow up note(COVERING)    SHARITA [id number]    HPI:  70 yo M PMHx ALS, CAD (s/p MI 1 stent placed 2006 on DAPT), HTN, HLD, BPH presenting with diaphoresis, shortness of breath. History taken by Shadia (wife) who lives with him. She states that fell coming out of the bathroom 2 days ago, fell on his buttocks, no head injury. Since then he has felt generalized weakness, and profuse diaphoresis. Prior to this he was in his usual state of health.   Patient has a history of ALS previously on riluzole which was stopped due to intolerance. Patient had a PEG tube and began to straight cath several months ago due to progressive ALS. He uses a walker and cane at home with no difficulty until 2 days ago.     As per the spouse, his urine looked cloudy, dark yellow with odor. Denies hematuria.     Of note, patient last admitted to [place] 11/9 --> 11/12/2020 for urinary retention, jo placed, likely cause BPH and noncompliance with flomax.     ED Course:   Vital Signs: /69  T 98 F SpO2 70% on RA --> 90% on NRB   EKG: normal sinus rhythm, no acute ST or T wave changes   Labs:   White Count: 29.47, Lactate 2.7, BUN/Cr: 113/5.70 Alk Phos 223   Imaging:   CXR: Scattered and lower lung field interstitial infiltrates are presently seen anterior greater on the right at this time. These are new since November 9, 2020.  IMPRESSION: Bilateral infiltrates as above.    CT Chest/Abomen/Pelvis: Bibasilar aspiration pneumonia. Fluid-filled upper thoracic esophagus may reflect gastroesophageal reflux.  Mild left hydroureteronephrosis secondary to a 7 mm left UVJ calculus. Bilateral nonspecific perirenal stranding. Correlate clinically for infection  Prostatomegaly.    Given:   2 L NS Bolus, Rocephin X 1   Jo Placed in ED  (29 Jul 2021 15:13)      Interval History -no new events    Patient is seen, chart was reviewed and case was discussed with the treatment team.  Pt is not in any distress.   Lying on bed comfortably.       Vital Signs Last 24 Hrs  T(C): 36.7 (01 Aug 2021 06:00), Max: 37.1 (31 Jul 2021 19:25)  T(F): 98 (01 Aug 2021 06:00), Max: 98.8 (31 Jul 2021 19:25)  HR: 71 (01 Aug 2021 06:00) (71 - 100)  BP: 141/78 (01 Aug 2021 06:00) (128/76 - 141/78)  BP(mean): --  RR: 19 (01 Aug 2021 06:00) (19 - 24)  SpO2: 93% (01 Aug 2021 06:00) (92% - 93%)        REVIEW OF SYSTEMS:    Constitutional: No fever,  Eyes: No eye pain, visual disturbances, or discharge  ENT:  No difficulty hearing, tinnitus, vertigo; No sinus or throat pain  Neck: No pain or stiffness  Respiratory: No , chills or hemoptysis  Cardiovascular: No chest pain, palpitations,   Gastrointestinal: No abdominal or epigastric pain. No nausea, vomiting or hematemesis; No diarrhea or constipati  Genitourinary: No dysuria, frequency, hematuria or incontinence  Neurological: No headaches,   Psychiatric: No depression, anxiety, mood swings or difficulty sleeping  Musculoskeletal: No joint pain or swelling; No muscle, back or extremity pain  Skin: No itching, burning, rashes or lesions   Lymph Nodes: No enlarged glands  Endocrine: No heat or cold intolerance;  Allergy and Immunologic: No hives or eczema    On Neurological Examination:    Mental Status - Pt is alert, awake, orie. Follows commands well     Speech;  Pt has hypophonic speech      Cranial Nerves - Pupils 3 mm equal and reactive to light, extraocular eye movements intact.   Pt has no  facial asymmetry. Facial sensation is intact.  Tongue - is in midline.    Muscle tone - is normal        Motor Exam -hand grasp 3+/5 rest of UE 4/5  3-4/5 LE    Sensory Exam - Pin prick, temperature, joint position and vibration are intact on either side. Pt withdraws all extremities equally on stimulation. No asymmetry seen. No complaints of tingling, numbness.        coordination:    Finger to nose: normal    Deep tendon Reflexes - 2 plus all over.          Neck Supple -  Yes.     MEDICATIONS    acetaminophen   Tablet .. 650 milliGRAM(s) Oral every 6 hours PRN  atorvastatin 80 milliGRAM(s) Oral at bedtime  cefepime   IVPB      chlorhexidine 2% Cloths 1 Application(s) Topical <User Schedule>  clopidogrel Tablet 75 milliGRAM(s) Oral daily  dextrose 5%. 1000 milliLiter(s) IV Continuous <Continuous>  escitalopram 10 milliGRAM(s) Oral daily  heparin   Injectable 5000 Unit(s) SubCutaneous every 8 hours  melatonin 3 milliGRAM(s) Oral at bedtime PRN  ondansetron Injectable 4 milliGRAM(s) IV Push every 8 hours PRN      Allergies    fish (Short breath; Anaphylaxis)  No Known Drug Allergies    Intolerances        LABS:  CBC Full  -  ( 01 Aug 2021 07:40 )  WBC Count : 7.64 K/uL  RBC Count : 4.18 M/uL  Hemoglobin : 12.9 g/dL  Hematocrit : 39.2 %  Platelet Count - Automated : 108 K/uL  Mean Cell Volume : 93.8 fl  Mean Cell Hemoglobin : 30.9 pg  Mean Cell Hemoglobin Concentration : 32.9 gm/dL  Auto Neutrophil # : 6.60 K/uL  Auto Lymphocyte # : 0.41 K/uL  Auto Monocyte # : 0.52 K/uL  Auto Eosinophil # : 0.05 K/uL   : 0.7 %  Auto Basophil % : 0.1 %      08-01    155<H>  |  118<H>  |  109<H>  ----------------------------<  199<H>  3.6   |  29  |  3.50<H>    Ca    9.6      01 Aug 2021 07:40  Phos  3.4     07-31  Mg     2.6     08-01    TPro  5.9<L>  /  Alb  1.6<L>  /  TBili  1.0  /  DBili  x   /  AST  97<H>  /  ALT  62  /  AlkPhos  260<H>  08-01    Hemoglobin A1C:     Vitamin B12     RADIOLOGY    ASSESSMENT AND PLAN:      SEEN FOR AMS  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  Pain is accessed and addressed  Would continue to follow.

## 2021-08-01 NOTE — PROGRESS NOTE ADULT - ASSESSMENT
72 yo M PMHx ALS, CAD (s/p MI 1 stent placed 2006), HTN, HLD, BPH admitted for urosepsis with obstructing stone.  He is now s/p stent placement for obstructing renal stone.    Urosepsis   - Pt profoundly septic upon admission with source UTI +/- aspiration PNA  - Tachycardic in setting of sepsis, now improved  - CT Chest/Abomen/Pelvis: Bibasilar aspiration pneumonia. Fluid-filled upper thoracic esophagus may reflect gastroesophageal reflux.  Mild left hydroureteronephrosis secondary to a 7 mm left UVJ calculus. Bilateral nonspecific perirenal stranding. Correlate clinically for infection  - IV antibiotics and fluids per primary team   - Remains in sr overnight without ectopy  - BP: 139/76 (08-01-21 @ 13:07) (136/77 - 141/78)  - Continue O2 support and needed, wean as tolerated   - Creatinine trend improving   <--3.50,  <--4.90,  <--5.70,  <--5.70  - Hypernatremia worsening, IVF, free water per renal  - TF per primary team    CAD  - hx of CAD s/p stent in 2006,   - Continue Plavix 75 mg QD  - routine echo    #HTN  - BP: 139/76 (08-01-21 @ 13:07) (136/77 - 141/78)  - Hx of HTN on metoprolol and Lisinopril at home, were holding for hypotension  - Restart Metoprolol 25 mg QD titrate up to home dose 50 mg QD,  - Avoid Acei or arbs especially with MARLEY  - Monitor hemodynamics closely    HLD   - Continue home statin     - Monitor and replete lytes, keep K>4, Mg>2.  - All other medical needs as per primary team.  - Other cardiovascular workup will depend on clinical course.  - Will continue to follow.    Laquita Montiel, MS ANP, AGAP  Nurse Practitioner- Cardiology   Spectra #[phone]

## 2021-08-01 NOTE — PROGRESS NOTE ADULT - PROBLEM SELECTOR PLAN 6
Complaining of worsening generalized weakness, likely in the setting of sepsis and urinary tract infection, however, patient not currently on riluzole therapy as per spouse, due to intolerance  - Neuro following   - PT consulted Complaining of worsening generalized weakness, likely in the setting of sepsis and urinary tract infection, however, patient not currently on riluzole therapy as per spouse, due to intolerance  - Neuro following   - PT consulted  -Functional quadriplegia; Needs assistance with ADL's

## 2021-08-01 NOTE — PROGRESS NOTE ADULT - ASSESSMENT
70 yo man with PMH of HTN, BPH, CAD and ALS with PEG, was admitted with not looking right, change in urine color, diaphoresis and shortness of breath.  Had a fall 2 days ago since then not feeling well, possibly related to sepsis due to UTI/pyelonephritis and aspiration pneumonia.   As per the spouse, his urine looked cloudy, dark yellow with odor. Denies hematuria.   Of note, patient last admitted to [facility] 11/9 --> 11/12/2020 for urinary retention, jo placed.   In ED Spo2 was 70a5 so placed on NRB  WBC 29k  Procalcitonin 301.8  UA with high WBC >50 and also RBC 25-50  CT Chest/Abomen/Pelvis: Bibasilar aspiration pneumonia. Fluid-filled upper thoracic esophagus may reflect gastroesophageal reflux.  Mild left hydroureteronephrosis secondary to a 7 mm left UVJ calculus. Bilateral nonspecific perirenal stranding. Correlate clinically for infection, Prostatomegaly.  S/P Left stent placed by urology on 7/29    3/31: no fevers, WBC normalized, Cr is still high, trending down, spoke with RN to address pt's oral hygiene, Blood cultures and urine culture grew Klebsiella oxytoca/Raoutella ornithinolytica, intermediate to Zosyn IV, changing antibiotics to cefepime according to the sensitivity. Repeat blood cultures are pending.     1- Aspiration pneumonia  2- UTI/pyelonephritis  3- Renal stone with left hydronephrosis   4- MARLEY  5- ALS    Recommendations:  - follow all culture data  - repeat blood cultures are pending   - Follow urine culture, also UC was sent from OR by urology intraoperatively   - stop Zosyn 3.375gm q12   - Follow WBC - normalized   - follow renal function   - start cefepime renally dosed - order is in, adjust dose based on renal function     Discussed with covering medical team

## 2021-08-01 NOTE — PROGRESS NOTE ADULT - SUBJECTIVE AND OBJECTIVE BOX
SHARITA HOUGH  MRN-[id number]    Interval History: The pt was seen and examined earlier, on supplemental O2 via NC, seems a little uncomfortable, pt's mouth is very dry, pt shakes his head when asked if anything bothers him or if he has any pain, unable to speak. The pt is afebrile since 7/29, WBC normalized.     PAST MEDICAL & SURGICAL HISTORY:  Myocardial infarct    Hypertension    Hyperlipidemia    BPH (benign prostatic hyperplasia)    ALS (amyotrophic lateral sclerosis)    S/P tonsillectomy    S/P coronary artery stent placement    History of hip surgery    History of hernia repair  &gt; 20 years ago    H/O shoulder surgery        ROS:    [x ] Unobtainable because: pt unable to speak, shakes his head to my questions if anything bothers him   [ ] All other systems negative    Constitutional: no fever, no chills  Head: no trauma  Eyes: no vision changes, no eye pain  ENT:  no sore throat, no rhinorrhea  Cardiovascular:  no chest pain, no palpitation  Respiratory:  no SOB, no cough  GI:  no abd pain, no vomiting, no diarrhea  urinary: no dysuria, no hematuria, no flank pain  musculoskeletal:  no joint pain, no joint swelling  skin:  no rash  neurology:  no headache, no seizure, no change in mental status  psych: no anxiety, no depression         Allergies  fish (Short breath; Anaphylaxis)  No Known Drug Allergies        ANTIMICROBIALS:  piperacillin/tazobactam IVPB..    piperacillin/tazobactam IVPB.. 3.375 every 12 hours      OTHER MEDS:  acetaminophen   Tablet .. 650 milliGRAM(s) Oral every 6 hours PRN  atorvastatin 80 milliGRAM(s) Oral at bedtime  chlorhexidine 2% Cloths 1 Application(s) Topical <User Schedule>  clopidogrel Tablet 75 milliGRAM(s) Oral daily  dextrose 5%. 1000 milliLiter(s) IV Continuous <Continuous>  escitalopram 10 milliGRAM(s) Oral daily  heparin   Injectable 5000 Unit(s) SubCutaneous every 8 hours  melatonin 3 milliGRAM(s) Oral at bedtime PRN  ondansetron Injectable 4 milliGRAM(s) IV Push every 8 hours PRN      Vital Signs Last 24 Hrs  T(C): 36.7 (01 Aug 2021 06:00), Max: 37.1 (31 Jul 2021 19:25)  T(F): 98 (01 Aug 2021 06:00), Max: 98.8 (31 Jul 2021 19:25)  HR: 71 (01 Aug 2021 06:00) (71 - 100)  BP: 141/78 (01 Aug 2021 06:00) (128/76 - 141/78)  BP(mean): --  RR: 19 (01 Aug 2021 06:00) (19 - 24)  SpO2: 93% (01 Aug 2021 06:00) (92% - 93%)    Physical Exam:  GEN: NAD, on NC, diaphoretic   HEENT: normocephalic and atraumatic. EOMI. PERRL.  Dry mouth  NECK: Supple.  No lymphadenopathy   LUNGS: diminished breath sounds bilaterally, no wheezing or rhonchi  HEART: Regular rate and rhythm without murmur.  ABDOMEN: Soft, mildly tender in RLQ, Positive bowel sounds.  PEG in place   : No CVA tenderness, jo with cloudy urine   EXTREMITIES: Muscular atrophy, motor weakness in all extremities   NEUROLOGIC: grossly intact.  PSYCHIATRIC: Appropriate affect .  SKIN: No rash    WBC Count: 7.64 K/uL (08-01 @ 07:40)  WBC Count: 10.60 K/uL (07-31 @ 08:14)  WBC Count: 20.44 K/uL (07-30 @ 05:33)  WBC Count: 29.47 K/uL (07-29 @ 13:35)                            12.9   7.64  )-----------( 108      ( 01 Aug 2021 07:40 )             39.2       08-01    155<H>  |  118<H>  |  109<H>  ----------------------------<  199<H>  3.6   |  29  |  3.50<H>    Ca    9.6      01 Aug 2021 07:40  Phos  3.4     07-31  Mg     2.6     08-01    TPro  5.9<L>  /  Alb  1.6<L>  /  TBili  1.0  /  DBili  x   /  AST  97<H>  /  ALT  62  /  AlkPhos  260<H>  08-01          Creatinine Trend: 3.50<--, 4.90<--, 5.70<--, 5.70<--      MICROBIOLOGY:  v  Kidney Kidney  07-30-21   Moderate Klebsiella oxytoca/Raoutella ornithinolytica  --  Klebsiella oxytoca /Raoutella ornithinolytica      .Blood Blood-Peripheral  07-29-21   Growth in anaerobic bottle: Klebsiella oxytoca/Raoutella ornithinolytica  Growth in aerobic bottle: Gram positive cocci in pairs  ***Blood Panel PCR results on this specimen are available  approximately 3 hours after the Gram stain result.***  Gramstain, PCR, and/or culture results may not always  correspond due to difference in methodologies.  ************************************************************  This PCR assay was performed by multiplex PCR. This  Assay tests for 66 bacterial and resistance gene targets.  Please refer to the [facility] test directory  at [email or web address] for details.  --  Blood Culture PCR  Klebsiella oxytoca /Raoutella ornithinolytica      Clean Catch Clean Catch (Midstream)  07-29-21   >=3 organisms. Probable collection contamination.  --  --          Rapid RVP Result: NotDete (07-29 @ 13:33)          Procalcitonin, Serum: 301.8 (07-29-21 @ 13:35)    SARS-CoV-2: NotDete (29 Jul 2021 13:33)    RADIOLOGY:    < from: Xray Chest 1 View- PORTABLE-Urgent (Xray Chest 1 View- PORTABLE-Urgent .) (07.30.21 @ 19:25) >    EXAM:  XR CHEST PORTABLE URGENT 1V                            PROCEDURE DATE:  07/30/2021          INTERPRETATION:  Portable chest radiograph    CLINICAL INFORMATION: Tachypnea.    TECHNIQUE:  Portable  AP view of the chest was obtained.    COMPARISON: 11/9/2020 chest available for review.    FINDINGS:    The lungs show RIGHT perihilar linear airspace disease. Remaining visualized lung grossly clear. No pneumothorax.    The heart and mediastinum size and configuration are within normal limits.    Visualized osseous structures are intact.    IMPRESSION:   RIGHT perihilar linear airspace disease..    --- End of Report ---        CINTHIA SYED MD; Attending Radiologist  This document has been electronically signed. Jul 31 2021  8:26A    < end of copied text >

## 2021-08-02 LAB
-  AMIKACIN: SIGNIFICANT CHANGE UP
-  AMOXICILLIN/CLAVULANIC ACID: SIGNIFICANT CHANGE UP
-  AMPICILLIN/SULBACTAM: SIGNIFICANT CHANGE UP
-  AMPICILLIN: SIGNIFICANT CHANGE UP
-  AZTREONAM: SIGNIFICANT CHANGE UP
-  CEFAZOLIN: SIGNIFICANT CHANGE UP
-  CEFEPIME: SIGNIFICANT CHANGE UP
-  CEFOXITIN: SIGNIFICANT CHANGE UP
-  CEFTRIAXONE: SIGNIFICANT CHANGE UP
-  CIPROFLOXACIN: SIGNIFICANT CHANGE UP
-  ERTAPENEM: SIGNIFICANT CHANGE UP
-  GENTAMICIN: SIGNIFICANT CHANGE UP
-  IMIPENEM: SIGNIFICANT CHANGE UP
-  LEVOFLOXACIN: SIGNIFICANT CHANGE UP
-  MEROPENEM: SIGNIFICANT CHANGE UP
-  PIPERACILLIN/TAZOBACTAM: SIGNIFICANT CHANGE UP
-  TIGECYCLINE: SIGNIFICANT CHANGE UP
-  TOBRAMYCIN: SIGNIFICANT CHANGE UP
-  TRIMETHOPRIM/SULFAMETHOXAZOLE: SIGNIFICANT CHANGE UP
ANION GAP SERPL CALC-SCNC: 6 MMOL/L — SIGNIFICANT CHANGE UP (ref 5–17)
BUN SERPL-MCNC: 89 MG/DL — HIGH (ref 7–23)
CALCIUM SERPL-MCNC: 9.5 MG/DL — SIGNIFICANT CHANGE UP (ref 8.5–10.1)
CHLORIDE SERPL-SCNC: 115 MMOL/L — HIGH (ref 96–108)
CO2 SERPL-SCNC: 31 MMOL/L — SIGNIFICANT CHANGE UP (ref 22–31)
CREAT SERPL-MCNC: 2.4 MG/DL — HIGH (ref 0.5–1.3)
CULTURE RESULTS: SIGNIFICANT CHANGE UP
GLUCOSE SERPL-MCNC: 208 MG/DL — HIGH (ref 70–99)
HCT VFR BLD CALC: 40 % — SIGNIFICANT CHANGE UP (ref 39–50)
HGB BLD-MCNC: 12.9 G/DL — LOW (ref 13–17)
MCHC RBC-ENTMCNC: 30.9 PG — SIGNIFICANT CHANGE UP (ref 27–34)
MCHC RBC-ENTMCNC: 32.3 GM/DL — SIGNIFICANT CHANGE UP (ref 32–36)
MCV RBC AUTO: 95.7 FL — SIGNIFICANT CHANGE UP (ref 80–100)
METHOD TYPE: SIGNIFICANT CHANGE UP
NRBC # BLD: 0 /100 WBCS — SIGNIFICANT CHANGE UP (ref 0–0)
ORGANISM # SPEC MICROSCOPIC CNT: SIGNIFICANT CHANGE UP
PLATELET # BLD AUTO: 104 K/UL — LOW (ref 150–400)
POTASSIUM SERPL-MCNC: 3.6 MMOL/L — SIGNIFICANT CHANGE UP (ref 3.5–5.3)
POTASSIUM SERPL-SCNC: 3.6 MMOL/L — SIGNIFICANT CHANGE UP (ref 3.5–5.3)
RBC # BLD: 4.18 M/UL — LOW (ref 4.2–5.8)
RBC # FLD: 13.2 % — SIGNIFICANT CHANGE UP (ref 10.3–14.5)
SODIUM SERPL-SCNC: 152 MMOL/L — HIGH (ref 135–145)
SPECIMEN SOURCE: SIGNIFICANT CHANGE UP
WBC # BLD: 10.46 K/UL — SIGNIFICANT CHANGE UP (ref 3.8–10.5)
WBC # FLD AUTO: 10.46 K/UL — SIGNIFICANT CHANGE UP (ref 3.8–10.5)

## 2021-08-02 PROCEDURE — 99232 SBSQ HOSP IP/OBS MODERATE 35: CPT

## 2021-08-02 RX ORDER — POTASSIUM CHLORIDE 20 MEQ
10 PACKET (EA) ORAL DAILY
Refills: 0 | Status: DISCONTINUED | OUTPATIENT
Start: 2021-08-02 | End: 2021-08-02

## 2021-08-02 RX ORDER — POTASSIUM CHLORIDE 20 MEQ
10 PACKET (EA) ORAL DAILY
Refills: 0 | Status: DISCONTINUED | OUTPATIENT
Start: 2021-08-02 | End: 2021-08-03

## 2021-08-02 RX ADMIN — HEPARIN SODIUM 5000 UNIT(S): 5000 INJECTION INTRAVENOUS; SUBCUTANEOUS at 05:24

## 2021-08-02 RX ADMIN — CEFEPIME 100 MILLIGRAM(S): 1 INJECTION, POWDER, FOR SOLUTION INTRAMUSCULAR; INTRAVENOUS at 11:49

## 2021-08-02 RX ADMIN — HEPARIN SODIUM 5000 UNIT(S): 5000 INJECTION INTRAVENOUS; SUBCUTANEOUS at 13:24

## 2021-08-02 RX ADMIN — ATORVASTATIN CALCIUM 80 MILLIGRAM(S): 80 TABLET, FILM COATED ORAL at 21:00

## 2021-08-02 RX ADMIN — Medication 25 MILLIGRAM(S): at 05:24

## 2021-08-02 RX ADMIN — SODIUM CHLORIDE 100 MILLILITER(S): 9 INJECTION, SOLUTION INTRAVENOUS at 16:00

## 2021-08-02 RX ADMIN — HEPARIN SODIUM 5000 UNIT(S): 5000 INJECTION INTRAVENOUS; SUBCUTANEOUS at 21:00

## 2021-08-02 RX ADMIN — CLOPIDOGREL BISULFATE 75 MILLIGRAM(S): 75 TABLET, FILM COATED ORAL at 11:49

## 2021-08-02 RX ADMIN — Medication 10 MILLIEQUIVALENT(S): at 12:01

## 2021-08-02 RX ADMIN — ESCITALOPRAM OXALATE 10 MILLIGRAM(S): 10 TABLET, FILM COATED ORAL at 11:49

## 2021-08-02 RX ADMIN — SODIUM CHLORIDE 100 MILLILITER(S): 9 INJECTION, SOLUTION INTRAVENOUS at 05:25

## 2021-08-02 NOTE — PROGRESS NOTE ADULT - ASSESSMENT
72 yo M PMHx ALS, CAD (s/p MI 1 stent placed 2006 on DAPT), HTN, HLD, BPH presented with AMS and shortness of breath, admitted for  sepsis secondary to aspiration pneumonia, UTI/pyelonephritis,  Renal stone with left hydronephrosis s/p stent placement by Urology, MARLEY   no

## 2021-08-02 NOTE — PROGRESS NOTE ADULT - SUBJECTIVE AND OBJECTIVE BOX
[facility] -- Santi Nair, Alfreda, Shara, Chris Peace Savella  Office # [phone]      Follow Up:  Urosepsis Hx of CAD    Subjective/Observations: Seen and examined.  Lying flat, resting comfortably.  Arouses easily with no new complaints and denies cp, sob or palpitations.  No signs of orthopnea or PND.  NAD.        REVIEW OF SYSTEMS: All other review of systems is negative unless indicated above    PAST MEDICAL & SURGICAL HISTORY:  Myocardial infarct    Hypertension    Hyperlipidemia    BPH (benign prostatic hyperplasia)    ALS (amyotrophic lateral sclerosis)    S/P tonsillectomy    S/P coronary artery stent placement    History of hip surgery    History of hernia repair  &gt; 20 years ago    H/O shoulder surgery        MEDICATIONS  (STANDING):  atorvastatin 80 milliGRAM(s) Oral at bedtime  cefepime   IVPB      cefepime   IVPB 1000 milliGRAM(s) IV Intermittent every 24 hours  chlorhexidine 2% Cloths 1 Application(s) Topical <User Schedule>  clopidogrel Tablet 75 milliGRAM(s) Oral daily  dextrose 5%. 1000 milliLiter(s) (100 mL/Hr) IV Continuous <Continuous>  escitalopram 10 milliGRAM(s) Oral daily  heparin   Injectable 5000 Unit(s) SubCutaneous every 8 hours  metoprolol succinate ER 25 milliGRAM(s) Oral daily    MEDICATIONS  (PRN):  acetaminophen   Tablet .. 650 milliGRAM(s) Oral every 6 hours PRN Temp greater or equal to 38.5C (101.3F), Mild Pain (1 - 3)  melatonin 3 milliGRAM(s) Oral at bedtime PRN Insomnia  ondansetron Injectable 4 milliGRAM(s) IV Push every 8 hours PRN Nausea and/or Vomiting      Allergies    fish (Short breath; Anaphylaxis)  No Known Drug Allergies    Intolerances            Vital Signs Last 24 Hrs  T(C): 36.7 (02 Aug 2021 05:20), Max: 36.7 (01 Aug 2021 13:07)  T(F): 98.1 (02 Aug 2021 05:20), Max: 98.1 (01 Aug 2021 13:07)  HR: 81 (02 Aug 2021 05:20) (77 - 84)  BP: 132/78 (02 Aug 2021 05:20) (132/78 - 139/76)  BP(mean): --  RR: 16 (02 Aug 2021 05:20) (16 - 20)  SpO2: 94% (02 Aug 2021 05:20) (93% - 95%)    I&O's Summary    01 Aug 2021 07:01  -  02 Aug 2021 07:00  --------------------------------------------------------  IN: 2480 mL / OUT: 2850 mL / NET: -370 mL          PHYSICAL EXAM:  TELE: Not on tele  Constitutional: NAD, lying flat asleep, arouses easily, well-developed, frail  HEENT: Dry Mucous Membranes, sores on lips and mouth area, scabbed, Anicteric  Pulmonary: Non-labored, breath sounds are clear bilaterally, No wheezing, rales or rhonchi  Cardiovascular: Regular, S1 and S2, No murmurs, rubs, gallops or clicks  Gastrointestinal: Bowel Sounds present, soft, nontender.   Genitourinary: Shukla draining clear yellow urine  Lymph: No peripheral edema. No lymphadenopathy.  Skin: No visible rashes or ulcers.  Psych:  Mood & affect flat    LABS: All Labs Reviewed:                        12.9   10.46 )-----------( 104      ( 02 Aug 2021 07:05 )             40.0                         12.9   7.64  )-----------( 108      ( 01 Aug 2021 07:40 )             39.2                         13.6   10.60 )-----------( 126      ( 31 Jul 2021 08:14 )             42.5     02 Aug 2021 07:05    152    |  115    |  89     ----------------------------<  208    3.6     |  31     |  2.40   01 Aug 2021 07:40    155    |  118    |  109    ----------------------------<  199    3.6     |  29     |  3.50   31 Jul 2021 08:14    150    |  115    |  127    ----------------------------<  186    3.6     |  27     |  4.90     Ca    9.5        02 Aug 2021 07:05  Ca    9.6        01 Aug 2021 07:40  Ca    9.7        31 Jul 2021 08:14  Phos  3.4       31 Jul 2021 08:14  Mg     2.6       01 Aug 2021 07:40  Mg     2.9       31 Jul 2021 08:14    TPro  5.9    /  Alb  1.6    /  TBili  1.0    /  DBili  x      /  AST  97     /  ALT  62     /  AlkPhos  260    01 Aug 2021 07:40  TPro  6.3    /  Alb  1.7    /  TBili  0.9    /  DBili  x      /  AST  82     /  ALT  49     /  AlkPhos  249    31 Jul 2021 08:14    < from: 12 Lead ECG (07.30.21 @ 08:01) >  Ventricular Rate 76 BPM    Atrial Rate 76 BPM    P-R Interval 136 ms    QRS Duration 80 ms    Q-T Interval 362 ms    QTC Calculation(Bazett) 407 ms    P Axis 39 degrees    R Axis -27 degrees    T Axis 21 degrees    Diagnosis Line Normal sinus rhythm  Inferior infarct , age undetermined  Possible Anterior infarct (cited on or before 29-JUL-2021)  Abnormal ECG  Confirmed by sonya Ramirez ([id number]) on 7/30/2021 4:21:24 PM    < end of copied text >      < from: Xray Chest 1 View- PORTABLE-Urgent (Xray Chest 1 View- PORTABLE-Urgent .) (07.30.21 @ 19:25) >  EXAM:  XR CHEST PORTABLE URGENT 1V                            PROCEDURE DATE:  07/30/2021          INTERPRETATION:  Portable chest radiograph    CLINICAL INFORMATION: Tachypnea.    TECHNIQUE:  Portable  AP view of the chest was obtained.    COMPARISON: 11/9/2020 chest available for review.    FINDINGS:    The lungs show RIGHT perihilar linear airspace disease. Remaining visualized lung grossly clear. No pneumothorax.    The heart and mediastinum size and configuration are within normal limits.    Visualized osseous structures are intact.    IMPRESSION:   RIGHT perihilar linear airspace disease..    --- End of Report ---        < end of copied text >      < from: US Renal (07.29.21 @ 15:37) >  EXAM:  US KIDNEY(S)                            PROCEDURE DATE:  07/29/2021          INTERPRETATION:  CLINICAL INFORMATION: Worsening renal failure    COMPARISON: 11/9/2020 and CT scan of earlier today.    TECHNIQUE: Sonography of the kidneys    FINDINGS:    Right kidney: 12.1 cm cm. No renal mass, hydronephrosis or calculi. A simple cyst in the upper to midpole measures 1.2 x 1.0 x 1.1 cm.    Left kidney: 12.4 cm. No renal mass or calculi. There is mild left hydronephrosis      IMPRESSION:    Mild left hydronephrosis as seen on the CAT scan of earlier today  Small right renal cyst again appreciated    --- End of Report ---    < end of copied text >

## 2021-08-02 NOTE — PROGRESS NOTE ADULT - SUBJECTIVE AND OBJECTIVE BOX
Patient is a 71y old  Male who presents with a chief complaint of Sepsis, UTI (30 Jul 2021 12:52)  Patient seen in follow up for MARLEY.        PAST MEDICAL HISTORY:  Myocardial infarct    Hypertension    Hyperlipidemia    BPH (benign prostatic hyperplasia)    ALS (amyotrophic lateral sclerosis)       MEDICATIONS  (STANDING):  atorvastatin 80 milliGRAM(s) Oral at bedtime  cefepime   IVPB      cefepime   IVPB 1000 milliGRAM(s) IV Intermittent every 24 hours  clopidogrel Tablet 75 milliGRAM(s) Oral daily  dextrose 5%. 1000 milliLiter(s) (100 mL/Hr) IV Continuous <Continuous>  escitalopram 10 milliGRAM(s) Oral daily  heparin   Injectable 5000 Unit(s) SubCutaneous every 8 hours  metoprolol succinate ER 25 milliGRAM(s) Oral daily  potassium chloride   Powder 10 milliEquivalent(s) Enteral Tube daily    MEDICATIONS  (PRN):  acetaminophen   Tablet .. 650 milliGRAM(s) Oral every 6 hours PRN Temp greater or equal to 38.5C (101.3F), Mild Pain (1 - 3)  melatonin 3 milliGRAM(s) Oral at bedtime PRN Insomnia  ondansetron Injectable 4 milliGRAM(s) IV Push every 8 hours PRN Nausea and/or Vomiting    T(C): 36.7 (08-02-21 @ 12:23), Max: 37.1 (07-31-21 @ 19:25)  HR: 81 (08-02-21 @ 12:23) (71 - 100)  BP: 123/76 (08-02-21 @ 12:23) (123/76 - 141/78)  RR: 20 (08-02-21 @ 12:23)  SpO2: 95% (08-02-21 @ 12:23)  Wt(kg): --  I&O's Detail    01 Aug 2021 07:01  -  02 Aug 2021 07:00  --------------------------------------------------------  IN:    dextrose 5%: 1200 mL    Free Water: 500 mL    Osmolite: 780 mL  Total IN: 2480 mL    OUT:    Indwelling Catheter - Urethral (mL): 2850 mL  Total OUT: 2850 mL    Total NET: -370 mL      02 Aug 2021 07:01  -  02 Aug 2021 16:00  --------------------------------------------------------  IN:    dextrose 5%: 500 mL    Enteral Tube Flush: 60 mL    Free Water: 250 mL    IV PiggyBack: 50 mL    Osmolite: 260 mL  Total IN: 1120 mL    OUT:    Indwelling Catheter - Urethral (mL): 1300 mL  Total OUT: 1300 mL    Total NET: -180 mL              PHYSICAL EXAM:  General: No distress  Respiratory: b/l air entry  Cardiovascular: S1 S2  Gastrointestinal: soft  Extremities:  no edema              LABORATORY:                        12.9   10.46 )-----------( 104      ( 02 Aug 2021 07:05 )             40.0     08-02    152<H>  |  115<H>  |  89<H>  ----------------------------<  208<H>  3.6   |  31  |  2.40<H>    Ca    9.5      02 Aug 2021 07:05  Mg     2.6     08-01    TPro  5.9<L>  /  Alb  1.6<L>  /  TBili  1.0  /  DBili  x   /  AST  97<H>  /  ALT  62  /  AlkPhos  260<H>  08-01    Sodium, Serum: 152 mmol/L (08-02 @ 07:05)  Sodium, Serum: 155 mmol/L (08-01 @ 07:40)    Potassium, Serum: 3.6 mmol/L (08-02 @ 07:05)  Potassium, Serum: 3.6 mmol/L (08-01 @ 07:40)    Hemoglobin: 12.9 g/dL (08-02 @ 07:05)  Hemoglobin: 12.9 g/dL (08-01 @ 07:40)  Hemoglobin: 13.6 g/dL (07-31 @ 08:14)    Creatinine, Serum 2.40 (08-02 @ 07:05)  Creatinine, Serum 3.50 (08-01 @ 07:40)  Creatinine, Serum 4.90 (07-31 @ 08:14)        LIVER FUNCTIONS - ( 01 Aug 2021 07:40 )  Alb: 1.6 g/dL / Pro: 5.9 g/dL / ALK PHOS: 260 U/L / ALT: 62 U/L / AST: 97 U/L / GGT: x

## 2021-08-02 NOTE — PHYSICAL THERAPY INITIAL EVALUATION ADULT - PERTINENT HX OF CURRENT PROBLEM, REHAB EVAL
72 yo M PMHx ALS, CAD (s/p MI 1 stent placed 2006 on DAPT), HTN, HLD, BPH presenting with diaphoresis, shortness of breath. History taken by Shadia (wife) who lives with him. She states that fell coming out of the bathroom 2 days ago, fell on his buttocks, no head injury.

## 2021-08-02 NOTE — PROGRESS NOTE ADULT - ASSESSMENT
MARLEY: Prerenal azotemia, ATN (hemodynamic, Sepsis), Obstructive uropathy, On ACEI  Left UVJ calculus, s/p ureteral stent placement.   R/o sepsis`  ALS    Improving renal indices. To continue current meds. IV abx.  follow up. Strict I & O. Avoid nephrotoxic meds as possible.   Will follow electrolytes and renal function trend. Avoid nephrotoxic meds as possible. Avoid ACEI, ARB, NSAIDs and IV contrast.

## 2021-08-02 NOTE — PROGRESS NOTE ADULT - SUBJECTIVE AND OBJECTIVE BOX
INTERVAL HPI/OVERNIGHT EVENTS: Afebrile    MEDICATIONS  (STANDING):  atorvastatin 80 milliGRAM(s) Oral at bedtime  cefepime   IVPB      cefepime   IVPB 1000 milliGRAM(s) IV Intermittent every 24 hours  chlorhexidine 2% Cloths 1 Application(s) Topical <User Schedule>  clopidogrel Tablet 75 milliGRAM(s) Oral daily  dextrose 5%. 1000 milliLiter(s) (100 mL/Hr) IV Continuous <Continuous>  escitalopram 10 milliGRAM(s) Oral daily  heparin   Injectable 5000 Unit(s) SubCutaneous every 8 hours  metoprolol succinate ER 25 milliGRAM(s) Oral daily    MEDICATIONS  (PRN):  acetaminophen   Tablet .. 650 milliGRAM(s) Oral every 6 hours PRN Temp greater or equal to 38.5C (101.3F), Mild Pain (1 - 3)  melatonin 3 milliGRAM(s) Oral at bedtime PRN Insomnia  ondansetron Injectable 4 milliGRAM(s) IV Push every 8 hours PRN Nausea and/or Vomiting        Vital Signs Last 24 Hrs  T(C): 36.7 (02 Aug 2021 05:20), Max: 36.7 (01 Aug 2021 13:07)  T(F): 98.1 (02 Aug 2021 05:20), Max: 98.1 (01 Aug 2021 13:07)  HR: 81 (02 Aug 2021 05:20) (77 - 84)  BP: 132/78 (02 Aug 2021 05:20) (132/78 - 139/76)  BP(mean): --  RR: 16 (02 Aug 2021 05:20) (16 - 20)  SpO2: 94% (02 Aug 2021 05:20) (93% - 95%)    PHYSICAL EXAM:    ABDOMEN: No CVA tenderness  GENITALIA: Shukla draining well. Urine grossly clear.    LABS:                        12.9   7.64  )-----------( 108      ( 01 Aug 2021 07:40 )             39.2     08-01    155<H>  |  118<H>  |  109<H>  ----------------------------<  199<H>  3.6   |  29  |  3.50<H>    Ca    9.6      01 Aug 2021 07:40  Phos  3.4     07-31  Mg     2.6     08-01    TPro  5.9<L>  /  Alb  1.6<L>  /  TBili  1.0  /  DBili  x   /  AST  97<H>  /  ALT  62  /  AlkPhos  260<H>  08-01        Urine culture:  07-31 @ 12:23 --   No growth to date.  Urine culture:  07-30 @ 00:49 --   Moderate Klebsiella oxytoca/Raoutella ornithinolytica  Numerous Streptococcus constellatus "Susceptibilities not performed"  Few Enterobacter cloacae complex Susceptibility to follow.  Urine culture:  07-29 @ 18:10 --   Growth in anaerobic bottle: Klebsiella oxytoca/Raoutella ornithinolytica  Growth in aerobic bottle: Gram positive cocci in pairs  ***Blood Panel PCR results on this specimen are available  approximately 3 hours after the Gram stain result.***  Gramstain, PCR, and/or culture results may not always  correspond due to difference in methodologies.  ************************************************************  This PCR assay was performed by multiplex PCR. This  Assay tests for 66 bacterial and resistance gene targets.  Please refer to the [facility] test directory  at [email or web address] for details.

## 2021-08-02 NOTE — PROGRESS NOTE ADULT - SUBJECTIVE AND OBJECTIVE BOX
[facility]  INFECTIOUS DISEASES   [address] [place] [address]  Tel: [phone]     Fax: [phone]  =======================================================    [id number]  SHARITA HOUGH     Follow up: Urosepsis and aspiration pneumonia    Lying down, seems comfortable, Urine has gross hematuria. No fever.  Leukocytosis normalized.     PAST MEDICAL & SURGICAL HISTORY:  Myocardial infarct  Hypertension  Hyperlipidemia  BPH (benign prostatic hyperplasia)  ALS (amyotrophic lateral sclerosis)  S/P tonsillectomy  S/P coronary artery stent placement  History of hip surgery  History of hernia repair  &gt; 20 years ago  H/O shoulder surgery    Social Hx: No smoking, ETOH Or drugs     FAMILY HISTORY:  FHx: myocardial infarction (Father)    FHx: hyperlipidemia    Allergies  fish (Short breath; Anaphylaxis)  No Known Drug Allergies    Antibiotics:     piperacillin/tazobactam IVPB.. 3.375 Gram(s) IV Intermittent once     REVIEW OF SYSTEMS:  CONSTITUTIONAL:  No Fever or chills,   HEENT:  No diplopia or blurred vision.  No sore throat or runny nose.  CARDIOVASCULAR:  No chest pain, SOB better.  RESPIRATORY:  No cough, no shortness of breath  GASTROINTESTINAL:  No nausea, vomiting or diarrhea.  GENITOURINARY:  cloudy urine with odor   MUSCULOSKELETAL:  no joint aches, no muscle pain  SKIN:  No change in skin, hair or nails.  NEUROLOGIC: weakness in all extremities due to ALS  PSYCHIATRIC:  No disorder of thought or mood.  ENDOCRINE:  No heat or cold intolerance, polyuria or polydipsia.  HEMATOLOGICAL:  No easy bruising or bleeding.     Physical Exam:  Vital Signs Last 24 Hrs  T(C): 36.7 (02 Aug 2021 12:23), Max: 36.7 (01 Aug 2021 20:59)  T(F): 98.1 (02 Aug 2021 12:23), Max: 98.1 (01 Aug 2021 20:59)  HR: 81 (02 Aug 2021 12:23) (81 - 84)  BP: 123/76 (02 Aug 2021 12:23) (123/76 - 136/76)  BP(mean): --  RR: 20 (02 Aug 2021 12:23) (16 - 20)  SpO2: 95% (02 Aug 2021 12:23) (93% - 95%)  GEN: NAD,  HEENT: normocephalic and atraumatic. EOMI. PERRL.    NECK: Supple.  No lymphadenopathy   LUNGS: Clear to auscultation.  HEART: Regular rate and rhythm without murmur.  ABDOMEN: Soft, nontender, and nondistended.  Positive bowel sounds.  PEG in place   : No CVA tenderness, jo with cloudy urine   EXTREMITIES: Muscular atrophy, motor weakness in all extremities   NEUROLOGIC: grossly intact.  PSYCHIATRIC: Appropriate affect .  SKIN: No rash      Labs:                        12.9   10.46 )-----------( 104      ( 02 Aug 2021 07:05 )             40.0     08-02    152<H>  |  115<H>  |  89<H>  ----------------------------<  208<H>  3.6   |  31  |  2.40<H>    Ca    9.5      02 Aug 2021 07:05  Mg     2.6     08-01    TPro  5.9<L>  /  Alb  1.6<L>  /  TBili  1.0  /  DBili  x   /  AST  97<H>  /  ALT  62  /  AlkPhos  260<H>  08-01    Culture - Blood (collected 07-31-21 @ 12:23)  Source: .Blood Blood-Venous    Culture - Blood (collected 07-31-21 @ 12:23)  Source: .Blood Blood    Culture - Urine (collected 07-30-21 @ 00:49)  Source: Kidney Kidney  Final Report (08-02-21 @ 10:22):    Moderate Klebsiella oxytoca/Raoutella ornithinolytica    Few Enterobacter cloacae complex    Numerous Streptococcus constellatus "Susceptibilities not performed"  Organism: Klebsiella oxytoca /Raoutella ornithinolytica  Enterobacter cloacae complex (08-02-21 @ 10:22)  Organism: Enterobacter cloacae complex (08-02-21 @ 10:22)    Sensitivities:      -  Amikacin: S <=16      -  Amoxicillin/Clavulanic Acid: R 16/8      -  Ampicillin: R 16 These ampicillin results predict results for amoxicillin      -  Ampicillin/Sulbactam: R 8/4 Enterobacter, Citrobacter, and Serratia may develop resistance during prolonged therapy (3-4 days)      -  Aztreonam: S <=4      -  Cefazolin: R >16      -  Cefepime: S <=2      -  Cefoxitin: R >16      -  Ceftriaxone: S <=1 Enterobacter, Citrobacter, and Serratia may develop resistance during prolonged therapy      -  Ciprofloxacin: S <=0.25      -  Ertapenem: S <=0.5      -  Gentamicin: S <=2      -  Imipenem: S <=1      -  Levofloxacin: S <=0.5      -  Meropenem: S <=1      -  Piperacillin/Tazobactam: S <=8      -  Tigecycline: S <=2      -  Tobramycin: S <=2      -  Trimethoprim/Sulfamethoxazole: S <=0.5/9.5      Method Type: ROSEMARIE  Organism: Klebsiella oxytoca /Raoutella ornithinolytica (08-02-21 @ 10:22)    Sensitivities:      -  Amikacin: S <=16      -  Amoxicillin/Clavulanic Acid: S <=8/4      -  Ampicillin: R >16 These ampicillin results predict results for amoxicillin      -  Ampicillin/Sulbactam: R >16/8 Enterobacter, Citrobacter, and Serratia may develop resistance during prolonged therapy (3-4 days)      -  Aztreonam: S <=4      -  Cefazolin: R >16      -  Cefepime: S <=2      -  Cefoxitin: S <=8      -  Ceftriaxone: I 2 Enterobacter, Citrobacter, and Serratia may develop resistance during prolonged therapy      -  Ciprofloxacin: S <=0.25      -  Ertapenem: S <=0.5      -  Gentamicin: S <=2      -  Imipenem: S <=1      -  Levofloxacin: S <=0.5      -  Meropenem: S <=1      -  Piperacillin/Tazobactam: I 64      -  Tigecycline: S <=2      -  Tobramycin: S <=2      -  Trimethoprim/Sulfamethoxazole: S <=0.5/9.5      Method Type: ROSEMARIE    Culture - Blood (collected 07-29-21 @ 18:10)  Source: .Blood Blood-Peripheral  Gram Stain (07-31-21 @ 09:01):    Growth in anaerobic bottle: Gram Negative Rods and Gram positive cocci in    pairs    Growth in aerobic bottle: Gram Negative Rods    Culture - Blood (collected 07-29-21 @ 18:10)  Source: .Blood Blood-Peripheral  Gram Stain (08-01-21 @ 06:58):    Upon re-evaluation of gram stain:    Growth in anaerobic bottle: Gram Negative Rods    Growth in anaerobic bottle: Gram positive cocci in pairs    Growth in aerobic bottle: Gram positive cocci in pairs  Organism: Blood Culture PCR  Klebsiella oxytoca /Raoutella ornithinolytica (08-01-21 @ 09:24)  Organism: Klebsiella oxytoca /Raoutella ornithinolytica (08-01-21 @ 09:24)    Sensitivities:      -  Amikacin: S <=16      -  Ampicillin: R >16 These ampicillin results predict results for amoxicillin      -  Ampicillin/Sulbactam: R >16/8 Enterobacter, Citrobacter, and Serratia may develop resistance during prolonged therapy (3-4 days)      -  Aztreonam: S <=4      -  Cefazolin: R >16 Enterobacter, Citrobacter, and Serratia may develop resistance during prolonged therapy (3-4 days)      -  Cefepime: S <=2      -  Cefoxitin: S <=8      -  Ceftriaxone: S <=1 Enterobacter, Citrobacter, and Serratia may develop resistance during prolonged therapy      -  Ciprofloxacin: S <=0.25      -  Ertapenem: S <=0.5      -  Gentamicin: S <=2      -  Imipenem: S <=1      -  Levofloxacin: S <=0.5      -  Meropenem: S <=1      -  Piperacillin/Tazobactam: I 64      -  Tobramycin: S <=2      -  Trimethoprim/Sulfamethoxazole: S <=0.5/9.5      Method Type: ROSEMARIE  Organism: Blood Culture PCR (07-30-21 @ 15:13)    Sensitivities:      -  Klebsiella oxytoca: Detec      -  Streptococcus anginosus group: Detec      Method Type: PCR    Culture - Urine (collected 07-29-21 @ 17:54)  Source: Clean Catch Clean Catch (Midstream)  Final Report (07-31-21 @ 10:38):    >=3 organisms. Probable collection contamination.    WBC Count: 10.46 K/uL (08-02-21 @ 07:05)  WBC Count: 7.64 K/uL (08-01-21 @ 07:40)  WBC Count: 10.60 K/uL (07-31-21 @ 08:14)  WBC Count: 20.44 K/uL (07-30-21 @ 05:33)  WBC Count: 29.47 K/uL (07-29-21 @ 13:35)    Creatinine, Serum: 2.40 mg/dL (08-02-21 @ 07:05)  Creatinine, Serum: 3.50 mg/dL (08-01-21 @ 07:40)  Creatinine, Serum: 4.90 mg/dL (07-31-21 @ 08:14)  Creatinine, Serum: 5.70 mg/dL (07-30-21 @ 05:33)  Creatinine, Serum: 5.70 mg/dL (07-29-21 @ 13:35)    Procalcitonin, Serum: 301.8 ng/mL (07-29-21 @ 13:35)     SARS-CoV-2: NotDetec (07-29-21 @ 13:33)  Rapid RVP Result: NotDetec (07-29-21 @ 13:33    All imaging and other data have been reviewed.  < from: CT Chest No Cont (07.29.21 @ 15:12) >  IMPRESSION:  Bibasilar aspiration pneumonia.  Fluid-filled upper thoracic esophagus may reflect gastroesophageal reflux.  Mild left hydroureteronephrosis secondary to a 7 mm left UVJ calculus.  Bilateral nonspecific perirenal stranding. Correlate clinically for infection  Prostatomegaly.    Assessment and Plan:   70 yo man with PMH of HTN, BPH, CAD and ALS with PEG, was admitted with not looking right, change in urine color, diaphoresis and shortness of breath.  Had a fall 2 days ago since then not feeling well, possibly related to sepsis due to UTI/pyelonephritis and aspiration pneumonia.   As per the spouse, his urine looked cloudy, dark yellow with odor. Denies hematuria.   Of note, patient last admitted to [place] 11/9 --> 11/12/2020 for urinary retention, jo placed.   In ED Spo2 was 70a5 so placed on NRB  WBC 29k  Procalcitonin 301.8  UA with high WBC >50 and also RBC 25-50  CT Chest/Abomen/Pelvis: Bibasilar aspiration pneumonia. Fluid-filled upper thoracic esophagus may reflect gastroesophageal reflux.  Mild left hydroureteronephrosis secondary to a 7 mm left UVJ calculus. Bilateral nonspecific perirenal stranding. Correlate clinically for infection, Prostatomegaly.  S/P Left stent placed by urology on 7/29  Blood culture 7/29 Klebsiella oxytoca and strep   Urine culture >3 bactereia  Kidney urine (from OR) with Klebsiella, strep and enterobacter   Repeat blood culture on 7/31 negative     1- Aspiration pneumonia  2- UTI/pyelonephritis  3- Renal stone with left hydronephrosis   4- MARLEY  5- ALS    Recommendations:  - Repeat blood culture negative   - Continue Cefepime 1gm daily   - Follow WBC 29k-->10k,  - Follow Creat 2.4, trend it to adjust meds   - Stent management as per urology  - Will treat for total 14 days     Will follow.    Santhosh Burton MD  Division of Infectious Diseases   Cell [phone] between 8am and 6pm   After 6pm and weekends please call ID service at [phone].

## 2021-08-02 NOTE — PROVIDER CONTACT NOTE (OTHER) - ASSESSMENT
Pt awake and alert, in no apparent physical distress. Shukla catheter draining bloody urine, no clots.

## 2021-08-02 NOTE — PROGRESS NOTE ADULT - PROBLEM SELECTOR PLAN 6
Complaining of worsening generalized weakness, likely in the setting of sepsis and urinary tract infection, however, patient not currently on riluzole therapy as per spouse, due to intolerance  - Neuro following   - PT consulted  -Functional quadriplegia; Needs assistance with ADL's  may need MARIBETH

## 2021-08-02 NOTE — PHYSICAL THERAPY INITIAL EVALUATION ADULT - GAIT TRAINING, PT EVAL
Patient will ambulate x 50 feet with RW with supervision in 2 weeks in order to increase mobility at home

## 2021-08-02 NOTE — PHYSICAL THERAPY INITIAL EVALUATION ADULT - GENERAL OBSERVATIONS, REHAB EVAL
Patient received supine in bed, lethargic, requires encouragement to participate with physical therapy, +IV

## 2021-08-02 NOTE — PHYSICAL THERAPY INITIAL EVALUATION ADULT - ADDITIONAL COMMENTS
Patient lives with wife in private home, up until recently was able to ambulate wit RW/SAC without assistance and perform most ADLs independently. Wife assists as needed.

## 2021-08-02 NOTE — PROGRESS NOTE ADULT - SUBJECTIVE AND OBJECTIVE BOX
Patient is a 71y old  Male who presents with a chief complaint of Sepsis, UTI (02 Aug 2021 14:30)      INTERVAL /OVERNIGHT EVENTS: alert awake and weak    MEDICATIONS  (STANDING):  atorvastatin 80 milliGRAM(s) Oral at bedtime  cefepime   IVPB      cefepime   IVPB 1000 milliGRAM(s) IV Intermittent every 24 hours  clopidogrel Tablet 75 milliGRAM(s) Oral daily  dextrose 5%. 1000 milliLiter(s) (100 mL/Hr) IV Continuous <Continuous>  escitalopram 10 milliGRAM(s) Oral daily  heparin   Injectable 5000 Unit(s) SubCutaneous every 8 hours  metoprolol succinate ER 25 milliGRAM(s) Oral daily  potassium chloride   Powder 10 milliEquivalent(s) Enteral Tube daily    MEDICATIONS  (PRN):  acetaminophen   Tablet .. 650 milliGRAM(s) Oral every 6 hours PRN Temp greater or equal to 38.5C (101.3F), Mild Pain (1 - 3)  melatonin 3 milliGRAM(s) Oral at bedtime PRN Insomnia  ondansetron Injectable 4 milliGRAM(s) IV Push every 8 hours PRN Nausea and/or Vomiting      Allergies    fish (Short breath; Anaphylaxis)  No Known Drug Allergies    Intolerances        REVIEW OF SYSTEMS:  denies    Vital Signs Last 24 Hrs  T(C): 36.7 (02 Aug 2021 12:23), Max: 36.7 (01 Aug 2021 20:59)  T(F): 98.1 (02 Aug 2021 12:23), Max: 98.1 (01 Aug 2021 20:59)  HR: 81 (02 Aug 2021 12:23) (81 - 84)  BP: 123/76 (02 Aug 2021 12:23) (123/76 - 136/76)  BP(mean): --  RR: 20 (02 Aug 2021 12:23) (16 - 20)  SpO2: 95% (02 Aug 2021 12:23) (93% - 95%)    PHYSICAL EXAM:  GENERAL: NAD, well-groomed, well-developed  HEAD:  Atraumatic, Normocephalic  EYES: EOMI, PERRLA, conjunctiva and sclera clear  ENMT: No tonsillar erythema, exudates, or enlargement; Moist mucous membranes, Good dentition, No lesions  NECK: Supple, No JVD, Normal thyroid  NERVOUS SYSTEM:  Alert & Oriented X3, Good concentration; Motor Strength 5/5 B/L upper and lower extremities; DTRs 2+ intact and symmetric  CHEST/LUNG: Clear to auscultation bilaterally; No rales, rhonchi, wheezing, or rubs  HEART: Regular rate and rhythm; No murmurs, rubs, or gallops  ABDOMEN: Soft, Nontender, Nondistended; Bowel sounds present  EXTREMITIES:  2+ Peripheral Pulses, No clubbing, cyanosis, or edema  LYMPH: No lymphadenopathy noted  SKIN: No rashes or lesions    LABS:                        12.9   10.46 )-----------( 104      ( 02 Aug 2021 07:05 )             40.0     02 Aug 2021 07:05    152    |  115    |  89     ----------------------------<  208    3.6     |  31     |  2.40     Ca    9.5        02 Aug 2021 07:05          CAPILLARY BLOOD GLUCOSE          RADIOLOGY & ADDITIONAL TESTS:    Notes Reviewed:  [x ] YES  [ ] NO    Care Discussed with Consultants/Other Providers [x ] YES  [ ] NO

## 2021-08-02 NOTE — PROGRESS NOTE ADULT - PROBLEM SELECTOR PLAN 7
PEG tube placement, on tube feedings at [facility] placed 2 weeks ago at [facility] due to progressive ALS and nutritional supplementation as per son.   - will hold feedings for now due to aspiration risk  - patient takes Nutrin 1.5 up to 5 times a day, with goal 2000 calories/day (as per son Caesar)   - IV PPI  -Nutrition and S&s consult as patient was some oral feeds at home but now has aspiration pneumonia so will be careful  -Daily oral care/ hygiene

## 2021-08-02 NOTE — PROGRESS NOTE ADULT - ASSESSMENT
70 yo M PMHx ALS, CAD (s/p MI 1 stent placed 2006), HTN, HLD, BPH admitted for urosepsis with obstructing stone.  He is now s/p stent placement for obstructing renal stone.    Urosepsis   - Pt profoundly septic upon admission with source UTI +/- aspiration PNA  - Tachycardic in setting of sepsis, now improved  - CT Chest/Abomen/Pelvis: Bibasilar aspiration pneumonia. Fluid-filled upper thoracic esophagus may reflect gastroesophageal reflux.  Mild left hydroureteronephrosis secondary to a 7 mm left UVJ calculus. Bilateral nonspecific perirenal stranding. Correlate clinically for infection  - IV antibiotics and fluids per primary team   - Remains in sr overnight without ectopy.  Not on tele.    - HR: 81 (02 Aug 2021 05:20) (77 - 84)  - BP: 132/78 (02 Aug 2021 05:20) (132/78 - 139/76)  - Continue O2 support and needed, wean as tolerated     MARLEY on CKD   - Creatinine trend improving   <--2.40, <--3.50,  <--4.90,  <--5.70,  <--5.70  - Hypernatremia worsening, IVF, free water for free water deficit as per Renal.  Slightly improving today.      Nutrition  - TF per primary team via GT  - Free water deficit managed by Renal/Nutrition    CAD  - hx of CAD s/p stent in 2006,   - Continue Plavix 75 mg QD  - routine echo    #HTN  - BP: 132/78 (02 Aug 2021 05:20) (132/78 - 139/76)  - Hx of HTN on metoprolol and Lisinopril at home, were holding for hypotension  - Tolerateing Metoprolol 25 mg QD titrate up to home dose 50 mg QD,  - Avoid Acei or arbs especially with MARLEY  - Monitor hemodynamics closely    HLD   - Continue home statin     - Monitor and replete lytes, keep K>4, Mg>2.  - All other medical needs as per primary team.  - Other cardiovascular workup will depend on clinical course.  - Will continue to follow.    Maru Wetzel, MS NP-C, AGACNP-BC  Nurse Practitioner- Cardiology   Spectra #[phone]   70 yo M PMHx ALS, CAD (s/p MI 1 stent placed 2006), HTN, HLD, BPH admitted for urosepsis with obstructing stone.  He is now s/p stent placement for obstructing renal stone.    Urosepsis   - Pt profoundly septic upon admission with source UTI +/- aspiration PNA  - Tachycardic in setting of sepsis, now improved  - CT Chest/Abomen/Pelvis: Bibasilar aspiration pneumonia. Fluid-filled upper thoracic esophagus may reflect gastroesophageal reflux.  Mild left hydroureteronephrosis secondary to a 7 mm left UVJ calculus. Bilateral nonspecific perirenal stranding. Correlate clinically for infection  - IV antibiotics and fluids per primary team   - HR: 81 (02 Aug 2021 05:20) (77 - 84)  - BP: 132/78 (02 Aug 2021 05:20) (132/78 - 139/76)  - Continue O2 support and needed, wean as tolerated     MARLEY on CKD   - Creatinine trend improving   <--2.40, <--3.50,  <--4.90,  <--5.70,  <--5.70  - Hypernatremia worsening, IVF, free water for free water deficit as per Renal.  Slightly improving today.      Nutrition  - TF per primary team via GT  - Free water deficit managed by Renal/Nutrition    CAD  - hx of CAD s/p stent in 2006,   - Continue Plavix 75 mg QD  - routine echo    #HTN  - BP: 132/78 (02 Aug 2021 05:20) (132/78 - 139/76)  - Hx of HTN on metoprolol and Lisinopril at home, were holding for hypotension  - Tolerateing Metoprolol 25 mg QD titrate up to home dose 50 mg QD,  - Avoid Acei or arbs especially with MARLEY  - Monitor hemodynamics closely    HLD   - Continue home statin     - Monitor and replete lytes, keep K>4, Mg>2.  - All other medical needs as per primary team.  - Other cardiovascular workup will depend on clinical course.  - Will continue to follow.    Maru Wetzel, MS NP-C, AGACNP-BC  Nurse Practitioner- Cardiology   Spectra #[phone]

## 2021-08-02 NOTE — PROGRESS NOTE ADULT - SUBJECTIVE AND OBJECTIVE BOX
Neurology Follow up note(COVERING)    SHARITA [id number]    HPI:  70 yo M PMHx ALS, CAD (s/p MI 1 stent placed 2006 on DAPT), HTN, HLD, BPH presenting with diaphoresis, shortness of breath. History taken by Shadia (wife) who lives with him. She states that fell coming out of the bathroom 2 days ago, fell on his buttocks, no head injury. Since then he has felt generalized weakness, and profuse diaphoresis. Prior to this he was in his usual state of health.   Patient has a history of ALS previously on riluzole which was stopped due to intolerance. Patient had a PEG tube and began to straight cath several months ago due to progressive ALS. He uses a walker and cane at home with no difficulty until 2 days ago.     As per the spouse, his urine looked cloudy, dark yellow with odor. Denies hematuria.     Of note, patient last admitted to [place] 11/9 --> 11/12/2020 for urinary retention, jo placed, likely cause BPH and noncompliance with flomax.     ED Course:   Vital Signs: /69  T 98 F SpO2 70% on RA --> 90% on NRB   EKG: normal sinus rhythm, no acute ST or T wave changes   Labs:   White Count: 29.47, Lactate 2.7, BUN/Cr: 113/5.70 Alk Phos 223   Imaging:   CXR: Scattered and lower lung field interstitial infiltrates are presently seen anterior greater on the right at this time. These are new since November 9, 2020.  IMPRESSION: Bilateral infiltrates as above.    CT Chest/Abomen/Pelvis: Bibasilar aspiration pneumonia. Fluid-filled upper thoracic esophagus may reflect gastroesophageal reflux.  Mild left hydroureteronephrosis secondary to a 7 mm left UVJ calculus. Bilateral nonspecific perirenal stranding. Correlate clinically for infection  Prostatomegaly.    Given:   2 L NS Bolus, Rocephin X 1   Jo Placed in ED  (29 Jul 2021 15:13)      Interval History -no complaints    Patient is seen, chart was reviewed and case was discussed with the treatment team.  Pt is not in any distress.   Lying on bed comfortably.       `Vital Signs Last 24 Hrs  T(C): 36.7 (02 Aug 2021 12:23), Max: 36.7 (01 Aug 2021 20:59)  T(F): 98.1 (02 Aug 2021 12:23), Max: 98.1 (01 Aug 2021 20:59)  HR: 81 (02 Aug 2021 12:23) (81 - 84)  BP: 123/76 (02 Aug 2021 12:23) (123/76 - 136/76)  BP(mean): --  RR: 20 (02 Aug 2021 12:23) (16 - 20)  SpO2: 95% (02 Aug 2021 12:23) (93% - 95%)        REVIEW OF SYSTEMS:    Constitutional: No fever,  Eyes: No eye pain, visual disturbances, or discharge  ENT:  No difficulty hearing, tinnitus, vertigo; No sinus or throat pain  Neck: No pain or stiffness  Respiratory: No , chills or hemoptysis  Cardiovascular: No chest pain, palpitations,   Gastrointestinal: No abdominal or epigastric pain. No nausea, vomiting or hematemesis; No diarrhea or constipati  Genitourinary: No dysuria, frequency, hematuria or incontinence  Neurological: No headaches,   Psychiatric: No depression, anxiety, mood swings or difficulty sleeping  Musculoskeletal: No joint pain or swelling; No muscle, back or extremity pain  Skin: No itching, burning, rashes or lesions   Lymph Nodes: No enlarged glands  Endocrine: No heat or cold intolerance;  Allergy and Immunologic: No hives or eczema    On Neurological Examination:    Mental Status - Pt is alert, awake, orie. Follows commands well     Speech;  Pt has hypophonic speech      Cranial Nerves - Pupils 3 mm equal and reactive to light, extraocular eye movements intact.   Pt has no  facial asymmetry. Facial sensation is intact.  Tongue - is in midline.    Muscle tone - is normal        Motor Exam -hand grasp 3+/5 rest of UE 4/5  3-4/5 LE    Sensory Exam - Pin prick, temperature, joint position and vibration are intact on either side. Pt withdraws all extremities equally on stimulation. No asymmetry seen. No complaints of tingling, numbness.        coordination:    Finger to nose: normal    Deep tendon Reflexes - 2 plus all over.          Neck Supple -  Yes.     MEDICATIONS    acetaminophen   Tablet .. 650 milliGRAM(s) Oral every 6 hours PRN  atorvastatin 80 milliGRAM(s) Oral at bedtime  cefepime   IVPB      chlorhexidine 2% Cloths 1 Application(s) Topical <User Schedule>  clopidogrel Tablet 75 milliGRAM(s) Oral daily  dextrose 5%. 1000 milliLiter(s) IV Continuous <Continuous>  escitalopram 10 milliGRAM(s) Oral daily  heparin   Injectable 5000 Unit(s) SubCutaneous every 8 hours  melatonin 3 milliGRAM(s) Oral at bedtime PRN  ondansetron Injectable 4 milliGRAM(s) IV Push every 8 hours PRN      Allergies    fish (Short breath; Anaphylaxis)  No Known Drug Allergies    Intolerances        08-02    152<H>  |  115<H>  |  89<H>  ----------------------------<  208<H>  3.6   |  31  |  2.40<H>    Ca    9.5      02 Aug 2021 07:05  Mg     2.6     08-01    TPro  5.9<L>  /  Alb  1.6<L>  /  TBili  1.0  /  DBili  x   /  AST  97<H>  /  ALT  62  /  AlkPhos  260<H>  08-01                          12.9   10.46 )-----------( 104      ( 02 Aug 2021 07:05 )             40.0     Hemoglobin A1C:     Vitamin B12     RADIOLOGY    ASSESSMENT AND PLAN:      SEEN FOR AMS  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  Pain is accessed and addressed  Would continue to follow.

## 2021-08-03 DIAGNOSIS — R31.0 GROSS HEMATURIA: ICD-10-CM

## 2021-08-03 DIAGNOSIS — L60.2 ONYCHOGRYPHOSIS: ICD-10-CM

## 2021-08-03 LAB
-  CEFTRIAXONE: SIGNIFICANT CHANGE UP
-  CLINDAMYCIN: SIGNIFICANT CHANGE UP
-  ERYTHROMYCIN: SIGNIFICANT CHANGE UP
-  LEVOFLOXACIN: SIGNIFICANT CHANGE UP
-  PENICILLIN: SIGNIFICANT CHANGE UP
-  VANCOMYCIN: SIGNIFICANT CHANGE UP
ANION GAP SERPL CALC-SCNC: 5 MMOL/L — SIGNIFICANT CHANGE UP (ref 5–17)
APPEARANCE UR: ABNORMAL
BACTERIA # UR AUTO: ABNORMAL
BASE EXCESS BLDA CALC-SCNC: 5.9 MMOL/L — HIGH (ref -2–2)
BILIRUB UR-MCNC: NEGATIVE — SIGNIFICANT CHANGE UP
BLOOD GAS COMMENTS ARTERIAL: SIGNIFICANT CHANGE UP
BUN SERPL-MCNC: 70 MG/DL — HIGH (ref 7–23)
CALCIUM SERPL-MCNC: 9.3 MG/DL — SIGNIFICANT CHANGE UP (ref 8.5–10.1)
CHLORIDE SERPL-SCNC: 111 MMOL/L — HIGH (ref 96–108)
CO2 SERPL-SCNC: 32 MMOL/L — HIGH (ref 22–31)
COLOR SPEC: ABNORMAL
CREAT SERPL-MCNC: 1.8 MG/DL — HIGH (ref 0.5–1.3)
CULTURE RESULTS: SIGNIFICANT CHANGE UP
DIFF PNL FLD: ABNORMAL
EPI CELLS # UR: NEGATIVE — SIGNIFICANT CHANGE UP
GLUCOSE SERPL-MCNC: 180 MG/DL — HIGH (ref 70–99)
GLUCOSE UR QL: NEGATIVE — SIGNIFICANT CHANGE UP
HCO3 BLDA-SCNC: 30 MMOL/L — HIGH (ref 23–27)
HCT VFR BLD CALC: 40.4 % — SIGNIFICANT CHANGE UP (ref 39–50)
HGB BLD-MCNC: 13 G/DL — SIGNIFICANT CHANGE UP (ref 13–17)
HOROWITZ INDEX BLDA+IHG-RTO: 21 — SIGNIFICANT CHANGE UP
KETONES UR-MCNC: NEGATIVE — SIGNIFICANT CHANGE UP
LEUKOCYTE ESTERASE UR-ACNC: ABNORMAL
METHOD TYPE: SIGNIFICANT CHANGE UP
METHOD TYPE: SIGNIFICANT CHANGE UP
NITRITE UR-MCNC: NEGATIVE — SIGNIFICANT CHANGE UP
PCO2 BLDA: 40 MMHG — SIGNIFICANT CHANGE UP (ref 32–46)
PH BLDA: 7.48 — HIGH (ref 7.35–7.45)
PH UR: 5 — SIGNIFICANT CHANGE UP (ref 5–8)
PO2 BLDA: 68 MMHG — LOW (ref 74–108)
POTASSIUM SERPL-MCNC: 3.8 MMOL/L — SIGNIFICANT CHANGE UP (ref 3.5–5.3)
POTASSIUM SERPL-SCNC: 3.8 MMOL/L — SIGNIFICANT CHANGE UP (ref 3.5–5.3)
PROT UR-MCNC: 100
RBC CASTS # UR COMP ASSIST: >50 /HPF (ref 0–4)
SAO2 % BLDA: 95 % — SIGNIFICANT CHANGE UP (ref 92–96)
SODIUM SERPL-SCNC: 148 MMOL/L — HIGH (ref 135–145)
SP GR SPEC: 1.01 — SIGNIFICANT CHANGE UP (ref 1.01–1.02)
SPECIMEN SOURCE: SIGNIFICANT CHANGE UP
UROBILINOGEN FLD QL: NEGATIVE — SIGNIFICANT CHANGE UP
WBC UR QL: SIGNIFICANT CHANGE UP

## 2021-08-03 PROCEDURE — 99497 ADVNCD CARE PLAN 30 MIN: CPT

## 2021-08-03 PROCEDURE — 99221 1ST HOSP IP/OBS SF/LOW 40: CPT

## 2021-08-03 PROCEDURE — 99232 SBSQ HOSP IP/OBS MODERATE 35: CPT

## 2021-08-03 RX ORDER — CEFEPIME 1 G/1
1000 INJECTION, POWDER, FOR SOLUTION INTRAMUSCULAR; INTRAVENOUS EVERY 12 HOURS
Refills: 0 | Status: DISCONTINUED | OUTPATIENT
Start: 2021-08-03 | End: 2021-08-05

## 2021-08-03 RX ORDER — MULTIVIT-MIN/FERROUS GLUCONATE 9 MG/15 ML
15 LIQUID (ML) ORAL DAILY
Refills: 0 | Status: DISCONTINUED | OUTPATIENT
Start: 2021-08-03 | End: 2021-08-05

## 2021-08-03 RX ORDER — LACTOBACILLUS ACIDOPHILUS 100MM CELL
1 CAPSULE ORAL THREE TIMES A DAY
Refills: 0 | Status: DISCONTINUED | OUTPATIENT
Start: 2021-08-03 | End: 2021-08-05

## 2021-08-03 RX ORDER — POTASSIUM CHLORIDE 20 MEQ
10 PACKET (EA) ORAL
Refills: 0 | Status: DISCONTINUED | OUTPATIENT
Start: 2021-08-03 | End: 2021-08-05

## 2021-08-03 RX ADMIN — SODIUM CHLORIDE 75 MILLILITER(S): 9 INJECTION, SOLUTION INTRAVENOUS at 23:25

## 2021-08-03 RX ADMIN — ATORVASTATIN CALCIUM 80 MILLIGRAM(S): 80 TABLET, FILM COATED ORAL at 21:25

## 2021-08-03 RX ADMIN — CLOPIDOGREL BISULFATE 75 MILLIGRAM(S): 75 TABLET, FILM COATED ORAL at 11:49

## 2021-08-03 RX ADMIN — Medication 25 MILLIGRAM(S): at 05:05

## 2021-08-03 RX ADMIN — Medication 10 MILLIEQUIVALENT(S): at 17:16

## 2021-08-03 RX ADMIN — SODIUM CHLORIDE 100 MILLILITER(S): 9 INJECTION, SOLUTION INTRAVENOUS at 05:19

## 2021-08-03 RX ADMIN — Medication 1 TABLET(S): at 21:25

## 2021-08-03 RX ADMIN — CEFEPIME 100 MILLIGRAM(S): 1 INJECTION, POWDER, FOR SOLUTION INTRAMUSCULAR; INTRAVENOUS at 23:21

## 2021-08-03 RX ADMIN — ESCITALOPRAM OXALATE 10 MILLIGRAM(S): 10 TABLET, FILM COATED ORAL at 11:49

## 2021-08-03 RX ADMIN — CEFEPIME 100 MILLIGRAM(S): 1 INJECTION, POWDER, FOR SOLUTION INTRAMUSCULAR; INTRAVENOUS at 11:48

## 2021-08-03 RX ADMIN — SODIUM CHLORIDE 75 MILLILITER(S): 9 INJECTION, SOLUTION INTRAVENOUS at 15:15

## 2021-08-03 RX ADMIN — Medication 1 TABLET(S): at 15:15

## 2021-08-03 NOTE — OCCUPATIONAL THERAPY INITIAL EVALUATION ADULT - PERTINENT HX OF CURRENT PROBLEM, REHAB EVAL
72 yo male with PMH ALS, recent PEG placement, CAD (s/p MI 1 stent placed 2006), HTN, HLD, BPH presented to [facility] with fever, SOB, urinary symptoms. Pt found to have b/l pneumonia and septic urologic stone. Pt s/p L ureteral stent placement. 72 yo male with PMH ALS, recent PEG placement, CAD (s/p MI 1 stent placed 2006), HTN, HLD, BPH presented to [facility] with fever, SOB, urinary symptoms. Pt found to have b/l pneumonia and septic urologic stone. Pt s/p L ureteral stent placement 7/29/2021.

## 2021-08-03 NOTE — PROGRESS NOTE ADULT - SUBJECTIVE AND OBJECTIVE BOX
Patient is a 71y old  Male who presents with a chief complaint of Sepsis, UTI (03 Aug 2021 16:00)      INTERVAL /OVERNIGHT EVENTS: hematuria overnight, family wants oral feeds    MEDICATIONS  (STANDING):  atorvastatin 80 milliGRAM(s) Oral at bedtime  cefepime   IVPB 1000 milliGRAM(s) IV Intermittent every 12 hours  dextrose 5%. 1000 milliLiter(s) (75 mL/Hr) IV Continuous <Continuous>  escitalopram 10 milliGRAM(s) Oral daily  lactobacillus acidophilus 1 Tablet(s) Oral three times a day  metoprolol succinate ER 25 milliGRAM(s) Oral daily  multivitamin/minerals/iron Oral Solution (CENTRUM) 15 milliLiter(s) Oral daily  potassium chloride   Powder 10 milliEquivalent(s) Enteral Tube two times a day    MEDICATIONS  (PRN):  acetaminophen   Tablet .. 650 milliGRAM(s) Oral every 6 hours PRN Temp greater or equal to 38.5C (101.3F), Mild Pain (1 - 3)      Allergies    fish (Short breath; Anaphylaxis)  No Known Drug Allergies    Intolerances        REVIEW OF SYSTEMS:  unable to obtain    Vital Signs Last 24 Hrs  T(C): 36.8 (03 Aug 2021 12:11), Max: 36.8 (03 Aug 2021 12:11)  T(F): 98.2 (03 Aug 2021 12:11), Max: 98.2 (03 Aug 2021 12:11)  HR: 70 (03 Aug 2021 12:11) (70 - 77)  BP: 129/75 (03 Aug 2021 12:11) (127/75 - 135/73)  BP(mean): --  RR: 18 (03 Aug 2021 12:11) (18 - 18)  SpO2: 96% (03 Aug 2021 12:11) (94% - 96%)    PHYSICAL EXAM:  GENERAL: NAD, well-groomed, well-developed  HEAD:  Atraumatic, Normocephalic  EYES: EOMI, PERRLA, conjunctiva and sclera clear  ENMT: No tonsillar erythema, exudates, or enlargement; Moist mucous membranes, Good dentition, No lesions  NECK: Supple, No JVD, Normal thyroid  NERVOUS SYSTEM:  Alert & awake; Motor Strength 5/5 B/L upper and lower extremities; DTRs 2+ intact and symmetric  CHEST/LUNG: Clear to auscultation bilaterally; No rales, rhonchi, wheezing, or rubs  HEART: Regular rate and rhythm; No murmurs, rubs, or gallops  ABDOMEN: Soft, Nontender, Nondistended; Bowel sounds present  EXTREMITIES:  2+ Peripheral Pulses, No clubbing, cyanosis, or edema  LYMPH: No lymphadenopathy noted  SKIN: No rashes or lesions    LABS:                        13.0   x     )-----------( x        ( 03 Aug 2021 00:31 )             40.4     03 Aug 2021 08:17    148    |  111    |  70     ----------------------------<  180    3.8     |  32     |  1.80     Ca    9.3        03 Aug 2021 08:17        Urinalysis Basic - ( 03 Aug 2021 00:15 )    Color: Red / Appearance: Slightly Turbid / S.010 / pH: x  Gluc: x / Ketone: Negative  / Bili: Negative / Urobili: Negative   Blood: x / Protein: 100 / Nitrite: Negative   Leuk Esterase: Small / RBC: >50 /HPF / WBC 3-5   Sq Epi: x / Non Sq Epi: Negative / Bacteria: Few      CAPILLARY BLOOD GLUCOSE          RADIOLOGY & ADDITIONAL TESTS:    Notes Reviewed:  [ x] YES  [ ] NO    Care Discussed with Consultants/Other Providers [x ] YES  [ ] NO

## 2021-08-03 NOTE — PROGRESS NOTE ADULT - SUBJECTIVE AND OBJECTIVE BOX
[facility]  INFECTIOUS DISEASES   [address] [place] [address]  Tel: [phone]     Fax: [phone]  =======================================================    [id number]  SHARITA HOUGH     Follow up: Urosepsis and aspiration pneumonia    Lying down, awake and alert responding to questions by nodding, comfortable, no fever.   Leukocytosis normalized.     PAST MEDICAL & SURGICAL HISTORY:  Myocardial infarct  Hypertension  Hyperlipidemia  BPH (benign prostatic hyperplasia)  ALS (amyotrophic lateral sclerosis)  S/P tonsillectomy  S/P coronary artery stent placement  History of hip surgery  History of hernia repair  &gt; 20 years ago  H/O shoulder surgery    Social Hx: No smoking, ETOH Or drugs     FAMILY HISTORY:  FHx: myocardial infarction (Father)    FHx: hyperlipidemia    Allergies  fish (Short breath; Anaphylaxis)  No Known Drug Allergies    Antibiotics:     piperacillin/tazobactam IVPB.. 3.375 Gram(s) IV Intermittent once     REVIEW OF SYSTEMS:  CONSTITUTIONAL:  No Fever or chills,   HEENT:  No diplopia or blurred vision.  No sore throat or runny nose.  CARDIOVASCULAR:  No chest pain, SOB better.  RESPIRATORY:  No cough, no shortness of breath  GASTROINTESTINAL:  No nausea, vomiting or diarrhea.  GENITOURINARY:  cloudy urine with odor   MUSCULOSKELETAL:  no joint aches, no muscle pain  SKIN:  No change in skin, hair or nails.  NEUROLOGIC: weakness in all extremities due to ALS  PSYCHIATRIC:  No disorder of thought or mood.  ENDOCRINE:  No heat or cold intolerance, polyuria or polydipsia.  HEMATOLOGICAL:  No easy bruising or bleeding.     Physical Exam:  Vital Signs Last 24 Hrs  T(C): 36.8 (03 Aug 2021 12:11), Max: 36.8 (03 Aug 2021 12:11)  T(F): 98.2 (03 Aug 2021 12:11), Max: 98.2 (03 Aug 2021 12:11)  HR: 70 (03 Aug 2021 12:11) (70 - 77)  BP: 129/75 (03 Aug 2021 12:11) (127/75 - 135/73)  BP(mean): --  RR: 18 (03 Aug 2021 12:11) (18 - 18)  SpO2: 96% (03 Aug 2021 12:11) (94% - 96%)  GEN: NAD,  HEENT: normocephalic and atraumatic. EOMI. PERRL.    NECK: Supple.  No lymphadenopathy   LUNGS: Clear to auscultation.  HEART: Regular rate and rhythm without murmur.  ABDOMEN: Soft, nontender, and nondistended.  Positive bowel sounds.  PEG in place   : No CVA tenderness, jo with cloudy urine   EXTREMITIES: Muscular atrophy, motor weakness in all extremities   NEUROLOGIC: grossly intact.  PSYCHIATRIC: Appropriate affect .  SKIN: No rash    Labs:                        13.0   x     )-----------( x        ( 03 Aug 2021 00:31 )             40.4     08-03    148<H>  |  111<H>  |  70<H>  ----------------------------<  180<H>  3.8   |  32<H>  |  1.80<H>    Ca    9.3      03 Aug 2021 08:17    Culture - Blood (collected 07-31-21 @ 12:23)  Source: .Blood Blood-Venous    Culture - Blood (collected 07-31-21 @ 12:23)  Source: .Blood Blood    Culture - Urine (collected 07-30-21 @ 00:49)  Source: Kidney Kidney  Final Report (08-02-21 @ 10:22):    Moderate Klebsiella oxytoca/Raoutella ornithinolytica    Few Enterobacter cloacae complex    Numerous Streptococcus constellatus "Susceptibilities not performed"  Organism: Klebsiella oxytoca /Raoutella ornithinolytica  Enterobacter cloacae complex (08-02-21 @ 10:22)  Organism: Enterobacter cloacae complex (08-02-21 @ 10:22)    Sensitivities:      -  Amikacin: S <=16      -  Amoxicillin/Clavulanic Acid: R 16/8      -  Ampicillin: R 16 These ampicillin results predict results for amoxicillin      -  Ampicillin/Sulbactam: R 8/4 Enterobacter, Citrobacter, and Serratia may develop resistance during prolonged therapy (3-4 days)      -  Aztreonam: S <=4      -  Cefazolin: R >16      -  Cefepime: S <=2      -  Cefoxitin: R >16      -  Ceftriaxone: S <=1 Enterobacter, Citrobacter, and Serratia may develop resistance during prolonged therapy      -  Ciprofloxacin: S <=0.25      -  Ertapenem: S <=0.5      -  Gentamicin: S <=2      -  Imipenem: S <=1      -  Levofloxacin: S <=0.5      -  Meropenem: S <=1      -  Piperacillin/Tazobactam: S <=8      -  Tigecycline: S <=2      -  Tobramycin: S <=2      -  Trimethoprim/Sulfamethoxazole: S <=0.5/9.5      Method Type: ROSEMARIE  Organism: Klebsiella oxytoca /Raoutella ornithinolytica (08-02-21 @ 10:22)    Sensitivities:      -  Amikacin: S <=16      -  Amoxicillin/Clavulanic Acid: S <=8/4      -  Ampicillin: R >16 These ampicillin results predict results for amoxicillin      -  Ampicillin/Sulbactam: R >16/8 Enterobacter, Citrobacter, and Serratia may develop resistance during prolonged therapy (3-4 days)      -  Aztreonam: S <=4      -  Cefazolin: R >16      -  Cefepime: S <=2      -  Cefoxitin: S <=8      -  Ceftriaxone: I 2 Enterobacter, Citrobacter, and Serratia may develop resistance during prolonged therapy      -  Ciprofloxacin: S <=0.25      -  Ertapenem: S <=0.5      -  Gentamicin: S <=2      -  Imipenem: S <=1      -  Levofloxacin: S <=0.5      -  Meropenem: S <=1      -  Piperacillin/Tazobactam: I 64      -  Tigecycline: S <=2      -  Tobramycin: S <=2      -  Trimethoprim/Sulfamethoxazole: S <=0.5/9.5      Method Type: ROSEMARIE    Culture - Blood (collected 07-29-21 @ 18:10)  Source: .Blood Blood-Peripheral  Gram Stain (07-31-21 @ 09:01):    Growth in anaerobic bottle: Gram Negative Rods and Gram positive cocci in    pairs    Growth in aerobic bottle: Gram Negative Rods  Final Report (08-03-21 @ 13:01):    Growth in aerobic and anaerobic bottles: Klebsiella oxytoca/Raoutella    ornithinolytica    Growth in anaerobic bottle: Streptococcus constellatus    See previous culture [id number]    Culture - Blood (collected 07-29-21 @ 18:10)  Source: .Blood Blood-Peripheral  Gram Stain (08-01-21 @ 06:58):    Upon re-evaluation of gram stain:    Growth in anaerobic bottle: Gram Negative Rods    Growth in anaerobic bottle: Gram positive cocci in pairs    Growth in aerobic bottle: Gram positive cocci in pairs  Organism: Streptococcus constellatus (08-03-21 @ 13:21)    Sensitivities:      -  Clindamycin: S      -  Erythromycin: S      -  Levofloxacin: S      -  Vancomycin: S      Method Type: KB  Organism: Blood Culture PCR  Klebsiella oxytoca /Raoutella ornithinolytica  Streptococcus constellatus (08-03-21 @ 13:14)  Organism: Streptococcus constellatus (08-03-21 @ 13:14)    Sensitivities:      -  Ceftriaxone: S 0.38      -  Penicillin: S 0.094      Method Type: ETEST  Organism: Klebsiella oxytoca /Raoutella ornithinolytica (08-01-21 @ 09:24)    Sensitivities:      -  Amikacin: S <=16      -  Ampicillin: R >16 These ampicillin results predict results for amoxicillin      -  Ampicillin/Sulbactam: R >16/8 Enterobacter, Citrobacter, and Serratia may develop resistance during prolonged therapy (3-4 days)      -  Aztreonam: S <=4      -  Cefazolin: R >16 Enterobacter, Citrobacter, and Serratia may develop resistance during prolonged therapy (3-4 days)      -  Cefepime: S <=2      -  Cefoxitin: S <=8      -  Ceftriaxone: S <=1 Enterobacter, Citrobacter, and Serratia may develop resistance during prolonged therapy      -  Ciprofloxacin: S <=0.25      -  Ertapenem: S <=0.5      -  Gentamicin: S <=2      -  Imipenem: S <=1      -  Levofloxacin: S <=0.5      -  Meropenem: S <=1      -  Piperacillin/Tazobactam: I 64      -  Tobramycin: S <=2      -  Trimethoprim/Sulfamethoxazole: S <=0.5/9.5      Method Type: ROSEMARIE  Organism: Blood Culture PCR (07-30-21 @ 15:13)    Sensitivities:      -  Klebsiella oxytoca: Detec      -  Streptococcus anginosus group: Detec      Method Type: PCR    Culture - Urine (collected 07-29-21 @ 17:54)  Source: Clean Catch Clean Catch (Midstream)  Final Report (07-31-21 @ 10:38):    >=3 organisms. Probable collection contamination.    WBC Count: 10.46 K/uL (08-02-21 @ 07:05)  WBC Count: 7.64 K/uL (08-01-21 @ 07:40)  WBC Count: 10.60 K/uL (07-31-21 @ 08:14)  WBC Count: 20.44 K/uL (07-30-21 @ 05:33)    Creatinine, Serum: 1.80 mg/dL (08-03-21 @ 08:17)  Creatinine, Serum: 2.40 mg/dL (08-02-21 @ 07:05)  Creatinine, Serum: 3.50 mg/dL (08-01-21 @ 07:40)  Creatinine, Serum: 4.90 mg/dL (07-31-21 @ 08:14)  Creatinine, Serum: 5.70 mg/dL (07-30-21 @ 05:33)    Procalcitonin, Serum: 7.60 ng/mL (08-03-21 @ 08:17)  Procalcitonin, Serum: 301.8 ng/mL (07-29-21 @ 13:35)     SARS-CoV-2: NotDetec (07-29-21 @ 13:33)  Rapid RVP Result: NotDetec (07-29-21 @ 13:33)    All imaging and other data have been reviewed.  < from: CT Chest No Cont (07.29.21 @ 15:12) >  IMPRESSION:  Bibasilar aspiration pneumonia.  Fluid-filled upper thoracic esophagus may reflect gastroesophageal reflux.  Mild left hydroureteronephrosis secondary to a 7 mm left UVJ calculus.  Bilateral nonspecific perirenal stranding. Correlate clinically for infection  Prostatomegaly.    Assessment and Plan:   70 yo man with PMH of HTN, BPH, CAD and ALS with PEG, was admitted with not looking right, change in urine color, diaphoresis and shortness of breath.  Had a fall 2 days ago since then not feeling well, possibly related to sepsis due to UTI/pyelonephritis and aspiration pneumonia.   As per the spouse, his urine looked cloudy, dark yellow with odor. Denies hematuria.   Of note, patient last admitted to [facility] 11/9 --> 11/12/2020 for urinary retention, jo placed.   In ED Spo2 was 70a5 so placed on NRB  WBC 29k  Procalcitonin 301.8  UA with high WBC >50 and also RBC 25-50  CT Chest/Abomen/Pelvis: Bibasilar aspiration pneumonia. Fluid-filled upper thoracic esophagus may reflect gastroesophageal reflux.  Mild left hydroureteronephrosis secondary to a 7 mm left UVJ calculus. Bilateral nonspecific perirenal stranding. Correlate clinically for infection, Prostatomegaly.  S/P Left stent placed by urology on 7/29  Blood culture 7/29 Klebsiella oxytoca and strep   Urine culture >3 bactereia  Kidney urine (from OR) with Klebsiella, strep and enterobacter   Repeat blood culture on 7/31 negative     1- Ecoli Bacteremia   2- UTI/pyelonephritis  3- Renal stone with left hydronephrosis   4- MARLEY  5- ALS  6- ?Aspiration Pneumonia    Recommendations:  - Repeat blood culture negative on 7/31  - Continue Cefepime will increase the dose to 1gm q12 since renal function improving   - Follow WBC 29k-->10k,  - Follow Creat 1.8, trend it to adjust meds   - Stent management as per urology, hematuria expected   - Will treat for total 14 days total. Last day 8/11    Will follow.    Santhosh Burton MD  Division of Infectious Diseases   Cell [phone] between 8am and 6pm   After 6pm and weekends please call ID service at [phone].

## 2021-08-03 NOTE — CONSULT NOTE ADULT - CONSULT REQUESTED DATE/TIME
29-Jul-2021 16:53
29-Jul-2021 16:13
29-Jul-2021 17:42
29-Jul-2021 18:09
03-Aug-2021 13:37
03-Aug-2021 08:05

## 2021-08-03 NOTE — OCCUPATIONAL THERAPY INITIAL EVALUATION ADULT - GENERAL OBSERVATIONS, REHAB EVAL
Patient found supine in bed with +IV left UE, +SCD, +Shukla catheter, +PEG, supplemental oxygen. Patient non-verbal during evaluation.

## 2021-08-03 NOTE — OCCUPATIONAL THERAPY INITIAL EVALUATION ADULT - ADDITIONAL COMMENTS
Patient is non-verbal at this time and social info obtained from EMR and spouse. Patient lives in a ranch style house with 2 steps with handrail to enter. Patient has a bathtub with shower doors and grab bars. Patient ambulated using a rolling walker or cane. Until recently patient was able to dress and bathe himself. Patient performed sit to stand and ambulated from bed to bedside chair using RW with min assist x 1 + 1 person to manage equipment. Patient requires assistance with ADL's and transfers due to decreased strength, decreased fine motor skills, decreased endurance and impaired sitting/standing balance.

## 2021-08-03 NOTE — PROGRESS NOTE ADULT - SUBJECTIVE AND OBJECTIVE BOX
INTERVAL HPI/OVERNIGHT EVENTS: developed hematuria overnight, HD stable and labs unchanged    MEDICATIONS  (STANDING):  atorvastatin 80 milliGRAM(s) Oral at bedtime  cefepime   IVPB      cefepime   IVPB 1000 milliGRAM(s) IV Intermittent every 24 hours  clopidogrel Tablet 75 milliGRAM(s) Oral daily  dextrose 5%. 1000 milliLiter(s) (100 mL/Hr) IV Continuous <Continuous>  escitalopram 10 milliGRAM(s) Oral daily  metoprolol succinate ER 25 milliGRAM(s) Oral daily  potassium chloride   Powder 10 milliEquivalent(s) Enteral Tube daily    MEDICATIONS  (PRN):  acetaminophen   Tablet .. 650 milliGRAM(s) Oral every 6 hours PRN Temp greater or equal to 38.5C (101.3F), Mild Pain (1 - 3)  melatonin 3 milliGRAM(s) Oral at bedtime PRN Insomnia  ondansetron Injectable 4 milliGRAM(s) IV Push every 8 hours PRN Nausea and/or Vomiting        Vital Signs Last 24 Hrs  T(C): 36.6 (03 Aug 2021 05:29), Max: 36.7 (02 Aug 2021 12:23)  T(F): 97.9 (03 Aug 2021 05:29), Max: 98.1 (02 Aug 2021 12:23)  HR: 77 (03 Aug 2021 05:29) (71 - 81)  BP: 135/73 (03 Aug 2021 05:29) (123/76 - 135/73)  BP(mean): --  RR: 18 (03 Aug 2021 05:29) (18 - 20)  SpO2: 94% (03 Aug 2021 05:29) (94% - 95%)    PHYSICAL EXAM:    ABDOMEN: soft, NT  GENITALIA: urine punch colored, draining well.    LABS:                        13.0   x     )-----------( x        ( 03 Aug 2021 00:31 )             40.4     08-02    152<H>  |  115<H>  |  89<H>  ----------------------------<  208<H>  3.6   |  31  |  2.40<H>    Ca    9.5      02 Aug 2021 07:05        Urinalysis Basic - ( 03 Aug 2021 00:15 )    Color: Red / Appearance: Slightly Turbid / S.010 / pH: x  Gluc: x / Ketone: Negative  / Bili: Negative / Urobili: Negative   Blood: x / Protein: 100 / Nitrite: Negative   Leuk Esterase: Small / RBC: >50 /HPF / WBC 3-5   Sq Epi: x / Non Sq Epi: Negative / Bacteria: Few      Urine culture:   @ 12:23 --   No growth to date.      RADIOLOGY & ADDITIONAL TESTS:

## 2021-08-03 NOTE — CONSULT NOTE ADULT - SUBJECTIVE AND OBJECTIVE BOX
PULMONARY/CRITICAL CARE        Patient is a 71y old  Male who presents with a chief complaint of Sepsis, UTI (03 Aug 2021 07:55) Possible pneumonia.     BRIEF HOSPITAL COURSE: ***72 yo M PMHx ALS, CAD (s/p MI 1 stent placed ), HTN, HLD, BPH presents to [facility] with fever, SOB, urinary symptoms. Found to have b/l PNA, and septic urologic stone.     Denies SOB. Since MOnday he has been more lethargic and more SOB. He just had a PEG placed about 1 week ago and has not used it much yet. He is still having oral diet,. also straight caths himself for urine. No alcohol use, no smoking. Allergies to Fish.  Now has jo for hematuria.   Was fully verbal prior to admission, now nonverbal.       Events last 24 hours: ***    PAST MEDICAL & SURGICAL HISTORY:  Myocardial infarct    Hypertension    Hyperlipidemia    BPH (benign prostatic hyperplasia)    ALS (amyotrophic lateral sclerosis)    S/P tonsillectomy    S/P coronary artery stent placement    History of hip surgery    History of hernia repair  &gt; 20 years ago    H/O shoulder surgery      Allergies    fish (Short breath; Anaphylaxis)  No Known Drug Allergies    Intolerances      FAMILY HISTORY:/ social: denies cigs, etoh  FHx: myocardial infarction (Father)    FHx: hyperlipidemia        Review of Systems:  CONSTITUTIONAL: No fever, chills, or fatigue  EYES: No eye pain, visual disturbances, or discharge  ENMT:  No difficulty hearing, tinnitus, vertigo; No sinus or throat pain  NECK: No pain or stiffness  RESPIRATORY: No cough, wheezing, chills or hemoptysis; No shortness of breath  CARDIOVASCULAR: No chest pain, palpitations, dizziness, or leg swelling  GASTROINTESTINAL: No abdominal or epigastric pain. No nausea, vomiting, or hematemesis; No diarrhea or constipation. No melena or hematochezia.  GENITOURINARY: No dysuria, frequency, hematuria, or incontinence  NEUROLOGICAL: No headaches, memory loss, has significant loss of strength,   SKIN: No itching, burning, rashes, or lesions   MUSCULOSKELETAL: No joint pain or swelling; No muscle, back, or extremity pain  PSYCHIATRIC: No depression, anxiety, mood swings, or difficulty sleeping      Medications:  cefepime   IVPB      cefepime   IVPB 1000 milliGRAM(s) IV Intermittent every 24 hours    metoprolol succinate ER 25 milliGRAM(s) Oral daily      acetaminophen   Tablet .. 650 milliGRAM(s) Oral every 6 hours PRN  escitalopram 10 milliGRAM(s) Oral daily  melatonin 3 milliGRAM(s) Oral at bedtime PRN  ondansetron Injectable 4 milliGRAM(s) IV Push every 8 hours PRN      clopidogrel Tablet 75 milliGRAM(s) Oral daily        atorvastatin 80 milliGRAM(s) Oral at bedtime    dextrose 5%. 1000 milliLiter(s) IV Continuous <Continuous>  potassium chloride   Powder 10 milliEquivalent(s) Enteral Tube daily                ICU Vital Signs Last 24 Hrs  T(C): 36.6 (03 Aug 2021 05:29), Max: 36.7 (02 Aug 2021 12:23)  T(F): 97.9 (03 Aug 2021 05:29), Max: 98.1 (02 Aug 2021 12:23)  HR: 77 (03 Aug 2021 05:29) (71 - 81)  BP: 135/73 (03 Aug 2021 05:29) (123/76 - 135/73)  BP(mean): --  ABP: --  ABP(mean): --  RR: 18 (03 Aug 2021 05:29) (18 - 20)  SpO2: 94% (03 Aug 2021 05:29) (94% - 95%)    Vital Signs Last 24 Hrs  T(C): 36.6 (03 Aug 2021 05:29), Max: 36.7 (02 Aug 2021 12:23)  T(F): 97.9 (03 Aug 2021 05:29), Max: 98.1 (02 Aug 2021 12:23)  HR: 77 (03 Aug 2021 05:29) (71 - 81)  BP: 135/73 (03 Aug 2021 05:29) (123/76 - 135/73)  BP(mean): --  RR: 18 (03 Aug 2021 05:29) (18 - 20)  SpO2: 94% (03 Aug 2021 05:29) (94% - 95%)        I&O's Detail    02 Aug 2021 07:01  -  03 Aug 2021 07:00  --------------------------------------------------------  IN:    dextrose 5%: 2210 mL    Enteral Tube Flush: 180 mL    Free Water: 950 mL    IV PiggyBack: 50 mL    Osmolite: 1170 mL  Total IN: 4560 mL    OUT:    Indwelling Catheter - Urethral (mL): 2300 mL  Total OUT: 2300 mL    Total NET: 2260 mL            LABS:                        13.0   x     )-----------( x        ( 03 Aug 2021 00:31 )             40.4     08-02    152<H>  |  115<H>  |  89<H>  ----------------------------<  208<H>  3.6   |  31  |  2.40<H>    Ca    9.5      02 Aug 2021 07:05            CAPILLARY BLOOD GLUCOSE          Urinalysis Basic - ( 03 Aug 2021 00:15 )    Color: Red / Appearance: Slightly Turbid / S.010 / pH: x  Gluc: x / Ketone: Negative  / Bili: Negative / Urobili: Negative   Blood: x / Protein: 100 / Nitrite: Negative   Leuk Esterase: Small / RBC: >50 /HPF / WBC 3-5   Sq Epi: x / Non Sq Epi: Negative / Bacteria: Few      CULTURES:  Culture Results:   No growth to date. ( @ 12:23)  Culture Results:   No growth to date. ( @ 12:23)  Culture Results:   Moderate Klebsiella oxytoca/Raoutella ornithinolytica  Few Enterobacter cloacae complex  Numerous Streptococcus constellatus "Susceptibilities not performed" ( @ 00:49)  Culture Results:   Growth in anaerobic bottle: Klebsiella oxytoca/Raoutella ornithinolytica  Growth in aerobic bottle: Streptococcus constellatus  ***Blood Panel PCR results on this specimen are available  approximately 3 hours after the Gram stain result.***  Gram stain, PCR, and/or culture results may not always  correspond due to difference in methodologies.  ************************************************************  This PCR assay was performed by multiplex PCR. This  Assay tests for 66 bacterial and resistance gene targets.  Please refer to the [facility] test directory  at [email or web address] for details. ( @ 18:10)  Culture Results:   Growth in aerobic and anaerobic bottles: Klebsiella oxytoca/Raoutella  ornithinolytica See previous culture [id number]    Growth in anaerobic bottle: Gram positive cocci in pairs ( @ 18:10)  Culture Results:   >=3 organisms. Probable collection contamination. ( @ 17:54)      Physical Examination:    General: No acute distress.  wd male sitting up comfortably in bed    HEENT: Pupils equal, reactive to light.  Symmetric.    PULM: Clear to auscultation bilaterally, decreased bs; no change percussion    CVS: Regular rate and rhythm, no murmurs, rubs, or gallops    ABD: Soft, nondistended, nontender, normoactive bowel sounds, no masses  Peg in place    EXT: No edema, nontender calves    SKIN: Warm and well perfused, no rashes noted.    NEURO: Alert, nonverbal, weakness ext.     RADIOLOGY: ***d< from: Xray Chest 1 View- PORTABLE-Urgent (Xray Chest 1 View- PORTABLE-Urgent .) (21 @ 19:25) >  EXAM:  XR CHEST PORTABLE URGENT 1V                            PROCEDURE DATE:  2021          INTERPRETATION:  Portable chest radiograph    CLINICAL INFORMATION: Tachypnea.    TECHNIQUE:  Portable  AP view of the chest was obtained.    COMPARISON: 2020 chest available for review.    FINDINGS:    The lungs show RIGHT perihilar linear airspace disease. Remaining visualized lung grossly clear. No pneumothorax.    The heart and mediastinum size and configuration are within normal limits.    Visualized osseous structures are intact.    IMPRESSION:   RIGHT perihilar linear airspace disease..    --- End of Report ---          < end of copied text >  < from: CT Chest No Cont (21 @ 15:12) >  EXAM:  CT CHEST                            PROCEDURE DATE:  2021          INTERPRETATION:  CLINICAL INFORMATION: Sepsis    COMPARISON: None.    CONTRAST/COMPLICATIONS:  IV Contrast: NONE  Oral Contrast: NONE  Complications: None reported at time of study completion    PROCEDURE:  CT of the Chest, Abdomen and Pelvis was performed.  Sagittal and coronal reformats were performed.    FINDINGS:  CHEST:  LUNGS AND LARGE AIRWAYS: Patent central airways. Extensive bibasilar dependent airspace infiltrates consistent with bilateral aspiration pneumonia  PLEURA: No pleural effusion.  VESSELS: Nonaneurysmal.  HEART: Coronary artery calcification. No pericardial effusion.  MEDIASTINUM AND GABRIELA: Mild fluid distention of the upper thoracic esophagussuggesting possible gastroesophageal reflux  CHEST WALL AND LOWER NECK: Within normal limits.    ABDOMEN AND PELVIS:  LIVER: Within normal limits.  BILE DUCTS: Normal caliber.  GALLBLADDER: Within normal limits.  SPLEEN: Within normal limits.  PANCREAS: Within normal limits.  ADRENALS: Within normal limits.  KIDNEYS/URETERS: Mild left hydroureteronephrosis secondary to a 7 mm left UVJ calculus. Punctate nonobstructive left renal calculus. Bilateral nonspecific perirenal stranding. Correlate for UTI.    BLADDER: Decompressed with Jo. Fluid-filled left lateral bladder diverticulum.  REPRODUCTIVE ORGANS: Enlarged prostate    BOWEL: No bowel obstruction or inflammation. Gastrostomy tube in situ. Appendix normal  PERITONEUM: No ascites.  VESSELS: Within normal limits.  RETROPERITONEUM/LYMPH NODES: No lymphadenopathy.  ABDOMINAL WALL: Fat-containing left inguinal hernia. Tiny fat-containing umbilical hernia.  BONES: Degenerative change.    IMPRESSION:  Bibasilar aspiration pneumonia.  Fluid-filled upper thoracic esophagus may reflect gastroesophageal reflux.  Mild left hydroureteronephrosis secondary to a 7 mm left UVJ calculus.  Bilateral nonspecific perirenal stranding. Correlate clinically for infection  Prostatomegaly.  Additional findings as discussed    --- End of Report ---    < end of copied text >      CRITICAL CARE TIME SPENT: ***

## 2021-08-03 NOTE — OCCUPATIONAL THERAPY INITIAL EVALUATION ADULT - RANGE OF MOTION EXAMINATION, UPPER EXTREMITY
Muscle strength UE's: grossly 4-/5. Fine motor skills: minimally impaired, opposition: WFL's except digit #'s 4 & 5 ~3/4 range./bilateral UE Active ROM was WFL  (within functional limits)

## 2021-08-03 NOTE — CONSULT NOTE ADULT - CONSULT REASON
hx of CAD
MARLEY
Sepsis, pneumonia and UTI
Severe sepsis
Sharp aseptic debridement for thicken, elongated toenails
ALS  ?Pneumonia/atelectasis  Uti/hematuria

## 2021-08-03 NOTE — PROGRESS NOTE ADULT - PROBLEM SELECTOR PLAN 6
Complaining of worsening generalized weakness, likely in the setting of sepsis and urinary tract infection, however, patient not currently on riluzole therapy as per spouse, due to intolerance  - Neuro following   - PT and OT consulted  -Functional quadriplegia; Needs assistance with ADL's  may need MARIBETH

## 2021-08-03 NOTE — CONSULT NOTE ADULT - PROBLEM SELECTOR RECOMMENDATION 9
- Patient seen and evaluated   - Sharp aseptic debridement with sterile nippers without incident and cleansed with alcohol wipes   - No wounds on the lower extremities appreciated or visualized  - Prevalon offloading boots ordered and will be applied by nursing to prevent any pressure ulcerations   - Patient feet are in good standing, healthy palpable pulses  - Podiatry stable  - Patient should follow up with podiatrist for routine nail care  PODIATRY FOLLOW UP INSTRUCTIONS   1. No wounds   2. Patient is to [facility] with Dr. Diaz or Dr. Woodward within 1 week upon discharge. - Patient seen and evaluated   - Sharp aseptic debridement with sterile nippers without incident and cleansed with alcohol wipes   - No wounds on the lower extremities appreciated or visualized  - Prevalon offloading boots ordered and will be applied by nursing to prevent any pressure ulcerations   - Patient feet are in good standing, healthy palpable pulses  - Podiatry stable  - Patient should follow up with podiatrist for routine nail care  - Podiatry sign off, thank you for the consult, much appreciated   PODIATRY FOLLOW UP INSTRUCTIONS   1. No wounds   2. Patient is to follow up [facility] with Dr. Diaz or Dr. Woodward within 1 week upon discharge.

## 2021-08-03 NOTE — CONSULT NOTE ADULT - ASSESSMENT
Painful elongated thicken, mildly dystrophic nails 1-5 b/l     Sharp aseptic debridement performed with sterile nail nippers without incident, area cleansed with alcohol wipes    Ordered prevalon offloading boots b/l to prevent any pressure ulcerations secondary to prolong bed stay     Patient currently does not have any foot wounds, feet are podiatry stable

## 2021-08-03 NOTE — PROGRESS NOTE ADULT - ASSESSMENT
71 year old male PMHx ALS, CAD (s/p MI 1 stent placed 2006), HTN, HLD, BPH admitted for urosepsis with obstructing stone. now s/p stent placement for obstructing renal stone.    Urosepsis   - Tachycardic in setting of sepsis, now improved  - CT Chest/Abomen/Pelvis: Bibasilar aspiration pneumonia. Fluid-filled upper thoracic esophagus may reflect gastroesophageal reflux.  Mild left hydroureteronephrosis secondary to a 7 mm left UVJ calculus. Bilateral nonspecific perirenal stranding. Correlate clinically for infection  - IV antibiotics and fluids per primary team      MARLEY on CKD   - as per renal   -appears hypovolemic on exam     CAD  - hx of CAD s/p stent in 2006,   - Continue Plavix 75 mg QD  - routine echo    HTN  - 135/73    HLD   - Continue home statin     - Monitor and replete lytes, keep K>4, Mg>2.  - All other medical needs as per primary team.  - Other cardiovascular workup will depend on clinical course.  - Will continue to follow.    Liudmila Solorzano FNP-C  Cardiology NP  [facility] [phone]   71 year old male PMHx ALS, CAD (s/p MI 1 stent placed 2006), HTN, HLD, BPH admitted for urosepsis with obstructing stone. now s/p stent placement for obstructing renal stone.    Urosepsis   - Tachycardic in setting of sepsis, now improved  - CT Chest/Abomen/Pelvis: Bibasilar aspiration pneumonia. Fluid-filled upper thoracic esophagus may reflect gastroesophageal reflux.  Mild left hydroureteronephrosis secondary to a 7 mm left UVJ calculus. Bilateral nonspecific perirenal stranding. Correlate clinically for infection  - IV antibiotics and fluids per primary team      MARLEY on CKD   - as per renal   - appears hypovolemic on exam     CAD  - hx of CAD s/p stent in 2006,   - Continue Plavix 75 mg QD  - routine echo    HTN  - 135/73    HLD   - Continue home statin     - Monitor and replete lytes, keep K>4, Mg>2.  - All other medical needs as per primary team.  - Other cardiovascular workup will depend on clinical course.  - Will continue to follow.    Liudmila Solorzano FNP-C  Cardiology NP  [facility] [phone]

## 2021-08-03 NOTE — PROGRESS NOTE ADULT - PROBLEM SELECTOR PLAN 7
PEG tube placement, on tube feedings at [facility] placed 2 weeks ago at [facility] due to progressive ALS and nutritional supplementation as per son.   - will hold feedings for now due to aspiration risk  - patient takes Nutrin 1.5 up to 5 times a day, with goal 2000 calories/day (as per son Caesar)   - IV PPI  -Nutrition and S&s consult as patient was some oral feeds at home but now has aspiration pneumonia so will be careful  -Daily oral care/ hygiene

## 2021-08-03 NOTE — CONSULT NOTE ADULT - ASSESSMENT
Pt. with ALS, now has likely aspiration pneumonia/UTI/Renal stone.   Will check abg--appears to be losing motor function, and might need nightly bipap.  D/w Dr Ruiz.

## 2021-08-03 NOTE — PROGRESS NOTE ADULT - SUBJECTIVE AND OBJECTIVE BOX
[facility] -- Santi Nair, Alfreda, Shara, Chris Peace Savella  Office # [phone]      Follow Up:    cad  Subjective/Observations:   Seen at bedside, alert, in no acute distress  remains on nasal cannula    REVIEW OF SYSTEMS: All other review of systems is negative unless indicated above    PAST MEDICAL & SURGICAL HISTORY:  Myocardial infarct    Hypertension    Hyperlipidemia    BPH (benign prostatic hyperplasia)    ALS (amyotrophic lateral sclerosis)    S/P tonsillectomy    S/P coronary artery stent placement    History of hip surgery    History of hernia repair  &gt; 20 years ago    H/O shoulder surgery        MEDICATIONS  (STANDING):  atorvastatin 80 milliGRAM(s) Oral at bedtime  cefepime   IVPB      cefepime   IVPB 1000 milliGRAM(s) IV Intermittent every 24 hours  clopidogrel Tablet 75 milliGRAM(s) Oral daily  dextrose 5%. 1000 milliLiter(s) (100 mL/Hr) IV Continuous <Continuous>  escitalopram 10 milliGRAM(s) Oral daily  metoprolol succinate ER 25 milliGRAM(s) Oral daily  potassium chloride   Powder 10 milliEquivalent(s) Enteral Tube daily    MEDICATIONS  (PRN):  acetaminophen   Tablet .. 650 milliGRAM(s) Oral every 6 hours PRN Temp greater or equal to 38.5C (101.3F), Mild Pain (1 - 3)  melatonin 3 milliGRAM(s) Oral at bedtime PRN Insomnia  ondansetron Injectable 4 milliGRAM(s) IV Push every 8 hours PRN Nausea and/or Vomiting      Allergies    fish (Short breath; Anaphylaxis)  No Known Drug Allergies    Intolerances        Vital Signs Last 24 Hrs  T(C): 36.6 (03 Aug 2021 05:29), Max: 36.7 (02 Aug 2021 12:23)  T(F): 97.9 (03 Aug 2021 05:29), Max: 98.1 (02 Aug 2021 12:23)  HR: 77 (03 Aug 2021 05:29) (71 - 81)  BP: 135/73 (03 Aug 2021 05:29) (123/76 - 135/73)  BP(mean): --  RR: 18 (03 Aug 2021 05:29) (18 - 20)  SpO2: 94% (03 Aug 2021 05:29) (94% - 95%)    I&O's Summary    02 Aug 2021 07:01  -  03 Aug 2021 07:00  --------------------------------------------------------  IN: 4560 mL / OUT: 2300 mL / NET: 2260 mL          PHYSICAL EXAM:  TELE: not on tele   Constitutional: NAD, awake and alert, frail appearing   HEENT: DRY Mucous Membranes, Anicteric  Pulmonary: Non-labored, breath sounds are clear bilaterally, No wheezing, crackles or rhonchi  Cardiovascular: Regular, S1 and S2 nl, No murmurs, rubs, gallops or clicks  Gastrointestinal: Bowel Sounds present, soft, nontender.   Lymph: No lymphadenopathy. No peripheral edema.  Skin: No visible rashes or ulcers.  Psych:  Mood & affect appropriate    LABS: All Labs Reviewed:                        13.0   x     )-----------( x        ( 03 Aug 2021 00:31 )             40.4                         12.9   10.46 )-----------( 104      ( 02 Aug 2021 07:05 )             40.0                         12.9   7.64  )-----------( 108      ( 01 Aug 2021 07:40 )             39.2     03 Aug 2021 08:17    148    |  111    |  70     ----------------------------<  180    3.8     |  32     |  1.80   02 Aug 2021 07:05    152    |  115    |  89     ----------------------------<  208    3.6     |  31     |  2.40   01 Aug 2021 07:40    155    |  118    |  109    ----------------------------<  199    3.6     |  29     |  3.50     Ca    9.3        03 Aug 2021 08:17  Ca    9.5        02 Aug 2021 07:05  Ca    9.6        01 Aug 2021 07:40  Mg     2.6       01 Aug 2021 07:40    TPro  5.9    /  Alb  1.6    /  TBili  1.0    /  DBili  x      /  AST  97     /  ALT  62     /  AlkPhos  260    01 Aug 2021 07:40        12 Lead ECG (07.30.21 @ 08:01) >  Ventricular Rate 76 BPM    Atrial Rate 76 BPM    P-R Interval 136 ms    QRS Duration 80 ms    Q-T Interval 362 ms    QTC Calculation(Bazett) 407 ms    P Axis 39 degrees    R Axis -27 degrees    T Axis 21 degrees    Diagnosis Line Normal sinus rhythm  Inferior infarct , age undetermined  Possible Anterior infarct (cited on or before 29-JUL-2021)  Abnormal ECG  Confirmed by sonya Ramirez ([id number]) on 7/30/2021 4:21:24 PM    < end of copied text >      < from: Xray Chest 1 View- PORTABLE-Urgent (Xray Chest 1 View- PORTABLE-Urgent .) (07.30.21 @ 19:25) >  EXAM:  XR CHEST PORTABLE URGENT 1V                            PROCEDURE DATE:  07/30/2021          INTERPRETATION:  Portable chest radiograph    CLINICAL INFORMATION: Tachypnea.    TECHNIQUE:  Portable  AP view of the chest was obtained.    COMPARISON: 11/9/2020 chest available for review.    FINDINGS:    The lungs show RIGHT perihilar linear airspace disease. Remaining visualized lung grossly clear. No pneumothorax.    The heart and mediastinum size and configuration are within normal limits.    Visualized osseous structures are intact.    IMPRESSION:   RIGHT perihilar linear airspace disease..    --- End of Report ---        < end of copied text >      < from: US Renal (07.29.21 @ 15:37) >  EXAM:  US KIDNEY(S)                            PROCEDURE DATE:  07/29/2021          INTERPRETATION:  CLINICAL INFORMATION: Worsening renal failure    COMPARISON: 11/9/2020 and CT scan of earlier today.    TECHNIQUE: Sonography of the kidneys    FINDINGS:    Right kidney: 12.1 cm cm. No renal mass, hydronephrosis or calculi. A simple cyst in the upper to midpole measures 1.2 x 1.0 x 1.1 cm.    Left kidney: 12.4 cm. No renal mass or calculi. There is mild left hydronephrosis      IMPRESSION:    Mild left hydronephrosis as seen on the CAT scan of earlier today  Small right renal cyst again appreciated

## 2021-08-03 NOTE — CONSULT NOTE ADULT - SUBJECTIVE AND OBJECTIVE BOX
HPI:  70 yo M PMHx ALS, CAD (s/p MI 1 stent placed 2006 on DAPT), HTN, HLD, BPH presenting with diaphoresis, shortness of breath. History taken by Shadia (wife) who lives with him. She states that fell coming out of the bathroom 2 days ago, fell on his buttocks, no head injury. Since then he has felt generalized weakness, and profuse diaphoresis. Prior to this he was in his usual state of health.   Patient has a history of ALS previously on riluzole which was stopped due to intolerance. Patient had a PEG tube and began to straight cath several months ago due to progressive ALS. He uses a walker and cane at home with no difficulty until 2 days ago.     As per the spouse, his urine looked cloudy, dark yellow with odor. Denies hematuria.     Of note, patient last admitted to [place] 11/9 --> 11/12/2020 for urinary retention, jo placed, likely cause BPH and noncompliance with flomax.     ED Course:   Vital Signs: /69  T 98 F SpO2 70% on RA --> 90% on NRB   EKG: normal sinus rhythm, no acute ST or T wave changes   Labs:   White Count: 29.47, Lactate 2.7, BUN/Cr: 113/5.70 Alk Phos 223   Imaging:   CXR: Scattered and lower lung field interstitial infiltrates are presently seen anterior greater on the right at this time. These are new since November 9, 2020.  IMPRESSION: Bilateral infiltrates as above.    CT Chest/Abomen/Pelvis: Bibasilar aspiration pneumonia. Fluid-filled upper thoracic esophagus may reflect gastroesophageal reflux.  Mild left hydroureteronephrosis secondary to a 7 mm left UVJ calculus. Bilateral nonspecific perirenal stranding. Correlate clinically for infection  Prostatomegaly.    Given:   2 L NS Bolus, Rocephin X 1   Jo Placed in ED  (29 Jul 2021 15:13)      71y year old Male seen at \[facility]\"" 2NOR 231 W1 for painful elongated, thicken toenails 1-5 b/l. The patient is resting comfortably in his bed. Denies any fever, chills, nausea, vomiting, chest pain, shortness of breath, or calf pain at this time.    REVIEW OF SYSTEMS    PAST MEDICAL & SURGICAL HISTORY:  Myocardial infarct    Hypertension    Hyperlipidemia    BPH (benign prostatic hyperplasia)    ALS (amyotrophic lateral sclerosis)    S/P tonsillectomy    S/P coronary artery stent placement    History of hip surgery    History of hernia repair  &gt; 20 years ago    H/O shoulder surgery        Allergies    fish (Short breath; Anaphylaxis)  No Known Drug Allergies    Intolerances        MEDICATIONS  (STANDING):  atorvastatin 80 milliGRAM(s) Oral at bedtime  cefepime   IVPB      cefepime   IVPB 1000 milliGRAM(s) IV Intermittent every 24 hours  dextrose 5%. 1000 milliLiter(s) (75 mL/Hr) IV Continuous <Continuous>  escitalopram 10 milliGRAM(s) Oral daily  lactobacillus acidophilus 1 Tablet(s) Oral three times a day  metoprolol succinate ER 25 milliGRAM(s) Oral daily  multivitamin/minerals/iron Oral Solution (CENTRUM) 15 milliLiter(s) Oral daily  potassium chloride   Powder 10 milliEquivalent(s) Enteral Tube two times a day    MEDICATIONS  (PRN):  acetaminophen   Tablet .. 650 milliGRAM(s) Oral every 6 hours PRN Temp greater or equal to 38.5C (101.3F), Mild Pain (1 - 3)      Social History:  Lives with wife, son at home  Ambulates with walker/cane  ADLS with assistance   Diet: liquids, broths, boost  COVID X 2 moderna  denies tobacco, alcohol or drug use (29 Jul 2021 15:13)      FAMILY HISTORY:  FHx: myocardial infarction (Father)    FHx: hyperlipidemia        Vital Signs Last 24 Hrs  T(C): 36.8 (03 Aug 2021 12:11), Max: 36.8 (03 Aug 2021 12:11)  T(F): 98.2 (03 Aug 2021 12:11), Max: 98.2 (03 Aug 2021 12:11)  HR: 70 (03 Aug 2021 12:11) (70 - 77)  BP: 129/75 (03 Aug 2021 12:11) (127/75 - 135/73)  BP(mean): --  RR: 18 (03 Aug 2021 12:11) (18 - 18)  SpO2: 96% (03 Aug 2021 12:11) (94% - 96%)    PHYSICAL EXAM:  Vascular: DP & PT palpable bilaterally, Capillary refill 3 seconds, Digital hair present bilaterally, no edema, no erythema   Neurological: Gross epicritic sensation intact   Musculoskeletal: patient able to perform passive ROM of the foot b/l   Dermatological:  1. Elongated, thicken, mildly dystrophic nails 1-5 b/l with minimal subungal debris, interspaces clean, no interdigital maceration  No open wounds or signs of infection     CBC Full  -  ( 03 Aug 2021 00:31 )  WBC Count : x  RBC Count : x  Hemoglobin : 13.0 g/dL  Hematocrit : 40.4 %  Platelet Count - Automated : x  Mean Cell Volume : x  Mean Cell Hemoglobin : x  Mean Cell Hemoglobin Concentration : x  Auto Neutrophil # : x  Auto Lymphocyte # : x  Auto Monocyte # : x  Auto Eosinophil # : x  Auto Basophil # : x  Auto Neutrophil % : x  Auto Lymphocyte % : x  Auto Monocyte % : x  Auto Eosinophil % : x  Auto Basophil % : x      ----------CHEM PANEL----------            Imaging: ----------

## 2021-08-03 NOTE — PROGRESS NOTE ADULT - SUBJECTIVE AND OBJECTIVE BOX
Patient is a 71y old  Male who presents with a chief complaint of Sepsis, UTI (2021 12:52)  Patient seen in follow up for [facility].        PAST MEDICAL HISTORY:  Myocardial infarct    Hypertension    Hyperlipidemia    BPH (benign prostatic hyperplasia)    ALS (amyotrophic lateral sclerosis)       MEDICATIONS  (STANDING):  atorvastatin 80 milliGRAM(s) Oral at bedtime  cefepime   IVPB      cefepime   IVPB 1000 milliGRAM(s) IV Intermittent every 24 hours  clopidogrel Tablet 75 milliGRAM(s) Oral daily  dextrose 5%. 1000 milliLiter(s) (100 mL/Hr) IV Continuous <Continuous>  escitalopram 10 milliGRAM(s) Oral daily  metoprolol succinate ER 25 milliGRAM(s) Oral daily  potassium chloride   Powder 10 milliEquivalent(s) Enteral Tube daily    MEDICATIONS  (PRN):  acetaminophen   Tablet .. 650 milliGRAM(s) Oral every 6 hours PRN Temp greater or equal to 38.5C (101.3F), Mild Pain (1 - 3)  melatonin 3 milliGRAM(s) Oral at bedtime PRN Insomnia  ondansetron Injectable 4 milliGRAM(s) IV Push every 8 hours PRN Nausea and/or Vomiting    T(C): 36.6 (21 @ 05:29), Max: 36.7 (21 @ 13:07)  HR: 77 (21 @ 05:29) (71 - 84)  BP: 135/73 (21 @ 05:29) (123/76 - 139/76)  RR: 18 (21 @ 05:29)  SpO2: 94% (21 @ 05:29)  Wt(kg): --  I&O's Detail    02 Aug 2021 07:01  -  03 Aug 2021 07:00  --------------------------------------------------------  IN:    dextrose 5%: 2210 mL    Enteral Tube Flush: 180 mL    Free Water: 950 mL    IV PiggyBack: 50 mL    Osmolite: 1170 mL  Total IN: 4560 mL    OUT:    Indwelling Catheter - Urethral (mL): 2300 mL  Total OUT: 2300 mL    Total NET: 2260 mL            PHYSICAL EXAM:  General: No distress  Respiratory: b/l air entry  Cardiovascular: S1 S2  Gastrointestinal: soft  Extremities:  no edema              LABORATORY:                        13.0   x     )-----------( x        ( 03 Aug 2021 00:31 )             40.4         148<H>  |  111<H>  |  70<H>  ----------------------------<  180<H>  3.8   |  32<H>  |  1.80<H>    Ca    9.3      03 Aug 2021 08:17      Sodium, Serum: 148 mmol/L ( @ 08:17)  Sodium, Serum: 152 mmol/L ( @ 07:05)    Potassium, Serum: 3.8 mmol/L ( @ 08:17)  Potassium, Serum: 3.6 mmol/L ( @ 07:05)    Hemoglobin: 13.0 g/dL ( @ 00:31)  Hemoglobin: 12.9 g/dL ( @ 07:05)  Hemoglobin: 12.9 g/dL ( @ 07:40)    Creatinine, Serum 1.80 ( @ 08:17)  Creatinine, Serum 2.40 ( @ 07:05)  Creatinine, Serum 3.50 ( @ 07:40)          Urinalysis Basic - ( 03 Aug 2021 00:15 )    Color: Red / Appearance: Slightly Turbid / S.010 / pH: x  Gluc: x / Ketone: Negative  / Bili: Negative / Urobili: Negative   Blood: x / Protein: 100 / Nitrite: Negative   Leuk Esterase: Small / RBC: >50 /HPF / WBC 3-5   Sq Epi: x / Non Sq Epi: Negative / Bacteria: Few      ABG - ( 03 Aug 2021 09:11 )  pH, Arterial: 7.48  pH, Blood: x     /  pCO2: 40    /  pO2: 68    / HCO3: 30    / Base Excess: 5.9   /  SaO2: 95

## 2021-08-03 NOTE — GOALS OF CARE CONVERSATION - ADVANCED CARE PLANNING - CONVERSATION DETAILS
Writer met with pt and spoke to son/HCP Caesar. Reviewed patient's medical and social history as well as events leading to patient's hospitalization. Writer discussed patient's current diagnosis (sepsisALS,BPH,HLD,HTN,MI,CAD stents,PEG),  medical condition and management,  prognosis, and life expectancy. Inquired about patient's wishes regarding extent of medical care to be provided including escalation of medical care into the ICU and use of vasopressor support. In addition, the writer inquired about thoughts regarding cardiopulmnary resuscitation, artificial nutrition and hydration including use of feeding tubes and IVF, antibiotics, and further investigative studies such as blood draws and radiology. Pt and son showed good  insight into medical condition. All questions answered.Pt responds to yes /no questions,after description he said yes to CPR and intubation.Pt has informed his son of how far to go and has a living will.Psychosocial support provided.

## 2021-08-03 NOTE — OCCUPATIONAL THERAPY INITIAL EVALUATION ADULT - PHYSICAL ASSIST/NONPHYSICAL ASSIST: BED TO CHAIR, REHAB EVAL
After Visit Summary   10/25/2018    Sydni Hardin    MRN: [id number]           Patient Information     Date Of Birth          1947        Visit Information        Provider Department      10/25/2018 2:20 PM [facility]sim Moreno [facility]  [facility]        Today's Diagnoses     Segmental and somatic dysfunction of lumbar region    -  1    Acute bilateral low back pain without sciatica        Segmental and somatic dysfunction of thoracic region        Segmental and somatic dysfunction of cervical region           Follow-ups after your visit        Your next 10 appointments already scheduled     Nov 01, 2018  1:30 PM CDT   Return Visit with Maksim Moreno DC   [facility] ([facility])    [address]  [facility] [address]   [phone]            Nov 05, 2018  1:15 PM CST   (Arrive by 1:00 PM)   [facility] with [facility]ibran [facility]arcon MD   [facility]    [address]  [facility] [address]   [phone]            Nov 09, 2018  1:00 PM CST   (Arrive by 12:45 PM)   Return Visit with Moisés Olson MD   [facility] ([facility] )    [address]  [facility] [address] [phone]              Who to contact     If you have questions or need follow up information about today's clinic visit or your schedule please contact  [facility] directly at [phone].  Normal or non-critical lab and imaging results will be communicated to you by MyChart, letter or phone within 4 business days after the clinic has received the results. If you do not hear from us within 7 days, please contact the clinic through MyChart or phone. If you have a critical or abnormal lab result, we will notify you by phone as soon as possible.  Submit refill requests through [employer] or call your pharmacy and they will forward the refill request to us. Please allow 3 business days for your  refill to be completed.          Additional Information About Your Visit        Care EveryWhere ID     This is your Care EveryWhere ID. This could be used by other organizations to access your [facility] medical records  [id number]         Blood Pressure from Last 3 Encounters:   10/02/18 136/88   09/27/18 141/96   09/16/18 143/82    Weight from Last 3 Encounters:   10/02/18 178 lb (80.7 kg)   09/24/18 175 lb 14.8 oz (79.8 kg)   09/10/18 177 lb (80.3 kg)              We Performed the Following     CHIROPRAC MANIP,SPINAL,3-4 REGIONS          Today's Medication Changes          These changes are accurate as of 10/25/18 11:59 PM.  If you have any questions, ask your nurse or doctor.               These medicines have changed or have updated prescriptions.        Dose/Directions    aspirin 81 MG EC tablet   This may have changed:  when to take this   Used for:  S/P total knee replacement using cement, right        Dose:  81 mg   Take 1 tablet (81 mg) by mouth 2 times daily   Refills:  0                Primary Care Provider Office Phone # Fax #    Gibran Alarcon [phone]       [address] [place] [address]        Equal Access to Services     TREVIN KENNEDY AH: Hadii dixon Mcnulty, waaxda aaron, qaybta kaalmada rick, cierra arteaga. So [facility] [phone].    ATENCIÓN: Si habla español, tiene a guaman disposición servicios gratuitos de asistencia lingüística. [facility] [phone].    We comply with applicable federal civil rights laws and [place] laws. We do not discriminate on the basis of race, color, national origin, age, disability, sex, sexual orientation, or gender identity.            Thank you!     Thank you for choosing  [facility]  for your care. Our goal is always to provide you with excellent care. Hearing back from our patients is one way we can continue to improve our services. Please take a few minutes to complete the written  survey that you may receive in the mail after your visit with us. Thank you!             Your Updated Medication List - Protect others around you: Learn how to safely use, store and throw away your medicines at www.disposemymeds.org.          This list is accurate as of 10/25/18 11:59 PM.  Always use your most recent med list.                   Brand Name Dispense Instructions for use Diagnosis    ALPRAZolam 1 MG tablet    XANAX    90 tablet    TAKE 1 TABLET BY MOUTH 3 TIMES DAILY AS NEEDED FOR ANXIETY    Anxiety       aspirin 81 MG EC tablet      Take 1 tablet (81 mg) by mouth 2 times daily    S/P total knee replacement using cement, right       atorvastatin 40 MG tablet    LIPITOR    90 tablet    TAKE 1 TABLET BY MOUTH DAILY    Mixed hyperlipidemia       benazepril 10 MG tablet    LOTENSIN    90 tablet    TAKE 1 TABLET BY MOUTH DAILY    Essential hypertension       benzonatate 200 MG capsule    TESSALON    60 capsule    TAKE 1 CAPSULE BY MOUTH 3 TIMES A DAY AS NEEDED FOR COUGH    Preop general physical exam, Tobacco abuse       budesonide-formoterol 160-4.5 MCG/ACT Inhaler    SYMBICORT    1 Inhaler    Inhale 2 puffs into the lungs 2 times daily. As needed    Asthma       butalbital-acetaminophen-caffeine -40 MG per tablet    FIORICET/ESGIC    30 tablet    Take 1 tablet by mouth every 6 hours as needed    Scalp hematoma, sequela       cimetidine 800 MG tablet    TAGAMET    90 tablet    Take 1 tablet (800 mg) by mouth At Bedtime As needed    Esophageal reflux       doxepin 10 MG capsule    SINEquan    90 capsule    Take 1 capsule (10 mg) by mouth At Bedtime    Primary osteoarthritis of right knee       EPIPEN 2-JAIRO 0.3 MG/0.3ML injection 2-pack   Generic drug:  EPINEPHrine     2 each    INJECT 1 SYRINGE INTRAMUSCULARLY ONCE AS NEEDED FOR ANAPHYLAXIS    Anaphylactic reaction       escitalopram 20 MG tablet    LEXAPRO    180 tablet    TAKE 1 TABLET BY MOUTH 2 TIMES A DAY    Dysthymia       fluconazole 150 MG  tablet    DIFLUCAN    4 tablet    Take 1 tablet (150 mg) by mouth every 3 days    Candidiasis of vulva and vagina       hydrochlorothiazide 25 MG tablet    HYDRODIURIL    90 tablet    TAKE 1 TABLET BY MOUTH DAILY    Benign essential hypertension       ipratropium - albuterol 0.5 mg/2.5 mg/3 mL 0.5-2.5 (3) MG/3ML neb solution    DUONEB    90 mL    Take 1 vial (3 mLs) by nebulization every 4 hours as needed for shortness of breath / dyspnea or wheezing    Acute bronchospasm, SOB (shortness of breath)       meloxicam 15 MG tablet    MOBIC    90 tablet    Take 1 tablet (15 mg) by mouth daily    Pain       montelukast 10 MG tablet    SINGULAIR    90 tablet    TAKE 1 TABLET BY MOUTH DAILY    Mixed hyperlipidemia       * nicotine 14 MG/24HR 24 hr patch    NICODERM CQ    30 patch    Place 1 patch onto the skin every 24 hours    Tobacco abuse       * nicotine 7 MG/24HR 24 hr patch    NICODERM CQ    30 patch    Place 1 patch onto the skin every 24 hours    Tobacco abuse       * nicotine 21 MG/24HR 24 hr patch    NICODERM CQ    30 patch    Place 1 patch onto the skin every 24 hours    Tobacco abuse       nicotine polacrilex 4 MG lozenge    EQL NICOTINE    360 tablet    Place 1 lozenge (4 mg) inside cheek as needed for smoking cessation    Tobacco abuse       nystatin 602922 UNIT/ML suspension    MYCOSTATIN    473 mL    TAKE 1 TEASPOON (5MLS) BY MOUTH 4 TIMES DAILY    Thrush       * order for DME     2 each    Equipment being ordered: Nebulizer supplies    Primary osteoarthritis of right knee       * order for DME     1 each    Equipment being ordered: mcgee walker    Primary osteoarthritis of right knee       * order for DME     1 each    Equipment being ordered: toilet seat riser    Postoperative state       * order for DME     1 Units    Equipment being ordered: Please install safety bar in bathroom to assist patient in getting up from toilet.    Status post total right knee replacement       order for DME     1 each     Discontinue O2 services    Chronic obstructive pulmonary disease, unspecified COPD type (H)       oxyCODONE-acetaminophen 5-325 MG per tablet    PERCOCET    60 tablet    TAKE 1 TO 2 TABLETS BY MOUTH EVERY 4 HOURS AS NEEDED FOR PAIN    Primary osteoarthritis of both knees       potassium chloride SA 10 MEQ CR tablet    K-DUR/KLOR-CON M    180 tablet    TAKE 1 TABLET BY MOUTH 2 TIMES DAILY WITH FOOD    Benign essential hypertension       predniSONE 10 MG tablet    DELTASONE    24 tablet    Take 4 tablets daily for 3 days,  take 2 tablets daily for 3 days, take 1 tablet daily for 3 days, take half a tablet for 3 days.    Acute exacerbation of chronic obstructive pulmonary disease (H)       temazepam 30 MG capsule    RESTORIL    90 capsule    TAKE 1 CAPSULE BY MOUTH NIGHTLY AS NEEDED FOR SLEEP    Persistent insomnia       VENTOLIN  (90 Base) MCG/ACT inhaler   Generic drug:  albuterol     18 g    USE 1 PUFF FOUR TIMES A DAY AS NEEDED    Tobacco abuse       VOLTAREN 1 % Gel topical gel   Generic drug:  diclofenac     100 g    APPLY 4 GRAMS TO KNEES FOUR TIMES A DAY USING ENCLOSED DOSING CARD    Primary osteoarthritis of left knee, Chronic pain of right knee       * Notice:  This list has 7 medication(s) that are the same as other medications prescribed for you. Read the directions carefully, and ask your doctor or other care provider to review them with you.       verbal cues/nonverbal cues (demo/gestures)/1 person + 1 person to manage equipment

## 2021-08-04 LAB
ANION GAP SERPL CALC-SCNC: 6 MMOL/L — SIGNIFICANT CHANGE UP (ref 5–17)
BUN SERPL-MCNC: 59 MG/DL — HIGH (ref 7–23)
CALCIUM SERPL-MCNC: 9.2 MG/DL — SIGNIFICANT CHANGE UP (ref 8.5–10.1)
CHLORIDE SERPL-SCNC: 109 MMOL/L — HIGH (ref 96–108)
CO2 SERPL-SCNC: 30 MMOL/L — SIGNIFICANT CHANGE UP (ref 22–31)
CREAT SERPL-MCNC: 1.4 MG/DL — HIGH (ref 0.5–1.3)
CULTURE RESULTS: SIGNIFICANT CHANGE UP
GLUCOSE SERPL-MCNC: 134 MG/DL — HIGH (ref 70–99)
HCT VFR BLD CALC: 37.5 % — LOW (ref 39–50)
HGB BLD-MCNC: 12.1 G/DL — LOW (ref 13–17)
MCHC RBC-ENTMCNC: 30.7 PG — SIGNIFICANT CHANGE UP (ref 27–34)
MCHC RBC-ENTMCNC: 32.3 GM/DL — SIGNIFICANT CHANGE UP (ref 32–36)
MCV RBC AUTO: 95.2 FL — SIGNIFICANT CHANGE UP (ref 80–100)
NRBC # BLD: 0 /100 WBCS — SIGNIFICANT CHANGE UP (ref 0–0)
ORGANISM # SPEC MICROSCOPIC CNT: SIGNIFICANT CHANGE UP
PLATELET # BLD AUTO: 152 K/UL — SIGNIFICANT CHANGE UP (ref 150–400)
POTASSIUM SERPL-MCNC: 4.1 MMOL/L — SIGNIFICANT CHANGE UP (ref 3.5–5.3)
POTASSIUM SERPL-SCNC: 4.1 MMOL/L — SIGNIFICANT CHANGE UP (ref 3.5–5.3)
RBC # BLD: 3.94 M/UL — LOW (ref 4.2–5.8)
RBC # FLD: 12.6 % — SIGNIFICANT CHANGE UP (ref 10.3–14.5)
SARS-COV-2 RNA SPEC QL NAA+PROBE: SIGNIFICANT CHANGE UP
SODIUM SERPL-SCNC: 145 MMOL/L — SIGNIFICANT CHANGE UP (ref 135–145)
SPECIMEN SOURCE: SIGNIFICANT CHANGE UP
WBC # BLD: 10.22 K/UL — SIGNIFICANT CHANGE UP (ref 3.8–10.5)
WBC # FLD AUTO: 10.22 K/UL — SIGNIFICANT CHANGE UP (ref 3.8–10.5)

## 2021-08-04 PROCEDURE — 71045 X-RAY EXAM CHEST 1 VIEW: CPT | Mod: 26

## 2021-08-04 PROCEDURE — 99232 SBSQ HOSP IP/OBS MODERATE 35: CPT

## 2021-08-04 RX ORDER — ASPIRIN/CALCIUM CARB/MAGNESIUM 324 MG
81 TABLET ORAL DAILY
Refills: 0 | Status: DISCONTINUED | OUTPATIENT
Start: 2021-08-04 | End: 2021-08-05

## 2021-08-04 RX ORDER — KETOTIFEN FUMARATE 0.34 MG/ML
1 SOLUTION OPHTHALMIC ONCE
Refills: 0 | Status: COMPLETED | OUTPATIENT
Start: 2021-08-04 | End: 2021-08-04

## 2021-08-04 RX ADMIN — Medication 1 TABLET(S): at 22:14

## 2021-08-04 RX ADMIN — Medication 1 TABLET(S): at 05:15

## 2021-08-04 RX ADMIN — Medication 10 MILLIEQUIVALENT(S): at 05:15

## 2021-08-04 RX ADMIN — ATORVASTATIN CALCIUM 80 MILLIGRAM(S): 80 TABLET, FILM COATED ORAL at 22:14

## 2021-08-04 RX ADMIN — Medication 25 MILLIGRAM(S): at 05:29

## 2021-08-04 RX ADMIN — Medication 15 MILLILITER(S): at 11:32

## 2021-08-04 RX ADMIN — ESCITALOPRAM OXALATE 10 MILLIGRAM(S): 10 TABLET, FILM COATED ORAL at 11:32

## 2021-08-04 RX ADMIN — Medication 1 TABLET(S): at 13:42

## 2021-08-04 RX ADMIN — Medication 10 MILLIEQUIVALENT(S): at 17:07

## 2021-08-04 RX ADMIN — Medication 1 DROP(S): at 17:07

## 2021-08-04 RX ADMIN — CEFEPIME 100 MILLIGRAM(S): 1 INJECTION, POWDER, FOR SOLUTION INTRAMUSCULAR; INTRAVENOUS at 11:32

## 2021-08-04 RX ADMIN — CEFEPIME 100 MILLIGRAM(S): 1 INJECTION, POWDER, FOR SOLUTION INTRAMUSCULAR; INTRAVENOUS at 22:46

## 2021-08-04 NOTE — PROGRESS NOTE ADULT - SUBJECTIVE AND OBJECTIVE BOX
[facility] -- Santi Nair, Shara Gant, Chris Peace Savella, Goodger  Office # [phone]    Follow Up:  CAD s/p stents, s/p Fall    Subjective/Observations: Sitting on the chair, comfortable on RA.  Able to make needs known.  Not in any distress,  Comfortable on RA    REVIEW OF SYSTEMS: All other review of systems is negative unless indicated above  PAST MEDICAL & SURGICAL HISTORY:  Myocardial infarct    Hypertension    Hyperlipidemia    BPH (benign prostatic hyperplasia)    ALS (amyotrophic lateral sclerosis)    S/P tonsillectomy    S/P coronary artery stent placement    History of hip surgery    History of hernia repair  &gt; 20 years ago    H/O shoulder surgery    MEDICATIONS  (STANDING):  atorvastatin 80 milliGRAM(s) Oral at bedtime  cefepime   IVPB 1000 milliGRAM(s) IV Intermittent every 12 hours  dextrose 5%. 1000 milliLiter(s) (75 mL/Hr) IV Continuous <Continuous>  escitalopram 10 milliGRAM(s) Oral daily  lactobacillus acidophilus 1 Tablet(s) Oral three times a day  metoprolol succinate ER 25 milliGRAM(s) Oral daily  multivitamin/minerals/iron Oral Solution (CENTRUM) 15 milliLiter(s) Oral daily  potassium chloride   Powder 10 milliEquivalent(s) Enteral Tube two times a day    MEDICATIONS  (PRN):  acetaminophen   Tablet .. 650 milliGRAM(s) Oral every 6 hours PRN Temp greater or equal to 38.5C (101.3F), Mild Pain (1 - 3)    Allergies    fish (Short breath; Anaphylaxis)  No Known Drug Allergies    Intolerances    Vital Signs Last 24 Hrs  T(C): 36.5 (04 Aug 2021 13:17), Max: 37 (04 Aug 2021 05:20)  T(F): 97.7 (04 Aug 2021 13:17), Max: 98.6 (04 Aug 2021 05:20)  HR: 69 (04 Aug 2021 13:17) (69 - 79)  BP: 125/72 (04 Aug 2021 13:17) (125/72 - 147/66)  BP(mean): --  RR: 18 (04 Aug 2021 13:17) (18 - 18)  SpO2: 98% (04 Aug 2021 13:17) (96% - 98%)  I&O's Summary    03 Aug 2021 07:01  -  04 Aug 2021 07:00  --------------------------------------------------------  IN: 1720 mL / OUT: 2600 mL / NET: -880 mL     PHYSICAL EXAM:  TELE: Not on tele  Constitutional: NAD, awake and alert, well-developed  HEENT: Moist Mucous Membranes, Anicteric  Pulmonary: Non-labored, breath sounds are equal bilaterally, No wheezing, rales +scattered rhonchi  Cardiovascular: Regular, S1 and S2, No murmurs, rubs, gallops or clicks  Gastrointestinal: Bowel Sounds present, soft, nontender.  PEG in situ  Lymph: No peripheral edema. No lymphadenopathy.  Skin: No visible rashes or ulcers.  Psych:  Mood & affect: Flat  LABS: All Labs Reviewed:                        12.1   10.22 )-----------( 152      ( 04 Aug 2021 12:58 )             37.5                         13.0   x     )-----------( x        ( 03 Aug 2021 00:31 )             40.4                         12.9   10.46 )-----------( 104      ( 02 Aug 2021 07:05 )             40.0     04 Aug 2021 12:58    145    |  109    |  59     ----------------------------<  134    4.1     |  30     |  1.40   03 Aug 2021 08:17    148    |  111    |  70     ----------------------------<  180    3.8     |  32     |  1.80   02 Aug 2021 07:05    152    |  115    |  89     ----------------------------<  208    3.6     |  31     |  2.40     Ca    9.2        04 Aug 2021 12:58  Ca    9.3        03 Aug 2021 08:17  Ca    9.5        02 Aug 2021 07:05    EXAM:  CT CHEST                          PROCEDURE DATE:  07/29/2021      INTERPRETATION:  CLINICAL INFORMATION: Sepsis    COMPARISON: None.    CONTRAST/COMPLICATIONS:  IV Contrast: NONE  Oral Contrast: NONE  Complications: None reported at time of study completion    PROCEDURE:  CT of the Chest, Abdomen and Pelvis was performed.  Sagittal and coronal reformats were performed.    FINDINGS:  CHEST:  LUNGS AND LARGE AIRWAYS: Patent central airways. Extensive bibasilar dependent airspace infiltrates consistent with bilateral aspiration pneumonia  PLEURA: No pleural effusion.  VESSELS: Nonaneurysmal.  HEART: Coronary artery calcification. No pericardial effusion.  MEDIASTINUM AND GABRIELA: Mild fluid distention of the upper thoracic esophagussuggesting possible gastroesophageal reflux  CHEST WALL AND LOWER NECK: Within normal limits.    ABDOMEN AND PELVIS:  LIVER: Within normal limits.  BILE DUCTS: Normal caliber.  GALLBLADDER: Within normal limits.  SPLEEN: Within normal limits.  PANCREAS: Within normal limits.  ADRENALS: Within normal limits.  KIDNEYS/URETERS: Mild left hydroureteronephrosis secondary to a 7 mm left UVJ calculus. Punctate nonobstructive left renal calculus. Bilateral nonspecific perirenal stranding. Correlate for UTI.    BLADDER: Decompressed with Shukla. Fluid-filled left lateral bladder diverticulum.  REPRODUCTIVE ORGANS: Enlarged prostate    BOWEL: No bowel obstruction or inflammation. Gastrostomy tube in situ. Appendix normal  PERITONEUM: No ascites.  VESSELS: Within normal limits.  RETROPERITONEUM/LYMPH NODES: No lymphadenopathy.  ABDOMINAL WALL: Fat-containing left inguinal hernia. Tiny fat-containing umbilical hernia.  BONES: Degenerative change.    IMPRESSION:  Bibasilar aspiration pneumonia.  Fluid-filled upper thoracic esophagus may reflect gastroesophageal reflux.  Mild left hydroureteronephrosis secondary to a 7 mm left UVJ calculus.  Bilateral nonspecific perirenal stranding. Correlate clinically for infection  Prostatomegaly.  Additional findings as discussed    --- End of Report ---    AMANDEEP CLARK MD; Attending Radiologist  This document has been electronically signed. Jul 29 2021  3:45PM    Ventricular Rate 76 BPM    Atrial Rate 76 BPM    P-R Interval 136 ms    QRS Duration 80 ms    Q-T Interval 362 ms    QTC Calculation(Bazett) 407 ms    P Axis 39 degrees    R Axis -27 degrees    T Axis 21 degrees    Diagnosis Line Normal sinus rhythm  Inferior infarct , age undetermined  Possible Anterior infarct (cited on or before 29-JUL-2021)  Abnormal ECG  Confirmed by sonya Ramirez ([id number]) on 7/30/2021 4:21:24 PM

## 2021-08-04 NOTE — PROGRESS NOTE ADULT - ASSESSMENT
Pt. with ALS, now has likely aspiration pneumonia/UTI/Renal stone.   Appears to be losing motor function, and might need nightly bipap.  Borderline sats--continue O2.

## 2021-08-04 NOTE — PROGRESS NOTE ADULT - PROBLEM SELECTOR PLAN 9
Hypotension upon admission due to sepsis   - hold lisinopril due to hypotension
Hypotension upon admission due to sepsis   - hold lisinopril and metoprolol due to hypotension
Hypotension upon admission due to sepsis   - hold lisinopril due to hypotension
Hypotension upon admission due to sepsis   - hold lisinopril and metoprolol due to hypotension

## 2021-08-04 NOTE — PROGRESS NOTE ADULT - SUBJECTIVE AND OBJECTIVE BOX
INTERVAL HPI/OVERNIGHT EVENTS: urine clearing    MEDICATIONS  (STANDING):  atorvastatin 80 milliGRAM(s) Oral at bedtime  cefepime   IVPB 1000 milliGRAM(s) IV Intermittent every 12 hours  dextrose 5%. 1000 milliLiter(s) (75 mL/Hr) IV Continuous <Continuous>  escitalopram 10 milliGRAM(s) Oral daily  lactobacillus acidophilus 1 Tablet(s) Oral three times a day  metoprolol succinate ER 25 milliGRAM(s) Oral daily  multivitamin/minerals/iron Oral Solution (CENTRUM) 15 milliLiter(s) Oral daily  potassium chloride   Powder 10 milliEquivalent(s) Enteral Tube two times a day    MEDICATIONS  (PRN):  acetaminophen   Tablet .. 650 milliGRAM(s) Oral every 6 hours PRN Temp greater or equal to 38.5C (101.3F), Mild Pain (1 - 3)        Vital Signs Last 24 Hrs  T(C): 37 (04 Aug 2021 05:20), Max: 37 (04 Aug 2021 05:20)  T(F): 98.6 (04 Aug 2021 05:20), Max: 98.6 (04 Aug 2021 05:20)  HR: 79 (04 Aug 2021 05:20) (70 - 79)  BP: 135/74 (04 Aug 2021 05:20) (129/72 - 135/74)  BP(mean): --  RR: 18 (04 Aug 2021 05:20) (18 - 18)  SpO2: 97% (04 Aug 2021 05:20) (96% - 97%)    PHYSICAL EXAM:    ABDOMEN: soft, NT  GENITALIA: urine clear via jo    LABS:                        13.0   x     )-----------( x        ( 03 Aug 2021 00:31 )             40.4     08-    148<H>  |  111<H>  |  70<H>  ----------------------------<  180<H>  3.8   |  32<H>  |  1.80<H>    Ca    9.3      03 Aug 2021 08:17        Urinalysis Basic - ( 03 Aug 2021 00:15 )    Color: Red / Appearance: Slightly Turbid / S.010 / pH: x  Gluc: x / Ketone: Negative  / Bili: Negative / Urobili: Negative   Blood: x / Protein: 100 / Nitrite: Negative   Leuk Esterase: Small / RBC: >50 /HPF / WBC 3-5   Sq Epi: x / Non Sq Epi: Negative / Bacteria: Few      Urine culture:   @ 12:23 --   No growth to date.      RADIOLOGY & ADDITIONAL TESTS:

## 2021-08-04 NOTE — PROGRESS NOTE ADULT - ASSESSMENT
72 yo M PMHx ALS, CAD (s/p MI 1 stent placed 2006 on DAPT), HTN, HLD, BPH presenting with diaphoresis, shortness of breath. Now with obstructive uropathy, left renal stent and MARLEY. Renal indices are now improving. Supplement potassium as ordered. Will follow.

## 2021-08-04 NOTE — PROGRESS NOTE ADULT - SUBJECTIVE AND OBJECTIVE BOX
SHARITA HOUGH  71y  Male    Patient is a 71y old  Male who presents with a chief complaint of Sepsis, UTI (04 Aug 2021 13:42)    awake and alert  denies any chest pain or SOB    PAST MEDICAL & SURGICAL HISTORY:  Myocardial infarct    Hypertension    Hyperlipidemia    BPH (benign prostatic hyperplasia)    ALS (amyotrophic lateral sclerosis)    S/P tonsillectomy    S/P coronary artery stent placement    History of hip surgery    History of hernia repair  &gt; 20 years ago    H/O shoulder surgery            PHYSICAL EXAM:    T(C): 36.5 (21 @ 13:17), Max: 37 (21 @ 05:20)  HR: 69 (21 @ 13:17) (69 - 79)  BP: 125/72 (21 @ 13:17) (125/72 - 147/66)  RR: 18 (21 @ 13:17) (18 - 18)  SpO2: 98% (21 @ 13:17) (96% - 98%)  Wt(kg): --    I&O's Detail    03 Aug 2021 07:01  -  04 Aug 2021 07:00  --------------------------------------------------------  IN:    dextrose 5%: 900 mL    Enteral Tube Flush: 300 mL    Osmolite: 520 mL  Total IN: 1720 mL    OUT:    Indwelling Catheter - Urethral (mL): 2600 mL  Total OUT: 2600 mL    Total NET: -880 mL          Respiratory: clear anteriorly, decreased BS at bases  Cardiovascular: S1 S2  Gastrointestinal: soft NT ND +BS  Extremities: trace edema   Neuro: Awake and alert    MEDICATIONS  (STANDING):  artificial tears (preservative free) Ophthalmic Solution 1 Drop(s) Both EYES three times a day  aspirin  chewable 81 milliGRAM(s) Oral daily  atorvastatin 80 milliGRAM(s) Oral at bedtime  cefepime   IVPB 1000 milliGRAM(s) IV Intermittent every 12 hours  dextrose 5%. 1000 milliLiter(s) (75 mL/Hr) IV Continuous <Continuous>  escitalopram 10 milliGRAM(s) Oral daily  ketotifen 0.025% Ophthalmic Solution 1 Drop(s) Both EYES once  lactobacillus acidophilus 1 Tablet(s) Oral three times a day  metoprolol succinate ER 25 milliGRAM(s) Oral daily  multivitamin/minerals/iron Oral Solution (CENTRUM) 15 milliLiter(s) Oral daily  potassium chloride   Powder 10 milliEquivalent(s) Enteral Tube two times a day    MEDICATIONS  (PRN):  acetaminophen   Tablet .. 650 milliGRAM(s) Oral every 6 hours PRN Temp greater or equal to 38.5C (101.3F), Mild Pain (1 - 3)                            12.1   10.22 )-----------( 152      ( 04 Aug 2021 12:58 )             37.5       08-04    145  |  109<H>  |  59<H>  ----------------------------<  134<H>  4.1   |  30  |  1.40<H>    Ca    9.2      04 Aug 2021 12:58        Creatinine Trend: Creatinine Trend: 1.40<--, 1.80<--, 2.40<--, 3.50<--, 4.90<--, 5.70<--    Urinalysis Basic - ( 03 Aug 2021 00:15 )    Color: Red / Appearance: Slightly Turbid / S.010 / pH: x  Gluc: x / Ketone: Negative  / Bili: Negative / Urobili: Negative   Blood: x / Protein: 100 / Nitrite: Negative   Leuk Esterase: Small / RBC: >50 /HPF / WBC 3-5   Sq Epi: x / Non Sq Epi: Negative / Bacteria: Few

## 2021-08-04 NOTE — PROGRESS NOTE ADULT - SUBJECTIVE AND OBJECTIVE BOX
[facility]  INFECTIOUS DISEASES   [address] [place] [address]  Tel: [phone]     Fax: [phone]  =======================================================    [facility]-[id number]  SHARITA HOUGH     Follow up: Urosepsis and aspiration pneumonia    Lying down, awake and alert responding to questions by nodding, comfortable, no fever.   Leukocytosis normalized. Gross hematuria.     PAST MEDICAL & SURGICAL HISTORY:  Myocardial infarct  Hypertension  Hyperlipidemia  BPH (benign prostatic hyperplasia)  ALS (amyotrophic lateral sclerosis)  S/P tonsillectomy  S/P coronary artery stent placement  History of hip surgery  History of hernia repair  &gt; 20 years ago  H/O shoulder surgery    Social Hx: No smoking, ETOH Or drugs     FAMILY HISTORY:  FHx: myocardial infarction (Father)    FHx: hyperlipidemia    Allergies  fish (Short breath; Anaphylaxis)  No Known Drug Allergies    Antibiotics:     piperacillin/tazobactam IVPB.. 3.375 Gram(s) IV Intermittent once     REVIEW OF SYSTEMS:  CONSTITUTIONAL:  No Fever or chills,   HEENT:  No diplopia or blurred vision.  No sore throat or runny nose.  CARDIOVASCULAR:  No chest pain, SOB better.  RESPIRATORY:  No cough, no shortness of breath  GASTROINTESTINAL:  No nausea, vomiting or diarrhea.  GENITOURINARY:  cloudy urine with odor   MUSCULOSKELETAL:  no joint aches, no muscle pain  SKIN:  No change in skin, hair or nails.  NEUROLOGIC: weakness in all extremities due to ALS  PSYCHIATRIC:  No disorder of thought or mood.  ENDOCRINE:  No heat or cold intolerance, polyuria or polydipsia.  HEMATOLOGICAL:  No easy bruising or bleeding.     Physical Exam:  Vital Signs Last 24 Hrs  T(C): 36.5 (04 Aug 2021 13:17), Max: 37 (04 Aug 2021 05:20)  T(F): 97.7 (04 Aug 2021 13:17), Max: 98.6 (04 Aug 2021 05:20)  HR: 69 (04 Aug 2021 13:17) (69 - 79)  BP: 125/72 (04 Aug 2021 13:17) (125/72 - 147/66)  BP(mean): --  RR: 18 (04 Aug 2021 13:17) (18 - 18)  SpO2: 98% (04 Aug 2021 13:17) (96% - 98%)  GEN: NAD,  HEENT: normocephalic and atraumatic. EOMI. PERRL.    NECK: Supple.  No lymphadenopathy   LUNGS: Clear to auscultation.  HEART: Regular rate and rhythm without murmur.  ABDOMEN: Soft, nontender, and nondistended.  Positive bowel sounds.  PEG in place   : No CVA tenderness, jo with cloudy urine   EXTREMITIES: Muscular atrophy, motor weakness in all extremities   NEUROLOGIC: grossly intact.  PSYCHIATRIC: Appropriate affect .  SKIN: No rash      Labs:                        12.1   10.22 )-----------( 152      ( 04 Aug 2021 12:58 )             37.5     08-04    145  |  109<H>  |  59<H>  ----------------------------<  134<H>  4.1   |  30  |  1.40<H>    Ca    9.2      04 Aug 2021 12:58    Culture - Blood (collected 07-31-21 @ 12:23)  Source: .Blood Blood-Venous    Culture - Blood (collected 07-31-21 @ 12:23)  Source: .Blood Blood    Culture - Urine (collected 07-30-21 @ 00:49)  Source: Kidney Kidney  Final Report (08-02-21 @ 10:22):    Moderate Klebsiella oxytoca/Raoutella ornithinolytica    Few Enterobacter cloacae complex    Numerous Streptococcus constellatus "Susceptibilities not performed"  Organism: Klebsiella oxytoca /Raoutella ornithinolytica  Enterobacter cloacae complex (08-02-21 @ 10:22)  Organism: Enterobacter cloacae complex (08-02-21 @ 10:22)    Sensitivities:      -  Amikacin: S <=16      -  Amoxicillin/Clavulanic Acid: R 16/8      -  Ampicillin: R 16 These ampicillin results predict results for amoxicillin      -  Ampicillin/Sulbactam: R 8/4 Enterobacter, Citrobacter, and Serratia may develop resistance during prolonged therapy (3-4 days)      -  Aztreonam: S <=4      -  Cefazolin: R >16      -  Cefepime: S <=2      -  Cefoxitin: R >16      -  Ceftriaxone: S <=1 Enterobacter, Citrobacter, and Serratia may develop resistance during prolonged therapy      -  Ciprofloxacin: S <=0.25      -  Ertapenem: S <=0.5      -  Gentamicin: S <=2      -  Imipenem: S <=1      -  Levofloxacin: S <=0.5      -  Meropenem: S <=1      -  Piperacillin/Tazobactam: S <=8      -  Tigecycline: S <=2      -  Tobramycin: S <=2      -  Trimethoprim/Sulfamethoxazole: S <=0.5/9.5      Method Type: ROSEMARIE  Organism: Klebsiella oxytoca /Raoutella ornithinolytica (08-02-21 @ 10:22)    Sensitivities:      -  Amikacin: S <=16      -  Amoxicillin/Clavulanic Acid: S <=8/4      -  Ampicillin: R >16 These ampicillin results predict results for amoxicillin      -  Ampicillin/Sulbactam: R >16/8 Enterobacter, Citrobacter, and Serratia may develop resistance during prolonged therapy (3-4 days)      -  Aztreonam: S <=4      -  Cefazolin: R >16      -  Cefepime: S <=2      -  Cefoxitin: S <=8      -  Ceftriaxone: I 2 Enterobacter, Citrobacter, and Serratia may develop resistance during prolonged therapy      -  Ciprofloxacin: S <=0.25      -  Ertapenem: S <=0.5      -  Gentamicin: S <=2      -  Imipenem: S <=1      -  Levofloxacin: S <=0.5      -  Meropenem: S <=1      -  Piperacillin/Tazobactam: I 64      -  Tigecycline: S <=2      -  Tobramycin: S <=2      -  Trimethoprim/Sulfamethoxazole: S <=0.5/9.5      Method Type: ROSEMARIE    Culture - Blood (collected 07-29-21 @ 18:10)  Source: .Blood Blood-Peripheral  Gram Stain (07-31-21 @ 09:01):    Growth in anaerobic bottle: Gram Negative Rods and Gram positive cocci in    pairs    Growth in aerobic bottle: Gram Negative Rods  Final Report (08-03-21 @ 13:01):    Growth in aerobic and anaerobic bottles: Klebsiella oxytoca/Raoutella    ornithinolytica    Growth in anaerobic bottle: Streptococcus constellatus    See previous culture [id number]    Culture - Blood (collected 07-29-21 @ 18:10)  Source: .Blood Blood-Peripheral  Gram Stain (08-01-21 @ 06:58):    Upon re-evaluation of gram stain:    Growth in anaerobic bottle: Gram Negative Rods    Growth in anaerobic bottle: Gram positive cocci in pairs    Growth in aerobic bottle: Gram positive cocci in pairs  Final Report (08-04-21 @ 13:30):    Growth in anaerobic bottle: Klebsiella oxytoca/Raoutella ornithinolytica    Growth in aerobic and anaerobic bottles: Streptococcus constellatus    ***Blood Panel PCR results on this specimen are available    approximately 3 hours after the Gram stain result.***    Gram stain, PCR, and/or culture results may not always    correspond due to difference in methodologies.    ************************************************************    This PCR assay was performed by multiplex PCR. This    Assay tests for 66 bacterial and resistance gene targets.    Please refer to the [facility] test directory    at [email or web address] for details.  Organism: Blood Culture PCR  Klebsiella oxytoca /Raoutella ornithinolytica  Streptococcus constellatus (08-04-21 @ 13:30)  Organism: Streptococcus constellatus (08-04-21 @ 13:30)    Sensitivities:      -  Ceftriaxone: S 0.38      -  Penicillin: S 0.094      Method Type: ETEST  Organism: Streptococcus constellatus (08-04-21 @ 13:30)    Sensitivities:      -  Clindamycin: S      -  Erythromycin: S      -  Levofloxacin: S      -  Vancomycin: S      Method Type: KB  Organism: Klebsiella oxytoca /Raoutella ornithinolytica (08-04-21 @ 13:30)    Sensitivities:      -  Amikacin: S <=16      -  Ampicillin: R >16 These ampicillin results predict results for amoxicillin      -  Ampicillin/Sulbactam: R >16/8 Enterobacter, Citrobacter, and Serratia may develop resistance during prolonged therapy (3-4 days)      -  Aztreonam: S <=4      -  Cefazolin: R >16 Enterobacter, Citrobacter, and Serratia may develop resistance during prolonged therapy (3-4 days)      -  Cefepime: S <=2      -  Cefoxitin: S <=8      -  Ceftriaxone: S <=1 Enterobacter, Citrobacter, and Serratia may develop resistance during prolonged therapy      -  Ciprofloxacin: S <=0.25      -  Ertapenem: S <=0.5      -  Gentamicin: S <=2      -  Imipenem: S <=1      -  Levofloxacin: S <=0.5      -  Meropenem: S <=1      -  Piperacillin/Tazobactam: I 64      -  Tobramycin: S <=2      -  Trimethoprim/Sulfamethoxazole: S <=0.5/9.5      Method Type: ROSEMARIE  Organism: Blood Culture PCR (08-04-21 @ 13:30)    Sensitivities:      -  Klebsiella oxytoca: Detec      -  Streptococcus anginosus group: Detec      Method Type: PCR    Culture - Urine (collected 07-29-21 @ 17:54)  Source: Clean Catch Clean Catch (Midstream)  Final Report (07-31-21 @ 10:38):    >=3 organisms. Probable collection contamination.    WBC Count: 10.22 K/uL (08-04-21 @ 12:58)  WBC Count: 10.46 K/uL (08-02-21 @ 07:05)  WBC Count: 7.64 K/uL (08-01-21 @ 07:40)  WBC Count: 10.60 K/uL (07-31-21 @ 08:14)    Creatinine, Serum: 1.40 mg/dL (08-04-21 @ 12:58)  Creatinine, Serum: 1.80 mg/dL (08-03-21 @ 08:17)  Creatinine, Serum: 2.40 mg/dL (08-02-21 @ 07:05)  Creatinine, Serum: 3.50 mg/dL (08-01-21 @ 07:40)  Creatinine, Serum: 4.90 mg/dL (07-31-21 @ 08:14)    Procalcitonin, Serum: 7.60 ng/mL (08-03-21 @ 08:17)  Procalcitonin, Serum: 301.8 ng/mL (07-29-21 @ 13:35)     SARS-CoV-2: NotDetec (07-29-21 @ 13:33)  Rapid RVP Result: NotDetec (07-29-21 @ 13:33)    All imaging and other data have been reviewed.  < from: CT Chest No Cont (07.29.21 @ 15:12) >  IMPRESSION:  Bibasilar aspiration pneumonia.  Fluid-filled upper thoracic esophagus may reflect gastroesophageal reflux.  Mild left hydroureteronephrosis secondary to a 7 mm left UVJ calculus.  Bilateral nonspecific perirenal stranding. Correlate clinically for infection  Prostatomegaly.    Assessment and Plan:   72 yo man with PMH of HTN, BPH, CAD and ALS with PEG, was admitted with not looking right, change in urine color, diaphoresis and shortness of breath.  Had a fall 2 days ago since then not feeling well, possibly related to sepsis due to UTI/pyelonephritis and aspiration pneumonia.   As per the spouse, his urine looked cloudy, dark yellow with odor. Denies hematuria.   Of note, patient last admitted to [facility] 11/9 --> 11/12/2020 for urinary retention, jo placed.   In ED Spo2 was 70a5 so placed on NRB  WBC 29k  Procalcitonin 301.8  UA with high WBC >50 and also RBC 25-50  CT Chest/Abomen/Pelvis: Bibasilar aspiration pneumonia. Fluid-filled upper thoracic esophagus may reflect gastroesophageal reflux.  Mild left hydroureteronephrosis secondary to a 7 mm left UVJ calculus. Bilateral nonspecific perirenal stranding. Correlate clinically for infection, Prostatomegaly.  S/P Left stent placed by urology on 7/29  Blood culture 7/29 Klebsiella oxytoca and strep   Urine culture >3 bactereia  Kidney urine (from OR) with Klebsiella, strep and enterobacter   Repeat blood culture on 7/31 negative     1- Ecoli Bacteremia   2- UTI/pyelonephritis  3- Renal stone with left hydronephrosis   4- MARLEY  5- ALS  6- ?Aspiration Pneumonia    Recommendations:  - Repeat blood culture negative on 7/31  - Continue Cefepime will increase the dose 1gm q8 since renal function improving   - Follow WBC 29k-->10k,  - Follow Creat 1.4, trend it to adjust meds   - Stent management as per urology, hematuria expected   - Will treat for total 14 days total. Last day 8/11, can place midline.     Will follow.    D/W Dr. Culver.     Santhosh Burton MD  Division of Infectious Diseases   Cell [phone] between 8am and 6pm   After 6pm and weekends please call ID service at [phone].

## 2021-08-04 NOTE — PROGRESS NOTE ADULT - PROBLEM SELECTOR PLAN 1
resolving
- Continue IV zosyn   - Blood culture + for Gm neg rods, prelim. Repeat blood cultures pending.   - Continue  maintenance fluids   - Monitor urine output.   - tyelenol PRN for fever   - THUY gonzáles
Zosyn administered. Fluid resuscitation,
Clinically improving.
abiots per ID
blood c&s shows bacteremia with multiple organisms, abiots per ID
improving, note positive blood c&s for G+ as well as kleb from urine
- Continue IV cefipime  - Blood culture + for Kelbsiella  from UTI,  Repeat blood cultures negative  - Continue  maintenance fluids   - Monitor urine output.   - tyelenol PRN for fever   - THUY scottl follow up appreciated
- Continue IV zosyn   - Blood culture + for Kelbsieal, probably from UTI, prelim. Repeat blood cultures pending.   - Continue  maintenance fluids   - Monitor urine output.   - tyelenol PRN for fever   - THUY gonzáles
- Continue IV zosyn   - Blood culture + for Kelbsiella  from UTI,  Repeat blood cultures negative  - Continue  maintenance fluids   - Monitor urine output.   - tyelenol PRN for fever   - THUY Burton follow up appreciated
- Continue IV cefipime per ID  - Blood culture + for Kelbsiella  from UTI,  Repeat blood cultures negative  - Continue  maintenance fluids   - Monitor urine output.   - tyelenol PRN for fever   - ID Micheal scottl follow up appreciated
- Continue IV zosyn   - fu blood/urine cultures   - Continue  maintenance fluids   - Monitor urine output.   - tyelenol PRN for fever   - THUY gonzáles

## 2021-08-04 NOTE — PROGRESS NOTE ADULT - SUBJECTIVE AND OBJECTIVE BOX
PULMONARY/CRITICAL CARE    DOS 21  Pt. unchanged. No fever . No sob. Abg noted.     Patient is a 71y old  Male who presents with a chief complaint of Sepsis, UTI (03 Aug 2021 07:55) Possible pneumonia.     BRIEF HOSPITAL COURSE: ***70 yo M PMHx ALS, CAD (s/p MI 1 stent placed ), HTN, HLD, BPH presents to [facility] with fever, SOB, urinary symptoms. Found to have b/l PNA, and septic urologic stone.     Denies SOB. Since Monday he has been more lethargic and more SOB. He just had a PEG placed about 1 week ago and has not used it much yet. He is still having oral diet,. also straight caths himself for urine. No alcohol use, no smoking. Allergies to Fish.  Now has jo for hematuria.   Was fully verbal prior to admission, now nonverbal.       Events last 24 hours: ***    PAST MEDICAL & SURGICAL HISTORY:  Myocardial infarct    Hypertension    Hyperlipidemia    BPH (benign prostatic hyperplasia)    ALS (amyotrophic lateral sclerosis)    S/P tonsillectomy    S/P coronary artery stent placement    History of hip surgery    History of hernia repair  &gt; 20 years ago    H/O shoulder surgery      Allergies    fish (Short breath; Anaphylaxis)  No Known Drug Allergies    Intolerances      FAMILY HISTORY:/ social: denies cigs, etoh  FHx: myocardial infarction (Father)    FHx: hyperlipidemia          Medications:  cefepime   IVPB      cefepime   IVPB 1000 milliGRAM(s) IV Intermittent every 24 hours    metoprolol succinate ER 25 milliGRAM(s) Oral daily      acetaminophen   Tablet .. 650 milliGRAM(s) Oral every 6 hours PRN  escitalopram 10 milliGRAM(s) Oral daily  melatonin 3 milliGRAM(s) Oral at bedtime PRN  ondansetron Injectable 4 milliGRAM(s) IV Push every 8 hours PRN      clopidogrel Tablet 75 milliGRAM(s) Oral daily        atorvastatin 80 milliGRAM(s) Oral at bedtime    dextrose 5%. 1000 milliLiter(s) IV Continuous <Continuous>  potassium chloride   Powder 10 milliEquivalent(s) Enteral Tube daily                ICU Vital Signs Last 24 Hrs  T(C): 36.6 (03 Aug 2021 05:29), Max: 36.7 (02 Aug 2021 12:23)  T(F): 97.9 (03 Aug 2021 05:29), Max: 98.1 (02 Aug 2021 12:23)  HR: 77 (03 Aug 2021 05:29) (71 - 81)  BP: 135/73 (03 Aug 2021 05:29) (123/76 - 135/73)  BP(mean): --  ABP: --  ABP(mean): --  RR: 18 (03 Aug 2021 05:29) (18 - 20)  SpO2: 94% (03 Aug 2021 05:29) (94% - 95%)    Vital Signs Last 24 Hrs  T(C): 36.6 (03 Aug 2021 05:29), Max: 36.7 (02 Aug 2021 12:23)  T(F): 97.9 (03 Aug 2021 05:29), Max: 98.1 (02 Aug 2021 12:23)  HR: 77 (03 Aug 2021 05:29) (71 - 81)  BP: 135/73 (03 Aug 2021 05:29) (123/76 - 135/73)  BP(mean): --  RR: 18 (03 Aug 2021 05:29) (18 - 20)  SpO2: 94% (03 Aug 2021 05:29) (94% - 95%)        I&O's Detail    02 Aug 2021 07:01  -  03 Aug 2021 07:00  --------------------------------------------------------  IN:    dextrose 5%: 2210 mL    Enteral Tube Flush: 180 mL    Free Water: 950 mL    IV PiggyBack: 50 mL    Osmolite: 1170 mL  Total IN: 4560 mL    OUT:    Indwelling Catheter - Urethral (mL): 2300 mL  Total OUT: 2300 mL    Total NET: 2260 mL            LABS:                        13.0   x     )-----------( x        ( 03 Aug 2021 00:31 )             40.4     08-02    152<H>  |  115<H>  |  89<H>  ----------------------------<  208<H>  3.6   |  31  |  2.40<H>    Ca    9.5      02 Aug 2021 07:05            CAPILLARY BLOOD GLUCOSE          Urinalysis Basic - ( 03 Aug 2021 00:15 )    Color: Red / Appearance: Slightly Turbid / S.010 / pH: x  Gluc: x / Ketone: Negative  / Bili: Negative / Urobili: Negative   Blood: x / Protein: 100 / Nitrite: Negative   Leuk Esterase: Small / RBC: >50 /HPF / WBC 3-5   Sq Epi: x / Non Sq Epi: Negative / Bacteria: Few      CULTURES:  Culture Results:   No growth to date. ( @ 12:23)  Culture Results:   No growth to date. ( @ 12:23)  Culture Results:   Moderate Klebsiella oxytoca/Raoutella ornithinolytica  Few Enterobacter cloacae complex  Numerous Streptococcus constellatus "Susceptibilities not performed" ( @ 00:49)  Culture Results:   Growth in anaerobic bottle: Klebsiella oxytoca/Raoutella ornithinolytica  Growth in aerobic bottle: Streptococcus constellatus  ***Blood Panel PCR results on this specimen are available  approximately 3 hours after the Gram stain result.***  Gram stain, PCR, and/or culture results may not always  correspond due to difference in methodologies.  ************************************************************  This PCR assay was performed by multiplex PCR. This  Assay tests for 66 bacterial and resistance gene targets.  Please refer to the [facility] test directory  at [email or web address] for details. ( @ 18:10)  Culture Results:   Growth in aerobic and anaerobic bottles: Klebsiella oxytoca/Raoutella  ornithinolytica See previous culture [id number]    Growth in anaerobic bottle: Gram positive cocci in pairs ( @ 18:10)  Culture Results:   >=3 organisms. Probable collection contamination. ( @ 17:54)      Physical Examination:    General: No acute distress.  wd male sitting up comfortably in bed    HEENT: Pupils equal, reactive to light.  Symmetric.    PULM: Clear to auscultation bilaterally, decreased bs; no change percussion    CVS: Regular rate and rhythm, no murmurs, rubs, or gallops    ABD: Soft, nondistended, nontender, normoactive bowel sounds, no masses  Peg in place    EXT: No edema, nontender calves    SKIN: Warm and well perfused, no rashes noted.    NEURO: Alert, nonverbal, weakness ext.     RADIOLOGY: ***d< from: Xray Chest 1 View- PORTABLE-Urgent (Xray Chest 1 View- PORTABLE-Urgent .) (21 @ 19:25) >  EXAM:  XR CHEST PORTABLE URGENT 1V                            PROCEDURE DATE:  2021          INTERPRETATION:  Portable chest radiograph    CLINICAL INFORMATION: Tachypnea.    TECHNIQUE:  Portable  AP view of the chest was obtained.    COMPARISON: 2020 chest available for review.    FINDINGS:    The lungs show RIGHT perihilar linear airspace disease. Remaining visualized lung grossly clear. No pneumothorax.    The heart and mediastinum size and configuration are within normal limits.    Visualized osseous structures are intact.    IMPRESSION:   RIGHT perihilar linear airspace disease..    --- End of Report ---          < end of copied text >  < from: CT Chest No Cont (21 @ 15:12) >  EXAM:  CT CHEST                            PROCEDURE DATE:  2021          INTERPRETATION:  CLINICAL INFORMATION: Sepsis    COMPARISON: None.    CONTRAST/COMPLICATIONS:  IV Contrast: NONE  Oral Contrast: NONE  Complications: None reported at time of study completion    PROCEDURE:  CT of the Chest, Abdomen and Pelvis was performed.  Sagittal and coronal reformats were performed.    FINDINGS:  CHEST:  LUNGS AND LARGE AIRWAYS: Patent central airways. Extensive bibasilar dependent airspace infiltrates consistent with bilateral aspiration pneumonia  PLEURA: No pleural effusion.  VESSELS: Nonaneurysmal.  HEART: Coronary artery calcification. No pericardial effusion.  MEDIASTINUM AND GABRIELA: Mild fluid distention of the upper thoracic esophagussuggesting possible gastroesophageal reflux  CHEST WALL AND LOWER NECK: Within normal limits.    ABDOMEN AND PELVIS:  LIVER: Within normal limits.  BILE DUCTS: Normal caliber.  GALLBLADDER: Within normal limits.  SPLEEN: Within normal limits.  PANCREAS: Within normal limits.  ADRENALS: Within normal limits.  KIDNEYS/URETERS: Mild left hydroureteronephrosis secondary to a 7 mm left UVJ calculus. Punctate nonobstructive left renal calculus. Bilateral nonspecific perirenal stranding. Correlate for UTI.    BLADDER: Decompressed with Jo. Fluid-filled left lateral bladder diverticulum.  REPRODUCTIVE ORGANS: Enlarged prostate    BOWEL: No bowel obstruction or inflammation. Gastrostomy tube in situ. Appendix normal  PERITONEUM: No ascites.  VESSELS: Within normal limits.  RETROPERITONEUM/LYMPH NODES: No lymphadenopathy.  ABDOMINAL WALL: Fat-containing left inguinal hernia. Tiny fat-containing umbilical hernia.  BONES: Degenerative change.    IMPRESSION:  Bibasilar aspiration pneumonia.  Fluid-filled upper thoracic esophagus may reflect gastroesophageal reflux.  Mild left hydroureteronephrosis secondary to a 7 mm left UVJ calculus.  Bilateral nonspecific perirenal stranding. Correlate clinically for infection  Prostatomegaly.  Additional findings as discussed    --- End of Report ---    < end of copied text >      CRITICAL CARE TIME SPENT: ***

## 2021-08-04 NOTE — PROGRESS NOTE ADULT - PROBLEM SELECTOR PLAN 5
- likely in the setting of dehydration  -Free water flushes , IV D5W . Monitor   - Nephro, Andria following
- likely in the setting of dehydration  -Free water flushes , IV D5W . Monitor   - Nephro, Andria following  resolved
- likely in the setting of dehydration  - On IVF maintenance   - Nephro, Andria Consulted
Given indwelling stent and jo, hematuria is not unexpected. Will ak RNs to irrigate jo to maintain patency,.
resolved
- likely in the setting of dehydration  -Free water flushes , IV D5W . Monitor   - Nephro, Andria following
- likely in the setting of dehydration  -Free water flushes , IV D5W . Monitor   - Nephro, Andria following
- likely in the setting of dehydration  -Free water flushes   - Nephro, Andria following

## 2021-08-04 NOTE — PROGRESS NOTE ADULT - ASSESSMENT
Refill requested. Chart reviewed. Refilled as requested. Annual physical and labs due April 2019. Reminder letter sent.     71 year old male PMHx ALS, CAD (s/p MI 1 stent placed 2006), HTN, HLD, BPH s/p fall 2 days PTA admitted for urosepsis with obstructing stone.  Now s/p stent placement for obstructing renal stone.    s/p Fall  - Likely mechanical in the setting of weakness, dehydration, and sepsis  - Fall precaution  - No evidence of cardiac etiology    Urosepsis/Aspiration Pna  - Tachycardic in setting of sepsis, now resolved  - CT Chest/Abomen/Pelvis: Bibasilar aspiration pneumonia. Fluid-filled upper thoracic esophagus may reflect gastroesophageal reflux.  Mild left hydroureteronephrosis secondary to a 7 mm left UVJ calculus. Bilateral nonspecific perirenal stranding. Correlate clinically for infection  - IV antibiotics and fluids per primary team and ID  - Pulm following     MARLEY on CKD/Hypernatremia  - Presented with creatinine of 5.7  - Improved significantly: 1.4 today   - Follow Renal recs  - Intravascularly depleted but Hypernatremia resolved with D5W    CAD  - Hx of CAD s/p stent in 2006,   - Continue Plavix, BB, and statin  - No clear clinical evidence of cardiac ischemia    HTN  - Stable and controlled  - On BB    HLD   - Continue home statin     - Monitor and replete lytes, keep K>4, Mg>2.    - All other medical needs as per primary team.  - Will continue to follow.    Danielle Fowler DNP, NP-C  Cardiology   Spectra #[phone]

## 2021-08-04 NOTE — PROGRESS NOTE ADULT - PROBLEM SELECTOR PLAN 6
Complaining of worsening generalized weakness, likely in the setting of sepsis and urinary tract infection, however, patient not currently on riluzole therapy as per spouse, due to intolerance  - Neuro follow up  - PT and OT consulted  -Functional quadriplegia; Needs assistance with ADL's  may need MARIBETH

## 2021-08-04 NOTE — PROGRESS NOTE ADULT - PROBLEM SELECTOR PLAN 7
PEG tube placement, on tube feedings at home placed 2 weeks ago at [facility] due to progressive ALS and nutritional supplementation as per son.   - will hold feedings for now due to aspiration risk  - patient takes Nutrin 1.5 up to 5 times a day, with goal 2000 calories/day (as per son Caesar)   - IV PPI  -Nutrition and S&s consult as patient was some oral feeds at home but now has aspiration pneumonia so will be careful  -Daily oral care/ hygiene

## 2021-08-04 NOTE — PROGRESS NOTE ADULT - PROBLEM SELECTOR PLAN 2
CT Chest showing possible aspiration pneumonia   - Continue  cefipime for now   - aspiration precautions  - On Tube feeds   - fu speech and swallow  pulm eval
Stable from  standpoint s/p left ureteral stent insertion.
For emergent left ureteral stent insertion.
craet improving
improving slowly
slowly improving
CT Chest showing possible aspiration pneumonia   - Continue  cefipime for now   - aspiration precautions  - On Tube feeds   - fu speech and swallow
improving
CT Chest showing possible aspiration pneumonia   - Continue  zosyn for now   - aspiration precautions  - NPO except meds  - fu speech and swallow
CT Chest showing possible aspiration pneumonia   - Continue  zosyn for now   - aspiration precautions  - On Tube feeds   - fu speech and swallow
CT Chest showing possible aspiration pneumonia   - Continue  cefipime for now   - aspiration precautions  - On Tube feeds   - fu speech and swallow  pulm eval  may need MBS / FEES
CT Chest showing possible aspiration pneumonia   - Continue  zosyn for now   - aspiration precautions  - On Tube feeds   - fu speech and swallow

## 2021-08-04 NOTE — PROGRESS NOTE ADULT - NUTRITIONAL ASSESSMENT
This patient has been assessed with a concern for Malnutrition and has been determined to have a diagnosis/diagnoses of Severe protein-calorie malnutrition.    This patient is being managed with:   Diet NPO with Tube Feed-  Tube Feeding Modality: Gastrostomy  Osmolite 1.5 Kelvin  Total Volume for 24 Hours (mL): 1560  Bolus  Total Volume of Bolus (mL):  260  Total # of Feeds: 5  Tube Feed Frequency: Every 4 hours   Tube Feed Start Time: 06:00  Bolus Feed Rate (mL per Hour): 260   Bolus Feed Duration (in Hours): 1  Bolus Feed Instructions:  start TF at 200 ml bolus feed every 4-5 hrsincrease to goal of 260 ml/feed.( total 1300 ml per day.) Flush with 750 total ml free water/day. Please instruct wife on bolus feeds.  Free Water Flush  Free Water Flush Instructions:  total free water needed/day ~750 ml  Entered: Jul 30 2021 10:35AM    
This patient has been assessed with a concern for Malnutrition and has been determined to have a diagnosis/diagnoses of Severe protein-calorie malnutrition.    This patient is being managed with:   Diet NPO with Tube Feed-  Tube Feeding Modality: Gastrostomy  Osmolite 1.5 Kelvin  Total Volume for 24 Hours (mL): 1560  Bolus  Total Volume of Bolus (mL):  260  Total # of Feeds: 5  Tube Feed Frequency: Every 4 hours   Tube Feed Start Time: 06:00  Bolus Feed Rate (mL per Hour): 260   Bolus Feed Duration (in Hours): 1  Bolus Feed Instructions:  start TF at 200 ml bolus feed every 4-5 hrsincrease to goal of 260 ml/feed.( total 1300 ml per day.) Flush with 750 total ml free water/day. Please instruct wife on bolus feeds.  Free Water Flush  Free Water Flush Instructions:  total free water needed/day ~750 ml  Entered: Jul 30 2021 10:35AM    
:
This patient has been assessed with a concern for Malnutrition and has been determined to have a diagnosis/diagnoses of Severe protein-calorie malnutrition.    This patient is being managed with:   Diet NPO with Tube Feed-  Tube Feeding Modality: Gastrostomy  Osmolite 1.5 Kelvin  Total Volume for 24 Hours (mL): 1560  Bolus  Total Volume of Bolus (mL):  260  Total # of Feeds: 5  Tube Feed Frequency: Every 4 hours   Tube Feed Start Time: 06:00  Bolus Feed Rate (mL per Hour): 260   Bolus Feed Duration (in Hours): 1  Bolus Feed Instructions:  start TF at 200 ml bolus feed every 4-5 hrsincrease to goal of 260 ml/feed.( total 1300 ml per day.) Flush with 750 total ml free water/day. Please instruct wife on bolus feeds.  Free Water Flush  Free Water Flush Instructions:  total free water needed/day ~750 ml  Entered: Jul 30 2021 10:35AM    
This patient has been assessed with a concern for Malnutrition and has been determined to have a diagnosis/diagnoses of Severe protein-calorie malnutrition.    This patient is being managed with:   Diet NPO with Tube Feed-  Tube Feeding Modality: Gastrostomy  Osmolite 1.5 Kelvin  Total Volume for 24 Hours (mL): 1560  Bolus  Total Volume of Bolus (mL):  260  Total # of Feeds: 5  Tube Feed Frequency: Every 4 hours   Tube Feed Start Time: 06:00  Bolus Feed Rate (mL per Hour): 260   Bolus Feed Duration (in Hours): 1  Bolus Feed Instructions:  start TF at 200 ml bolus feed every 4-5 hrsincrease to goal of 260 ml/feed.( total 1300 ml per day.) Flush with 750 total ml free water/day. Please instruct wife on bolus feeds.  Free Water Flush  Free Water Flush Instructions:  total free water needed/day ~750 ml  Entered: Jul 30 2021 10:35AM    
:
This patient has been assessed with a concern for Malnutrition and has been determined to have a diagnosis/diagnoses of Severe protein-calorie malnutrition.    This patient is being managed with:   Diet NPO with Tube Feed-  Tube Feeding Modality: Gastrostomy  Osmolite 1.5 Kelvin  Total Volume for 24 Hours (mL): 1560  Bolus  Total Volume of Bolus (mL):  260  Total # of Feeds: 5  Tube Feed Frequency: Every 4 hours   Tube Feed Start Time: 06:00  Bolus Feed Rate (mL per Hour): 260   Bolus Feed Duration (in Hours): 1  Bolus Feed Instructions:  start TF at 200 ml bolus feed every 4-5 hrsincrease to goal of 260 ml/feed.( total 1300 ml per day.) Flush with 750 total ml free water/day. Please instruct wife on bolus feeds.  Free Water Flush  Free Water Flush Instructions:  total free water needed/day ~750 ml  Entered: Jul 30 2021 10:35AM    

## 2021-08-04 NOTE — PROGRESS NOTE ADULT - SUBJECTIVE AND OBJECTIVE BOX
Patient is a 71y old  Male who presents with a chief complaint of Sepsis, UTI (04 Aug 2021 15:36)      INTERVAL /OVERNIGHT EVENTS: alert awake non verbal    MEDICATIONS  (STANDING):  artificial tears (preservative free) Ophthalmic Solution 1 Drop(s) Both EYES three times a day  aspirin  chewable 81 milliGRAM(s) Oral daily  atorvastatin 80 milliGRAM(s) Oral at bedtime  cefepime   IVPB 1000 milliGRAM(s) IV Intermittent every 12 hours  dextrose 5%. 1000 milliLiter(s) (50 mL/Hr) IV Continuous <Continuous>  escitalopram 10 milliGRAM(s) Oral daily  ketotifen 0.025% Ophthalmic Solution 1 Drop(s) Both EYES once  lactobacillus acidophilus 1 Tablet(s) Oral three times a day  metoprolol succinate ER 25 milliGRAM(s) Oral daily  multivitamin/minerals/iron Oral Solution (CENTRUM) 15 milliLiter(s) Oral daily  potassium chloride   Powder 10 milliEquivalent(s) Enteral Tube two times a day    MEDICATIONS  (PRN):  acetaminophen   Tablet .. 650 milliGRAM(s) Oral every 6 hours PRN Temp greater or equal to 38.5C (101.3F), Mild Pain (1 - 3)      Allergies    fish (Short breath; Anaphylaxis)  No Known Drug Allergies    Intolerances        REVIEW OF SYSTEMS:  denies    Vital Signs Last 24 Hrs  T(C): 36.5 (04 Aug 2021 13:17), Max: 37 (04 Aug 2021 05:20)  T(F): 97.7 (04 Aug 2021 13:17), Max: 98.6 (04 Aug 2021 05:20)  HR: 69 (04 Aug 2021 13:17) (69 - 79)  BP: 125/72 (04 Aug 2021 13:17) (125/72 - 147/66)  BP(mean): --  RR: 18 (04 Aug 2021 13:17) (18 - 18)  SpO2: 98% (04 Aug 2021 13:17) (96% - 98%)    PHYSICAL EXAM:  GENERAL: NAD, well-groomed, well-developed  HEAD:  Atraumatic, Normocephalic  EYES: EOMI, PERRLA, conjunctiva and sclera clear  ENMT: No tonsillar erythema, exudates, or enlargement; Moist mucous membranes, Good dentition, No lesions  NECK: Supple, No JVD, Normal thyroid  NERVOUS SYSTEM:  Alert & Oriented X3, Good concentration; Motor Strength 5/5 B/L upper and lower extremities; DTRs 2+ intact and symmetric  CHEST/LUNG: Clear to auscultation bilaterally; No rales, rhonchi, wheezing, or rubs  HEART: Regular rate and rhythm; No murmurs, rubs, or gallops  ABDOMEN: Soft, Nontender, Nondistended; Bowel sounds present  EXTREMITIES:  2+ Peripheral Pulses, No clubbing, cyanosis, or edema  LYMPH: No lymphadenopathy noted  SKIN: No rashes or lesions    LABS:                        12.1   10.22 )-----------( 152      ( 04 Aug 2021 12:58 )             37.5     04 Aug 2021 12:58    145    |  109    |  59     ----------------------------<  134    4.1     |  30     |  1.40     Ca    9.2        04 Aug 2021 12:58        Urinalysis Basic - ( 03 Aug 2021 00:15 )    Color: Red / Appearance: Slightly Turbid / S.010 / pH: x  Gluc: x / Ketone: Negative  / Bili: Negative / Urobili: Negative   Blood: x / Protein: 100 / Nitrite: Negative   Leuk Esterase: Small / RBC: >50 /HPF / WBC 3-5   Sq Epi: x / Non Sq Epi: Negative / Bacteria: Few      CAPILLARY BLOOD GLUCOSE          RADIOLOGY & ADDITIONAL TESTS:    Notes Reviewed:  [x ] YES  [ ] NO    Care Discussed with Consultants/Other Providers [x ] YES  [ ] NO

## 2021-08-05 VITALS
DIASTOLIC BLOOD PRESSURE: 71 MMHG | TEMPERATURE: 98 F | SYSTOLIC BLOOD PRESSURE: 131 MMHG | HEART RATE: 71 BPM | OXYGEN SATURATION: 97 % | RESPIRATION RATE: 17 BRPM

## 2021-08-05 LAB
ANION GAP SERPL CALC-SCNC: 4 MMOL/L — LOW (ref 5–17)
BUN SERPL-MCNC: 50 MG/DL — HIGH (ref 7–23)
CALCIUM SERPL-MCNC: 8.8 MG/DL — SIGNIFICANT CHANGE UP (ref 8.5–10.1)
CHLORIDE SERPL-SCNC: 109 MMOL/L — HIGH (ref 96–108)
CO2 SERPL-SCNC: 28 MMOL/L — SIGNIFICANT CHANGE UP (ref 22–31)
CREAT SERPL-MCNC: 1.2 MG/DL — SIGNIFICANT CHANGE UP (ref 0.5–1.3)
CULTURE RESULTS: SIGNIFICANT CHANGE UP
CULTURE RESULTS: SIGNIFICANT CHANGE UP
GLUCOSE SERPL-MCNC: 98 MG/DL — SIGNIFICANT CHANGE UP (ref 70–99)
HCT VFR BLD CALC: 36.9 % — LOW (ref 39–50)
HGB BLD-MCNC: 11.8 G/DL — LOW (ref 13–17)
MCHC RBC-ENTMCNC: 30.1 PG — SIGNIFICANT CHANGE UP (ref 27–34)
MCHC RBC-ENTMCNC: 32 GM/DL — SIGNIFICANT CHANGE UP (ref 32–36)
MCV RBC AUTO: 94.1 FL — SIGNIFICANT CHANGE UP (ref 80–100)
NRBC # BLD: 0 /100 WBCS — SIGNIFICANT CHANGE UP (ref 0–0)
PLATELET # BLD AUTO: 172 K/UL — SIGNIFICANT CHANGE UP (ref 150–400)
POTASSIUM SERPL-MCNC: 4.4 MMOL/L — SIGNIFICANT CHANGE UP (ref 3.5–5.3)
POTASSIUM SERPL-SCNC: 4.4 MMOL/L — SIGNIFICANT CHANGE UP (ref 3.5–5.3)
RBC # BLD: 3.92 M/UL — LOW (ref 4.2–5.8)
RBC # FLD: 12.3 % — SIGNIFICANT CHANGE UP (ref 10.3–14.5)
SODIUM SERPL-SCNC: 141 MMOL/L — SIGNIFICANT CHANGE UP (ref 135–145)
SPECIMEN SOURCE: SIGNIFICANT CHANGE UP
SPECIMEN SOURCE: SIGNIFICANT CHANGE UP
WBC # BLD: 11.54 K/UL — HIGH (ref 3.8–10.5)
WBC # FLD AUTO: 11.54 K/UL — HIGH (ref 3.8–10.5)

## 2021-08-05 PROCEDURE — 87150 DNA/RNA AMPLIFIED PROBE: CPT

## 2021-08-05 PROCEDURE — 84145 PROCALCITONIN (PCT): CPT

## 2021-08-05 PROCEDURE — 74230 X-RAY XM SWLNG FUNCJ C+: CPT | Mod: 26

## 2021-08-05 PROCEDURE — 81001 URINALYSIS AUTO W/SCOPE: CPT

## 2021-08-05 PROCEDURE — U0005: CPT

## 2021-08-05 PROCEDURE — 74230 X-RAY XM SWLNG FUNCJ C+: CPT

## 2021-08-05 PROCEDURE — 99232 SBSQ HOSP IP/OBS MODERATE 35: CPT

## 2021-08-05 PROCEDURE — 83735 ASSAY OF MAGNESIUM: CPT

## 2021-08-05 PROCEDURE — 85025 COMPLETE CBC W/AUTO DIFF WBC: CPT

## 2021-08-05 PROCEDURE — C1758: CPT

## 2021-08-05 PROCEDURE — 87184 SC STD DISK METHOD PER PLATE: CPT

## 2021-08-05 PROCEDURE — A9698: CPT

## 2021-08-05 PROCEDURE — 71250 CT THORAX DX C-: CPT | Mod: MA

## 2021-08-05 PROCEDURE — 71045 X-RAY EXAM CHEST 1 VIEW: CPT

## 2021-08-05 PROCEDURE — 94760 N-INVAS EAR/PLS OXIMETRY 1: CPT

## 2021-08-05 PROCEDURE — 87077 CULTURE AEROBIC IDENTIFY: CPT

## 2021-08-05 PROCEDURE — 36573 INSJ PICC RS&I 5 YR+: CPT

## 2021-08-05 PROCEDURE — 82803 BLOOD GASES ANY COMBINATION: CPT

## 2021-08-05 PROCEDURE — 80053 COMPREHEN METABOLIC PANEL: CPT

## 2021-08-05 PROCEDURE — 85014 HEMATOCRIT: CPT

## 2021-08-05 PROCEDURE — 76937 US GUIDE VASCULAR ACCESS: CPT

## 2021-08-05 PROCEDURE — 0225U NFCT DS DNA&RNA 21 SARSCOV2: CPT

## 2021-08-05 PROCEDURE — 85610 PROTHROMBIN TIME: CPT

## 2021-08-05 PROCEDURE — 93005 ELECTROCARDIOGRAM TRACING: CPT

## 2021-08-05 PROCEDURE — 92611 MOTION FLUOROSCOPY/SWALLOW: CPT

## 2021-08-05 PROCEDURE — 97162 PT EVAL MOD COMPLEX 30 MIN: CPT

## 2021-08-05 PROCEDURE — C1751: CPT

## 2021-08-05 PROCEDURE — 92610 EVALUATE SWALLOWING FUNCTION: CPT

## 2021-08-05 PROCEDURE — 97116 GAIT TRAINING THERAPY: CPT

## 2021-08-05 PROCEDURE — 36415 COLL VENOUS BLD VENIPUNCTURE: CPT

## 2021-08-05 PROCEDURE — U0003: CPT

## 2021-08-05 PROCEDURE — 82310 ASSAY OF CALCIUM: CPT

## 2021-08-05 PROCEDURE — C1769: CPT

## 2021-08-05 PROCEDURE — 76000 FLUOROSCOPY <1 HR PHYS/QHP: CPT

## 2021-08-05 PROCEDURE — 97535 SELF CARE MNGMENT TRAINING: CPT

## 2021-08-05 PROCEDURE — 86769 SARS-COV-2 COVID-19 ANTIBODY: CPT

## 2021-08-05 PROCEDURE — 80048 BASIC METABOLIC PNL TOTAL CA: CPT

## 2021-08-05 PROCEDURE — 74176 CT ABD & PELVIS W/O CONTRAST: CPT | Mod: MA

## 2021-08-05 PROCEDURE — 97166 OT EVAL MOD COMPLEX 45 MIN: CPT

## 2021-08-05 PROCEDURE — 71045 X-RAY EXAM CHEST 1 VIEW: CPT | Mod: 26

## 2021-08-05 PROCEDURE — 85730 THROMBOPLASTIN TIME PARTIAL: CPT

## 2021-08-05 PROCEDURE — 83605 ASSAY OF LACTIC ACID: CPT

## 2021-08-05 PROCEDURE — 99285 EMERGENCY DEPT VISIT HI MDM: CPT | Mod: 25

## 2021-08-05 PROCEDURE — 97530 THERAPEUTIC ACTIVITIES: CPT

## 2021-08-05 PROCEDURE — 85027 COMPLETE CBC AUTOMATED: CPT

## 2021-08-05 PROCEDURE — 36600 WITHDRAWAL OF ARTERIAL BLOOD: CPT

## 2021-08-05 PROCEDURE — 84100 ASSAY OF PHOSPHORUS: CPT

## 2021-08-05 PROCEDURE — 96374 THER/PROPH/DIAG INJ IV PUSH: CPT

## 2021-08-05 PROCEDURE — 85018 HEMOGLOBIN: CPT

## 2021-08-05 PROCEDURE — 87086 URINE CULTURE/COLONY COUNT: CPT

## 2021-08-05 PROCEDURE — 84300 ASSAY OF URINE SODIUM: CPT

## 2021-08-05 PROCEDURE — 87181 SC STD AGAR DILUTION PER AGT: CPT

## 2021-08-05 PROCEDURE — 87186 SC STD MICRODIL/AGAR DIL: CPT

## 2021-08-05 PROCEDURE — 82570 ASSAY OF URINE CREATININE: CPT

## 2021-08-05 PROCEDURE — 83970 ASSAY OF PARATHORMONE: CPT

## 2021-08-05 PROCEDURE — 87040 BLOOD CULTURE FOR BACTERIA: CPT

## 2021-08-05 PROCEDURE — 76775 US EXAM ABDO BACK WALL LIM: CPT

## 2021-08-05 PROCEDURE — C2617: CPT

## 2021-08-05 RX ORDER — LACTOBACILLUS ACIDOPHILUS 100MM CELL
0 CAPSULE ORAL
Qty: 0 | Refills: 0 | DISCHARGE
Start: 2021-08-05

## 2021-08-05 RX ORDER — CLOPIDOGREL BISULFATE 75 MG/1
1 TABLET, FILM COATED ORAL
Qty: 0 | Refills: 0 | DISCHARGE

## 2021-08-05 RX ORDER — DOCUSATE SODIUM 100 MG
1 CAPSULE ORAL
Qty: 0 | Refills: 0 | DISCHARGE

## 2021-08-05 RX ORDER — CEFEPIME 1 G/1
1 INJECTION, POWDER, FOR SOLUTION INTRAMUSCULAR; INTRAVENOUS
Qty: 0 | Refills: 0 | DISCHARGE
Start: 2021-08-05

## 2021-08-05 RX ORDER — LISINOPRIL 2.5 MG/1
1 TABLET ORAL
Qty: 0 | Refills: 0 | DISCHARGE

## 2021-08-05 RX ORDER — METOPROLOL TARTRATE 50 MG
1 TABLET ORAL
Qty: 0 | Refills: 0 | DISCHARGE

## 2021-08-05 RX ORDER — FENOFIBRATE,MICRONIZED 130 MG
1 CAPSULE ORAL
Qty: 0 | Refills: 0 | DISCHARGE

## 2021-08-05 RX ORDER — ROSUVASTATIN CALCIUM 5 MG/1
1 TABLET ORAL
Qty: 0 | Refills: 0 | DISCHARGE

## 2021-08-05 RX ORDER — MULTIVIT-MIN/FERROUS GLUCONATE 9 MG/15 ML
0 LIQUID (ML) ORAL
Qty: 0 | Refills: 0 | DISCHARGE
Start: 2021-08-05

## 2021-08-05 RX ORDER — ESCITALOPRAM OXALATE 10 MG/1
1 TABLET, FILM COATED ORAL
Qty: 0 | Refills: 0 | DISCHARGE

## 2021-08-05 RX ADMIN — Medication 1 TABLET(S): at 05:59

## 2021-08-05 RX ADMIN — Medication 81 MILLIGRAM(S): at 13:58

## 2021-08-05 RX ADMIN — Medication 1 TABLET(S): at 13:58

## 2021-08-05 RX ADMIN — ESCITALOPRAM OXALATE 10 MILLIGRAM(S): 10 TABLET, FILM COATED ORAL at 13:58

## 2021-08-05 RX ADMIN — Medication 1 DROP(S): at 13:58

## 2021-08-05 RX ADMIN — Medication 25 MILLIGRAM(S): at 06:00

## 2021-08-05 RX ADMIN — CEFEPIME 100 MILLIGRAM(S): 1 INJECTION, POWDER, FOR SOLUTION INTRAMUSCULAR; INTRAVENOUS at 13:58

## 2021-08-05 RX ADMIN — Medication 10 MILLIEQUIVALENT(S): at 06:00

## 2021-08-05 RX ADMIN — Medication 15 MILLILITER(S): at 13:59

## 2021-08-05 NOTE — PROGRESS NOTE ADULT - PROBLEM SELECTOR PROBLEM 10
Hyperlipidemia
Hyperlipidemia
S/P percutaneous endoscopic gastrostomy (PEG) tube placement
Hyperlipidemia
S/P percutaneous endoscopic gastrostomy (PEG) tube placement
Hyperlipidemia

## 2021-08-05 NOTE — DISCHARGE NOTE NURSING/CASE MANAGEMENT/SOCIAL WORK - PATIENT PORTAL LINK FT
You can access the FollowMyHealth Patient Portal offered by [facility] by registering at the following website: [email or web address]. By joining [employer]’s FollowMyHealth portal, you will also be able to view your health information using other applications (apps) compatible with our system.

## 2021-08-05 NOTE — DISCHARGE NOTE PROVIDER - CARE PROVIDERS DIRECT ADDRESSES
[email or web address]

## 2021-08-05 NOTE — SWALLOW VFSS/MBS ASSESSMENT ADULT - COMMENTS
Clinical swallow assessment completed 7/30, at which time oral nutrition/hydration/medication was contraindicated. MBS was recommended to determine PO candidacy.    CXR 8/4: "Residual RIGHT greater than LEFT bibasilar airspace disease and RIGHT linear atelectasis"

## 2021-08-05 NOTE — PROGRESS NOTE ADULT - ASSESSMENT
Pt. with ALS, now has likely aspiration pneumonia/UTI/Renal stone.   Appears to be losing motor function, and might need eventually nightly bipap.  For now, nasal O2.   FU CXR noted.   Antibiotics.

## 2021-08-05 NOTE — PROGRESS NOTE ADULT - SUBJECTIVE AND OBJECTIVE BOX
INTERVAL HPI/OVERNIGHT EVENTS: no more hematuria, for MARIBETH placement    MEDICATIONS  (STANDING):  artificial tears (preservative free) Ophthalmic Solution 1 Drop(s) Both EYES three times a day  aspirin  chewable 81 milliGRAM(s) Oral daily  atorvastatin 80 milliGRAM(s) Oral at bedtime  cefepime   IVPB 1000 milliGRAM(s) IV Intermittent every 12 hours  dextrose 5%. 1000 milliLiter(s) (50 mL/Hr) IV Continuous <Continuous>  escitalopram 10 milliGRAM(s) Oral daily  lactobacillus acidophilus 1 Tablet(s) Oral three times a day  metoprolol succinate ER 25 milliGRAM(s) Oral daily  multivitamin/minerals/iron Oral Solution (CENTRUM) 15 milliLiter(s) Oral daily  potassium chloride   Powder 10 milliEquivalent(s) Enteral Tube two times a day    MEDICATIONS  (PRN):  acetaminophen   Tablet .. 650 milliGRAM(s) Oral every 6 hours PRN Temp greater or equal to 38.5C (101.3F), Mild Pain (1 - 3)        Vital Signs Last 24 Hrs  T(C): 36.7 (05 Aug 2021 05:27), Max: 37.2 (04 Aug 2021 20:20)  T(F): 98.1 (05 Aug 2021 05:27), Max: 98.9 (04 Aug 2021 20:20)  HR: 73 (05 Aug 2021 05:27) (69 - 76)  BP: 143/77 (05 Aug 2021 05:27) (125/72 - 147/70)  BP(mean): --  RR: 18 (05 Aug 2021 05:27) (17 - 18)  SpO2: 99% (05 Aug 2021 05:27) (94% - 99%)    PHYSICAL EXAM:      GENITALIA: urine clear    LABS:                        11.8   11.54 )-----------( 172      ( 05 Aug 2021 06:30 )             36.9     08-05    141  |  109<H>  |  50<H>  ----------------------------<  98  4.4   |  28  |  1.20    Ca    8.8      05 Aug 2021 06:30              RADIOLOGY & ADDITIONAL TESTS:

## 2021-08-05 NOTE — DISCHARGE NOTE PROVIDER - NSDCMRMEDTOKEN_GEN_ALL_CORE_FT
cefepime 1 g intravenous injection: 1 gram(s) intravenous every 12 hours until 8/11/2021  Colace 100 mg oral capsule: 1 cap(s) by gastrostomy tube once a day, As Needed  Crestor 40 mg oral tablet: 1 tab(s) by gastrostomy tube once a day  fenofibrate 160 mg oral tablet: 1 tab(s) by gastrostomy tube once a day  lactobacillus acidophilus oral capsule: by gastrostomy tube once a day  Lexapro 10 mg oral tablet: 1 tab(s) by gastrostomy tube once a day  lisinopril 2.5 mg oral tablet: 1 tab(s) by gastrostomy tube once a day  metoprolol tartrate 50 mg oral tablet: 1 tab(s) by gastrostomy tube once a day  Multiple Vitamins with Minerals oral liquid: by gastrostomy tube once a day  ocular lubricant ophthalmic solution: 1 drop(s) to each affected eye 3 times a day  Plavix 75 mg oral tablet: 1 tab(s) by gastrostomy tube once a day

## 2021-08-05 NOTE — PROCEDURE NOTE - NSINFORMCONSENT_GEN_A_CORE
pts son/Benefits, risks, and possible complications of procedure explained to patient/caregiver who verbalized understanding and gave verbal consent.

## 2021-08-05 NOTE — PROGRESS NOTE ADULT - PROBLEM SELECTOR PROBLEM 5
Gross hematuria
Hypernatremia
Gross hematuria
Hyperlipidemia
Hypernatremia
Gross hematuria
Hyperlipidemia
Hypernatremia

## 2021-08-05 NOTE — PROGRESS NOTE ADULT - ASSESSMENT
71 year old male PMHx ALS, CAD (s/p MI 1 stent placed 2006), HTN, HLD, BPH s/p fall 2 days PTA admitted for urosepsis with obstructing stone.  Now s/p stent placement for obstructing renal stone.    s/p Fall  - Likely mechanical in the setting of weakness, dehydration, and sepsis  - Fall precaution  - No evidence of cardiac etiology    Urosepsis/Aspiration Pna  - CT Chest/Abomen/Pelvis: Bibasilar aspiration pneumonia. Fluid-filled upper thoracic esophagus may reflect gastroesophageal reflux.  Mild left hydroureteronephrosis secondary to a 7 mm left UVJ calculus. Bilateral nonspecific perirenal stranding. Correlate clinically for infection  - IV antibiotics and fluids per primary team and ID  -sp PICC this am   - Pulm following     MARLEY on CKD/Hypernatremia  - Follow Renal recs  - Intravascularly depleted but Hypernatremia resolved with D5W    CAD  - Hx of CAD s/p stent in 2006,   - Continue Plavix, BB, and statin  - No clear clinical evidence of cardiac ischemia    HTN  - 131/71  - On BB    HLD   - Continue home statin     - Monitor and replete lytes, keep K>4, Mg>2.  Awaiting discharge today  Liudmila Solorzano FNP-C  Cardiology NP  [id number] [phone]

## 2021-08-05 NOTE — PROGRESS NOTE ADULT - PROBLEM SELECTOR PROBLEM 4
MARLEY (acute kidney injury)
Urinary retention
Hypertension
Hypertension
Urinary retention
Urinary retention
MARLEY (acute kidney injury)
Urinary retention
MARLEY (acute kidney injury)

## 2021-08-05 NOTE — PROGRESS NOTE ADULT - PROBLEM SELECTOR PLAN 4
maintain jo
BUN/Cr: 113/5.70 (baseline appears to be 39/1.1)   - fu urine lytes   - likely in the setting of sepsis, hypoperfusion   - continue IVF   - will hold ramipril, fenofibrate and avoid nephrotoxic agents  - Nephro Andria consulted
BUN/Cr: 113/5.70 (baseline appears to be 39/1.1)   - fu urine lytes   - likely in the setting of sepsis, hypoperfusion   - continue IVF   - will hold ramipril, fenofibrate and avoid nephrotoxic agents  - Nephro Andria consulted
Pt has chronoc retention. Shukla must be continued until (if) he can resume self cath
- Likely secondary to obstructive Uropathy and on ACE inhibitors   - continue IVF   - will hold ramipril, fenofibrate and avoid nephrotoxic agents  - Nephro Andria following
as pt is unable to perform self cath, he will need to continue jo
jo indwelling
- Likely secondary to obstructive Uropathy and on ACE inhibitors   - continue IVF per renal  - will hold ramipril, fenofibrate and avoid nephrotoxic agents  - Nephro Andria following
- Likely secondary to obstructive Uropathy and on ACE inhibitors   - continue IVF per renal  - will hold ramipril, fenofibrate and avoid nephrotoxic agents  - Nephro Andria following  improving slowly
- Likely secondary to obstructive Uropathy and on ACE inhibitors   - continue IVF   - will hold ramipril, fenofibrate and avoid nephrotoxic agents  - Nephro Andria following

## 2021-08-05 NOTE — SWALLOW VFSS/MBS ASSESSMENT ADULT - SLP PERTINENT HISTORY OF CURRENT PROBLEM
Per charting, "70 yo M PMHx ALS, CAD (s/p MI 1 stent placed 2006 on DAPT), HTN, HLD, BPH admitted for urosepsis."

## 2021-08-05 NOTE — PROGRESS NOTE ADULT - SUBJECTIVE AND OBJECTIVE BOX
PULMONARY/CRITICAL CARE    DOS 21  Denies sob.   Pt. unchanged. No fever . .     Patient is a 71y old  Male who presents with a chief complaint of Sepsis, UTI (03 Aug 2021 07:55) Possible pneumonia.     BRIEF HOSPITAL COURSE: ***70 yo M PMHx ALS, CAD (s/p MI 1 stent placed ), HTN, HLD, BPH presents to [facility] with fever, SOB, urinary symptoms. Found to have b/l PNA, and septic urologic stone.     Denies SOB. Since Monday he has been more lethargic and more SOB. He just had a PEG placed about 1 week ago and has not used it much yet. He is still having oral diet,. also straight caths himself for urine. No alcohol use, no smoking. Allergies to Fish.  Now has jo for hematuria.   Was fully verbal prior to admission, now nonverbal.       Events last 24 hours: ***    PAST MEDICAL & SURGICAL HISTORY:  Myocardial infarct    Hypertension    Hyperlipidemia    BPH (benign prostatic hyperplasia)    ALS (amyotrophic lateral sclerosis)    S/P tonsillectomy    S/P coronary artery stent placement    History of hip surgery    History of hernia repair  &gt; 20 years ago    H/O shoulder surgery      Allergies    fish (Short breath; Anaphylaxis)  No Known Drug Allergies    Intolerances      FAMILY HISTORY:/ social: denies cigs, etoh  FHx: myocardial infarction (Father)    FHx: hyperlipidemia          Medications:  cefepime   IVPB      cefepime   IVPB 1000 milliGRAM(s) IV Intermittent every 24 hours    metoprolol succinate ER 25 milliGRAM(s) Oral daily      acetaminophen   Tablet .. 650 milliGRAM(s) Oral every 6 hours PRN  escitalopram 10 milliGRAM(s) Oral daily  melatonin 3 milliGRAM(s) Oral at bedtime PRN  ondansetron Injectable 4 milliGRAM(s) IV Push every 8 hours PRN      clopidogrel Tablet 75 milliGRAM(s) Oral daily        atorvastatin 80 milliGRAM(s) Oral at bedtime    dextrose 5%. 1000 milliLiter(s) IV Continuous <Continuous>  potassium chloride   Powder 10 milliEquivalent(s) Enteral Tube daily                ICU Vital Signs Last 24 Hrs  T(C): 36.6 (03 Aug 2021 05:29), Max: 36.7 (02 Aug 2021 12:23)  T(F): 97.9 (03 Aug 2021 05:29), Max: 98.1 (02 Aug 2021 12:23)  HR: 77 (03 Aug 2021 05:29) (71 - 81)  BP: 135/73 (03 Aug 2021 05:29) (123/76 - 135/73)  BP(mean): --  ABP: --  ABP(mean): --  RR: 18 (03 Aug 2021 05:29) (18 - 20)  SpO2: 94% (03 Aug 2021 05:29) (94% - 95%)    Vital Signs Last 24 Hrs  T(C): 36.6 (03 Aug 2021 05:29), Max: 36.7 (02 Aug 2021 12:23)  T(F): 97.9 (03 Aug 2021 05:29), Max: 98.1 (02 Aug 2021 12:23)  HR: 77 (03 Aug 2021 05:29) (71 - 81)  BP: 135/73 (03 Aug 2021 05:29) (123/76 - 135/73)  BP(mean): --  RR: 18 (03 Aug 2021 05:29) (18 - 20)  SpO2: 94% (03 Aug 2021 05:29) (94% - 95%)        I&O's Detail    02 Aug 2021 07:01  -  03 Aug 2021 07:00  --------------------------------------------------------  IN:    dextrose 5%: 2210 mL    Enteral Tube Flush: 180 mL    Free Water: 950 mL    IV PiggyBack: 50 mL    Osmolite: 1170 mL  Total IN: 4560 mL    OUT:    Indwelling Catheter - Urethral (mL): 2300 mL  Total OUT: 2300 mL    Total NET: 2260 mL            LABS:                        13.0   x     )-----------( x        ( 03 Aug 2021 00:31 )             40.4     08-02    152<H>  |  115<H>  |  89<H>  ----------------------------<  208<H>  3.6   |  31  |  2.40<H>    Ca    9.5      02 Aug 2021 07:05            CAPILLARY BLOOD GLUCOSE          Urinalysis Basic - ( 03 Aug 2021 00:15 )    Color: Red / Appearance: Slightly Turbid / S.010 / pH: x  Gluc: x / Ketone: Negative  / Bili: Negative / Urobili: Negative   Blood: x / Protein: 100 / Nitrite: Negative   Leuk Esterase: Small / RBC: >50 /HPF / WBC 3-5   Sq Epi: x / Non Sq Epi: Negative / Bacteria: Few      CULTURES:  Culture Results:   No growth to date. ( @ 12:23)  Culture Results:   No growth to date. ( @ 12:23)  Culture Results:   Moderate Klebsiella oxytoca/Raoutella ornithinolytica  Few Enterobacter cloacae complex  Numerous Streptococcus constellatus "Susceptibilities not performed" ( @ 00:49)  Culture Results:   Growth in anaerobic bottle: Klebsiella oxytoca/Raoutella ornithinolytica  Growth in aerobic bottle: Streptococcus constellatus  ***Blood Panel PCR results on this specimen are available  approximately 3 hours after the Gram stain result.***  Gram stain, PCR, and/or culture results may not always  correspond due to difference in methodologies.  ************************************************************  This PCR assay was performed by multiplex PCR. This  Assay tests for 66 bacterial and resistance gene targets.  Please refer to the [facility] test directory  at [email or web address] for details. ( @ 18:10)  Culture Results:   Growth in aerobic and anaerobic bottles: Klebsiella oxytoca/Raoutella  ornithinolytica See previous culture [id number]    Growth in anaerobic bottle: Gram positive cocci in pairs ( @ 18:10)  Culture Results:   >=3 organisms. Probable collection contamination. ( @ 17:54)      Physical Examination:    General: No acute distress.  wd male sitting up comfortably in bed    HEENT: Pupils equal, reactive to light.  Symmetric.    PULM: Clear to auscultation bilaterally, decreased bs; no change percussion    CVS: Regular rate and rhythm, no murmurs, rubs, or gallops    ABD: Soft, nondistended, nontender, normoactive bowel sounds, no masses  Peg in place    EXT: No edema, nontender calves    SKIN: Warm and well perfused, no rashes noted.    NEURO: Alert, nonverbal, weakness ext.     RADIOLOGY: ***d< from: Xray Chest 1 View- PORTABLE-Urgent (Xray Chest 1 View- PORTABLE-Urgent .) (21 @ 19:25) >  EXAM:  XR CHEST PORTABLE URGENT 1V                            PROCEDURE DATE:  2021          INTERPRETATION:  Portable chest radiograph    CLINICAL INFORMATION: Tachypnea.    TECHNIQUE:  Portable  AP view of the chest was obtained.    COMPARISON: 2020 chest available for review.    FINDINGS:    The lungs show RIGHT perihilar linear airspace disease. Remaining visualized lung grossly clear. No pneumothorax.    The heart and mediastinum size and configuration are within normal limits.    Visualized osseous structures are intact.    IMPRESSION:   RIGHT perihilar linear airspace disease..    --- End of Report ---          < end of copied text >  < from: CT Chest No Cont (21 @ 15:12) >  EXAM:  CT CHEST                            PROCEDURE DATE:  2021          INTERPRETATION:  CLINICAL INFORMATION: Sepsis    COMPARISON: None.    CONTRAST/COMPLICATIONS:  IV Contrast: NONE  Oral Contrast: NONE  Complications: None reported at time of study completion    PROCEDURE:  CT of the Chest, Abdomen and Pelvis was performed.  Sagittal and coronal reformats were performed.    FINDINGS:  CHEST:  LUNGS AND LARGE AIRWAYS: Patent central airways. Extensive bibasilar dependent airspace infiltrates consistent with bilateral aspiration pneumonia  PLEURA: No pleural effusion.  VESSELS: Nonaneurysmal.  HEART: Coronary artery calcification. No pericardial effusion.  MEDIASTINUM AND GABRIELA: Mild fluid distention of the upper thoracic esophagussuggesting possible gastroesophageal reflux  CHEST WALL AND LOWER NECK: Within normal limits.    ABDOMEN AND PELVIS:  LIVER: Within normal limits.  BILE DUCTS: Normal caliber.  GALLBLADDER: Within normal limits.  SPLEEN: Within normal limits.  PANCREAS: Within normal limits.  ADRENALS: Within normal limits.  KIDNEYS/URETERS: Mild left hydroureteronephrosis secondary to a 7 mm left UVJ calculus. Punctate nonobstructive left renal calculus. Bilateral nonspecific perirenal stranding. Correlate for UTI.    BLADDER: Decompressed with Jo. Fluid-filled left lateral bladder diverticulum.  REPRODUCTIVE ORGANS: Enlarged prostate    BOWEL: No bowel obstruction or inflammation. Gastrostomy tube in situ. Appendix normal  PERITONEUM: No ascites.  VESSELS: Within normal limits.  RETROPERITONEUM/LYMPH NODES: No lymphadenopathy.  ABDOMINAL WALL: Fat-containing left inguinal hernia. Tiny fat-containing umbilical hernia.  BONES: Degenerative change.    IMPRESSION:  Bibasilar aspiration pneumonia.  Fluid-filled upper thoracic esophagus may reflect gastroesophageal reflux.  Mild left hydroureteronephrosis secondary to a 7 mm left UVJ calculus.  Bilateral nonspecific perirenal stranding. Correlate clinically for infection  Prostatomegaly.  Additional findings as discussed    --- End of Report ---    < end of copied text >  < from: Xray Chest 1 View- PORTABLE-Routine (Xray Chest 1 View- PORTABLE-Routine in AM.) (21 @ 09:54) >  EXAM:  XR CHEST PORTABLE ROUTINE 1V                            PROCEDURE DATE:  2021          INTERPRETATION:  Portable chest radiograph    CLINICAL INFORMATION: Pneumonia. Follow-up    TECHNIQUE:  Portable  AP view of the chest was obtained.    COMPARISON: 2021 chest available for review.    FINDINGS:    The lungs show residual RIGHT greater than LEFT bibasilar airspace disease and linear atelectasis.  . No pneumothorax.    The heart and mediastinum size and configuration are withinnormal limits.    Visualized osseous structures are intact.    IMPRESSION:   Residual RIGHT greater than LEFT bibasilar airspace disease and RIGHT linear atelectasis    Confirmatory lateral radiograph recommended.    --- End of Report ---            CINTHIA SYED MD; Attending Radiologist  This document has been electronically signed. Aug  4 2021  6:10PM    < end of copied text >

## 2021-08-05 NOTE — PROGRESS NOTE ADULT - PROBLEM SELECTOR PLAN 3
s/p emergent stent
s/p stent
- CT showing 7 mm calculus, uro consulted; emergent OR for septic stone s/p stent placement by Dr. Verduzco   - Continue  cefipime q 12 h (renally dosed)   - ID Micheal et al  following
s/p stent. Further treatment will depend on clinical couse
s/p emergent stent
s/p stent
- CT showing 7 mm calculus, uro consulted; emergent OR for septic stone s/p stent placement by Dr. Verduzco   - Continue  zosyn q 12 h (renally dosed)   - THUY Burton following
-UA: moderate LE and large blood   - CT showing 7 mm calculus, uro consulted; emergent OR for septic stone s/p stent placement by Dr. Verduzco   - Continue  zosyn q 12 h (renally dosed)   - THUY Burton following
-UA: moderate LE and large blood   - CT showing 7 mm calculus, uro consulted; emergent OR for septic stone s/p stent placement by Dr. Verduzco   - Continue  zosyn q 12 h (renally dosed)   - THUY Burton following
- CT showing 7 mm calculus, uro consulted; emergent OR for septic stone s/p stent placement by Dr. Verduzco   - Continue  cefipime q 12 h (renally dosed)   - ID Micheal et al  following
- CT showing 7 mm calculus, uro consulted; emergent OR for septic stone s/p stent placement by Dr. Verduzco   - Continue  cefipime q 12 h (renally dosed)   - THUY gonzáles

## 2021-08-05 NOTE — PROGRESS NOTE ADULT - PROBLEM SELECTOR PROBLEM 3
CAD (coronary artery disease)
Urinary tract infection
Left ureteral calculus
Urinary tract infection
Left ureteral calculus
CAD (coronary artery disease)
Left ureteral calculus
Urinary tract infection

## 2021-08-05 NOTE — PROGRESS NOTE ADULT - REASON FOR ADMISSION
Sepsis, UTI

## 2021-08-05 NOTE — PROGRESS NOTE ADULT - SUBJECTIVE AND OBJECTIVE BOX
SHARITA HOUGH  71y  Male    Patient is a 71y old  Male who presents with a chief complaint of Sepsis, UTI (05 Aug 2021 10:47)    awake and alert    PAST MEDICAL & SURGICAL HISTORY:  Myocardial infarct    Hypertension    Hyperlipidemia    BPH (benign prostatic hyperplasia)    ALS (amyotrophic lateral sclerosis)    S/P tonsillectomy    S/P coronary artery stent placement    History of hip surgery    History of hernia repair  &gt; 20 years ago    H/O shoulder surgery            PHYSICAL EXAM:    T(C): 36.7 (08-05-21 @ 05:27), Max: 37.2 (08-04-21 @ 20:20)  HR: 73 (08-05-21 @ 05:27) (69 - 76)  BP: 143/77 (08-05-21 @ 05:27) (125/72 - 147/70)  RR: 18 (08-05-21 @ 05:27) (17 - 18)  SpO2: 99% (08-05-21 @ 05:27) (94% - 99%)  Wt(kg): --    I&O's Detail    04 Aug 2021 07:01  -  05 Aug 2021 07:00  --------------------------------------------------------  IN:    dextrose 5%: 650 mL    Enteral Tube Flush: 1100 mL    Free Water: 250 mL    IV PiggyBack: 50 mL    Osmolite: 1220 mL  Total IN: 3270 mL    OUT:    Indwelling Catheter - Urethral (mL): 1000 mL    Oral Fluid: 0 mL  Total OUT: 1000 mL    Total NET: 2270 mL          Respiratory: clear anteriorly, decreased BS at bases  Cardiovascular: S1 S2  Gastrointestinal: soft NT ND +BS  Extremities: edema   Neuro: Awake and alert    MEDICATIONS  (STANDING):  artificial tears (preservative free) Ophthalmic Solution 1 Drop(s) Both EYES three times a day  aspirin  chewable 81 milliGRAM(s) Oral daily  atorvastatin 80 milliGRAM(s) Oral at bedtime  cefepime   IVPB 1000 milliGRAM(s) IV Intermittent every 12 hours  escitalopram 10 milliGRAM(s) Oral daily  lactobacillus acidophilus 1 Tablet(s) Oral three times a day  metoprolol succinate ER 25 milliGRAM(s) Oral daily  multivitamin/minerals/iron Oral Solution (CENTRUM) 15 milliLiter(s) Oral daily  potassium chloride   Powder 10 milliEquivalent(s) Enteral Tube two times a day    MEDICATIONS  (PRN):  acetaminophen   Tablet .. 650 milliGRAM(s) Oral every 6 hours PRN Temp greater or equal to 38.5C (101.3F), Mild Pain (1 - 3)                            11.8   11.54 )-----------( 172      ( 05 Aug 2021 06:30 )             36.9       08-05    141  |  109<H>  |  50<H>  ----------------------------<  98  4.4   |  28  |  1.20    Ca    8.8      05 Aug 2021 06:30        Creatinine Trend: Creatinine Trend: 1.20<--, 1.40<--, 1.80<--, 2.40<--, 3.50<--, 4.90<--

## 2021-08-05 NOTE — PROGRESS NOTE ADULT - PROBLEM SELECTOR PROBLEM 7
Urinary tract infection
S/P percutaneous endoscopic gastrostomy (PEG) tube placement
Urinary tract infection
S/P percutaneous endoscopic gastrostomy (PEG) tube placement

## 2021-08-05 NOTE — DISCHARGE NOTE PROVIDER - NSDCCPCAREPLAN_GEN_ALL_CORE_FT
PRINCIPAL DISCHARGE DIAGNOSIS  Diagnosis: Sepsis due to urinary tract infection  Assessment and Plan of Treatment: follow up with rehab MD      SECONDARY DISCHARGE DIAGNOSES  Diagnosis: MARLEY (acute kidney injury)  Assessment and Plan of Treatment:     Diagnosis: ALS (amyotrophic lateral sclerosis)  Assessment and Plan of Treatment:

## 2021-08-05 NOTE — PROGRESS NOTE ADULT - PROBLEM SELECTOR PROBLEM 9
Pneumonia, aspiration
Hypertension
Pneumonia, aspiration
Hypertension

## 2021-08-05 NOTE — DISCHARGE NOTE PROVIDER - PROVIDER TOKENS
PROVIDER:[TOKEN:[[id number]]],PROVIDER:[TOKEN:[[id number]]],PROVIDER:[TOKEN:[[id number]]]

## 2021-08-05 NOTE — PROGRESS NOTE ADULT - PROVIDER SPECIALTY LIST ADULT
Cardiology
Cardiology
Infectious Disease
Infectious Disease
Neurology
Cardiology
Infectious Disease
Nephrology
Cardiology
Infectious Disease
Nephrology
Neurology
Pulmonology
Infectious Disease
Infectious Disease
MICU
Nephrology
Nephrology
Neuropsychology
Cardiology
Cardiology
Nephrology
Urology
Urology
Cardiology
Urology
Urology
Hospitalist
Urology
Hospitalist
Pulmonology
Urology
Urology
Family Medicine
Hospitalist

## 2021-08-05 NOTE — DISCHARGE NOTE PROVIDER - DETAILS OF MALNUTRITION DIAGNOSIS/DIAGNOSES
This patient has been assessed with a concern for Malnutrition and was treated during this hospitalization for the following Nutrition diagnosis/diagnoses:     -  07/30/2021: Severe protein-calorie malnutrition

## 2021-08-05 NOTE — SWALLOW VFSS/MBS ASSESSMENT ADULT - SLP GENERAL OBSERVATIONS
Pt safely transferred to [facility] chair, +lateral view. Pt was awake and cooperative, on supplemental O2 via 3L NC. Pt p/w significantly poor oral secretion management marked by copious, thick, dry, green-tinged secretions diffusely throughout oral cavity. Pt with large mucus strand from soft palate to posterior tongue. Radiology RN called, who completed oral care with good result. Pt was hesitant to participate in PO trials, but was agreeable with cues of encouragement. Pt denied pain pre and post study. Pt safely transferred to [facility] chair, +lateral view. Pt was awake and cooperative, on supplemental O2 via 3L NC. Pt p/w significantly poor oral secretion management marked by copious, thick, dry, green-tinged secretions diffusely throughout oral cavity. Pt with large mucus strand from soft palate to posterior tongue. Radiology RN called, who completed thorough oral care with good result. Pt was hesitant to participate in PO trials, but was agreeable with cues of encouragement. Pt denied pain pre and post study.

## 2021-08-05 NOTE — PROGRESS NOTE ADULT - PROBLEM SELECTOR PROBLEM 1
Sepsis

## 2021-08-05 NOTE — SWALLOW VFSS/MBS ASSESSMENT ADULT - RECOMMENDED CONSISTENCY
1. Oral nutrition/hydration/medication is contraindicated.  2. Continue with PEG as means of nutrition/hydration/medication.  3. Consider GOC discussion re: nutritional intake with pt/family members.  4. Maintain strict aspiration precautions.  5. Ongoing oral care! 1. Oral nutrition/hydration/medication is contraindicated.  2. Continue with PEG as means of nutrition/hydration/medication.  3. Consider GOC discussion re: nutritional intake with pt/family members.  4. Maintain strict aspiration precautions.  5. Ongoing oral care!  6. Swallowing therapy s/p d/c to maintain oropharyngeal swallow mechanism.

## 2021-08-05 NOTE — PROGRESS NOTE ADULT - SUBJECTIVE AND OBJECTIVE BOX
[facility] -- Santi Nair, Alfreda, Shara, Chris Peace Savella  Office # [phone]      Follow Up:  htn hld     Subjective/Observations:     Seen at bedside, alert, in no acute distress  + productive cough, suctioned by nurse recently   denies shortness of breath, dizziness, chest pain , nausea, fever chills    REVIEW OF SYSTEMS: All other review of systems is negative unless indicated above    PAST MEDICAL & SURGICAL HISTORY:  Myocardial infarct    Hypertension    Hyperlipidemia    BPH (benign prostatic hyperplasia)    ALS (amyotrophic lateral sclerosis)    S/P tonsillectomy    S/P coronary artery stent placement    History of hip surgery    History of hernia repair  &gt; 20 years ago    H/O shoulder surgery        MEDICATIONS  (STANDING):  artificial tears (preservative free) Ophthalmic Solution 1 Drop(s) Both EYES three times a day  aspirin  chewable 81 milliGRAM(s) Oral daily  atorvastatin 80 milliGRAM(s) Oral at bedtime  cefepime   IVPB 1000 milliGRAM(s) IV Intermittent every 12 hours  escitalopram 10 milliGRAM(s) Oral daily  lactobacillus acidophilus 1 Tablet(s) Oral three times a day  metoprolol succinate ER 25 milliGRAM(s) Oral daily  multivitamin/minerals/iron Oral Solution (CENTRUM) 15 milliLiter(s) Oral daily  potassium chloride   Powder 10 milliEquivalent(s) Enteral Tube two times a day    MEDICATIONS  (PRN):  acetaminophen   Tablet .. 650 milliGRAM(s) Oral every 6 hours PRN Temp greater or equal to 38.5C (101.3F), Mild Pain (1 - 3)      Allergies    fish (Short breath; Anaphylaxis)  No Known Drug Allergies    Intolerances        Vital Signs Last 24 Hrs  T(C): 36.4 (05 Aug 2021 13:20), Max: 37.2 (04 Aug 2021 20:20)  T(F): 97.5 (05 Aug 2021 13:20), Max: 98.9 (04 Aug 2021 20:20)  HR: 71 (05 Aug 2021 13:20) (71 - 76)  BP: 131/71 (05 Aug 2021 13:20) (131/71 - 147/70)  BP(mean): --  RR: 17 (05 Aug 2021 13:20) (17 - 18)  SpO2: 97% (05 Aug 2021 13:20) (94% - 99%)    I&O's Summary    04 Aug 2021 07:01  -  05 Aug 2021 07:00  --------------------------------------------------------  IN: 3270 mL / OUT: 1000 mL / NET: 2270 mL    05 Aug 2021 07:01  -  05 Aug 2021 15:39  --------------------------------------------------------  IN: 0 mL / OUT: 800 mL / NET: -800 mL          PHYSICAL EXAM:  TELE: not on tele   Constitutional: NAD, awake and alert,  HEENT: Moist Mucous Membranes, Anicteric  Pulmonary: Non-labored, breath sounds are clear bilaterally, scattered rhonchi   Cardiovascular: Regular, S1 and S2 nl, No murmurs, rubs, gallops or clicks  Gastrointestinal: Bowel Sounds present, soft, nontender.   Lymph: No lymphadenopathy. No peripheral edema.  Skin: No visible rashes or ulcers.  Psych:  Mood & affect appropriate    LABS: All Labs Reviewed:                        11.8   11.54 )-----------( 172      ( 05 Aug 2021 06:30 )             36.9                         12.1   10.22 )-----------( 152      ( 04 Aug 2021 12:58 )             37.5                         13.0   x     )-----------( x        ( 03 Aug 2021 00:31 )             40.4     05 Aug 2021 06:30    141    |  109    |  50     ----------------------------<  98     4.4     |  28     |  1.20   04 Aug 2021 12:58    145    |  109    |  59     ----------------------------<  134    4.1     |  30     |  1.40   03 Aug 2021 08:17    148    |  111    |  70     ----------------------------<  180    3.8     |  32     |  1.80     Ca    8.8        05 Aug 2021 06:30  Ca    9.2        04 Aug 2021 12:58  Ca    9.3        03 Aug 2021 08:17        :  CT CHEST                          PROCEDURE DATE:  07/29/2021      INTERPRETATION:  CLINICAL INFORMATION: Sepsis    COMPARISON: None.    CONTRAST/COMPLICATIONS:  IV Contrast: NONE  Oral Contrast: NONE  Complications: None reported at time of study completion    PROCEDURE:  CT of the Chest, Abdomen and Pelvis was performed.  Sagittal and coronal reformats were performed.    FINDINGS:  CHEST:  LUNGS AND LARGE AIRWAYS: Patent central airways. Extensive bibasilar dependent airspace infiltrates consistent with bilateral aspiration pneumonia  PLEURA: No pleural effusion.  VESSELS: Nonaneurysmal.  HEART: Coronary artery calcification. No pericardial effusion.  MEDIASTINUM AND GABRIELA: Mild fluid distention of the upper thoracic esophagussuggesting possible gastroesophageal reflux  CHEST WALL AND LOWER NECK: Within normal limits.    ABDOMEN AND PELVIS:  LIVER: Within normal limits.  BILE DUCTS: Normal caliber.  GALLBLADDER: Within normal limits.  SPLEEN: Within normal limits.  PANCREAS: Within normal limits.  ADRENALS: Within normal limits.  KIDNEYS/URETERS: Mild left hydroureteronephrosis secondary to a 7 mm left UVJ calculus. Punctate nonobstructive left renal calculus. Bilateral nonspecific perirenal stranding. Correlate for UTI.    BLADDER: Decompressed with Shukla. Fluid-filled left lateral bladder diverticulum.  REPRODUCTIVE ORGANS: Enlarged prostate    BOWEL: No bowel obstruction or inflammation. Gastrostomy tube in situ. Appendix normal  PERITONEUM: No ascites.  VESSELS: Within normal limits.  RETROPERITONEUM/LYMPH NODES: No lymphadenopathy.  ABDOMINAL WALL: Fat-containing left inguinal hernia. Tiny fat-containing umbilical hernia.  BONES: Degenerative change.    IMPRESSION:  Bibasilar aspiration pneumonia.  Fluid-filled upper thoracic esophagus may reflect gastroesophageal reflux.  Mild left hydroureteronephrosis secondary to a 7 mm left UVJ calculus.  Bilateral nonspecific perirenal stranding. Correlate clinically for infection  Prostatomegaly.  Additional findings as discussed    --- End of Report ---    AMANDEEP CLARK MD; Attending Radiologist  This document has been electronically signed. Jul 29 2021  3:45PM    Ventricular Rate 76 BPM    Atrial Rate 76 BPM    P-R Interval 136 ms    QRS Duration 80 ms    Q-T Interval 362 ms    QTC Calculation(Bazett) 407 ms    P Axis 39 degrees    R Axis -27 degrees    T Axis 21 degrees    Diagnosis Line Normal sinus rhythm  Inferior infarct , age undetermined  Possible Anterior infarct (cited on or before 29-JUL-2021)  Abnormal ECG

## 2021-08-05 NOTE — DISCHARGE NOTE PROVIDER - HOSPITAL COURSE
admitted for sepsis from UTI  ABX per ID  ureteral stone with hydronephrosis  underwent stent placement  MARLEY with hypernatremia  hypotonic IVF given  DC after ID and renal clearance

## 2021-08-05 NOTE — SWALLOW VFSS/MBS ASSESSMENT ADULT - ORAL PHASE
Delayed oral transit time/Reduced anterior - posterior transport/Incomplete tongue to palate contact/Uncontrolled bolus / spillover in hypopharynx

## 2021-08-05 NOTE — PROGRESS NOTE ADULT - SUBJECTIVE AND OBJECTIVE BOX
[facility]  INFECTIOUS DISEASES   [address] [place] [address]  Tel: [phone]     Fax: [phone]  =======================================================    [facility]-[id number]  SHARITA HOUGH     Follow up: Urosepsis and aspiration pneumonia    Patient seen and examined.   Lying down, comfortable, no fever.   Leukocytosis improved.     PAST MEDICAL & SURGICAL HISTORY:  Myocardial infarct  Hypertension  Hyperlipidemia  BPH (benign prostatic hyperplasia)  ALS (amyotrophic lateral sclerosis)  S/P tonsillectomy  S/P coronary artery stent placement  History of hip surgery  History of hernia repair  &gt; 20 years ago  H/O shoulder surgery    Social Hx: No smoking, ETOH Or drugs     FAMILY HISTORY:  FHx: myocardial infarction (Father)    FHx: hyperlipidemia    Allergies  fish (Short breath; Anaphylaxis)  No Known Drug Allergies    Antibiotics:     piperacillin/tazobactam IVPB.. 3.375 Gram(s) IV Intermittent once     REVIEW OF SYSTEMS:  CONSTITUTIONAL:  No Fever or chills,   HEENT:  No diplopia or blurred vision.  No sore throat or runny nose.  CARDIOVASCULAR:  No chest pain, SOB better.  RESPIRATORY:  No cough, no shortness of breath  GASTROINTESTINAL:  No nausea, vomiting or diarrhea.  GENITOURINARY:  cloudy urine with odor   MUSCULOSKELETAL:  no joint aches, no muscle pain  SKIN:  No change in skin, hair or nails.  NEUROLOGIC: weakness in all extremities due to ALS  PSYCHIATRIC:  No disorder of thought or mood.  ENDOCRINE:  No heat or cold intolerance, polyuria or polydipsia.  HEMATOLOGICAL:  No easy bruising or bleeding.     Physical Exam:  Vital Signs Last 24 Hrs  T(C): 36.7 (05 Aug 2021 05:27), Max: 37.2 (04 Aug 2021 20:20)  T(F): 98.1 (05 Aug 2021 05:27), Max: 98.9 (04 Aug 2021 20:20)  HR: 73 (05 Aug 2021 05:27) (69 - 76)  BP: 143/77 (05 Aug 2021 05:27) (125/72 - 147/70)  BP(mean): --  RR: 18 (05 Aug 2021 05:27) (17 - 18)  SpO2: 99% (05 Aug 2021 05:27) (94% - 99%)  GEN: NAD,  HEENT: normocephalic and atraumatic. EOMI. PERRL.    NECK: Supple.  No lymphadenopathy   LUNGS: Clear to auscultation.  HEART: Regular rate and rhythm without murmur.  ABDOMEN: Soft, nontender, and nondistended.  Positive bowel sounds.  PEG in place   : No CVA tenderness, jo with cloudy urine   EXTREMITIES: Muscular atrophy, motor weakness in all extremities   NEUROLOGIC: grossly intact.  PSYCHIATRIC: Appropriate affect .  SKIN: No rash    Labs:                        11.8   11.54 )-----------( 172      ( 05 Aug 2021 06:30 )             36.9     08-05    141  |  109<H>  |  50<H>  ----------------------------<  98  4.4   |  28  |  1.20    Ca    8.8      05 Aug 2021 06:30    Culture - Blood (collected 07-31-21 @ 12:23)  Source: .Blood Blood-Venous    Culture - Blood (collected 07-31-21 @ 12:23)  Source: .Blood Blood    Culture - Urine (collected 07-30-21 @ 00:49)  Source: Kidney Kidney  Final Report (08-02-21 @ 10:22):    Moderate Klebsiella oxytoca/Raoutella ornithinolytica    Few Enterobacter cloacae complex    Numerous Streptococcus constellatus "Susceptibilities not performed"  Organism: Klebsiella oxytoca /Raoutella ornithinolytica  Enterobacter cloacae complex (08-02-21 @ 10:22)  Organism: Enterobacter cloacae complex (08-02-21 @ 10:22)    Sensitivities:      -  Amikacin: S <=16      -  Amoxicillin/Clavulanic Acid: R 16/8      -  Ampicillin: R 16 These ampicillin results predict results for amoxicillin      -  Ampicillin/Sulbactam: R 8/4 Enterobacter, Citrobacter, and Serratia may develop resistance during prolonged therapy (3-4 days)      -  Aztreonam: S <=4      -  Cefazolin: R >16      -  Cefepime: S <=2      -  Cefoxitin: R >16      -  Ceftriaxone: S <=1 Enterobacter, Citrobacter, and Serratia may develop resistance during prolonged therapy      -  Ciprofloxacin: S <=0.25      -  Ertapenem: S <=0.5      -  Gentamicin: S <=2      -  Imipenem: S <=1      -  Levofloxacin: S <=0.5      -  Meropenem: S <=1      -  Piperacillin/Tazobactam: S <=8      -  Tigecycline: S <=2      -  Tobramycin: S <=2      -  Trimethoprim/Sulfamethoxazole: S <=0.5/9.5      Method Type: ROSEMARIE  Organism: Klebsiella oxytoca /Raoutella ornithinolytica (08-02-21 @ 10:22)    Sensitivities:      -  Amikacin: S <=16      -  Amoxicillin/Clavulanic Acid: S <=8/4      -  Ampicillin: R >16 These ampicillin results predict results for amoxicillin      -  Ampicillin/Sulbactam: R >16/8 Enterobacter, Citrobacter, and Serratia may develop resistance during prolonged therapy (3-4 days)      -  Aztreonam: S <=4      -  Cefazolin: R >16      -  Cefepime: S <=2      -  Cefoxitin: S <=8      -  Ceftriaxone: I 2 Enterobacter, Citrobacter, and Serratia may develop resistance during prolonged therapy      -  Ciprofloxacin: S <=0.25      -  Ertapenem: S <=0.5      -  Gentamicin: S <=2      -  Imipenem: S <=1      -  Levofloxacin: S <=0.5      -  Meropenem: S <=1      -  Piperacillin/Tazobactam: I 64      -  Tigecycline: S <=2      -  Tobramycin: S <=2      -  Trimethoprim/Sulfamethoxazole: S <=0.5/9.5      Method Type: ROSEMARIE    Culture - Blood (collected 07-29-21 @ 18:10)  Source: .Blood Blood-Peripheral  Gram Stain (07-31-21 @ 09:01):    Growth in anaerobic bottle: Gram Negative Rods and Gram positive cocci in    pairs    Growth in aerobic bottle: Gram Negative Rods  Final Report (08-03-21 @ 13:01):    Growth in aerobic and anaerobic bottles: Klebsiella oxytoca/Raoutella    ornithinolytica    Growth in anaerobic bottle: Streptococcus constellatus    See previous culture [id number]    Culture - Blood (collected 07-29-21 @ 18:10)  Source: .Blood Blood-Peripheral  Gram Stain (08-01-21 @ 06:58):    Upon re-evaluation of gram stain:    Growth in anaerobic bottle: Gram Negative Rods    Growth in anaerobic bottle: Gram positive cocci in pairs    Growth in aerobic bottle: Gram positive cocci in pairs  Final Report (08-04-21 @ 13:30):    Growth in anaerobic bottle: Klebsiella oxytoca/Raoutella ornithinolytica    Growth in aerobic and anaerobic bottles: Streptococcus constellatus    ***Blood Panel PCR results on this specimen are available    approximately 3 hours after the Gram stain result.***    Gram stain, PCR, and/or culture results may not always    correspond due to difference in methodologies.    ************************************************************    This PCR assay was performed by multiplex PCR. This    Assay tests for 66 bacterial and resistance gene targets.    Please refer to the [facility] test directory    at [email or web address] for details.  Organism: Blood Culture PCR  Klebsiella oxytoca /Raoutella ornithinolytica  Streptococcus constellatus (08-04-21 @ 13:30)  Organism: Streptococcus constellatus (08-04-21 @ 13:30)    Sensitivities:      -  Ceftriaxone: S 0.38      -  Penicillin: S 0.094      Method Type: ETEST  Organism: Streptococcus constellatus (08-04-21 @ 13:30)    Sensitivities:      -  Clindamycin: S      -  Erythromycin: S      -  Levofloxacin: S      -  Vancomycin: S      Method Type: KB  Organism: Klebsiella oxytoca /Raoutella ornithinolytica (08-04-21 @ 13:30)    Sensitivities:      -  Amikacin: S <=16      -  Ampicillin: R >16 These ampicillin results predict results for amoxicillin      -  Ampicillin/Sulbactam: R >16/8 Enterobacter, Citrobacter, and Serratia may develop resistance during prolonged therapy (3-4 days)      -  Aztreonam: S <=4      -  Cefazolin: R >16 Enterobacter, Citrobacter, and Serratia may develop resistance during prolonged therapy (3-4 days)      -  Cefepime: S <=2      -  Cefoxitin: S <=8      -  Ceftriaxone: S <=1 Enterobacter, Citrobacter, and Serratia may develop resistance during prolonged therapy      -  Ciprofloxacin: S <=0.25      -  Ertapenem: S <=0.5      -  Gentamicin: S <=2      -  Imipenem: S <=1      -  Levofloxacin: S <=0.5      -  Meropenem: S <=1      -  Piperacillin/Tazobactam: I 64      -  Tobramycin: S <=2      -  Trimethoprim/Sulfamethoxazole: S <=0.5/9.5      Method Type: ROSEMARIE  Organism: Blood Culture PCR (08-04-21 @ 13:30)    Sensitivities:      -  Klebsiella oxytoca: Detec      -  Streptococcus anginosus group: Detec      Method Type: PCR    Culture - Urine (collected 07-29-21 @ 17:54)  Source: Clean Catch Clean Catch (Midstream)  Final Report (07-31-21 @ 10:38):    >=3 organisms. Probable collection contamination.    WBC Count: 11.54 K/uL (08-05-21 @ 06:30)  WBC Count: 10.22 K/uL (08-04-21 @ 12:58)  WBC Count: 10.46 K/uL (08-02-21 @ 07:05)  WBC Count: 7.64 K/uL (08-01-21 @ 07:40)  WBC Count: 10.60 K/uL (07-31-21 @ 08:14)    Creatinine, Serum: 1.20 mg/dL (08-05-21 @ 06:30)  Creatinine, Serum: 1.40 mg/dL (08-04-21 @ 12:58)  Creatinine, Serum: 1.80 mg/dL (08-03-21 @ 08:17)  Creatinine, Serum: 2.40 mg/dL (08-02-21 @ 07:05)  Creatinine, Serum: 3.50 mg/dL (08-01-21 @ 07:40)  Creatinine, Serum: 4.90 mg/dL (07-31-21 @ 08:14)    Procalcitonin, Serum: 7.60 ng/mL (08-03-21 @ 08:17)  Procalcitonin, Serum: 301.8 ng/mL (07-29-21 @ 13:35)     SARS-CoV-2: NotDetec (07-29-21 @ 13:33)  Rapid RVP Result: Darriontec (07-29-21 @ 13:33)    COVID-19 PCR: Nik (08-04-21 @ 17:11)    All imaging and other data have been reviewed.  < from: CT Chest No Cont (07.29.21 @ 15:12) >  IMPRESSION:  Bibasilar aspiration pneumonia.  Fluid-filled upper thoracic esophagus may reflect gastroesophageal reflux.  Mild left hydroureteronephrosis secondary to a 7 mm left UVJ calculus.  Bilateral nonspecific perirenal stranding. Correlate clinically for infection  Prostatomegaly.    Assessment and Plan:   70 yo man with PMH of HTN, BPH, CAD and ALS with PEG, was admitted with not looking right, change in urine color, diaphoresis and shortness of breath.  Had a fall 2 days ago since then not feeling well, possibly related to sepsis due to UTI/pyelonephritis and aspiration pneumonia.   As per the spouse, his urine looked cloudy, dark yellow with odor. Denies hematuria.   Of note, patient last admitted to [facility] 11/9 --> 11/12/2020 for urinary retention, jo placed.   In ED Spo2 was 70a5 so placed on NRB  WBC 29k  Procalcitonin 301.8  UA with high WBC >50 and also RBC 25-50  CT Chest/Abomen/Pelvis: Bibasilar aspiration pneumonia. Fluid-filled upper thoracic esophagus may reflect gastroesophageal reflux.  Mild left hydroureteronephrosis secondary to a 7 mm left UVJ calculus. Bilateral nonspecific perirenal stranding. Correlate clinically for infection, Prostatomegaly.  S/P Left stent placed by urology on 7/29  Blood culture 7/29 Klebsiella oxytoca and strep   Urine culture >3 bactereia  Kidney urine (from OR) with Klebsiella, strep and enterobacter   Repeat blood culture on 7/31 negative     1- Ecoli Bacteremia   2- UTI/pyelonephritis  3- Renal stone with left hydronephrosis   4- MARLEY  5- ALS  6- ?Aspiration Pneumonia    Recommendations:  - Repeat blood culture negative on 7/31  - Continue Cefepime 1gm q8   - Follow WBC 29k-->11k,  - Follow Creat 1.2, trend it to adjust meds   - Stent management as per urology, hematuria expected   - Will treat for total 14 days total. Last day 8/11, can place midline.     Will follow.    Santhosh Burton MD  Division of Infectious Diseases   Cell [phone] between 8am and 6pm   After 6pm and weekends please call ID service at [phone].

## 2021-08-05 NOTE — SWALLOW VFSS/MBS ASSESSMENT ADULT - DIAGNOSTIC IMPRESSIONS
As mentioned above, pt arrived to radiology suite with significantly poor oral secretion management. There was diffuse, copious, thick, dry, green-tinged secretions in oral cavity. There was a mucus strand from the soft palate to posterior tongue. Radiology RN was called, who complete thorough oral care with good result. Pt appeared anxious and was hesitant to PO trials, but agreeable with cues of encouragement. Pt accepted 1/4 teaspoon of honey thick liquids at a time, pt was not accepting to larger bolus volume. After x3 trials of honey thick liquids, pt then declined additional PO trials despite max cues of encouragement. Pt appeared anxious and study was then terminated. Upon honey thick liquid trials, pt p/w:  1. Moderate oral dysphagia marked by reduced utensil stripping, anterior spillage of bolus, prolonged/reduced bolus cohesion resulting in premature spillage to valleculae, reduced posterior transfer, delayed oral transit time, and mild oral residue diffusely throughout oral cavity (which was retrieved by SLP with oral suction via yankuer).  2. Moderate pharyngeal dysphagia. Pharyngeal swallow trigger was delayed at the level of the valleculae. BOT retraction, pharyngeal contractility, and hyolaryngeal elevation/excursion were reduced. Bolus volume accepted was minimal, so overall residue in the valleculae was considered mild; however, there was minimal clearance of bolus from the level of the valleculae, increasing severity of physiologic impairment. There was epiglottic undercoating observed during the swallow, which was then observed to migrate down the laryngeal vestibule post swallow, resulting in penetration post swallow. Absent response to penetration is suggestive of reduced laryngeal sensation. Pt refused additional PO trials, therefore, unable to trial compensatory strategies.

## 2021-08-05 NOTE — DISCHARGE NOTE PROVIDER - CARE PROVIDER_API CALL
Curtis Zavala)  Internal Medicine  [address] [place] [address]  Phone: [phone]  Fax: [phone]  Follow Up Time:     Clint Ruiz)  Urology  [address] [facility] [facility], [address]  [place] [address]  Phone: [phone]  Fax: [phone]  Follow Up Time:     Santhosh Burton)  Infectious Disease; Internal Medicine  [address] [place] [address]  Phone: [phone]  Fax: [phone]  Follow Up Time:

## 2021-08-05 NOTE — PROGRESS NOTE ADULT - ATTENDING COMMENTS
71M h/o ALS, neurogenic bladder (self-caths), CAD s/p stent (2006), HTN, HLD, BPH a/e sepsis 2/2 UTI/pyelo from obstructing L utreterorenal stone s/p stent placement c/b multi-focal PNA likely 2/2 chronic aspiration    Neuro: mental status improved today, reports no pain  - continue lexapro and prn melatonin  CV: hemodynamically stable, hold lisinopril and metoprolol in setting of low normal BPs 2/2 sepsis  Pulm: respiratory status improved, now weaned off ventuir-mask and down to 1L NC, can likely dc supplemental O2   GI: restart home enteral feeds via PEG  - NPO and pt deferred speech and swallow exam this AM, can re-attempt when amenable  Renal: MARLEY likely 2/2 obstructive uropathy from stone, now s/p stent placement with appropriate UOP  - continue to trend renal function, likely to gradually improve now that obstruction resolved  - can dc IVF as pt now adequately resuscitated and restarting home feeds  ID: GNR in blood to be speciated, urine culture pending - continue zosyn pending speciation/sensitivities and repeat blood cultures in AM  Heme: dvt ppx with HSQ    Pt gradually improving but high risk for decompensation. To remain in ICU today for closer monitoring, if remains stable can make eligible to floor tomorrow
Chart reviewed    Patient seen and examined    Agree with plan as outlined above    71 year old male PMHx ALS, CAD (s/p MI 1 stent placed 2006), HTN, HLD, BPH s/p fall 2 days PTA admitted for urosepsis with obstructing stone.  Now s/p stent placement for obstructing renal stone.    s/p Fall  - Likely mechanical in the setting of weakness, dehydration, and sepsis  - Fall precaution  - No evidence of cardiac etiology    Urosepsis/Aspiration Pna  - Tachycardic in setting of sepsis, now resolved  - CT Chest/Abomen/Pelvis: Bibasilar aspiration pneumonia. Fluid-filled upper thoracic esophagus may reflect gastroesophageal reflux.  Mild left hydroureteronephrosis secondary to a 7 mm left UVJ calculus. Bilateral nonspecific perirenal stranding. Correlate clinically for infection  - IV antibiotics and fluids per primary team and ID  - Pulm following
No cardiac contraindication to d/c.
No signs of significant ischemia or volume overload. cont current care. Further cardiac workup will depend on clinical course.
Seen/examined. agree with above.  remains with poor renal function, and borderline hypotension  also more hypernatremic, in setting of dehydration  renal follow up
Seen/examined. agree with above.  remains with poor renal function, though improving slightly  also more hypernatremic, in setting of dehydration  d5w recommended and renal follow up .
Seen/examined. agree with above.  creatinine improving slowly, still appears dry

## 2021-08-05 NOTE — CHART NOTE - NSCHARTNOTEFT_GEN_A_CORE
: Marta    INDICATION: sepsis, respiratory failure    PROCEDURE:  [x] LIMITED ECHO  [x] LIMITED CHEST  [ ] LIMITED RETROPERITONEAL  [ ] LIMITED ABDOMINAL  [ ] LIMITED DVT  [ ] NEEDLE GUIDANCE VASCULAR  [ ] NEEDLE GUIDANCE THORACENTESIS  [ ] NEEDLE GUIDANCE PARACENTESIS  [ ] NEEDLE GUIDANCE PERICARDIOCENTESIS  [ ] OTHER    FINDINGS:  ECHO - grossly preserved LV function, no pericardial effusion, no RV dilation; IVC fully collapsing with respirations  Chest - a-line predominance with R > L lower lobe consolidations, no effusions    INTERPRETATION:  - IVC suggestive of hypovolemia, bolus additional 2L LR  - lung findings c/w multifocal PNA
Called by RN for concern of mobile oropharyngeal mucous film. RN was performing oral care, at which time she noted thickened film extending from pharynx to palate. Patient admitted for sepsis 2/2 aspiration PN and UTI/pyelonephritis. RN concerned that patient may aspirate again with attempted removal of mucous.    Patient does not appear to be in acute respiratory distress, though there are audible and visible upper airway secretions pooling in pharynx. RT called to bedside to assist in suctioning. Yankauer suction tip used to remove oropharyngeal mucous and pharyngeal secretions. Nasal trumpet then used for deeper suctioning, with subsequent improvement in clinical appearance. At this time, will continue with humidified nasal cannula to moisten airways and prevent further thickening/drying of mucous.    Tressa Lucero, PGY-3  [phone]
Called by RN for pt complaining of R eye pain and itching. Pt seen and examined at bedside. Pt admits to moderate pain. Pt denies any trauma or scratching of the region. Pt denies fever, chills, blurred vision.         T(C): 36.5 (21 @ 13:17), Max: 37 (21 @ 05:20)  HR: 69 (21 @ 13:17) (69 - 79)  BP: 125/72 (21 @ 13:17) (125/72 - 147/66)  RR: 18 (21 @ 13:17) (18 - 18)  SpO2: 98% (21 @ 13:17) (96% - 98%)  Wt(kg): --    Physical :  Gen- weak appearing, NC   Eye: b/l eye tearing, no b/l eye swelling, no exudates, no posterior orbital pain, no pain with Extra occular motion   Neuro- alert, moving all 4 extremities    LABS:                        12.1   10.22 )-----------( 152      ( 04 Aug 2021 12:58 )             37.5     08-    145  |  109<H>  |  59<H>  ----------------------------<  134<H>  4.1   |  30  |  1.40<H>    Ca    9.2      04 Aug 2021 12:58        Urinalysis Basic - ( 03 Aug 2021 00:15 )    Color: Red / Appearance: Slightly Turbid / S.010 / pH: x  Gluc: x / Ketone: Negative  / Bili: Negative / Urobili: Negative   Blood: x / Protein: 100 / Nitrite: Negative   Leuk Esterase: Small / RBC: >50 /HPF / WBC 3-5   Sq Epi: x / Non Sq Epi: Negative / Bacteria: Few      ABG - ( 03 Aug 2021 09:11 )  pH, Arterial: 7.48  pH, Blood: x     /  pCO2: 40    /  pO2: 68    / HCO3: 30    / Base Excess: 5.9   /  SaO2: 95                      Assessment/Plan  70 yo M PMHx ALS, CAD (s/p MI 1 stent placed  on DAPT), HTN, HLD, BPH presented with AMS and shortness of breath, admitted for  sepsis secondary to aspiration pneumonia, UTI/pyelonephritis,  Renal stone with left hydronephrosis s/p stent placement by Urology, MARLEY, admitted for sepsis, PNA, MARLEY, UTI. Pt now with R eye itch and tearing.     -b/l eye tearing, R eye itch suspect allergies   -artifical tears TID first dose now  -ketotifen eye drops b/l eyes for suspected allergies  -no signs of orbital cellulitis, septal cellulitis  -RN to call if any changes or development of exudates, worsenign pain etc     Dr Saunders  PGY3  [phone]
Do you have Advance Directives (HCP / LV / Organ donation / Documentation of oral advance Directive):   (  x  )  yes    (      )    NO                                                                            Do you have LV - Living will :                                                                                                                                             (  x  )  yes    (      )   No    Do you have HCP - Health Care Proxy:                                                                                                                            ( x    )  yes   (       ) N0    Do you have DNR- Do Not Resuscitate :                                                                                                                           (      )  yes  (   x     )  No    Do you have DNI- Do Not intubate  :                                                                                                                               (      )  yes   (   x    ) No    Do you have MOLST - Medical orders for Life sustaining treatment  :                                                                    (      ) yes    (    x   ) No    Decision Maker :  (  x   ) Patient     (   x   )  HCA   (     ) Public Health Law Surrogate     (      ) Surrogate  (       ) Guardian    Goals of Care :  (  x    )   Complete Care     (       ) No Limitations                              (       )   Comfort Care       (       )  Hospice                               (      )   Limited medical Intervention / s    Medical Interventions :   (   x     )   CPR       (        )  DNR                                               x)  Intubation with MV - Mechanical Ventilation  (     x ) BIPAP/CPAP    (         )   DNI                                               (      x   )  Artificial Nutrition -  IVF, TPN / PPN, Tube Feeds             (         )   No Feeding Tube                                                (    x    ) Use Antibiotics                         (          ) No Antibiotics                                                (    x     ) Blood and Blood Products     (         )   No Blood or Blood products                                                (    x      )  Dialysis                                    (         )  No Dialysis                                                (          )  Medical Management only  (         )  No Invasive Interventions or Surgery  Time spent :                        (   x    ) upto 30 minutes                       (           )   more than 30 minutes  ACP reviewed and discussed
Assessment: Pt admit with sepsis, s/p ureteral stent. Hx ALS, asp Pn, s/p PEG. Sacral st 1 pressure injury. Spoke with RN. Pt tolerating bolus TF. Last BM 8/2. 1+ edema left hand; sacrum Stage I. Na 152, water flushes ordered.     Factors impacting intake: [ ] none [ ] nausea  [ ] vomiting [ ] diarrhea [ ] constipation  [ ]chewing problems [x ] swallowing issues  [ ] other:     Diet Presciption: Diet, NPO with Tube Feed:   Tube Feeding Modality: Gastrostomy  Osmolite 1.5 Kelvin  Total Volume for 24 Hours (mL): 1560  Bolus  Total Volume of Bolus (mL):  260  Total # of Feeds: 5  Tube Feed Frequency: Every 4 hours   Tube Feed Start Time: 06:00  Bolus Feed Rate (mL per Hour): 260   Bolus Feed Duration (in Hours): 1  Bolus Feed Instructions:  start TF at 200 ml bolus feed every 4-5 hrs,increase to goal of 260 ml/feed.( total 1300 ml per day.) Flush with 750 total ml free water/day. Please instruct wife on bolus feeds.  Free Water Flush  Free Water Flush Instructions:  total free water needed/day ~750 ml (07-30-21 @ 10:35)    Intake: tolerating bolus TF.     Current Weight: adm 161.8# 8/2 172.8# (?accurate wt)   % Weight Change    Pertinent Medications: MEDICATIONS  (STANDING):  atorvastatin 80 milliGRAM(s) Oral at bedtime  cefepime   IVPB      cefepime   IVPB 1000 milliGRAM(s) IV Intermittent every 24 hours  clopidogrel Tablet 75 milliGRAM(s) Oral daily  dextrose 5%. 1000 milliLiter(s) (100 mL/Hr) IV Continuous <Continuous>  escitalopram 10 milliGRAM(s) Oral daily  heparin   Injectable 5000 Unit(s) SubCutaneous every 8 hours  metoprolol succinate ER 25 milliGRAM(s) Oral daily  potassium chloride   Powder 10 milliEquivalent(s) Enteral Tube daily    MEDICATIONS  (PRN):  acetaminophen   Tablet .. 650 milliGRAM(s) Oral every 6 hours PRN Temp greater or equal to 38.5C (101.3F), Mild Pain (1 - 3)  melatonin 3 milliGRAM(s) Oral at bedtime PRN Insomnia  ondansetron Injectable 4 milliGRAM(s) IV Push every 8 hours PRN Nausea and/or Vomiting    Pertinent Labs: 08-02 Na152 mmol/L<H> Glu 208 mg/dL<H> K+ 3.6 mmol/L Cr  2.40 mg/dL<H> BUN 89 mg/dL<H> 07-31 Phos 3.4 mg/dL 08-01 Alb 1.6 g/dL<L>     CAPILLARY BLOOD GLUCOSE        Skin: sacrum Stage I     Estimated Needs:   [ x] no change since previous assessment  [ ] recalculated:     Previous Nutrition Diagnosis:   [ ] Inadequate Energy Intake [ ]Inadequate Oral Intake [ ] Excessive Energy Intake   [ ] Underweight [ ] Increased Nutrient Needs [ ] Overweight/Obesity   [ ] Altered GI Function [ ] Unintended Weight Loss [ ] Food & Nutrition Related Knowledge Deficit [x ] Malnutrition     Nutrition Diagnosis is [x ] ongoing  [ ] resolved [ ] not applicable     New Nutrition Diagnosis: [x ] not applicable       Interventions:   Recommend  [ ] Change Diet To:  [ ] Nutrition Supplement  [ x] Nutrition Support: consider continuous feedings at home, if family unable to do bolus feedings when aide isn't present.   [ ] Other:     Monitoring and Evaluation:   [ ] PO intake [ x ] Tolerance to diet prescription [ x ] weights  [ x ] labs[ x ] follow up per protocol  [ x] other:
Called by RN for hematuria. Patient seen and examined at bedside. Catheter draining luana blood. Patient able to communicate via simple yes/no questions; denies symptoms of acute blood loss anemia including headache, vision changes, chest discomfort, palpitations, SOB, abd/back pain. Patient denies previous episodes of hematuria. Of note, patient admitted for sepsis secondary to UTI in setting of 7mm calculus at UVJ and is now POD #5 s/p insertion of L ureteral stent. Patient has been afebrile, otherwise hemodynamically stable, and is currently being treated with cefepime.      T(C): 36.7 (08-02-21 @ 20:31), Max: 36.7 (08-02-21 @ 05:20)  HR: 71 (08-02-21 @ 20:31) (71 - 81)  BP: 127/75 (08-02-21 @ 20:31) (123/76 - 132/78)  RR: 18 (08-02-21 @ 20:31) (16 - 20)  SpO2: 95% (08-02-21 @ 20:31) (94% - 95%)  Wt(kg): --    Physical :  Gen- NAD, ncat  Cardio - s+1,s+2, rrr, no murmur  Lung - cta b/l, no wheeze, no rhonchi, no rales   Abdomen- +BS, NT/ND, no guarding, no rebound, no masses  - luana hematuria draining from catheter      LABS:                        12.9   10.46 )-----------( 104      ( 02 Aug 2021 07:05 )             40.0     08-02    152<H>  |  115<H>  |  89<H>  ----------------------------<  208<H>  3.6   |  31  |  2.40<H>    Ca    9.5      02 Aug 2021 07:05  Mg     2.6     08-01    TPro  5.9<L>  /  Alb  1.6<L>  /  TBili  1.0  /  DBili  x   /  AST  97<H>  /  ALT  62  /  AlkPhos  260<H>  08-01      Assessment/Plan  72 yo M PMHx ALS, CAD (s/p MI 1 stent placed 2006), HTN, HLD, BPH admitted for urosepsis with obstructing stone s/p L ureteral stent placement 5 days ago. Now called by RN for hematuria.    - Collect urine sample for UA with microscopy  - STAT H/H, plan to transfuse if <8 given CAD history  - Will d/c heparin VTE ppx  - Patient currently receiving daily Plavix given h/o MI with PCI in 2006, primary team to discuss with cardiology benefit vs risk of continuing antiplatelet in setting of hematuria  - Patient remains HD stable, RN to call with any changes

## 2021-08-05 NOTE — PROGRESS NOTE ADULT - ASSESSMENT
70 yo M PMHx ALS, CAD (s/p MI 1 stent placed 2006 on DAPT), HTN, HLD, BPH presenting with diaphoresis, shortness of breath. Now with obstructive uropathy, left renal stent and MARLEY. Renal indices are now improving. Supplement potassium as needed.  Will follow.

## 2021-08-05 NOTE — PROGRESS NOTE ADULT - PROBLEM SELECTOR PROBLEM 8
CAD (coronary artery disease)
Prophylactic measure
Prophylactic measure
CAD (coronary artery disease)

## 2021-08-05 NOTE — PROGRESS NOTE ADULT - PROBLEM SELECTOR PROBLEM 6
Amyotrophic lateral sclerosis (ALS)

## 2021-08-05 NOTE — PROGRESS NOTE ADULT - PROBLEM SELECTOR PROBLEM 2
Left ureteral calculus
MARLEY (acute kidney injury)
Left ureteral calculus
MARLEY (acute kidney injury)
Pneumonia, aspiration

## 2021-10-23 ENCOUNTER — INPATIENT (INPATIENT)
Facility: HOSPITAL | Age: 71
LOS: 4 days | Discharge: HOME | DRG: 698 | End: 2021-10-28
Attending: FAMILY MEDICINE | Admitting: FAMILY MEDICINE
Payer: MEDICARE

## 2021-10-23 VITALS
DIASTOLIC BLOOD PRESSURE: 50 MMHG | TEMPERATURE: 98 F | SYSTOLIC BLOOD PRESSURE: 84 MMHG | WEIGHT: 147.05 LBS | OXYGEN SATURATION: 94 % | HEART RATE: 112 BPM | RESPIRATION RATE: 16 BRPM | HEIGHT: 65 IN

## 2021-10-23 DIAGNOSIS — A41.9 SEPSIS, UNSPECIFIED ORGANISM: ICD-10-CM

## 2021-10-23 DIAGNOSIS — N17.9 ACUTE KIDNEY FAILURE, UNSPECIFIED: ICD-10-CM

## 2021-10-23 DIAGNOSIS — Z98.890 OTHER SPECIFIED POSTPROCEDURAL STATES: Chronic | ICD-10-CM

## 2021-10-23 DIAGNOSIS — Z29.9 ENCOUNTER FOR PROPHYLACTIC MEASURES, UNSPECIFIED: ICD-10-CM

## 2021-10-23 DIAGNOSIS — D72.829 ELEVATED WHITE BLOOD CELL COUNT, UNSPECIFIED: ICD-10-CM

## 2021-10-23 DIAGNOSIS — Z90.89 ACQUIRED ABSENCE OF OTHER ORGANS: Chronic | ICD-10-CM

## 2021-10-23 DIAGNOSIS — Z95.5 PRESENCE OF CORONARY ANGIOPLASTY IMPLANT AND GRAFT: Chronic | ICD-10-CM

## 2021-10-23 DIAGNOSIS — I95.9 HYPOTENSION, UNSPECIFIED: ICD-10-CM

## 2021-10-23 LAB
ALBUMIN SERPL ELPH-MCNC: 2.2 G/DL — LOW (ref 3.3–5)
ALBUMIN SERPL ELPH-MCNC: 2.9 G/DL — LOW (ref 3.3–5)
ALP SERPL-CCNC: 106 U/L — SIGNIFICANT CHANGE UP (ref 40–120)
ALP SERPL-CCNC: 74 U/L — SIGNIFICANT CHANGE UP (ref 40–120)
ALT FLD-CCNC: 61 U/L — SIGNIFICANT CHANGE UP (ref 12–78)
ALT FLD-CCNC: 78 U/L — SIGNIFICANT CHANGE UP (ref 12–78)
ANION GAP SERPL CALC-SCNC: 11 MMOL/L — SIGNIFICANT CHANGE UP (ref 5–17)
ANION GAP SERPL CALC-SCNC: 5 MMOL/L — SIGNIFICANT CHANGE UP (ref 5–17)
APPEARANCE UR: ABNORMAL
APTT BLD: 27.2 SEC — LOW (ref 27.5–35.5)
AST SERPL-CCNC: 30 U/L — SIGNIFICANT CHANGE UP (ref 15–37)
AST SERPL-CCNC: 34 U/L — SIGNIFICANT CHANGE UP (ref 15–37)
BACTERIA # UR AUTO: ABNORMAL
BASOPHILS # BLD AUTO: 0 K/UL — SIGNIFICANT CHANGE UP (ref 0–0.2)
BASOPHILS NFR BLD AUTO: 0 % — SIGNIFICANT CHANGE UP (ref 0–2)
BILIRUB SERPL-MCNC: 1 MG/DL — SIGNIFICANT CHANGE UP (ref 0.2–1.2)
BILIRUB SERPL-MCNC: 1.5 MG/DL — HIGH (ref 0.2–1.2)
BILIRUB UR-MCNC: NEGATIVE — SIGNIFICANT CHANGE UP
BUN SERPL-MCNC: 75 MG/DL — HIGH (ref 7–23)
BUN SERPL-MCNC: 76 MG/DL — HIGH (ref 7–23)
CALCIUM SERPL-MCNC: 10.6 MG/DL — HIGH (ref 8.5–10.1)
CALCIUM SERPL-MCNC: 9.3 MG/DL — SIGNIFICANT CHANGE UP (ref 8.5–10.1)
CHLORIDE SERPL-SCNC: 110 MMOL/L — HIGH (ref 96–108)
CHLORIDE SERPL-SCNC: 120 MMOL/L — HIGH (ref 96–108)
CK SERPL-CCNC: 32 U/L — SIGNIFICANT CHANGE UP (ref 26–308)
CO2 SERPL-SCNC: 25 MMOL/L — SIGNIFICANT CHANGE UP (ref 22–31)
CO2 SERPL-SCNC: 26 MMOL/L — SIGNIFICANT CHANGE UP (ref 22–31)
COLOR SPEC: YELLOW — SIGNIFICANT CHANGE UP
COMMENT - URINE: SIGNIFICANT CHANGE UP
CREAT SERPL-MCNC: 2.5 MG/DL — HIGH (ref 0.5–1.3)
CREAT SERPL-MCNC: 3.1 MG/DL — HIGH (ref 0.5–1.3)
DIFF PNL FLD: ABNORMAL
EOSINOPHIL # BLD AUTO: 0 K/UL — SIGNIFICANT CHANGE UP (ref 0–0.5)
EOSINOPHIL NFR BLD AUTO: 0 % — SIGNIFICANT CHANGE UP (ref 0–6)
EPI CELLS # UR: SIGNIFICANT CHANGE UP
GLUCOSE SERPL-MCNC: 164 MG/DL — HIGH (ref 70–99)
GLUCOSE SERPL-MCNC: 261 MG/DL — HIGH (ref 70–99)
GLUCOSE UR QL: NEGATIVE — SIGNIFICANT CHANGE UP
HCT VFR BLD CALC: 50.7 % — HIGH (ref 39–50)
HGB BLD-MCNC: 15.9 G/DL — SIGNIFICANT CHANGE UP (ref 13–17)
INR BLD: 1.24 RATIO — HIGH (ref 0.88–1.16)
KETONES UR-MCNC: NEGATIVE — SIGNIFICANT CHANGE UP
LACTATE SERPL-SCNC: 2.7 MMOL/L — HIGH (ref 0.7–2)
LACTATE SERPL-SCNC: 5.9 MMOL/L — CRITICAL HIGH (ref 0.7–2)
LEUKOCYTE ESTERASE UR-ACNC: ABNORMAL
LYMPHOCYTES # BLD AUTO: 0.73 K/UL — LOW (ref 1–3.3)
LYMPHOCYTES # BLD AUTO: 2 % — LOW (ref 13–44)
MAGNESIUM SERPL-MCNC: 2.5 MG/DL — SIGNIFICANT CHANGE UP (ref 1.6–2.6)
MCHC RBC-ENTMCNC: 31 PG — SIGNIFICANT CHANGE UP (ref 27–34)
MCHC RBC-ENTMCNC: 31.4 GM/DL — LOW (ref 32–36)
MCV RBC AUTO: 98.8 FL — SIGNIFICANT CHANGE UP (ref 80–100)
MONOCYTES # BLD AUTO: 1.1 K/UL — HIGH (ref 0–0.9)
MONOCYTES NFR BLD AUTO: 3 % — SIGNIFICANT CHANGE UP (ref 2–14)
NEUTROPHILS # BLD AUTO: 34.76 K/UL — HIGH (ref 1.8–7.4)
NEUTROPHILS NFR BLD AUTO: 91 % — HIGH (ref 43–77)
NEUTS BAND # BLD: 4 % — SIGNIFICANT CHANGE UP (ref 0–8)
NITRITE UR-MCNC: POSITIVE
NRBC # BLD: 0 — SIGNIFICANT CHANGE UP
NRBC # BLD: SIGNIFICANT CHANGE UP /100 WBCS (ref 0–0)
NT-PROBNP SERPL-SCNC: 3529 PG/ML — HIGH (ref 0–125)
PH UR: 8 — SIGNIFICANT CHANGE UP (ref 5–8)
PHOSPHATE SERPL-MCNC: 2.4 MG/DL — LOW (ref 2.5–4.5)
PLAT MORPH BLD: NORMAL — SIGNIFICANT CHANGE UP
PLATELET # BLD AUTO: 324 K/UL — SIGNIFICANT CHANGE UP (ref 150–400)
POTASSIUM SERPL-MCNC: 4.2 MMOL/L — SIGNIFICANT CHANGE UP (ref 3.5–5.3)
POTASSIUM SERPL-MCNC: 4.7 MMOL/L — SIGNIFICANT CHANGE UP (ref 3.5–5.3)
POTASSIUM SERPL-SCNC: 4.2 MMOL/L — SIGNIFICANT CHANGE UP (ref 3.5–5.3)
POTASSIUM SERPL-SCNC: 4.7 MMOL/L — SIGNIFICANT CHANGE UP (ref 3.5–5.3)
PROT SERPL-MCNC: 6.2 G/DL — SIGNIFICANT CHANGE UP (ref 6–8.3)
PROT SERPL-MCNC: 8 G/DL — SIGNIFICANT CHANGE UP (ref 6–8.3)
PROT UR-MCNC: 100
PROTHROM AB SERPL-ACNC: 14.4 SEC — HIGH (ref 10.6–13.6)
RAPID RVP RESULT: SIGNIFICANT CHANGE UP
RBC # BLD: 5.13 M/UL — SIGNIFICANT CHANGE UP (ref 4.2–5.8)
RBC # FLD: 14 % — SIGNIFICANT CHANGE UP (ref 10.3–14.5)
RBC BLD AUTO: NORMAL — SIGNIFICANT CHANGE UP
RBC CASTS # UR COMP ASSIST: ABNORMAL /HPF (ref 0–4)
SARS-COV-2 RNA SPEC QL NAA+PROBE: SIGNIFICANT CHANGE UP
SODIUM SERPL-SCNC: 147 MMOL/L — HIGH (ref 135–145)
SODIUM SERPL-SCNC: 150 MMOL/L — HIGH (ref 135–145)
SP GR SPEC: 1.01 — SIGNIFICANT CHANGE UP (ref 1.01–1.02)
TROPONIN I SERPL-MCNC: <.015 NG/ML — SIGNIFICANT CHANGE UP (ref 0.01–0.04)
UROBILINOGEN FLD QL: NEGATIVE — SIGNIFICANT CHANGE UP
WBC # BLD: 36.59 K/UL — HIGH (ref 3.8–10.5)
WBC # FLD AUTO: 36.59 K/UL — HIGH (ref 3.8–10.5)
WBC UR QL: ABNORMAL

## 2021-10-23 PROCEDURE — 76604 US EXAM CHEST: CPT | Mod: 26

## 2021-10-23 PROCEDURE — 93308 TTE F-UP OR LMTD: CPT | Mod: 26

## 2021-10-23 PROCEDURE — 71045 X-RAY EXAM CHEST 1 VIEW: CPT | Mod: 26

## 2021-10-23 PROCEDURE — 74176 CT ABD & PELVIS W/O CONTRAST: CPT | Mod: 26,MG

## 2021-10-23 PROCEDURE — 99291 CRITICAL CARE FIRST HOUR: CPT

## 2021-10-23 PROCEDURE — 99291 CRITICAL CARE FIRST HOUR: CPT | Mod: CS

## 2021-10-23 PROCEDURE — 93010 ELECTROCARDIOGRAM REPORT: CPT

## 2021-10-23 PROCEDURE — 76937 US GUIDE VASCULAR ACCESS: CPT | Mod: 26

## 2021-10-23 PROCEDURE — 99223 1ST HOSP IP/OBS HIGH 75: CPT

## 2021-10-23 PROCEDURE — G1004: CPT

## 2021-10-23 RX ORDER — SODIUM CHLORIDE 9 MG/ML
1000 INJECTION, SOLUTION INTRAVENOUS ONCE
Refills: 0 | Status: COMPLETED | OUTPATIENT
Start: 2021-10-23 | End: 2021-10-23

## 2021-10-23 RX ORDER — CEFEPIME 1 G/1
1000 INJECTION, POWDER, FOR SOLUTION INTRAMUSCULAR; INTRAVENOUS ONCE
Refills: 0 | Status: COMPLETED | OUTPATIENT
Start: 2021-10-23 | End: 2021-10-23

## 2021-10-23 RX ORDER — PANTOPRAZOLE SODIUM 20 MG/1
40 TABLET, DELAYED RELEASE ORAL DAILY
Refills: 0 | Status: DISCONTINUED | OUTPATIENT
Start: 2021-10-23 | End: 2021-10-24

## 2021-10-23 RX ORDER — LEVETIRACETAM 250 MG/1
500 TABLET, FILM COATED ORAL
Refills: 0 | Status: DISCONTINUED | OUTPATIENT
Start: 2021-10-23 | End: 2021-10-28

## 2021-10-23 RX ORDER — CEFEPIME 1 G/1
1000 INJECTION, POWDER, FOR SOLUTION INTRAMUSCULAR; INTRAVENOUS EVERY 24 HOURS
Refills: 0 | Status: DISCONTINUED | OUTPATIENT
Start: 2021-10-24 | End: 2021-10-24

## 2021-10-23 RX ORDER — SODIUM CHLORIDE 9 MG/ML
1000 INJECTION INTRAMUSCULAR; INTRAVENOUS; SUBCUTANEOUS
Refills: 0 | Status: COMPLETED | OUTPATIENT
Start: 2021-10-23 | End: 2021-10-23

## 2021-10-23 RX ORDER — SODIUM CHLORIDE 9 MG/ML
1000 INJECTION, SOLUTION INTRAVENOUS
Refills: 0 | Status: DISCONTINUED | OUTPATIENT
Start: 2021-10-23 | End: 2021-10-23

## 2021-10-23 RX ORDER — HEPARIN SODIUM 5000 [USP'U]/ML
5000 INJECTION INTRAVENOUS; SUBCUTANEOUS EVERY 12 HOURS
Refills: 0 | Status: DISCONTINUED | OUTPATIENT
Start: 2021-10-23 | End: 2021-10-24

## 2021-10-23 RX ORDER — ACETAMINOPHEN 500 MG
650 TABLET ORAL ONCE
Refills: 0 | Status: COMPLETED | OUTPATIENT
Start: 2021-10-23 | End: 2021-10-23

## 2021-10-23 RX ORDER — SODIUM CHLORIDE 9 MG/ML
1000 INJECTION INTRAMUSCULAR; INTRAVENOUS; SUBCUTANEOUS ONCE
Refills: 0 | Status: COMPLETED | OUTPATIENT
Start: 2021-10-23 | End: 2021-10-23

## 2021-10-23 RX ORDER — CLOPIDOGREL BISULFATE 75 MG/1
75 TABLET, FILM COATED ORAL DAILY
Refills: 0 | Status: DISCONTINUED | OUTPATIENT
Start: 2021-10-23 | End: 2021-10-28

## 2021-10-23 RX ORDER — CEFEPIME 1 G/1
INJECTION, POWDER, FOR SOLUTION INTRAMUSCULAR; INTRAVENOUS
Refills: 0 | Status: DISCONTINUED | OUTPATIENT
Start: 2021-10-23 | End: 2021-10-24

## 2021-10-23 RX ORDER — CHLORHEXIDINE GLUCONATE 213 G/1000ML
1 SOLUTION TOPICAL
Refills: 0 | Status: DISCONTINUED | OUTPATIENT
Start: 2021-10-23 | End: 2021-10-26

## 2021-10-23 RX ORDER — MIDODRINE HYDROCHLORIDE 2.5 MG/1
10 TABLET ORAL EVERY 8 HOURS
Refills: 0 | Status: DISCONTINUED | OUTPATIENT
Start: 2021-10-23 | End: 2021-10-24

## 2021-10-23 RX ORDER — PIPERACILLIN AND TAZOBACTAM 4; .5 G/20ML; G/20ML
3.38 INJECTION, POWDER, LYOPHILIZED, FOR SOLUTION INTRAVENOUS ONCE
Refills: 0 | Status: COMPLETED | OUTPATIENT
Start: 2021-10-23 | End: 2021-10-23

## 2021-10-23 RX ORDER — NOREPINEPHRINE BITARTRATE/D5W 8 MG/250ML
0.05 PLASTIC BAG, INJECTION (ML) INTRAVENOUS
Qty: 8 | Refills: 0 | Status: DISCONTINUED | OUTPATIENT
Start: 2021-10-23 | End: 2021-10-24

## 2021-10-23 RX ORDER — SODIUM CHLORIDE 9 MG/ML
1000 INJECTION, SOLUTION INTRAVENOUS
Refills: 0 | Status: DISCONTINUED | OUTPATIENT
Start: 2021-10-23 | End: 2021-10-24

## 2021-10-23 RX ADMIN — PANTOPRAZOLE SODIUM 40 MILLIGRAM(S): 20 TABLET, DELAYED RELEASE ORAL at 18:30

## 2021-10-23 RX ADMIN — PIPERACILLIN AND TAZOBACTAM 200 GRAM(S): 4; .5 INJECTION, POWDER, LYOPHILIZED, FOR SOLUTION INTRAVENOUS at 12:45

## 2021-10-23 RX ADMIN — CLOPIDOGREL BISULFATE 75 MILLIGRAM(S): 75 TABLET, FILM COATED ORAL at 18:30

## 2021-10-23 RX ADMIN — SODIUM CHLORIDE 100 MILLILITER(S): 9 INJECTION, SOLUTION INTRAVENOUS at 15:57

## 2021-10-23 RX ADMIN — Medication 6.25 MICROGRAM(S)/KG/MIN: at 13:34

## 2021-10-23 RX ADMIN — CEFEPIME 100 MILLIGRAM(S): 1 INJECTION, POWDER, FOR SOLUTION INTRAMUSCULAR; INTRAVENOUS at 18:30

## 2021-10-23 RX ADMIN — SODIUM CHLORIDE 1000 MILLILITER(S): 9 INJECTION INTRAMUSCULAR; INTRAVENOUS; SUBCUTANEOUS at 13:25

## 2021-10-23 RX ADMIN — HEPARIN SODIUM 5000 UNIT(S): 5000 INJECTION INTRAVENOUS; SUBCUTANEOUS at 18:30

## 2021-10-23 RX ADMIN — MIDODRINE HYDROCHLORIDE 10 MILLIGRAM(S): 2.5 TABLET ORAL at 21:35

## 2021-10-23 RX ADMIN — SODIUM CHLORIDE 1000 MILLILITER(S): 9 INJECTION, SOLUTION INTRAVENOUS at 15:57

## 2021-10-23 RX ADMIN — Medication 650 MILLIGRAM(S): at 11:30

## 2021-10-23 RX ADMIN — SODIUM CHLORIDE 1000 MILLILITER(S): 9 INJECTION INTRAMUSCULAR; INTRAVENOUS; SUBCUTANEOUS at 12:45

## 2021-10-23 RX ADMIN — Medication 650 MILLIGRAM(S): at 13:21

## 2021-10-23 RX ADMIN — LEVETIRACETAM 500 MILLIGRAM(S): 250 TABLET, FILM COATED ORAL at 21:35

## 2021-10-23 RX ADMIN — SODIUM CHLORIDE 1000 MILLILITER(S): 9 INJECTION INTRAMUSCULAR; INTRAVENOUS; SUBCUTANEOUS at 11:45

## 2021-10-23 NOTE — CONSULT NOTE ADULT - SUBJECTIVE AND OBJECTIVE BOX
[facility] - Santi Nair, Alfreda, Shara, Kobi, Ashley Preez  Office Number: [phone]    Initial Consult Note    CHIEF COMPLAINT: Patient is a 71y old  Male who presents with a chief complaint of blocked jo (23 Oct 2021 15:28)      HPI:  70 yo M PMHx ALS, CAD (s/p MI 1 stent placed 2006 on DAPT), HTN, HLD, BPH p/w lethargy/urinary retention. Hx provided by son Eleazar at bedside. States pt has been more lethargic the past couple of days, had his visiting RN come to his house today, who noticed no output of jo cathter. Jo catheter was replaced (drained ~1000cc of dark urine), and was told to come to ER.  In ER, labs significant for WBC 37k, Lactate 5.9, SCr 3.1, +UA.   Pt was hypotensive despite 30cc/kg IVF bolus and was started on Levo gtt.  Pt seen and examined at bedside - lethargic/somnolent, however protecting airway (satting 94-95% on RA). Following commands. When asked if he is in pain, pt able to shake head no, however unable to provide ROS.     He was on a similar admission in 8/21 for urosepsis  Cardiac wise, he has a history of CAD s/p MI in 2006 and HTN  Cardiac wise, he has been on crestor, tricor, lisinopril, metoprolol and plavix  Unable to obtain ROS from patient.      PAST MEDICAL & SURGICAL HISTORY:  Myocardial infarct    Hypertension    Hyperlipidemia    BPH (benign prostatic hyperplasia)    ALS (amyotrophic lateral sclerosis)    S/P tonsillectomy    S/P coronary artery stent placement    History of hip surgery    History of hernia repair  &gt; 20 years ago    H/O shoulder surgery        SOCIAL HISTORY:  No tobacco, ethanol, or drug abuse.    FAMILY HISTORY:  FHx: myocardial infarction (Father)    FHx: hyperlipidemia      MEDICATIONS  (STANDING):  cefepime   IVPB      cefepime   IVPB 1000 milliGRAM(s) IV Intermittent once  chlorhexidine 2% Cloths 1 Application(s) Topical <User Schedule>  chlorhexidine 4% Liquid 1 Application(s) Topical <User Schedule>  heparin   Injectable 5000 Unit(s) SubCutaneous every 12 hours  norepinephrine Infusion 0.05 MICROgram(s)/kG/Min (6.25 mL/Hr) IV Continuous <Continuous>  pantoprazole  Injectable 40 milliGRAM(s) IV Push daily  sodium chloride 0.45%. 1000 milliLiter(s) (80 mL/Hr) IV Continuous <Continuous>    MEDICATIONS  (PRN):      Allergies    fish (Short breath; Anaphylaxis)  No Known Drug Allergies    Intolerances        REVIEW OF SYSTEMS:  Unable to assess    VITAL SIGNS:   Vital Signs Last 24 Hrs  T(C): 36.9 (23 Oct 2021 14:48), Max: 36.9 (23 Oct 2021 11:34)  T(F): 98.5 (23 Oct 2021 14:48), Max: 98.5 (23 Oct 2021 11:34)  HR: 74 (23 Oct 2021 14:48) (68 - 112)  BP: 88/50 (23 Oct 2021 14:48) (60/46 - 104/53)  BP(mean): --  RR: 16 (23 Oct 2021 14:48) (16 - 18)  SpO2: 96% (23 Oct 2021 14:48) (94% - 96%)    I&O's Summary      On Exam:    Constitutional: NAD, nods head though does not follow commands  Lungs:  Non-labored, breath sounds are clear bilaterally, No wheezing, rales or rhonchi  Cardiovascular: RRR.  S1 and S2 positive.  No murmurs, rubs, gallops or clicks  Gastrointestinal: Bowel Sounds present, soft, nontender.   Lymph: No peripheral edema. No cervical lymphadenopathy.  Neurological: unable to assess  Skin: No rashes or ulcers   Psych:  unable to assess    LABS: All Labs Reviewed:                        15.9   36.59 )-----------( 324      ( 23 Oct 2021 12:04 )             50.7     23 Oct 2021 15:07    150    |  120    |  75     ----------------------------<  164    4.2     |  25     |  2.50   23 Oct 2021 12:04    147    |  110    |  76     ----------------------------<  261    4.7     |  26     |  3.10     Ca    9.3        23 Oct 2021 15:07  Ca    10.6       23 Oct 2021 12:04  Phos  2.4       23 Oct 2021 15:07  Mg     2.5       23 Oct 2021 15:07    TPro  6.2    /  Alb  2.2    /  TBili  1.0    /  DBili  x      /  AST  30     /  ALT  61     /  AlkPhos  74     23 Oct 2021 15:07  TPro  8.0    /  Alb  2.9    /  TBili  1.5    /  DBili  x      /  AST  34     /  ALT  78     /  AlkPhos  106    23 Oct 2021 12:04    PT/INR - ( 23 Oct 2021 12:04 )   PT: 14.4 sec;   INR: 1.24 ratio         PTT - ( 23 Oct 2021 12:04 )  PTT:27.2 sec  CARDIAC MARKERS ( 23 Oct 2021 12:04 )  <.015 ng/mL / x     / 32 U/L / x     / x          Blood Culture:   10-23 @ 12:04  Pro Bnp [id number]        RADIOLOGY:    EKG: sr, rad

## 2021-10-23 NOTE — CONSULT NOTE ADULT - SUBJECTIVE AND OBJECTIVE BOX
CHIEF COMPLAINT: fevers    HISTORY OF PRESENT ILLNESS: 70 y/o with hx of stones s/p ureteral stent placement for sepsis 2/2 obstructing stones. The stent is indwelling. 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 He was seen at home by VNS today and the catheter was changed due to non function. Evidently a liter was drained. He has been more lethargic for a couple days, and was referred to ED, where he wa found to bhe inseptic shock.    PAST MEDICAL & SURGICAL HISTORY:  Myocardial infarct    Hypertension    Hyperlipidemia    BPH (benign prostatic hyperplasia)    ALS (amyotrophic lateral sclerosis)    S/P tonsillectomy    S/P coronary artery stent placement    History of hip surgery    History of hernia repair  &gt; 20 years ago    H/O shoulder surgery        REVIEW OF SYSTEMS:  unable to obtain from pt    MEDICATIONS  (STANDING):  cefepime   IVPB      cefepime   IVPB 1000 milliGRAM(s) IV Intermittent once  chlorhexidine 2% Cloths 1 Application(s) Topical <User Schedule>  chlorhexidine 4% Liquid 1 Application(s) Topical <User Schedule>  heparin   Injectable 5000 Unit(s) SubCutaneous every 12 hours  lactated ringers. 1000 milliLiter(s) (100 mL/Hr) IV Continuous <Continuous>  norepinephrine Infusion 0.05 MICROgram(s)/kG/Min (6.25 mL/Hr) IV Continuous <Continuous>  pantoprazole  Injectable 40 milliGRAM(s) IV Push daily    MEDICATIONS  (PRN):      Allergies    fish (Short breath; Anaphylaxis)  No Known Drug Allergies    Intolerances        SOCIAL HISTORY:    FAMILY HISTORY:  FHx: myocardial infarction (Father)    FHx: hyperlipidemia        Vital Signs Last 24 Hrs  T(C): 36.9 (23 Oct 2021 14:48), Max: 36.9 (23 Oct 2021 11:34)  T(F): 98.5 (23 Oct 2021 14:48), Max: 98.5 (23 Oct 2021 11:34)  HR: 73 (23 Oct 2021 15:45) (68 - 112)  BP: 99/54 (23 Oct 2021 15:45) (60/46 - 104/53)  BP(mean): 70 (23 Oct 2021 15:45) (66 - 71)  RR: 25 (23 Oct 2021 15:45) (16 - 27)  SpO2: 95% (23 Oct 2021 15:45) (93% - 96%)    PHYSICAL EXAM:    Constitutional: NAD, well-developed, not in obvious pain  HEENT: CRUZ, EOMI, Normal Hearing, MMM  Neck: No LAD, No JVD  Back: Normal spine flexure, No CVA tenderness  Respiratory: not using accessory muscles  Cardiovascular: RRR  Abd:  soft, NT/ND, No CVAT  : Normal phallus,open meatus,bilateral descended testes, no masses, indwelling jo  Extremities: No peripheral edema  Vascular: 2+ peripheral pulses  Skin: No rashes    LABS:                        15.9   36.59 )-----------( 324      ( 23 Oct 2021 12:04 )             50.7     10-    150<H>  |  120<H>  |  75<H>  ----------------------------<  164<H>  4.2   |  25  |  2.50<H>    Ca    9.3      23 Oct 2021 15:07  Phos  2.4     10-  Mg     2.5     10-    TPro  6.2  /  Alb  2.2<L>  /  TBili  1.0  /  DBili  x   /  AST  30  /  ALT  61  /  AlkPhos  74  10-23    PT/INR - ( 23 Oct 2021 12:04 )   PT: 14.4 sec;   INR: 1.24 ratio         PTT - ( 23 Oct 2021 12:04 )  PTT:27.2 sec  Urinalysis Basic - ( 23 Oct 2021 12:05 )    Color: Yellow / Appearance: Turbid / S.015 / pH: x  Gluc: x / Ketone: Negative  / Bili: Negative / Urobili: Negative   Blood: x / Protein: 100 / Nitrite: Positive   Leuk Esterase: Moderate / RBC: 25-50 /HPF / WBC 26-50   Sq Epi: x / Non Sq Epi: Few / Bacteria: Few      Urine Culture:     RADIOLOGY & ADDITIONAL STUDIES:  < from: CT Renal Stone Hunt (10.23.21 @ 12:41) >  EXAM:  CT RENAL STONE HUNT                            PROCEDURE DATE:  10/23/2021          INTERPRETATION:  CLINICAL INFORMATION: Fever, dysuria, history of kidney stone.    COMPARISON: CT abdomen pelvis dated 2021.    CONTRAST/COMPLICATIONS:  IV Contrast: NONE  0 cc administered   0 cc discarded  Oral Contrast: NONE  Complications: None reported at time of study completion    PROCEDURE:  CT of the Abdomen and Pelvis was performed.  Sagittal and coronal reformats were performed.    FINDINGS:  LOWER CHEST: Bibasilar subsegmental atelectasis. Coronary artery calcifications. Trace pericardial fluid.    LIVER: Within normal limits.  BILE DUCTS: Normal caliber.  GALLBLADDER: Contracted.  SPLEEN: Within normal limits.  PANCREAS: Within normal limits.  ADRENALS: Within normal limits.  KIDNEYS/URETERS: Mild left hydronephrosis. 2 mm nonobstructive calculus interpolar region left kidney. No right-sided calculi.  Left ureteral double-J stent in good position. Mild left ureteral dilatation with 5 mm calculus in the distal left ureter.    BLADDER: Jo catheter. Air within the bladder lumen. 8 mm bladder calculus. Left-sided bladder diverticulum containing 6 mm calculus.  REPRODUCTIVE ORGANS: Enlarged prostate with dystrophic calcifications.    BOWEL: PEG tube. No bowel obstruction. Appendix normal.  PERITONEUM: No ascites.  VESSELS: Within normal limits.  RETROPERITONEUM/LYMPH NODES: No lymphadenopathy.  ABDOMINAL WALL: Fat-containing left inguinal hernia.  BONES: Within normal limits.    IMPRESSION:  Left ureteral stent with mild left hydroureteronephrosis.    Subcentimeter nonobstructive calculi in the left kidney, distal left ureter, urinary bladder, and bladder diverticulum.        --- End of Report ---            KYRA CLEVELAND; Attending Radiologist  This document has been electronically signed. Oct 23 2021  1:04PM    < end of copied text >   CHIEF COMPLAINT: lehargy    HISTORY OF PRESENT ILLNESS: 70 y/o with hx of stones s/p ureteral stent placement for sepsis 2/2 obstructing stones. The stent is indwelling. 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 He was seen at home by VNS today and the catheter was changed due to non function. Evidently a liter was drained. He has been more lethargic for a couple days, and was referred to ED, where he wa found to bhe inseptic shock.    PAST MEDICAL & SURGICAL HISTORY:  Myocardial infarct    Hypertension    Hyperlipidemia    BPH (benign prostatic hyperplasia)    ALS (amyotrophic lateral sclerosis)    S/P tonsillectomy    S/P coronary artery stent placement    History of hip surgery    History of hernia repair  &gt; 20 years ago    H/O shoulder surgery        REVIEW OF SYSTEMS:  unable to obtain from pt    MEDICATIONS  (STANDING):  cefepime   IVPB      cefepime   IVPB 1000 milliGRAM(s) IV Intermittent once  chlorhexidine 2% Cloths 1 Application(s) Topical <User Schedule>  chlorhexidine 4% Liquid 1 Application(s) Topical <User Schedule>  heparin   Injectable 5000 Unit(s) SubCutaneous every 12 hours  lactated ringers. 1000 milliLiter(s) (100 mL/Hr) IV Continuous <Continuous>  norepinephrine Infusion 0.05 MICROgram(s)/kG/Min (6.25 mL/Hr) IV Continuous <Continuous>  pantoprazole  Injectable 40 milliGRAM(s) IV Push daily    MEDICATIONS  (PRN):      Allergies    fish (Short breath; Anaphylaxis)  No Known Drug Allergies    Intolerances        SOCIAL HISTORY:    FAMILY HISTORY:  FHx: myocardial infarction (Father)    FHx: hyperlipidemia        Vital Signs Last 24 Hrs  T(C): 36.9 (23 Oct 2021 14:48), Max: 36.9 (23 Oct 2021 11:34)  T(F): 98.5 (23 Oct 2021 14:48), Max: 98.5 (23 Oct 2021 11:34)  HR: 73 (23 Oct 2021 15:45) (68 - 112)  BP: 99/54 (23 Oct 2021 15:45) (60/46 - 104/53)  BP(mean): 70 (23 Oct 2021 15:45) (66 - 71)  RR: 25 (23 Oct 2021 15:45) (16 - 27)  SpO2: 95% (23 Oct 2021 15:45) (93% - 96%)    PHYSICAL EXAM:    Constitutional: NAD, well-developed, not in obvious pain  HEENT: CRUZ, EOMI, Normal Hearing, MMM  Neck: No LAD, No JVD  Back: Normal spine flexure, No CVA tenderness  Respiratory: not using accessory muscles  Cardiovascular: RRR  Abd:  soft, NT/ND, No CVAT  : Normal phallus,open meatus,bilateral descended testes, no masses, indwelling jo draining clear urine  Extremities: No peripheral edema  Vascular: 2+ peripheral pulses  Skin: No rashes    LABS:                        15.9   36.59 )-----------( 324      ( 23 Oct 2021 12:04 )             50.7     10-23    150<H>  |  120<H>  |  75<H>  ----------------------------<  164<H>  4.2   |  25  |  2.50<H>    Ca    9.3      23 Oct 2021 15:07  Phos  2.4     10-  Mg     2.5     10-23    TPro  6.2  /  Alb  2.2<L>  /  TBili  1.0  /  DBili  x   /  AST  30  /  ALT  61  /  AlkPhos  74  10-23    PT/INR - ( 23 Oct 2021 12:04 )   PT: 14.4 sec;   INR: 1.24 ratio         PTT - ( 23 Oct 2021 12:04 )  PTT:27.2 sec  Urinalysis Basic - ( 23 Oct 2021 12:05 )    Color: Yellow / Appearance: Turbid / S.015 / pH: x  Gluc: x / Ketone: Negative  / Bili: Negative / Urobili: Negative   Blood: x / Protein: 100 / Nitrite: Positive   Leuk Esterase: Moderate / RBC: 25-50 /HPF / WBC 26-50   Sq Epi: x / Non Sq Epi: Few / Bacteria: Few      Urine Culture:     RADIOLOGY & ADDITIONAL STUDIES:  < from: CT Renal Stone Hunt (10.23.21 @ 12:41) >  EXAM:  CT RENAL STONE HUNT                            PROCEDURE DATE:  10/23/2021          INTERPRETATION:  CLINICAL INFORMATION: Fever, dysuria, history of kidney stone.    COMPARISON: CT abdomen pelvis dated 2021.    CONTRAST/COMPLICATIONS:  IV Contrast: NONE  0 cc administered   0 cc discarded  Oral Contrast: NONE  Complications: None reported at time of study completion    PROCEDURE:  CT of the Abdomen and Pelvis was performed.  Sagittal and coronal reformats were performed.    FINDINGS:  LOWER CHEST: Bibasilar subsegmental atelectasis. Coronary artery calcifications. Trace pericardial fluid.    LIVER: Within normal limits.  BILE DUCTS: Normal caliber.  GALLBLADDER: Contracted.  SPLEEN: Within normal limits.  PANCREAS: Within normal limits.  ADRENALS: Within normal limits.  KIDNEYS/URETERS: Mild left hydronephrosis. 2 mm nonobstructive calculus interpolar region left kidney. No right-sided calculi.  Left ureteral double-J stent in good position. Mild left ureteral dilatation with 5 mm calculus in the distal left ureter.    BLADDER: Jo catheter. Air within the bladder lumen. 8 mm bladder calculus. Left-sided bladder diverticulum containing 6 mm calculus.  REPRODUCTIVE ORGANS: Enlarged prostate with dystrophic calcifications.    BOWEL: PEG tube. No bowel obstruction. Appendix normal.  PERITONEUM: No ascites.  VESSELS: Within normal limits.  RETROPERITONEUM/LYMPH NODES: No lymphadenopathy.  ABDOMINAL WALL: Fat-containing left inguinal hernia.  BONES: Within normal limits.    IMPRESSION:  Left ureteral stent with mild left hydroureteronephrosis.    Subcentimeter nonobstructive calculi in the left kidney, distal left ureter, urinary bladder, and bladder diverticulum.        --- End of Report ---            KYRA CLEVELAND; Attending Radiologist  This document has been electronically signed. Oct 23 2021  1:04PM    < end of copied text >

## 2021-10-23 NOTE — ED PROVIDER NOTE - OBJECTIVE STATEMENT
72 yo M p/w seen by VNS today, in urinary retention - jo placed by that nurse with ~ 1000 cc dark urine. Pt more lethargic than usual, found some hypotensions. No cough/ uri. No known covid exposures. No other acute co.

## 2021-10-23 NOTE — ED PROVIDER NOTE - NSICDXPASTMEDICALHX_GEN_ALL_CORE_FT
PAST MEDICAL HISTORY:  ALS (amyotrophic lateral sclerosis)     BPH (benign prostatic hyperplasia)     Hyperlipidemia     Hypertension     Myocardial infarct

## 2021-10-23 NOTE — CONSULT NOTE ADULT - SUBJECTIVE AND OBJECTIVE BOX
Patient is a 71y old  Male who presents with a chief complaint of blocked jo (23 Oct 2021 13:57)      BRIEF HOSPITAL COURSE: ***    Events last 24 hours: ***    PAST MEDICAL & SURGICAL HISTORY:  Myocardial infarct    Hypertension    Hyperlipidemia    BPH (benign prostatic hyperplasia)    ALS (amyotrophic lateral sclerosis)    S/P tonsillectomy    S/P coronary artery stent placement    History of hip surgery    History of hernia repair  &gt; 20 years ago    H/O shoulder surgery        Review of Systems:  CONSTITUTIONAL: No fever, chills, or fatigue  EYES: No eye pain, visual disturbances, or discharge  ENMT:  No difficulty hearing, tinnitus, vertigo; No sinus or throat pain  NECK: No pain or stiffness  RESPIRATORY: No cough, wheezing, chills or hemoptysis; No shortness of breath  CARDIOVASCULAR: No chest pain, palpitations, dizziness, or leg swelling  GASTROINTESTINAL: No abdominal or epigastric pain. No nausea, vomiting, or hematemesis; No diarrhea or constipation. No melena or hematochezia.  GENITOURINARY: No dysuria, frequency, hematuria, or incontinence  NEUROLOGICAL: No headaches, memory loss, loss of strength, numbness, or tremors  SKIN: No itching, burning, rashes, or lesions   MUSCULOSKELETAL: No joint pain or swelling; No muscle, back, or extremity pain  PSYCHIATRIC: No depression, anxiety, mood swings, or difficulty sleeping      Medications:    norepinephrine Infusion 0.05 MICROgram(s)/kG/Min IV Continuous <Continuous>                      chlorhexidine 4% Liquid 1 Application(s) Topical <User Schedule>            ICU Vital Signs Last 24 Hrs  T(C): 36.9 (23 Oct 2021 11:34), Max: 36.9 (23 Oct 2021 11:34)  T(F): 98.5 (23 Oct 2021 11:34), Max: 98.5 (23 Oct 2021 11:34)  HR: 70 (23 Oct 2021 14:15) (68 - 112)  BP: 93/53 (23 Oct 2021 14:15) (60/46 - 104/53)  BP(mean): --  ABP: --  ABP(mean): --  RR: 16 (23 Oct 2021 14:04) (16 - 18)  SpO2: 96% (23 Oct 2021 14:04) (94% - 96%)          I&O's Detail        LABS:                        15.9   36.59 )-----------( 324      ( 23 Oct 2021 12:04 )             50.7     10-23    147<H>  |  110<H>  |  76<H>  ----------------------------<  261<H>  4.7   |  26  |  3.10<H>    Ca    10.6<H>      23 Oct 2021 12:04    TPro  8.0  /  Alb  2.9<L>  /  TBili  1.5<H>  /  DBili  x   /  AST  34  /  ALT  78  /  AlkPhos  106  10-23      CARDIAC MARKERS ( 23 Oct 2021 12:04 )  <.015 ng/mL / x     / 32 U/L / x     / x          CAPILLARY BLOOD GLUCOSE        PT/INR - ( 23 Oct 2021 12:04 )   PT: 14.4 sec;   INR: 1.24 ratio         PTT - ( 23 Oct 2021 12:04 )  PTT:27.2 sec  Urinalysis Basic - ( 23 Oct 2021 12:05 )    Color: Yellow / Appearance: Turbid / S.015 / pH: x  Gluc: x / Ketone: Negative  / Bili: Negative / Urobili: Negative   Blood: x / Protein: 100 / Nitrite: Positive   Leuk Esterase: Moderate / RBC: 25-50 /HPF / WBC 26-50   Sq Epi: x / Non Sq Epi: Few / Bacteria: Few      CULTURES:      Physical Examination:    General: No acute distress.  Alert, oriented, interactive, nonfocal    HEENT: Pupils equal, reactive to light.  Symmetric.    PULM: Clear to auscultation bilaterally, no significant sputum production    CVS: Regular rate and rhythm, no murmurs, rubs, or gallops    ABD: Soft, nondistended, nontender, normoactive bowel sounds, no masses    EXT: No edema, nontender    SKIN: Warm and well perfused, no rashes noted.    NEURO: A&Ox3, strength 5/5 all extremities, cranial nerves grossly intact, no focal deficits      RADIOLOGY: ***      CRITICAL CARE TIME SPENT: ***  Evaluating/treating patient, reviewing data/labs/imaging, discussing case with multidisciplinary team, discussing plan/goals of care with patient/family. Non-inclusive of procedure time.   Patient is a 71y old  Male who presents with a chief complaint of blocked jo (23 Oct 2021 13:57)    BRIEF HOSPITAL COURSE:   72 yo M PMHx ALS, CAD (s/p MI 1 stent placed  on DAPT), HTN, HLD, BPH p/w lethargy/urinary retention. Hx provided by son Eleazar at bedside. States pt has been more lethargic the past couple of days, had his visiting RN come to his house today, who noticed no output of jo cathter. Jo catheter was replaced (drained ~1000cc of dark urine), and was told to come to ER.  In ER, labs significant for WBC 37k, Lactate 5.9, SCr 3.1, +UA.   Pt was hypotensive despite 30cc/kg IVF bolus and was started on Levo gtt.  ICU consulted for further management.   Pt seen and examined at bedside - lethargic/somnolent, however protecting airway (satting 94-95% on RA). Following commands. When asked if he is in pain, pt able to shake head no, however unable to provide ROS.     PAST MEDICAL & SURGICAL HISTORY:  Myocardial infarct  Hypertension  Hyperlipidemia  BPH (benign prostatic hyperplasia)  ALS (amyotrophic lateral sclerosis)  S/P tonsillectomy  S/P coronary artery stent placement  History of hip surgery  History of hernia repair  &gt; 20 years ag  H/O shoulder surgery    Review of Systems:  Due to pt's mental status, subjective information was not able to be obtained from the patient. History was obtained, to the extent possible, from review of the chart and collateral sources of information.     Medications:  norepinephrine Infusion 0.05 MICROgram(s)/kG/Min IV Continuous <Continuous>  chlorhexidine 4% Liquid 1 Application(s) Topical <User Schedule>    ICU Vital Signs Last 24 Hrs  T(C): 36.9 (23 Oct 2021 11:34), Max: 36.9 (23 Oct 2021 11:34)  T(F): 98.5 (23 Oct 2021 11:34), Max: 98.5 (23 Oct 2021 11:34)  HR: 70 (23 Oct 2021 14:15) (68 - 112)  BP: 93/53 (23 Oct 2021 14:15) (60/46 - 104/53)  BP(mean): --  ABP: --  ABP(mean): --  RR: 16 (23 Oct 2021 14:04) (16 - 18)  SpO2: 96% (23 Oct 2021 14:04) (94% - 96%)    I&O's Detail  LABS:                        15.9   36.59 )-----------( 324      ( 23 Oct 2021 12:04 )             50.7     10-23    147<H>  |  110<H>  |  76<H>  ----------------------------<  261<H>  4.7   |  26  |  3.10<H>    Ca    10.6<H>      23 Oct 2021 12:04    TPro  8.0  /  Alb  2.9<L>  /  TBili  1.5<H>  /  DBili  x   /  AST  34  /  ALT  78  /  AlkPhos  106  10-23    CARDIAC MARKERS ( 23 Oct 2021 12:04 )  <.015 ng/mL / x     / 32 U/L / x     / x        CAPILLARY BLOOD GLUCOSE    PT/INR - ( 23 Oct 2021 12:04 )   PT: 14.4 sec;   INR: 1.24 ratio    PTT - ( 23 Oct 2021 12:04 )  PTT:27.2 sec  Urinalysis Basic - ( 23 Oct 2021 12:05 )    Color: Yellow / Appearance: Turbid / S.015 / pH: x  Gluc: x / Ketone: Negative  / Bili: Negative / Urobili: Negative   Blood: x / Protein: 100 / Nitrite: Positive   Leuk Esterase: Moderate / RBC: 25-50 /HPF / WBC 26-50   Sq Epi: x / Non Sq Epi: Few / Bacteria: Few    CULTURES:    Physical Examination:  General: Lethargic, following commands.   HEENT: PERRL.  NECK: Supple.   PULM: Clear to auscultation bilaterally.  CVS: s1/s2.  ABD: Soft, nondistended, nontender, normoactive bowel sounds. +PEG.   EXT: No edema, nontender  SKIN: Cool.    RADIOLOGY:   < from: CT Renal Stone Hunt (10.23.21 @ 12:41) >  EXAM:  CT RENAL STONE HUNT                        PROCEDURE DATE:  10/23/2021    IMPRESSION:  Left ureteral stent with mild left hydroureteronephrosis.  Subcentimeter nonobstructive calculi in the left kidney, distal left ureter, urinary bladder, and bladder diverticulum.      72 yo M PMHx ALS, CAD (s/p MI 1 stent placed  on DAPT), HTN, HLD, BPH p/w lethargy/urinary retention. Hx provided by son Eleazar at bedside. States pt has been more lethargic the past couple of days, had his visiting RN come to his house today, who noticed no output of jo cathter. Jo catheter was replaced (drained ~1000cc of dark urine), and was told to come to ER.  In ER, labs significant for WBC 37k, Lactate 5.9, SCr 3.1, +UA.   Pt was hypotensive despite 30cc/kg IVF bolus and was started on Levo gtt.  ICU consulted for further management.   Assessment:  1. Septic Shock  2. UTI  3. MARLEY on CKD  4. ALS    Plan:  NEURO:  -Monitor mental status closely, avoid neurosuppresants.    CV:  -Hypotensive despite 30cc/kg IVF bolus. Levo gtt started, actively titrating to maintain goal MAP >65-70, monitoring end points of perfusion.  -Keep K~4 and Mg>2 for optimal arrhythmia suppression.    RESP:  -Satting well on RA, goal SpO2 >92%.    RENAL:  -Renal function currently w/ MARLEY on CKD (likely 2/2 ATN)  -trend lytes/Scr daily with BMP  -I's and O's, goal UOP 0.5 cc/kg/hr  -renal dose meds and avoid nephrotoxins     GI:  -NPO.  -Protonix QD.     ENDO:  -Aggressive glycemic control to limit FS glucose to <180mg/dl.     ID:  -Afebrile, leukocytosis, elevated lactate. UA+. Received 30cc/kg sepsis IVF and Zosyn x1 in ER. BloodCx / UrineCx sent. Will place on empiric Cefepime now (renally dosed) pending cultures and cover appropriately.     HEME:  -DVT ppx w/ SC Hep.     DISPO: D/w ICU Intensivist Dr. Lozano. Will admit to MICU.     CRITICAL CARE TIME SPENT:  50 minutes of critical care time spent providing medical care for patient's acute illness/conditions that impairs at least one vital organ system and/or poses a high risk of imminent or life threatening deterioration in the patient's condition. It includes time spent evaluating and treating the patient's acute illness as well as time spent reviewing labs, radiology, discussing goals of care with patient and/or patient's family, and discussing the case with a multidisciplinary team, including the eICU, in an effort to prevent further life threatening deterioration or end organ damage. This time is independent of any procedures performed.

## 2021-10-23 NOTE — H&P ADULT - HISTORY OF PRESENT ILLNESS
70 yo M p/w seen by VNS today, in urinary retention - jo placed by that nurse with ~ 1000 cc dark urine. Pt more lethargic than usual, found some hypotensions. No cough/ uri. No known covid exposures. No other acute co.  In ER patient was found to be in shock.  patient is being admitted for further work up and treatment.

## 2021-10-23 NOTE — ED ADULT NURSE NOTE - NSIMPLEMENTINTERV_GEN_ALL_ED
Implemented All Fall with Harm Risk Interventions:  [place] to call system. Call bell, personal items and telephone within reach. Instruct patient to call for assistance. Room bathroom lighting operational. Non-slip footwear when patient is off stretcher. Physically safe environment: no spills, clutter or unnecessary equipment. Stretcher in lowest position, wheels locked, appropriate side rails in place. Provide visual cue, wrist band, yellow gown, etc. Monitor gait and stability. Monitor for mental status changes and reorient to person, place, and time. Review medications for side effects contributing to fall risk. Reinforce activity limits and safety measures with patient and family. Provide visual clues: red socks.

## 2021-10-23 NOTE — ED PROVIDER NOTE - PROGRESS NOTE DETAILS
Pt with rerutn of hypotension, no other acute changes. DW pts son - pt is full code , started Levophed. Pt will be eval by ICU. Pt with return of hypotension, no other acute changes. DW pts son - pt is full code , started Levophed. Pt will be eval by ICU. Pt seen and accepted by ICU

## 2021-10-23 NOTE — H&P ADULT - NSHPLABSRESULTS_GEN_ALL_CORE
10    150<H>  |  120<H>  |  75<H>  ----------------------------<  164<H>  4.2   |  25  |  2.50<H>    Ca    9.3      23 Oct 2021 15:07  Phos  2.4     10-23  Mg     2.5     10-23    TPro  6.2  /  Alb  2.2<L>  /  TBili  1.0  /  DBili  x   /  AST  30  /  ALT  61  /  AlkPhos  74  10-23                            15.9   36.59 )-----------( 324      ( 23 Oct 2021 12:04 )             50.7       CARDIAC MARKERS ( 23 Oct 2021 12:04 )  <.015 ng/mL / x     / 32 U/L / x     / x            LIVER FUNCTIONS - ( 23 Oct 2021 15:07 )  Alb: 2.2 g/dL / Pro: 6.2 g/dL / ALK PHOS: 74 U/L / ALT: 61 U/L / AST: 30 U/L / GGT: x             PT/INR - ( 23 Oct 2021 12:04 )   PT: 14.4 sec;   INR: 1.24 ratio         PTT - ( 23 Oct 2021 12:04 )  PTT:27.2 sec    Urinalysis Basic - ( 23 Oct 2021 12:05 )    Color: Yellow / Appearance: Turbid / S.015 / pH: x  Gluc: x / Ketone: Negative  / Bili: Negative / Urobili: Negative   Blood: x / Protein: 100 / Nitrite: Positive   Leuk Esterase: Moderate / RBC: 25-50 /HPF / WBC 26-50   Sq Epi: x / Non Sq Epi: Few / Bacteria: Few

## 2021-10-23 NOTE — PROCEDURE NOTE - NSPROCDETAILS_GEN_ALL_CORE
ultrasound guided peripheral IV placement/location identified, draped/prepped, sterile technique used/blood seen on insertion/dressing applied/flushes easily/secured in place/sterile technique, catheter placed

## 2021-10-23 NOTE — CONSULT NOTE ADULT - ATTENDING COMMENTS
71M h/o ALS, CAD (s/p stent 2006 on DAPT), HTN, HLD, BPH, L renal stones s/p stent placement p/w lethargy/urinary retention. Per son, pt has been increasingly lethargic in recent days. VNS came to home today, noted no outpt from jo and exchanged with immediate return of ~1L urine and sent to ED. On arrival, pt afebrile, tachycardic to 110s and hypotensive to 80s/50s with SBPs ranging 60s-100s. Labs notable for leukocytosis of 36.6, MARLEY with sCr 3.1 (1.2 8/2021), lactate 5.9 with +UA. Blood and urine cultures sent. CT abd/pel stone hunt showed L ureteral stent in proper place, non-obstructed L renal stones. Received 2L bolus and zosyn x1. Remained hypotensive despite volume resuscitation and started on levophed.     Acute Problems  - Septic Shock  - UTI  - MARLEY  - Leukocytosis  - Lacticemia/Lactic Acidosis  - Hyperglycemia    Neuro: metabolic encephalopathy in setting of sepsis; protecting airway, following commands  CV: septic shock on levophed for pressor support, maintain MAP > 65  Pulm: no acute issues  GI: enteral feeds via PEG  Renal: MARLEY 2/2 sepsis +/- pre-renal MARLEY from urinary obstruction which is resolved post-jo change, ureteral stent appears to be in place on CT  - trend with ongoing resuscitation  - lactic acidosis in setting of sepsis - repeat level now s/p fluid resuscitation to trend  ID: septic shock 2/2 UTI, no evidence of previous resistant organisms - continue emperic cefepime, follow up blood and urine cultures  Endo: likely stress induced hyperglycemia, continue to monitor, if remains elevated will start ISS coverage  Heme: dvt ppx with HSQ 71M h/o ALS, CAD (s/p stent 2006 on DAPT), HTN, HLD, BPH, L renal stones s/p stent placement p/w lethargy/urinary retention. Per son, pt has been increasingly lethargic in recent days. VNS came to home today, noted no outpt from jo and exchanged with immediate return of ~1L urine and sent to ED. On arrival, pt afebrile, tachycardic to 110s and hypotensive to 80s/50s with SBPs ranging 60s-100s. Labs notable for leukocytosis of 36.6, MARLEY with sCr 3.1 (1.2 8/2021), lactate 5.9 with +UA. Blood and urine cultures sent. CT abd/pel stone hunt showed L ureteral stent in proper place, non-obstructed L renal stones. Received 2L bolus and zosyn x1. Remained hypotensive despite volume resuscitation and started on levophed.     Acute Problems  - Septic Shock  - Hypovolemia  - UTI  - MARLEY  - Leukocytosis  - Lacticemia/Lactic Acidosis  - Hyperglycemia    Neuro: metabolic encephalopathy in setting of sepsis; protecting airway, following commands  CV: septic shock s/p 2L bolus in ED, still appears hypovolemic on POCUS with IVC fully collapsible - will give additional 1L LR bolus now and continue LR @ 100cc/hr  - currently on levophed 0.05 from ED, if remains on pressors post-bolus will start midodrine  Pulm: no acute issues  GI: enteral feeds via PEG  Renal: MARLEY 2/2 sepsis +/- pre-renal MARLEY from urinary obstruction which is resolved post-jo change, ureteral stent appears to be in place on CT  - trend with ongoing resuscitation  - lactic acidosis in setting of sepsis - repeat level now s/p fluid resuscitation to trend  ID: septic shock 2/2 UTI, no evidence of previous resistant organisms - continue emperic cefepime, follow up blood and urine cultures  Endo: likely stress induced hyperglycemia, continue to monitor, if remains elevated will start ISS coverage  Heme: dvt ppx with HSQ

## 2021-10-23 NOTE — ED ADULT NURSE NOTE - OBJECTIVE STATEMENT
Received the patient in the Er. Patient is alert and awake. Patient is non verbal. As per EMT visiting nurse came evaluated the patient. As per visiting nurse Shukla catheter was clogged. Visiting nurse changed the Shukla cathter and decent amount of dark color urine drained.

## 2021-10-23 NOTE — PATIENT PROFILE ADULT - NSPROHMSYMPCOND_GEN_A_NUR
cardiovascular/genitourinary PEG, HX of ALS/cardiovascular/gastrointestinal/genitourinary/musculoskeletal/neurologic

## 2021-10-23 NOTE — CONSULT NOTE ADULT - SUBJECTIVE AND OBJECTIVE BOX
Patient is a 71y old  Male who presents with a chief complaint of blocked jo (23 Oct 2021 15:19)    HPI:  72 yo M PMHx ALS, CAD (s/p MI 1 stent placed  on DAPT), HTN, HLD, BPH p/w lethargy/urinary retention. Hx provided by son Eleazar at bedside. States pt has been more lethargic the past couple of days, had his visiting RN come to his house today, who noticed no output of jo cathter. Jo catheter was replaced (drained ~1000cc of dark urine), and was told to come to ER.  In ER, labs significant for WBC 37k, Lactate 5.9, SCr 3.1, +UA. Pt was hypotensive despite 30cc/kg IVF bolus and was started on Levo gtt.    Renal consult called for MARLEY. History obtained from chart.    PAST MEDICAL HISTORY:  Myocardial infarct    Hypertension    Hyperlipidemia    BPH (benign prostatic hyperplasia)    ALS (amyotrophic lateral sclerosis)        PAST SURGICAL HISTORY:  S/P tonsillectomy    S/P coronary artery stent placement    History of hip surgery    History of hernia repair    H/O shoulder surgery        FAMILY HISTORY:  FHx: myocardial infarction (Father)    FHx: hyperlipidemia        SOCIAL HISTORY: No smoking or alcohol use     Allergies    fish (Short breath; Anaphylaxis)  No Known Drug Allergies    Intolerances      Home Medications:  cefepime 1 g intravenous injection: 1 gram(s) intravenous every 12 hours until 2021 (05 Aug 2021 10:56)  Colace 100 mg oral capsule: 1 cap(s) by gastrostomy tube once a day, As Needed (05 Aug 2021 10:56)  Crestor 40 mg oral tablet: 1 tab(s) by gastrostomy tube once a day (05 Aug 2021 10:56)  fenofibrate 160 mg oral tablet: 1 tab(s) by gastrostomy tube once a day (05 Aug 2021 10:56)  lactobacillus acidophilus oral capsule: by gastrostomy tube once a day (05 Aug 2021 10:56)  Lexapro 10 mg oral tablet: 1 tab(s) by gastrostomy tube once a day (05 Aug 2021 10:56)  lisinopril 2.5 mg oral tablet: 1 tab(s) by gastrostomy tube once a day (05 Aug 2021 10:56)  metoprolol tartrate 50 mg oral tablet: 1 tab(s) by gastrostomy tube once a day (05 Aug 2021 10:56)  Multiple Vitamins with Minerals oral liquid: by gastrostomy tube once a day (05 Aug 2021 10:56)  ocular lubricant ophthalmic solution: 1 drop(s) to each affected eye 3 times a day (05 Aug 2021 10:56)  Plavix 75 mg oral tablet: 1 tab(s) by gastrostomy tube once a day (05 Aug 2021 10:56)    MEDICATIONS  (STANDING):  cefepime   IVPB      cefepime   IVPB 1000 milliGRAM(s) IV Intermittent once  chlorhexidine 2% Cloths 1 Application(s) Topical <User Schedule>  chlorhexidine 4% Liquid 1 Application(s) Topical <User Schedule>  heparin   Injectable 5000 Unit(s) SubCutaneous every 12 hours  norepinephrine Infusion 0.05 MICROgram(s)/kG/Min (6.25 mL/Hr) IV Continuous <Continuous>  pantoprazole  Injectable 40 milliGRAM(s) IV Push daily    MEDICATIONS  (PRN):      REVIEW OF SYSTEMS:  General: lethargic, arousable    T(F): 98.5 (10-23-21 @ 14:48), Max: 98.5 (10-23-21 @ 11:34)  HR: 74 (10-23-21 @ 14:48) (68 - 112)  BP: 88/50 (10-23-21 @ 14:48) (60/46 - 104/53)  RR: 16 (10-23-21 @ 14:48) (16 - 18)  SpO2: 96% (10-23-21 @ 14:48) (94% - 96%)  Wt(kg): --    PHYSICAL EXAM:  General: lethargic  Respiratory: b/l air entry  Cardiovascular: S1 S2  Gastrointestinal: soft  Extremities: no edema        10-23    147<H>  |  110<H>  |  76<H>  ----------------------------<  261<H>  4.7   |  26  |  3.10<H>    Ca    10.6<H>      23 Oct 2021 12:04    TPro  8.0  /  Alb  2.9<L>  /  TBili  1.5<H>  /  DBili  x   /  AST  34  /  ALT  78  /  AlkPhos  106  10-23                          15.9   36.59 )-----------( 324      ( 23 Oct 2021 12:04 )             50.7       Hemoglobin: 15.9 g/dL (10-23 @ 12:04)  Hematocrit: 50.7 % (10-23 @ 12:04)  Potassium, Serum: 4.7 mmol/L (10-23 @ 12:04)  Blood Urea Nitrogen, Serum: 76 mg/dL (10-23 @ 12:04)      Creatinine, Serum: 3.10 (10-23 @ 12:04)      Urinalysis Basic - ( 23 Oct 2021 12:05 )    Color: Yellow / Appearance: Turbid / S.015 / pH: x  Gluc: x / Ketone: Negative  / Bili: Negative / Urobili: Negative   Blood: x / Protein: 100 / Nitrite: Positive   Leuk Esterase: Moderate / RBC: 25-50 /HPF / WBC 26-50   Sq Epi: x / Non Sq Epi: Few / Bacteria: Few      LIVER FUNCTIONS - ( 23 Oct 2021 12:04 )  Alb: 2.9 g/dL / Pro: 8.0 g/dL / ALK PHOS: 106 U/L / ALT: 78 U/L / AST: 34 U/L / GGT: x           CARDIAC MARKERS ( 23 Oct 2021 12:04 )  <.015 ng/mL / x     / 32 U/L / x     / x          Creatine Kinase, Serum: 32 U/L (10-23-21 @ 12:04)      ``< from: CT Renal Stone Hunt (10.23.21 @ 12:41) >    EXAM:  CT RENAL STONE HUNT                            PROCEDURE DATE:  10/23/2021          INTERPRETATION:  CLINICAL INFORMATION: Fever, dysuria, history of kidney stone.    COMPARISON: CT abdomen pelvis dated 2021.    CONTRAST/COMPLICATIONS:  IV Contrast: NONE  0 cc administered   0 cc discarded  Oral Contrast: NONE  Complications: None reported at time of study completion    PROCEDURE:  CT of the Abdomen and Pelvis was performed.  Sagittal and coronal reformats were performed.    FINDINGS:  LOWER CHEST: Bibasilar subsegmental atelectasis. Coronary artery calcifications. Trace pericardial fluid.    LIVER: Within normal limits.  BILE DUCTS: Normal caliber.  GALLBLADDER: Contracted.  SPLEEN: Within normal limits.  PANCREAS: Within normal limits.  ADRENALS: Within normal limits.  KIDNEYS/URETERS: Mild left hydronephrosis. 2 mm nonobstructive calculus interpolar region left kidney. No right-sided calculi.  Left ureteral double-J stent in good position. Mild left ureteral dilatation with 5 mm calculus in the distal left ureter.    BLADDER: Jo catheter. Air within the bladder lumen. 8 mm bladder calculus. Left-sided bladder diverticulum containing 6 mm calculus.  REPRODUCTIVE ORGANS: Enlarged prostate with dystrophic calcifications.    BOWEL: PEG tube. No bowel obstruction. Appendix normal.  PERITONEUM: No ascites.  VESSELS: Within normal limits.  RETROPERITONEUM/LYMPH NODES: No lymphadenopathy.  ABDOMINAL WALL: Fat-containing left inguinal hernia.  BONES: Within normal limits.    IMPRESSION:  Left ureteral stent with mild left hydroureteronephrosis.    Subcentimeter nonobstructive calculi in the left kidney, distal left ureter, urinary bladder, and bladder diverticulum.        --- End of Report ---            KYRA CLEVELAND; Attending Radiologist  This document has been electronically signed. Oct 23 2021  1:04PM    < end of copied text >

## 2021-10-23 NOTE — H&P ADULT - NSHPPHYSICALEXAM_GEN_ALL_CORE
· CONSTITUTIONAL: Well appearing, awake, alert, minimally verbal and in no apparent distress.  · ENMT: Airway patent, Nasal mucosa clear. Mouth with normal mucosa. Throat has no vesicles, no oropharyngeal exudates and uvula is midline. MM Moist. neck supple. no meningeal signs.  · EYES: Clear bilaterally, pupils equal, round and reactive to light.  · CARDIAC: Normal rate, regular rhythm.  Heart sounds S1, S2.  No murmurs, rubs or gallops.  · RESPIRATORY: Breath sounds clear and equal bilaterally. nl resp effort. no w/r/r.  · GASTROINTESTINAL: Abdomen soft, non-tender, no guarding. non-distended. no hsm. no cvat  · GENITOURINARY: - - -  · Bladder: non-tender  · Inguinal Region: no swelling  · MUSCULOSKELETAL: Spine appears normal, range of motion is not limited, no muscle or joint tenderness  · NEUROLOGICAL: Alert, no focal deficits  · SKIN: Skin normal color for race, warm, dry and intact. No evidence of rash.  · HEME LYMPH: No adenopathy or splenomegaly. No cervical or inguinal lymphadenopathy.

## 2021-10-23 NOTE — ED PROVIDER NOTE - NSICDXPASTSURGICALHX_GEN_ALL_CORE_FT
PAST SURGICAL HISTORY:  H/O shoulder surgery     History of hernia repair > 20 years ago    History of hip surgery     S/P coronary artery stent placement     S/P tonsillectomy

## 2021-10-23 NOTE — ED PROVIDER NOTE - CARE PLAN
Principal Discharge DX:	Acute sepsis  Secondary Diagnosis:	Acute renal insufficiency  Secondary Diagnosis:	Leukocytosis  Secondary Diagnosis:	Acute UTI   1

## 2021-10-23 NOTE — PATIENT PROFILE ADULT - VISION (WITH CORRECTIVE LENSES IF THE PATIENT USUALLY WEARS THEM):
Normal vision: sees adequately in most situations; can see medication labels, newsprint eye glasses not with pt per son its at home/Partially impaired: cannot see medication labels or newsprint, but can see obstacles in path, and the surrounding layout; can count fingers at arm's length

## 2021-10-23 NOTE — CONSULT NOTE ADULT - PROBLEM SELECTOR RECOMMENDATION 9
Likely 2/2 urosepsis 2/2 recent non function of jo. His left ureteral stent is in good position, and no immediate intervention is warranted. Agree with Yatra spectrum abiramin pending c&s results.

## 2021-10-24 LAB
ALBUMIN SERPL ELPH-MCNC: 2 G/DL — LOW (ref 3.3–5)
ALP SERPL-CCNC: 82 U/L — SIGNIFICANT CHANGE UP (ref 40–120)
ALT FLD-CCNC: 54 U/L — SIGNIFICANT CHANGE UP (ref 12–78)
ANION GAP SERPL CALC-SCNC: 4 MMOL/L — LOW (ref 5–17)
AST SERPL-CCNC: 27 U/L — SIGNIFICANT CHANGE UP (ref 15–37)
BILIRUB SERPL-MCNC: 0.5 MG/DL — SIGNIFICANT CHANGE UP (ref 0.2–1.2)
BUN SERPL-MCNC: 55 MG/DL — HIGH (ref 7–23)
CALCIUM SERPL-MCNC: 9.7 MG/DL — SIGNIFICANT CHANGE UP (ref 8.5–10.1)
CHLORIDE SERPL-SCNC: 118 MMOL/L — HIGH (ref 96–108)
CO2 SERPL-SCNC: 30 MMOL/L — SIGNIFICANT CHANGE UP (ref 22–31)
COVID-19 SPIKE DOMAIN AB INTERP: POSITIVE
COVID-19 SPIKE DOMAIN ANTIBODY RESULT: >250 U/ML — HIGH
CREAT SERPL-MCNC: 1.2 MG/DL — SIGNIFICANT CHANGE UP (ref 0.5–1.3)
GLUCOSE SERPL-MCNC: 150 MG/DL — HIGH (ref 70–99)
GRAM STN FLD: SIGNIFICANT CHANGE UP
HCT VFR BLD CALC: 39.8 % — SIGNIFICANT CHANGE UP (ref 39–50)
HGB BLD-MCNC: 12 G/DL — LOW (ref 13–17)
LACTATE SERPL-SCNC: 1.7 MMOL/L — SIGNIFICANT CHANGE UP (ref 0.7–2)
MAGNESIUM SERPL-MCNC: 2.4 MG/DL — SIGNIFICANT CHANGE UP (ref 1.6–2.6)
MCHC RBC-ENTMCNC: 30.2 GM/DL — LOW (ref 32–36)
MCHC RBC-ENTMCNC: 30.9 PG — SIGNIFICANT CHANGE UP (ref 27–34)
MCV RBC AUTO: 102.6 FL — HIGH (ref 80–100)
METHOD TYPE: SIGNIFICANT CHANGE UP
MRSA PCR RESULT.: SIGNIFICANT CHANGE UP
NRBC # BLD: 0 /100 WBCS — SIGNIFICANT CHANGE UP (ref 0–0)
P MIRABILIS DNA BLD POS QL NAA+PROBE: SIGNIFICANT CHANGE UP
PHOSPHATE SERPL-MCNC: 2.5 MG/DL — SIGNIFICANT CHANGE UP (ref 2.5–4.5)
PLATELET # BLD AUTO: 186 K/UL — SIGNIFICANT CHANGE UP (ref 150–400)
POTASSIUM SERPL-MCNC: 4.2 MMOL/L — SIGNIFICANT CHANGE UP (ref 3.5–5.3)
POTASSIUM SERPL-SCNC: 4.2 MMOL/L — SIGNIFICANT CHANGE UP (ref 3.5–5.3)
PROT SERPL-MCNC: 6.1 G/DL — SIGNIFICANT CHANGE UP (ref 6–8.3)
RBC # BLD: 3.88 M/UL — LOW (ref 4.2–5.8)
RBC # FLD: 14.4 % — SIGNIFICANT CHANGE UP (ref 10.3–14.5)
S AUREUS DNA NOSE QL NAA+PROBE: DETECTED
SARS-COV-2 IGG+IGM SERPL QL IA: >250 U/ML — HIGH
SARS-COV-2 IGG+IGM SERPL QL IA: POSITIVE
SODIUM SERPL-SCNC: 152 MMOL/L — HIGH (ref 135–145)
SPECIMEN SOURCE: SIGNIFICANT CHANGE UP
WBC # BLD: 16.34 K/UL — HIGH (ref 3.8–10.5)
WBC # FLD AUTO: 16.34 K/UL — HIGH (ref 3.8–10.5)

## 2021-10-24 PROCEDURE — 99233 SBSQ HOSP IP/OBS HIGH 50: CPT

## 2021-10-24 PROCEDURE — 93308 TTE F-UP OR LMTD: CPT | Mod: 26

## 2021-10-24 PROCEDURE — 76604 US EXAM CHEST: CPT | Mod: 26

## 2021-10-24 RX ORDER — FENOFIBRATE,MICRONIZED 130 MG
145 CAPSULE ORAL DAILY
Refills: 0 | Status: DISCONTINUED | OUTPATIENT
Start: 2021-10-24 | End: 2021-10-28

## 2021-10-24 RX ORDER — CEFTRIAXONE 500 MG/1
1000 INJECTION, POWDER, FOR SOLUTION INTRAMUSCULAR; INTRAVENOUS EVERY 24 HOURS
Refills: 0 | Status: DISCONTINUED | OUTPATIENT
Start: 2021-10-25 | End: 2021-10-28

## 2021-10-24 RX ORDER — CEFEPIME 1 G/1
2000 INJECTION, POWDER, FOR SOLUTION INTRAMUSCULAR; INTRAVENOUS EVERY 12 HOURS
Refills: 0 | Status: DISCONTINUED | OUTPATIENT
Start: 2021-10-24 | End: 2021-10-24

## 2021-10-24 RX ORDER — LACTOBACILLUS ACIDOPHILUS 100MM CELL
1 CAPSULE ORAL DAILY
Refills: 0 | Status: DISCONTINUED | OUTPATIENT
Start: 2021-10-24 | End: 2021-10-28

## 2021-10-24 RX ORDER — SODIUM CHLORIDE 9 MG/ML
1000 INJECTION, SOLUTION INTRAVENOUS
Refills: 0 | Status: DISCONTINUED | OUTPATIENT
Start: 2021-10-24 | End: 2021-10-25

## 2021-10-24 RX ORDER — ESCITALOPRAM OXALATE 10 MG/1
20 TABLET, FILM COATED ORAL DAILY
Refills: 0 | Status: DISCONTINUED | OUTPATIENT
Start: 2021-10-24 | End: 2021-10-28

## 2021-10-24 RX ORDER — ATORVASTATIN CALCIUM 80 MG/1
80 TABLET, FILM COATED ORAL AT BEDTIME
Refills: 0 | Status: DISCONTINUED | OUTPATIENT
Start: 2021-10-24 | End: 2021-10-28

## 2021-10-24 RX ORDER — ENOXAPARIN SODIUM 100 MG/ML
40 INJECTION SUBCUTANEOUS DAILY
Refills: 0 | Status: DISCONTINUED | OUTPATIENT
Start: 2021-10-24 | End: 2021-10-28

## 2021-10-24 RX ORDER — METOCLOPRAMIDE HCL 10 MG
10 TABLET ORAL ONCE
Refills: 0 | Status: COMPLETED | OUTPATIENT
Start: 2021-10-24 | End: 2021-10-24

## 2021-10-24 RX ORDER — ESCITALOPRAM OXALATE 10 MG/1
1 TABLET, FILM COATED ORAL
Qty: 0 | Refills: 0 | DISCHARGE

## 2021-10-24 RX ADMIN — HEPARIN SODIUM 5000 UNIT(S): 5000 INJECTION INTRAVENOUS; SUBCUTANEOUS at 05:06

## 2021-10-24 RX ADMIN — LEVETIRACETAM 500 MILLIGRAM(S): 250 TABLET, FILM COATED ORAL at 21:10

## 2021-10-24 RX ADMIN — MIDODRINE HYDROCHLORIDE 10 MILLIGRAM(S): 2.5 TABLET ORAL at 05:06

## 2021-10-24 RX ADMIN — SODIUM CHLORIDE 125 MILLILITER(S): 9 INJECTION, SOLUTION INTRAVENOUS at 21:10

## 2021-10-24 RX ADMIN — ATORVASTATIN CALCIUM 80 MILLIGRAM(S): 80 TABLET, FILM COATED ORAL at 21:10

## 2021-10-24 RX ADMIN — SODIUM CHLORIDE 125 MILLILITER(S): 9 INJECTION, SOLUTION INTRAVENOUS at 11:26

## 2021-10-24 RX ADMIN — Medication 10 MILLIGRAM(S): at 20:42

## 2021-10-24 RX ADMIN — ENOXAPARIN SODIUM 40 MILLIGRAM(S): 100 INJECTION SUBCUTANEOUS at 11:26

## 2021-10-24 RX ADMIN — SODIUM CHLORIDE 125 MILLILITER(S): 9 INJECTION, SOLUTION INTRAVENOUS at 12:39

## 2021-10-24 RX ADMIN — CLOPIDOGREL BISULFATE 75 MILLIGRAM(S): 75 TABLET, FILM COATED ORAL at 11:26

## 2021-10-24 RX ADMIN — Medication 145 MILLIGRAM(S): at 11:43

## 2021-10-24 RX ADMIN — CHLORHEXIDINE GLUCONATE 1 APPLICATION(S): 213 SOLUTION TOPICAL at 05:07

## 2021-10-24 RX ADMIN — SODIUM CHLORIDE 125 MILLILITER(S): 9 INJECTION, SOLUTION INTRAVENOUS at 02:19

## 2021-10-24 RX ADMIN — LEVETIRACETAM 500 MILLIGRAM(S): 250 TABLET, FILM COATED ORAL at 11:26

## 2021-10-24 RX ADMIN — CEFEPIME 100 MILLIGRAM(S): 1 INJECTION, POWDER, FOR SOLUTION INTRAMUSCULAR; INTRAVENOUS at 11:43

## 2021-10-24 RX ADMIN — ESCITALOPRAM OXALATE 20 MILLIGRAM(S): 10 TABLET, FILM COATED ORAL at 11:43

## 2021-10-24 RX ADMIN — Medication 1 TABLET(S): at 11:26

## 2021-10-24 NOTE — DIETITIAN INITIAL EVALUATION ADULT. - ADD RECOMMEND
1.) Continue Osmolite 1.5 bolus feeds @ 260cc x 5/day providing 1950kcal/day, 81.5g protein/day (29kcal/kg, 1.22g/kg based on 66.6kg). 2.) Monitor tolerance and adjust as needed.

## 2021-10-24 NOTE — CONSULT NOTE ADULT - CONSULT REQUESTED DATE/TIME
23-Oct-2021 13:45
23-Oct-2021 15:19
23-Oct-2021 16:17
23-Oct-2021 15:42
24-Oct-2021 11:22
23-Oct-2021 15:28

## 2021-10-24 NOTE — DIETITIAN INITIAL EVALUATION ADULT. - OTHER INFO
GI/Intake:  -Chronic PEG; per Team, son unable to recall home TF formula - current diet order based on previous admission   -GI: no BM noted thus far   -Allergy: per EMR, fish     Pressure Injury:   -Sacrum: stage 1   -L/T heel: stage 1     Weight Hx:   -147 pounds (dosing)   -Per [facility] HIE: 164 pounds (6/26/21)

## 2021-10-24 NOTE — DIETITIAN INITIAL EVALUATION ADULT. - CHIEF COMPLAINT
71y Male with PMH of ALS, BPH, HLD, HTN, Myocardial infarction, Chronic PEG. Pt admitted on 10/23 complaining of urinary symptoms. Presenting with sepsis 2/2 blocked jo.

## 2021-10-24 NOTE — CONSULT NOTE ADULT - ASSESSMENT
Eduardo is a 71 year old male with ALS, CAD (s/p MI 1 stent placed 2006 previously on DAPT), HTN, HLD, BPH p/w lethargy and urinary retention.   He has been found to be tachycardic and hypotensive, with urosepsis.    - despite fluid resuscitation he has remained hypotensive, and is requiring levophed  - cont for now, with goal to titrate to MAP around 65  - cont ivf  - iv abx    - no sign of acute ischemia. his troponin is negative  - history of cad s/p pci in 2006  - cont plavix  - can hold statin  - hold metoprolol and lisinopril given hypotension  - appears dry, despite elevated BNP    - trend creatinine and electrolytes. Keep K>4, Mg>2  - will follow with you     
MARLEY: Prerenal azotemia, Septic ATN, Clogged jo, on ACEI  Sepsis, Shock on pressors  h/o Left UVJ calculus, s/p ureteral stent placement.   h/o ALS    Continue jo drainage. Hold ACEI. IV hydration. Check cultures, IV abx. On pressor support. Avoid nephrotoxic meds as possible. Avoid ACEI, ARB, NSAIDs and IV contrast. Will follow electrolytes and renal function trend. ICU evaluation and management.   Further recommendations pending clinical course. Thank you for the courtesy of this referral. 
[facility] Infectious Diseases  Chart Reviewed-empiric cefepime reasonable with adjusted dosing with renal function, will want to follow for any neuro other toxicity    Full Consult to follow for any immediate concerns please fell free to contact us directly at  [phone] and have us paged or text my cell # [phone]  Liang Cheema MD PhD  
72 yo M PMHx ALS, CAD (s/p MI 1 stent placed 2006 on DAPT), HTN, HLD, BPH p/w lethargy/urinary retention. adm with increased lethargy, no output per jo catheter. with ~1000cc of dark urine drained after replacement, admitted with concern for pyelonephritis    RECOMMENDATIONS  Story is compelling for pyelonephritis with associated Proteus mirabilis bacteremia, agree with broad spectrum abx and replacment of jo critical, based on most recent regional antibiogram will recommend de-escalation to Ceftriaxone pending sensitivities, equivalent susceptibility expectation, more reliable dosing with fluctuating renal function, lower risk of any toxicities and narrower spectrum, with this being an indole neg Proteus mirabilis limited concerns for cephalosporin inducible beta-lactamase  Urine in lab  rec repeat blood cultures for am 10/25  recs to follow     We will follow along in the care of this patient. Please call us at [phone] or text me directly on my cell# at [phone] with any concerns.    Starting Monday Dr. Ave Hernandez will be covering for our group. If you have any questions, concerns or new micro data please reach out to them at [phone].

## 2021-10-24 NOTE — PROGRESS NOTE ADULT - SUBJECTIVE AND OBJECTIVE BOX
Patient is a 71y old  Male who presents with a chief complaint of blocked jo (24 Oct 2021 12:23)  Patient seen in follow up for MARLEY.        PAST MEDICAL HISTORY:  Myocardial infarct    Hypertension    Hyperlipidemia    BPH (benign prostatic hyperplasia)    ALS (amyotrophic lateral sclerosis)      MEDICATIONS  (STANDING):  chlorhexidine 2% Cloths 1 Application(s) Topical <User Schedule>  chlorhexidine 4% Liquid 1 Application(s) Topical <User Schedule>  clopidogrel Tablet 75 milliGRAM(s) Oral daily  enoxaparin Injectable 40 milliGRAM(s) SubCutaneous daily  escitalopram 20 milliGRAM(s) Oral daily  fenofibrate Tablet 145 milliGRAM(s) Oral daily  lactobacillus acidophilus 1 Tablet(s) Oral daily  levETIRAcetam  Solution 500 milliGRAM(s) Oral two times a day  sodium chloride 0.45%. 1000 milliLiter(s) (125 mL/Hr) IV Continuous <Continuous>    MEDICATIONS  (PRN):    T(C): 37.1 (10-24-21 @ 11:40), Max: 39 (10-23-21 @ 11:45)  HR: 84 (10-24-21 @ 13:00) (55 - 112)  BP: 122/59 (10-24-21 @ 13:00) (60/46 - 150/70)  RR: 26 (10-24-21 @ 13:00) (16 - 33)  SpO2: 95% (10-24-21 @ 13:00) (93% - 98%)  Wt(kg): --  I&O's Detail    23 Oct 2021 07:  -  24 Oct 2021 07:00  --------------------------------------------------------  IN:    Enteral Tube Flush: 60 mL    Free Water: 400 mL    IV PiggyBack: 50 mL    Lactated Ringers: 2000 mL    Norepinephrine: 52.2 mL    Osmolite: 720 mL  Total IN: 3282.2 mL    OUT:    Indwelling Catheter - Urethral (mL): 1910 mL  Total OUT: 1910 mL    Total NET: 1372.2 mL      24 Oct 2021 07:  -  24 Oct 2021 13:47  --------------------------------------------------------  IN:    Enteral Tube Flush: 60 mL    Free Water: 560 mL    IV PiggyBack: 50 mL    Lactated Ringers: 625 mL    Osmolite: 520 mL  Total IN: 1815 mL    OUT:    Indwelling Catheter - Urethral (mL): 560 mL    Norepinephrine: 0 mL  Total OUT: 560 mL    Total NET: 1255 mL          PHYSICAL EXAM:  General: No distress  Respiratory: b/l air entry  Cardiovascular: S1 S2  Gastrointestinal: soft, PEG  Extremities:  no edema                              12.0   16.34 )-----------( 186      ( 24 Oct 2021 06:23 )             39.8     10-24    152<H>  |  118<H>  |  55<H>  ----------------------------<  150<H>  4.2   |  30  |  1.20    Ca    9.7      24 Oct 2021 06:23  Phos  2.5     10-24  Mg     2.4     10-24    TPro  6.1  /  Alb  2.0<L>  /  TBili  0.5  /  DBili  x   /  AST  27  /  ALT  54  /  AlkPhos  82  10-24    CARDIAC MARKERS ( 23 Oct 2021 12:04 )  <.015 ng/mL / x     / 32 U/L / x     / x          LIVER FUNCTIONS - ( 24 Oct 2021 06:23 )  Alb: 2.0 g/dL / Pro: 6.1 g/dL / ALK PHOS: 82 U/L / ALT: 54 U/L / AST: 27 U/L / GGT: x           Urinalysis Basic - ( 23 Oct 2021 12:05 )    Color: Yellow / Appearance: Turbid / S.015 / pH: x  Gluc: x / Ketone: Negative  / Bili: Negative / Urobili: Negative   Blood: x / Protein: 100 / Nitrite: Positive   Leuk Esterase: Moderate / RBC: 25-50 /HPF / WBC 26-50   Sq Epi: x / Non Sq Epi: Few / Bacteria: Few        Sodium, Serum: 152 (10-24 @ 06:23)  Sodium, Serum: 150 (10-23 @ 15:07)  Sodium, Serum: 147 (10-23 @ 12:04)    Creatinine, Serum: 1.20 (10-24 @ 06:23)  Creatinine, Serum: 2.50 (10-23 @ 15:07)  Creatinine, Serum: 3.10 (10-23 @ 12:04)    Potassium, Serum: 4.2 (10-24 @ :23)  Potassium, Serum: 4.2 (10-23 @ 15:07)  Potassium, Serum: 4.7 (10-23 @ 12:04)    Hemoglobin: 12.0 (10-24 @ 06:23)  Hemoglobin: 15.9 (10-23 @ 12:04)

## 2021-10-24 NOTE — CONSULT NOTE ADULT - SUBJECTIVE AND OBJECTIVE BOX
[facility] DIVISION of INFECTIOUS DISEASE  Liang Cheema MD PhD, Ave Hernandez MD, Berkley Tapia MD, Zaynab Perez MD, Jon Llanos MD  and providing coverage with Kasey Dennis MD and Lasha Ornelas MD  Providing Infectious Disease Consultations at [facility], [facility]    Office# [phone] to schedule follow up appointments  Answering Service for urgent calls or New Consults [phone]  Cell# to text for urgent issues Liang Cheema [phone]     HPI:  70 yo M p/w seen by VNS today, in urinary retention - jo placed by that nurse with ~ 1000 cc dark urine. Pt more lethargic than usual, found some hypotensions. No cough/ uri. No known covid exposures. No other acute co.  In ER patient was found to be in shock.  patient is being admitted for further work up and treatment. (23 Oct 2021 13:57)      PAST MEDICAL & SURGICAL HISTORY:  Myocardial infarct  Hypertension  Hyperlipidemia  BPH (benign prostatic hyperplasia)  ALS (amyotrophic lateral sclerosis)    S/P tonsillectomy  S/P coronary artery stent placement  History of hip surgery  History of hernia repair   20 years ago  H/O shoulder surgery        Antimicrobials  cefepime   IVPB 2000 milliGRAM(s) IV Intermittent every 12 hours      Immunological      Other  chlorhexidine 2% Cloths 1 Application(s) Topical <User Schedule>  chlorhexidine 4% Liquid 1 Application(s) Topical <User Schedule>  clopidogrel Tablet 75 milliGRAM(s) Oral daily  enoxaparin Injectable 40 milliGRAM(s) SubCutaneous daily  escitalopram 20 milliGRAM(s) Oral daily  fenofibrate Tablet 145 milliGRAM(s) Oral daily  lactated ringers. 1000 milliLiter(s) IV Continuous <Continuous>  lactobacillus acidophilus 1 Tablet(s) Oral daily  levETIRAcetam  Solution 500 milliGRAM(s) Oral two times a day      Allergies    fish (Short breath; Anaphylaxis)  No Known Drug Allergies    Intolerances        SOCIAL HISTORY:  Social History:  · Tobacco Usage  Unknown if ever smoked (23 Oct 2021 13:57)      FAMILY HISTORY:  FHx: myocardial infarction (Father)    FHx: hyperlipidemia        ROS:    limited by cognitive status    Vital Signs Last 24 Hrs  T(C): 36.9 (24 Oct 2021 04:30), Max: 39 (23 Oct 2021 11:45)  T(F): 98.4 (24 Oct 2021 04:30), Max: 102.2 (23 Oct 2021 11:45)  HR: 59 (24 Oct 2021 09:00) (58 - 112)  BP: 113/55 (24 Oct 2021 09:00) (60/46 - 150/70)  BP(mean): 79 (24 Oct 2021 09:00) (66 - 100)  RR: 19 (24 Oct 2021 09:00) (16 - 33)  SpO2: 98% (24 Oct 2021 09:00) (93% - 98%)    PE:  WDWN in no distress  HEENT:  NC, PERRL, sclerae anicteric, conjunctivae clear, EOMI.  Sinuses nontender, no nasal exudate.  No buccal or pharyngeal lesions, erythema or exudate  Neck:  Supple, no adenopathy  Lungs:  No accessory muscle use, bilaterally clear to auscultation  Cor:  distant  Abd:  Symmetric, normoactive BS.  Soft, suprapubic area is tender, no masses, guarding or rebound.  Liver and spleen not enlarged  Extrem:  No cyanosis or edema  Skin:  No rashes.  Neuro: limited  Musc: moving all limbs freely, no focal deficits        LABS:                        12.0   16.34 )-----------( 186      ( 24 Oct 2021 06:23 )             39.8       WBC Count: 16.34 K/uL (10-24-21 @ 06:23)  WBC Count: 36.59 K/uL (10-23-21 @ 12:04)      10-24    152<H>  |  118<H>  |  55<H>  ----------------------------<  150<H>  4.2   |  30  |  1.20    Ca    9.7      24 Oct 2021 06:23  Phos  2.5     10-24  Mg     2.4     10-24    TPro  6.1  /  Alb  2.0<L>  /  TBili  0.5  /  DBili  x   /  AST  27  /  ALT  54  /  AlkPhos  82  10-24      Creatinine, Serum: 1.20 mg/dL (10-24-21 @ 06:23)  Creatinine, Serum: 2.50 mg/dL (10-23-21 @ 15:07)  Creatinine, Serum: 3.10 mg/dL (10-23-21 @ 12:04)      Urinalysis Basic - ( 23 Oct 2021 12:05 )    Color: Yellow / Appearance: Turbid / S.015 / pH: x  Gluc: x / Ketone: Negative  / Bili: Negative / Urobili: Negative   Blood: x / Protein: 100 / Nitrite: Positive   Leuk Esterase: Moderate / RBC: 25-50 /HPF / WBC 26-50   Sq Epi: x / Non Sq Epi: Few / Bacteria: Few      MICROBIOLOGY:    Culture - Blood (10.23.21 @ 16:27)    -  Proteus mirabilis: Detec    Gram Stain:   Growth in anaerobic bottle: Gram Negative Rods  Growth in aerobic bottle: Gram Negative Rods    Specimen Source: .Blood Blood-Peripheral    Organism: Blood Culture PCR    Culture Results:   Growth in anaerobic bottle: Gram Negative Rods  Growth in aerobic bottle: Gram Negative Rods  ***Blood Panel PCR results on this specimen are available  approximately 3 hours after the Gram stain result.***  Gram stain, PCR, and/or culture results may not always  correspond due to difference in methodologies.  ************************************************************  This PCR assay was performed by multiplex PCR. This  Assay tests for 66 bacterial and resistance gene targets.  Please refer to the [facility] test directory  at [email or web address] for details.    Organism Identification: Blood Culture PCR    Method Type: PCR        RADIOLOGY & ADDITIONAL STUDIES:    --< from: CT Renal Stone Hunt (10.23.21 @ 12:41) >    EXAM:  CT RENAL STONE HUNT                            PROCEDURE DATE:  10/23/2021          INTERPRETATION:  CLINICAL INFORMATION: Fever, dysuria, history of kidney stone.    COMPARISON: CT abdomen pelvis dated 2021.    CONTRAST/COMPLICATIONS:  IV Contrast: NONE  0 cc administered   0 cc discarded  Oral Contrast: NONE  Complications: None reported at time of study completion    PROCEDURE:  CT of the Abdomen and Pelvis was performed.  Sagittal and coronal reformats were performed.    FINDINGS:  LOWER CHEST: Bibasilar subsegmental atelectasis. Coronary artery calcifications. Trace pericardial fluid.    LIVER: Within normal limits.  BILE DUCTS: Normal caliber.  GALLBLADDER: Contracted.  SPLEEN: Within normal limits.  PANCREAS: Within normal limits.  ADRENALS: Within normal limits.  KIDNEYS/URETERS: Mild left hydronephrosis. 2 mm nonobstructive calculus interpolar region left kidney. No right-sided calculi.  Left ureteral double-J stent in good position. Mild left ureteral dilatation with 5 mm calculus in the distal left ureter.    BLADDER: Jo catheter. Air within the bladder lumen. 8 mm bladder calculus. Left-sided bladder diverticulum containing 6 mm calculus.  REPRODUCTIVE ORGANS: Enlarged prostate with dystrophic calcifications.    BOWEL: PEG tube. No bowel obstruction. Appendix normal.  PERITONEUM: No ascites.  VESSELS: Within normal limits.  RETROPERITONEUM/LYMPH NODES: No lymphadenopathy.  ABDOMINAL WALL: Fat-containing left inguinal hernia.  BONES: Within normal limits.    IMPRESSION:  Left ureteral stent with mild left hydroureteronephrosis.    Subcentimeter nonobstructive calculi in the left kidney, distal left ureter, urinary bladder, and bladder diverticulum.

## 2021-10-24 NOTE — PROGRESS NOTE ADULT - SUBJECTIVE AND OBJECTIVE BOX
Patient is a 71y old  Male who presents with a chief complaint of blocked jo (24 Oct 2021 12:46)  events noted  weaned off pressors      INTERVAL HPI/OVERNIGHT EVENTS:  T(C): 37.2 (10-24-21 @ 16:00), Max: 37.2 (10-24-21 @ 16:00)  HR: 72 (10-24-21 @ 21:00) (55 - 87)  BP: 118/56 (10-24-21 @ 21:00) (99/51 - 150/70)  RR: 22 (10-24-21 @ 21:00) (16 - 32)  SpO2: 96% (10-24-21 @ 21:00) (95% - 98%)  Wt(kg): --  I&O's Summary    23 Oct 2021 07:  -  24 Oct 2021 07:00  --------------------------------------------------------  IN: 3282.2 mL / OUT: 1910 mL / NET: 1372.2 mL    24 Oct 2021 07:  -  24 Oct 2021 21:21  --------------------------------------------------------  IN: 3735 mL / OUT: 1710 mL / NET: 2025 mL        LABS:                        12.0   16.34 )-----------( 186      ( 24 Oct 2021 06:23 )             39.8     10-24    152<H>  |  118<H>  |  55<H>  ----------------------------<  150<H>  4.2   |  30  |  1.20    Ca    9.7      24 Oct 2021 06:23  Phos  2.5     10-24  Mg     2.4     10-24    TPro  6.1  /  Alb  2.0<L>  /  TBili  0.5  /  DBili  x   /  AST  27  /  ALT  54  /  AlkPhos  82  10-24    PT/INR - ( 23 Oct 2021 12:04 )   PT: 14.4 sec;   INR: 1.24 ratio         PTT - ( 23 Oct 2021 12:04 )  PTT:27.2 sec  Urinalysis Basic - ( 23 Oct 2021 12:05 )    Color: Yellow / Appearance: Turbid / S.015 / pH: x  Gluc: x / Ketone: Negative  / Bili: Negative / Urobili: Negative   Blood: x / Protein: 100 / Nitrite: Positive   Leuk Esterase: Moderate / RBC: 25-50 /HPF / WBC 26-50   Sq Epi: x / Non Sq Epi: Few / Bacteria: Few      CAPILLARY BLOOD GLUCOSE            Urinalysis Basic - ( 23 Oct 2021 12:05 )    Color: Yellow / Appearance: Turbid / S.015 / pH: x  Gluc: x / Ketone: Negative  / Bili: Negative / Urobili: Negative   Blood: x / Protein: 100 / Nitrite: Positive   Leuk Esterase: Moderate / RBC: 25-50 /HPF / WBC 26-50   Sq Epi: x / Non Sq Epi: Few / Bacteria: Few        MEDICATIONS  (STANDING):  atorvastatin 80 milliGRAM(s) Oral at bedtime  chlorhexidine 2% Cloths 1 Application(s) Topical <User Schedule>  chlorhexidine 4% Liquid 1 Application(s) Topical <User Schedule>  clopidogrel Tablet 75 milliGRAM(s) Oral daily  enoxaparin Injectable 40 milliGRAM(s) SubCutaneous daily  escitalopram 20 milliGRAM(s) Oral daily  fenofibrate Tablet 145 milliGRAM(s) Oral daily  lactobacillus acidophilus 1 Tablet(s) Oral daily  levETIRAcetam  Solution 500 milliGRAM(s) Oral two times a day  sodium chloride 0.45%. 1000 milliLiter(s) (125 mL/Hr) IV Continuous <Continuous>    MEDICATIONS  (PRN):          RADIOLOGY & ADDITIONAL TESTS:    Imaging Personally Reviewed:  [x ] YES  [ ] NO    Consultant(s) Notes Reviewed:  [ x] YES  [ ] NO    PHYSICAL EXAM:  GENERAL: NAD, well-groomed, well-developed  HEAD:  Atraumatic, Normocephalic  EYES: EOMI, PERRLA, conjunctiva and sclera clear  ENMT: No tonsillar erythema, exudates, or enlargement; Moist mucous membranes, Good dentition, No lesions  NECK: Supple, No JVD, Normal thyroid  NERVOUS SYSTEM: more alert  CHEST/LUNG: Clear to percussion bilaterally; No rales, rhonchi, wheezing, or rubs  HEART: Regular rate and rhythm; No murmurs, rubs, or gallops  ABDOMEN: Soft, Nontender, Nondistended; Bowel sounds present  EXTREMITIES:  2+ Peripheral Pulses, No clubbing, cyanosis, or edema  LYMPH: No lymphadenopathy noted  SKIN: No rashes or lesions    Care Discussed with Consultants/Other Providers [x ] YES  [ ] NO

## 2021-10-24 NOTE — PROGRESS NOTE ADULT - ASSESSMENT
Eduardo is a 71 year old male with ALS, CAD (s/p MI 1 stent placed 2006 previously on DAPT), HTN, HLD, BPH p/w lethargy and urinary retention.   He has been found to be tachycardic and hypotensive, with urosepsis.    - despite fluid resuscitation he has remained hypotensive, and required levophed  - now off levophed, with sig improvement in BP  - cont ivf  - iv abx for gnr bacteremia  - lactate cleared    - no sign of acute ischemia. his troponin is negative  - history of cad s/p pci in 2006  - cont plavix and can restart statin  - hold metoprolol and lisinopril given hypotension  - appears dry, despite elevated BNP    - trend creatinine and electrolytes. Keep K>4, Mg>2  - will follow with you

## 2021-10-24 NOTE — PROGRESS NOTE ADULT - SUBJECTIVE AND OBJECTIVE BOX
Patient is a 71y old  Male who presents with a chief complaint of blocked jo (24 Oct 2021 11:24)    24 hour events: No acute events overnight. Titrated off levophed at 1 AM overnight. Seen and examined at bedside this AM.     REVIEW OF SYSTEMS: unable to assess as pt minimally verbal    T(F): 98.8 (10-24-21 @ 11:40), Max: 98.8 (10-24-21 @ 11:40)  HR: 75 (10-24-21 @ 12:00) (55 - 80)  BP: 125/57 (10-24-21 @ 12:00) (60/46 - 150/70)  RR: 24 (10-24-21 @ 12:00) (16 - 33)  SpO2: 95% (10-24-21 @ 12:00) (93% - 98%)  Wt(kg): --    I&O's Summary    10-23 @ 07:  -  10-24 @ 07:00  --------------------------------------------------------  IN: 3282.2 mL / OUT: 1910 mL / NET: 1372.2 mL    10-24 @ :  -  10-24 @ 12:24  --------------------------------------------------------  IN: 1815 mL / OUT: 560 mL / NET: 1255 mL      PHYSICAL EXAM  General: Awake, alert, minimally verbal but in NAD  CNS: Awake, alert, minimally verbal, responsive to verbal and physical stimuli  HEENT: NC/AT  Resp: CTAB, no wheezes or rales appreciated  CVS: RRR, S1 S2, no murmurs appreciated  Abd: Slightly tender to palpation lower quad, soft, non distended, no guarding  Skin: Warm, WP    MEDICATIONS      fenofibrate Tablet Oral      escitalopram Oral  levETIRAcetam  Solution Oral      clopidogrel Tablet Oral  enoxaparin Injectable SubCutaneous        lactated ringers. IV Continuous      chlorhexidine 2% Cloths Topical  chlorhexidine 4% Liquid Topical    lactobacillus acidophilus Oral                          12.0   16.34 )-----------( 186      ( 24 Oct 2021 06:23 )             39.8       10    152<H>  |  118<H>  |  55<H>  ----------------------------<  150<H>  4.2   |  30  |  1.20    Ca    9.7      24 Oct 2021 06:23  Phos  2.5     10-24  Mg     2.4     10-24    TPro  6.1  /  Alb  2.0<L>  /  TBili  0.5  /  DBili  x   /  AST  27  /  ALT  54  /  AlkPhos  82  10-24    Lactate 1.7           10-24 @ 06:23    Lactate 2.7           10-23 @ 15:07      CARDIAC MARKERS ( 23 Oct 2021 12:04 )  <.015 ng/mL / x     / 32 U/L / x     / x          PT/INR - ( 23 Oct 2021 12:04 )   PT: 14.4 sec;   INR: 1.24 ratio         PTT - ( 23 Oct 2021 12:04 )  PTT:27.2 sec  Urinalysis Basic - ( 23 Oct 2021 12:05 )    Color: Yellow / Appearance: Turbid / S.015 / pH: x  Gluc: x / Ketone: Negative  / Bili: Negative / Urobili: Negative   Blood: x / Protein: 100 / Nitrite: Positive   Leuk Esterase: Moderate / RBC: 25-50 /HPF / WBC 26-50   Sq Epi: x / Non Sq Epi: Few / Bacteria: Few      .Blood Blood-Peripheral   Growth in anaerobic bottle: Gram Negative Rods  Growth in aerobic bottle: Gram Negative Rods  ***Blood Panel PCR results on this specimen are available  approximately 3 hours after the Gram stain result.***  Gram stain, PCR, and/or culture results may not always  correspond due to difference in methodologies.  ************************************************************  This PCR assay was performed by multiplex PCR. This  Assay tests for 66 bacterial and resistance gene targets.  Please refer to the [facility] test directory  at [email or web address] for details.   Growth in anaerobic bottle: Gram Negative Rods  Growth in aerobic bottle: Gram Negative Rods 10-23 @ 16:27      Rapid RVP Result: NotDetec (10-23 @ 12:06)    < from: CT Renal Stone Hunt (10.23.21 @ 12:41) >  FINDINGS:  LOWER CHEST: Bibasilar subsegmental atelectasis. Coronary artery calcifications. Trace pericardial fluid.    LIVER: Within normal limits.  BILE DUCTS: Normal caliber.  GALLBLADDER: Contracted.  SPLEEN: Within normal limits.  PANCREAS: Within normal limits.  ADRENALS: Within normal limits.  KIDNEYS/URETERS: Mild left hydronephrosis. 2 mm nonobstructive calculus interpolar region left kidney. No right-sided calculi.  Left ureteral double-J stent in good position. Mild left ureteral dilatation with 5 mm calculus in the distal left ureter.    BLADDER: Jo catheter. Air within the bladder lumen. 8 mm bladder calculus. Left-sided bladder diverticulum containing 6 mm calculus.  REPRODUCTIVE ORGANS: Enlarged prostate with dystrophic calcifications.    BOWEL: PEG tube. No bowel obstruction. Appendix normal.  PERITONEUM: No ascites.  VESSELS: Within normal limits.  RETROPERITONEUM/LYMPH NODES: No lymphadenopathy.  ABDOMINAL WALL: Fat-containing left inguinal hernia.  BONES: Within normal limits.    IMPRESSION:  Left ureteral stent with mild left hydroureteronephrosis.    Subcentimeter nonobstructive calculi in the left kidney, distal left ureter, urinary bladder, and bladder diverticulum.    < end of copied text >    CENTRAL LINE: N  JO: Y              A-LINE: N              GLOBAL ISSUE/BEST PRACTICE  Analgesia: N  Sedation: N  HOB elevation: yes  Stress ulcer prophylaxis: N  VTE prophylaxis: Y  Glycemic control: N  Nutrition: Y    CODE STATUS: Full code

## 2021-10-24 NOTE — PROVIDER CONTACT NOTE (CRITICAL VALUE NOTIFICATION) - ACTION/TREATMENT ORDERED:
pt on abx and IVF
Per Md she will f/u with patient he is currently on antibiotics
no new orders received at present
Treated with IVF and antibiotics

## 2021-10-24 NOTE — PROVIDER CONTACT NOTE (CRITICAL VALUE NOTIFICATION) - TEST AND RESULT REPORTED:
Lactate 5.9
10/23 blood culture anaerobic bottle gm neg carolyn
BC form 10/24/21- growth in aerobic bottle gram negative rods (drawn 10/23/21)
lactate- 2.7

## 2021-10-24 NOTE — DIETITIAN NUTRITION RISK NOTIFICATION - TREATMENT: THE FOLLOWING DIET HAS BEEN RECOMMENDED
Diet, NPO with Tube Feed:   Tube Feeding Modality: Gastrostomy  Osmolite 1.5 Kelvin  Total Volume for 24 Hours (mL): 1560  Bolus  Total Volume of Bolus (mL):  260  Total # of Feeds: 5  Tube Feed Frequency: Every 4 hours   Tube Feed Start Time: 06:00  Bolus Feed Rate (mL per Hour): 260   Bolus Feed Duration (in Hours): 1  Bolus Feed Instructions:  start TF at 200 ml bolus feed every 4-5 hrs,increase to goal of 260 ml/feed.( total 1300 ml per day.) Flush with 750 total ml free water/day. Please instruct wife on bolus feeds.  Free Water Flush  Free Water Flush Instructions:  total free water needed/day ~750 ml (10-23-21 @ 17:53) [Active]

## 2021-10-24 NOTE — PROGRESS NOTE ADULT - SUBJECTIVE AND OBJECTIVE BOX
[facility] - Santi Nair, Alfreda, Shara, Kobi, Ashley Perez  Office Number:  [phone]    Patient resting comfortably in bed in NAD.  Laying flat with no respiratory distress.   unable to communicate  off levophed this morning  gnr bacteremia    ROS: negative unless otherwise mentioned.    Telemetry:  sr    MEDICATIONS  (STANDING):  cefepime   IVPB 2000 milliGRAM(s) IV Intermittent every 12 hours  chlorhexidine 2% Cloths 1 Application(s) Topical <User Schedule>  chlorhexidine 4% Liquid 1 Application(s) Topical <User Schedule>  clopidogrel Tablet 75 milliGRAM(s) Oral daily  enoxaparin Injectable 40 milliGRAM(s) SubCutaneous daily  escitalopram 20 milliGRAM(s) Oral daily  fenofibrate Tablet 145 milliGRAM(s) Oral daily  lactated ringers. 1000 milliLiter(s) (125 mL/Hr) IV Continuous <Continuous>  lactobacillus acidophilus 1 Tablet(s) Oral daily  levETIRAcetam  Solution 500 milliGRAM(s) Oral two times a day    MEDICATIONS  (PRN):      Allergies    fish (Short breath; Anaphylaxis)  No Known Drug Allergies    Intolerances        Vital Signs Last 24 Hrs  T(C): 36.9 (24 Oct 2021 04:30), Max: 39 (23 Oct 2021 11:45)  T(F): 98.4 (24 Oct 2021 04:30), Max: 102.2 (23 Oct 2021 11:45)  HR: 59 (24 Oct 2021 09:00) (58 - 112)  BP: 113/55 (24 Oct 2021 09:00) (60/46 - 150/70)  BP(mean): 79 (24 Oct 2021 09:00) (66 - 100)  RR: 19 (24 Oct 2021 09:00) (16 - 33)  SpO2: 98% (24 Oct 2021 09:00) (93% - 98%)    I&O's Summary    23 Oct 2021 07:01  -  24 Oct 2021 07:00  --------------------------------------------------------  IN: 3282.2 mL / OUT: 1910 mL / NET: 1372.2 mL        ON EXAM:    Constitutional: NAD, nods head though does not follow commands  Lungs:  Non-labored, breath sounds are clear bilaterally, No wheezing, rales or rhonchi  Cardiovascular: RRR.  S1 and S2 positive.  No murmurs, rubs, gallops or clicks  Gastrointestinal: Bowel Sounds present, soft, nontender.   Lymph: No peripheral edema. No cervical lymphadenopathy.  Neurological: unable to assess  Skin: No rashes or ulcers   Psych:  unable to assess      LABS: All Labs Reviewed:                        12.0   16.34 )-----------( 186      ( 24 Oct 2021 06:23 )             39.8                         15.9   36.59 )-----------( 324      ( 23 Oct 2021 12:04 )             50.7     24 Oct 2021 06:23    152    |  118    |  55     ----------------------------<  150    4.2     |  30     |  1.20   23 Oct 2021 15:07    150    |  120    |  75     ----------------------------<  164    4.2     |  25     |  2.50   23 Oct 2021 12:04    147    |  110    |  76     ----------------------------<  261    4.7     |  26     |  3.10     Ca    9.7        24 Oct 2021 06:23  Ca    9.3        23 Oct 2021 15:07  Ca    10.6       23 Oct 2021 12:04  Phos  2.5       24 Oct 2021 06:23  Phos  2.4       23 Oct 2021 15:07  Mg     2.4       24 Oct 2021 06:23  Mg     2.5       23 Oct 2021 15:07    TPro  6.1    /  Alb  2.0    /  TBili  0.5    /  DBili  x      /  AST  27     /  ALT  54     /  AlkPhos  82     24 Oct 2021 06:23  TPro  6.2    /  Alb  2.2    /  TBili  1.0    /  DBili  x      /  AST  30     /  ALT  61     /  AlkPhos  74     23 Oct 2021 15:07  TPro  8.0    /  Alb  2.9    /  TBili  1.5    /  DBili  x      /  AST  34     /  ALT  78     /  AlkPhos  106    23 Oct 2021 12:04    PT/INR - ( 23 Oct 2021 12:04 )   PT: 14.4 sec;   INR: 1.24 ratio         PTT - ( 23 Oct 2021 12:04 )  PTT:27.2 sec  CARDIAC MARKERS ( 23 Oct 2021 12:04 )  <.015 ng/mL / x     / 32 U/L / x     / x          Blood Culture: Organism Blood Culture PCR  Gram Stain Blood -- Gram Stain   Growth in anaerobic bottle: Gram Negative Rods  Growth in aerobic bottle: Gram Negative Rods  Specimen Source .Blood Blood-Peripheral  Culture-Blood --      10-23 @ 12:04  Pro Bnp [id number]

## 2021-10-24 NOTE — PROGRESS NOTE ADULT - ASSESSMENT
MARLEY: Prerenal azotemia, Septic ATN, Clogged jo, on ACEI  Sepsis, Shock on pressors  h/o Left UVJ calculus, s/p ureteral stent placement.   h/o ALS    Improved renal indices. Continue jo drainage. Hold ACEI. IV hydration. Check cultures, IV abx. On pressor support. Avoid nephrotoxic meds as possible.   Avoid ACEI, ARB, NSAIDs and IV contrast. Will follow electrolytes and renal function trend. D/w ICU team.  MARLEY: Prerenal azotemia, Septic ATN, Clogged jo, on ACEI  Sepsis, Shock on pressors  h/o Left UVJ calculus, s/p ureteral stent placement.   h/o ALS  Hypernatremia    Improved renal indices. Continue jo drainage. Hold ACEI. IV hydration. Change to half NS to avoid further increase in sodium.   IV abx. Avoid nephrotoxic meds as possible. Avoid ACEI, ARB, NSAIDs and IV contrast. Will follow electrolytes and renal function trend. D/w ICU team.

## 2021-10-24 NOTE — PROGRESS NOTE ADULT - ASSESSMENT
70 yo M PMHx ALS, CAD (s/p MI 1 stent placed 2006 on DAPT), HTN, HLD, BPH p/w lethargy/urinary retention. Admitted to ICU for urosepsis requiring pressor support.    1. Septic Shock  2. UTI (Proteus mirabilis)  3. MARLEY on CKD  4. ALS    NEURO:  -Cont home keppra for seizures (confirmed with outpt pharmacy, pt takes 500mg BID at home)  -Cont home lexapro  -Monitor mental status closely, avoid neurosuppresants.    CV:  -Shock resolved, off pressors  -D/c levo, d/c midodrine  -Holding home antihypertensives  -Hx of MI, cont home plavix  -Cont home fenofibrate for HLD    RESP: No acute issues, satting well on RA, goal SpO2 >92%.    RENAL:  -MARLEY, obstructive due to clogged Shukla  -Kidney fxn improving  -HyperNa noted, will start IVF with 1/2 NS, FW through tube feeds  -trend lytes/Scr daily with BMP  -I's and O's, goal UOP 0.5 cc/kg/hr  -renal dose meds and avoid nephrotoxins     GI: No acute issues, cont tube feeds. Cont Protonix for GI ppx.     ID:  -Urosepsis, bacteremia 2/2 Proteus mirabilis pyelonephritis  -De-escalate abx to ceftriaxone <--cefepime, f/u sensitivities  -Repeat BCx tomorrow AM  -Leukocytosis improving, lactate normalized, pt afebrile, non toxic appearing    ENDO: No acute issues, routine FS within goal 140-180    HEME: Transition to lovenox <-- HSQ as kidney fxn improving    DISPO: Full code

## 2021-10-25 LAB
ALBUMIN SERPL ELPH-MCNC: 1.9 G/DL — LOW (ref 3.3–5)
ALP SERPL-CCNC: 70 U/L — SIGNIFICANT CHANGE UP (ref 40–120)
ALT FLD-CCNC: 77 U/L — SIGNIFICANT CHANGE UP (ref 12–78)
ANION GAP SERPL CALC-SCNC: 5 MMOL/L — SIGNIFICANT CHANGE UP (ref 5–17)
APTT BLD: 26.7 SEC — LOW (ref 27.5–35.5)
AST SERPL-CCNC: 40 U/L — HIGH (ref 15–37)
BASOPHILS # BLD AUTO: 0.01 K/UL — SIGNIFICANT CHANGE UP (ref 0–0.2)
BASOPHILS NFR BLD AUTO: 0.1 % — SIGNIFICANT CHANGE UP (ref 0–2)
BILIRUB SERPL-MCNC: 0.7 MG/DL — SIGNIFICANT CHANGE UP (ref 0.2–1.2)
BUN SERPL-MCNC: 40 MG/DL — HIGH (ref 7–23)
CALCIUM SERPL-MCNC: 9.5 MG/DL — SIGNIFICANT CHANGE UP (ref 8.5–10.1)
CHLORIDE SERPL-SCNC: 113 MMOL/L — HIGH (ref 96–108)
CO2 SERPL-SCNC: 29 MMOL/L — SIGNIFICANT CHANGE UP (ref 22–31)
CREAT SERPL-MCNC: 0.77 MG/DL — SIGNIFICANT CHANGE UP (ref 0.5–1.3)
EOSINOPHIL # BLD AUTO: 0.08 K/UL — SIGNIFICANT CHANGE UP (ref 0–0.5)
EOSINOPHIL NFR BLD AUTO: 0.8 % — SIGNIFICANT CHANGE UP (ref 0–6)
GLUCOSE SERPL-MCNC: 92 MG/DL — SIGNIFICANT CHANGE UP (ref 70–99)
HCT VFR BLD CALC: 33.4 % — LOW (ref 39–50)
HGB BLD-MCNC: 10.1 G/DL — LOW (ref 13–17)
IMM GRANULOCYTES NFR BLD AUTO: 0.4 % — SIGNIFICANT CHANGE UP (ref 0–1.5)
INR BLD: 1.06 RATIO — SIGNIFICANT CHANGE UP (ref 0.88–1.16)
LYMPHOCYTES # BLD AUTO: 0.98 K/UL — LOW (ref 1–3.3)
LYMPHOCYTES # BLD AUTO: 9.8 % — LOW (ref 13–44)
MAGNESIUM SERPL-MCNC: 2 MG/DL — SIGNIFICANT CHANGE UP (ref 1.6–2.6)
MCHC RBC-ENTMCNC: 30.2 GM/DL — LOW (ref 32–36)
MCHC RBC-ENTMCNC: 30.9 PG — SIGNIFICANT CHANGE UP (ref 27–34)
MCV RBC AUTO: 102.1 FL — HIGH (ref 80–100)
MONOCYTES # BLD AUTO: 0.52 K/UL — SIGNIFICANT CHANGE UP (ref 0–0.9)
MONOCYTES NFR BLD AUTO: 5.2 % — SIGNIFICANT CHANGE UP (ref 2–14)
NEUTROPHILS # BLD AUTO: 8.41 K/UL — HIGH (ref 1.8–7.4)
NEUTROPHILS NFR BLD AUTO: 83.7 % — HIGH (ref 43–77)
NRBC # BLD: 0 /100 WBCS — SIGNIFICANT CHANGE UP (ref 0–0)
PHOSPHATE SERPL-MCNC: 2.5 MG/DL — SIGNIFICANT CHANGE UP (ref 2.5–4.5)
PLATELET # BLD AUTO: 171 K/UL — SIGNIFICANT CHANGE UP (ref 150–400)
POTASSIUM SERPL-MCNC: 4.2 MMOL/L — SIGNIFICANT CHANGE UP (ref 3.5–5.3)
POTASSIUM SERPL-SCNC: 4.2 MMOL/L — SIGNIFICANT CHANGE UP (ref 3.5–5.3)
PROT SERPL-MCNC: 5.9 G/DL — LOW (ref 6–8.3)
PROTHROM AB SERPL-ACNC: 12.4 SEC — SIGNIFICANT CHANGE UP (ref 10.6–13.6)
RBC # BLD: 3.27 M/UL — LOW (ref 4.2–5.8)
RBC # FLD: 13.8 % — SIGNIFICANT CHANGE UP (ref 10.3–14.5)
SODIUM SERPL-SCNC: 147 MMOL/L — HIGH (ref 135–145)
WBC # BLD: 10.04 K/UL — SIGNIFICANT CHANGE UP (ref 3.8–10.5)
WBC # FLD AUTO: 10.04 K/UL — SIGNIFICANT CHANGE UP (ref 3.8–10.5)

## 2021-10-25 PROCEDURE — 99291 CRITICAL CARE FIRST HOUR: CPT

## 2021-10-25 PROCEDURE — 99233 SBSQ HOSP IP/OBS HIGH 50: CPT | Mod: GC

## 2021-10-25 PROCEDURE — 93306 TTE W/DOPPLER COMPLETE: CPT | Mod: 26

## 2021-10-25 RX ORDER — FENOFIBRATE,MICRONIZED 130 MG
1 CAPSULE ORAL
Qty: 0 | Refills: 0 | DISCHARGE

## 2021-10-25 RX ORDER — LISINOPRIL 2.5 MG/1
1 TABLET ORAL
Qty: 0 | Refills: 0 | DISCHARGE

## 2021-10-25 RX ORDER — ROSUVASTATIN CALCIUM 5 MG/1
1 TABLET ORAL
Qty: 0 | Refills: 0 | DISCHARGE

## 2021-10-25 RX ORDER — CLOPIDOGREL BISULFATE 75 MG/1
1 TABLET, FILM COATED ORAL
Qty: 0 | Refills: 0 | DISCHARGE

## 2021-10-25 RX ORDER — LISINOPRIL 2.5 MG/1
2.5 TABLET ORAL DAILY
Refills: 0 | Status: DISCONTINUED | OUTPATIENT
Start: 2021-10-25 | End: 2021-10-28

## 2021-10-25 RX ORDER — DOCUSATE SODIUM 100 MG
1 CAPSULE ORAL
Qty: 0 | Refills: 0 | DISCHARGE

## 2021-10-25 RX ORDER — NYSTATIN 500MM UNIT
500000 POWDER (EA) MISCELLANEOUS ONCE
Refills: 0 | Status: COMPLETED | OUTPATIENT
Start: 2021-10-25 | End: 2021-10-25

## 2021-10-25 RX ORDER — METOPROLOL TARTRATE 50 MG
50 TABLET ORAL DAILY
Refills: 0 | Status: DISCONTINUED | OUTPATIENT
Start: 2021-10-25 | End: 2021-10-26

## 2021-10-25 RX ORDER — METOPROLOL TARTRATE 50 MG
1 TABLET ORAL
Qty: 0 | Refills: 0 | DISCHARGE

## 2021-10-25 RX ADMIN — CEFTRIAXONE 100 MILLIGRAM(S): 500 INJECTION, POWDER, FOR SOLUTION INTRAMUSCULAR; INTRAVENOUS at 13:54

## 2021-10-25 RX ADMIN — CHLORHEXIDINE GLUCONATE 1 APPLICATION(S): 213 SOLUTION TOPICAL at 06:22

## 2021-10-25 RX ADMIN — CLOPIDOGREL BISULFATE 75 MILLIGRAM(S): 75 TABLET, FILM COATED ORAL at 12:21

## 2021-10-25 RX ADMIN — SODIUM CHLORIDE 125 MILLILITER(S): 9 INJECTION, SOLUTION INTRAVENOUS at 06:23

## 2021-10-25 RX ADMIN — LEVETIRACETAM 500 MILLIGRAM(S): 250 TABLET, FILM COATED ORAL at 09:33

## 2021-10-25 RX ADMIN — ESCITALOPRAM OXALATE 20 MILLIGRAM(S): 10 TABLET, FILM COATED ORAL at 12:22

## 2021-10-25 RX ADMIN — Medication 1 TABLET(S): at 12:22

## 2021-10-25 RX ADMIN — ENOXAPARIN SODIUM 40 MILLIGRAM(S): 100 INJECTION SUBCUTANEOUS at 12:22

## 2021-10-25 RX ADMIN — LEVETIRACETAM 500 MILLIGRAM(S): 250 TABLET, FILM COATED ORAL at 21:53

## 2021-10-25 RX ADMIN — Medication 145 MILLIGRAM(S): at 12:22

## 2021-10-25 RX ADMIN — Medication 500000 UNIT(S): at 15:22

## 2021-10-25 RX ADMIN — ATORVASTATIN CALCIUM 80 MILLIGRAM(S): 80 TABLET, FILM COATED ORAL at 21:53

## 2021-10-25 NOTE — PROGRESS NOTE ADULT - SUBJECTIVE AND OBJECTIVE BOX
Patient is a 71y old  Male who presents with a chief complaint of blocked jo (25 Oct 2021 17:24)      INTERVAL /OVERNIGHT EVENTS: alert awake    MEDICATIONS  (STANDING):  atorvastatin 80 milliGRAM(s) Oral at bedtime  cefTRIAXone   IVPB 1000 milliGRAM(s) IV Intermittent every 24 hours  chlorhexidine 2% Cloths 1 Application(s) Topical <User Schedule>  chlorhexidine 4% Liquid 1 Application(s) Topical <User Schedule>  clopidogrel Tablet 75 milliGRAM(s) Oral daily  enoxaparin Injectable 40 milliGRAM(s) SubCutaneous daily  escitalopram 20 milliGRAM(s) Oral daily  fenofibrate Tablet 145 milliGRAM(s) Oral daily  lactobacillus acidophilus 1 Tablet(s) Oral daily  levETIRAcetam  Solution 500 milliGRAM(s) Oral two times a day    MEDICATIONS  (PRN):      Allergies    fish (Short breath; Anaphylaxis)  No Known Drug Allergies    Intolerances        REVIEW OF SYSTEMS:  denies    Vital Signs Last 24 Hrs  T(C): 37.5 (25 Oct 2021 16:00), Max: 37.5 (25 Oct 2021 16:00)  T(F): 99.5 (25 Oct 2021 16:00), Max: 99.5 (25 Oct 2021 16:00)  HR: 77 (25 Oct 2021 18:00) (58 - 84)  BP: 150/66 (25 Oct 2021 18:00) (102/58 - 150/66)  BP(mean): 95 (25 Oct 2021 18:00) (77 - 95)  RR: 31 (25 Oct 2021 18:00) (18 - 35)  SpO2: 97% (25 Oct 2021 18:00) (96% - 99%)    PHYSICAL EXAM:  GENERAL: NAD, well-groomed, well-developed  HEAD:  Atraumatic, Normocephalic  EYES: EOMI, PERRLA, conjunctiva and sclera clear  ENMT: No tonsillar erythema, exudates, or enlargement; Moist mucous membranes, Good dentition, No lesions  NECK: Supple, No JVD, Normal thyroid  NERVOUS SYSTEM:  Alert & Oriented X3, Good concentration; Motor Strength 5/5 B/L upper and lower extremities; DTRs 2+ intact and symmetric  CHEST/LUNG: Clear to auscultation bilaterally; No rales, rhonchi, wheezing, or rubs  HEART: Regular rate and rhythm; No murmurs, rubs, or gallops  ABDOMEN: Soft, Nontender, Nondistended; Bowel sounds present  EXTREMITIES:  2+ Peripheral Pulses, No clubbing, cyanosis, or edema  LYMPH: No lymphadenopathy noted  SKIN: No rashes or lesions    LABS:                        10.1   10.04 )-----------( 171      ( 25 Oct 2021 06:14 )             33.4     25 Oct 2021 06:14    147    |  113    |  40     ----------------------------<  92     4.2     |  29     |  0.77     Ca    9.5        25 Oct 2021 06:14  Phos  2.5       25 Oct 2021 06:14  Mg     2.0       25 Oct 2021 06:14    TPro  5.9    /  Alb  1.9    /  TBili  0.7    /  DBili  x      /  AST  40     /  ALT  77     /  AlkPhos  70     25 Oct 2021 06:14    PT/INR - ( 25 Oct 2021 06:14 )   PT: 12.4 sec;   INR: 1.06 ratio         PTT - ( 25 Oct 2021 06:14 )  PTT:26.7 sec    CAPILLARY BLOOD GLUCOSE          RADIOLOGY & ADDITIONAL TESTS:    Notes Reviewed:  [x ] YES  [ ] NO    Care Discussed with Consultants/Other Providers [x ] YES  [ ] NO

## 2021-10-25 NOTE — DISCHARGE NOTE PROVIDER - NSDCCPCAREPLAN_GEN_ALL_CORE_FT
PRINCIPAL DISCHARGE DIAGNOSIS  Diagnosis: Acute sepsis  Assessment and Plan of Treatment: follow up with ID MD Dr. Cheema      SECONDARY DISCHARGE DIAGNOSES  Diagnosis: Acute renal insufficiency  Assessment and Plan of Treatment:     Diagnosis: Leukocytosis  Assessment and Plan of Treatment:     Diagnosis: Acute UTI  Assessment and Plan of Treatment:

## 2021-10-25 NOTE — PROGRESS NOTE ADULT - ASSESSMENT
MARLEY: Prerenal azotemia, Septic ATN, Clogged jo, on ACEI  Sepsis, Shock on pressors  h/o Left UVJ calculus, s/p ureteral stent placement.   h/o ALS  Hypernatremia    Improved renal indices. Continue jo drainage. IV hydration. Sodium levels improving. IV abx. Avoid nephrotoxic meds as possible.   Avoid ACEI, ARB, NSAIDs and IV contrast. Will follow electrolytes and renal function trend. D/w ICU team.

## 2021-10-25 NOTE — DISCHARGE NOTE PROVIDER - CARE PROVIDER_API CALL
Curtis Zavala (MD)  Internal Medicine  [address] [place] [address]  Phone: [phone]  Fax: [phone]  Follow Up Time:     Clint Ruiz)  Urology  [address] [facility], [address]  [place] [address]  Phone: [phone]  Fax: [phone]  Follow Up Time:     Liang Cheema; PhD)  Infectious Disease; Internal Medicine  [address] [place] [address]  Phone: [phone]  Fax: [phone]  Follow Up Time:

## 2021-10-25 NOTE — DISCHARGE NOTE PROVIDER - DETAILS OF MALNUTRITION DIAGNOSIS/DIAGNOSES
This patient has been assessed with a concern for Malnutrition and was treated during this hospitalization for the following Nutrition diagnosis/diagnoses:     -  10/24/2021: Severe protein-calorie malnutrition

## 2021-10-25 NOTE — DISCHARGE NOTE PROVIDER - NSDCMRMEDTOKEN_GEN_ALL_CORE_FT
Crestor 40 mg oral tablet: 1 tab(s) orally once a day  fenofibrate 160 mg oral tablet: 1 tab(s) orally once a day  Keppra 100 mg/mL oral solution: 5 milliliter(s) orally 2 times a day  lactobacillus acidophilus oral capsule: by gastrostomy tube once a day  Lexapro 20 mg oral tablet: 1 tab(s) orally once a day  lisinopril 2.5 mg oral tablet: 1 tab(s) orally once a day  metoprolol tartrate 50 mg oral tablet: 1 tab(s) orally once a day  Multiple Vitamins with Minerals oral liquid: by gastrostomy tube once a day  Plavix 75 mg oral tablet: 1 tab(s) orally once a day   cefuroxime 500 mg oral tablet: 1 tab(s) orally 2 times a day   Crestor 40 mg oral tablet: 1 tab(s) orally once a day  fenofibrate 160 mg oral tablet: 1 tab(s) orally once a day  Keppra 100 mg/mL oral solution: 5 milliliter(s) orally 2 times a day  lactobacillus acidophilus oral capsule: by gastrostomy tube once a day  Lexapro 20 mg oral tablet: 1 tab(s) orally once a day  lisinopril 2.5 mg oral tablet: 1 tab(s) orally once a day  metoprolol tartrate 50 mg oral tablet: 1 tab(s) orally once a day  Multiple Vitamins with Minerals oral liquid: by gastrostomy tube once a day  Plavix 75 mg oral tablet: 1 tab(s) orally once a day

## 2021-10-25 NOTE — PROGRESS NOTE ADULT - SUBJECTIVE AND OBJECTIVE BOX
Patient is a 71y old  Male who presents with a chief complaint of blocked jo (24 Oct 2021 12:23)  Patient seen in follow up for MARLEY.        PAST MEDICAL HISTORY:  Myocardial infarct    Hypertension    Hyperlipidemia    BPH (benign prostatic hyperplasia)    ALS (amyotrophic lateral sclerosis)      MEDICATIONS  (STANDING):  atorvastatin 80 milliGRAM(s) Oral at bedtime  cefTRIAXone   IVPB 1000 milliGRAM(s) IV Intermittent every 24 hours  chlorhexidine 2% Cloths 1 Application(s) Topical <User Schedule>  chlorhexidine 4% Liquid 1 Application(s) Topical <User Schedule>  clopidogrel Tablet 75 milliGRAM(s) Oral daily  enoxaparin Injectable 40 milliGRAM(s) SubCutaneous daily  escitalopram 20 milliGRAM(s) Oral daily  fenofibrate Tablet 145 milliGRAM(s) Oral daily  lactobacillus acidophilus 1 Tablet(s) Oral daily  levETIRAcetam  Solution 500 milliGRAM(s) Oral two times a day    MEDICATIONS  (PRN):    T(C): 36.5 (10-25-21 @ 07:43), Max: 37.2 (10-24-21 @ 16:00)  HR: 74 (10-25-21 @ 10:00) (55 - 108)  BP: 119/57 (10-25-21 @ 10:00) (60/46 - 150/70)  RR: 26 (10-25-21 @ 10:00)  SpO2: 96% (10-25-21 @ 10:00)  Wt(kg): --  I&O's Detail    24 Oct 2021 07:  -  25 Oct 2021 07:00  --------------------------------------------------------  IN:    Enteral Tube Flush: 60 mL    Free Water: 1220 mL    IV PiggyBack: 50 mL    Lactated Ringers: 625 mL    Osmolite: 910 mL    sodium chloride 0.45%: 2375 mL  Total IN: 5240 mL    OUT:    Indwelling Catheter - Urethral (mL): 2460 mL    Norepinephrine: 0 mL  Total OUT: 2460 mL    Total NET: 2780 mL      25 Oct 2021 07:  -  25 Oct 2021 11:49  --------------------------------------------------------  IN:    Enteral Tube Flush: 130 mL    Osmolite: 260 mL  Total IN: 390 mL    OUT:  Total OUT: 0 mL    Total NET: 390 mL              PHYSICAL EXAM:  General: No distress  Respiratory: b/l air entry  Cardiovascular: S1 S2  Gastrointestinal: soft, PEG  Extremities:  no edema                               LABORATORY:                        10.1   10.04 )-----------( 171      ( 25 Oct 2021 06:14 )             33.4     10-25    147<H>  |  113<H>  |  40<H>  ----------------------------<  92  4.2   |  29  |  0.77    Ca    9.5      25 Oct 2021 06:14  Phos  2.5     10-25  Mg     2.0     10-25    TPro  5.9<L>  /  Alb  1.9<L>  /  TBili  0.7  /  DBili  x   /  AST  40<H>  /  ALT  77  /  AlkPhos  70  10-25    Sodium, Serum: 147 mmol/L (10-25 @ 06:14)  Sodium, Serum: 152 mmol/L (10-24 @ 06:23)  Sodium, Serum: 150 mmol/L (10-23 @ 15:07)  Sodium, Serum: 147 mmol/L (10-23 @ 12:04)    Potassium, Serum: 4.2 mmol/L (10-25 @ 06:14)  Potassium, Serum: 4.2 mmol/L (10-24 @ 06:23)  Potassium, Serum: 4.2 mmol/L (10-23 @ 15:07)  Potassium, Serum: 4.7 mmol/L (10-23 @ 12:04)    Hemoglobin: 10.1 g/dL (10-25 @ 06:14)  Hemoglobin: 12.0 g/dL (10-24 @ 06:23)  Hemoglobin: 15.9 g/dL (10-23 @ 12:04)    Creatinine, Serum 0.77 (10-25 @ 06:14)  Creatinine, Serum 1.20 (10-24 @ 06:23)  Creatinine, Serum 2.50 (10-23 @ 15:07)  Creatinine, Serum 3.10 (10-23 @ 12:04)    CARDIAC MARKERS ( 23 Oct 2021 12:04 )  <.015 ng/mL / x     / 32 U/L / x     / x          LIVER FUNCTIONS - ( 25 Oct 2021 06:14 )  Alb: 1.9 g/dL / Pro: 5.9 g/dL / ALK PHOS: 70 U/L / ALT: 77 U/L / AST: 40 U/L / GGT: x           Urinalysis Basic - ( 23 Oct 2021 12:05 )    Color: Yellow / Appearance: Turbid / S.015 / pH: x  Gluc: x / Ketone: Negative  / Bili: Negative / Urobili: Negative   Blood: x / Protein: 100 / Nitrite: Positive   Leuk Esterase: Moderate / RBC: 25-50 /HPF / WBC 26-50   Sq Epi: x / Non Sq Epi: Few / Bacteria: Few

## 2021-10-25 NOTE — PROGRESS NOTE ADULT - SUBJECTIVE AND OBJECTIVE BOX
[facility], Division of Infectious Diseases  [place] [place], [place]  [phone]  after hours and weekends [phone]    Name: SHARITA HOUGH  Age: 71y  Gender: Male  MRN: [id number]    Interval History--  Notes reviewed  awake poor historian      Allergies    fish (Short breath; Anaphylaxis)  No Known Drug Allergies    Intolerances        Medications--  Antibiotics:  cefTRIAXone   IVPB 1000 milliGRAM(s) IV Intermittent every 24 hours    Immunologic:    Other:  atorvastatin  chlorhexidine 2% Cloths  chlorhexidine 4% Liquid  clopidogrel Tablet  enoxaparin Injectable  escitalopram  fenofibrate Tablet  lactobacillus acidophilus  levETIRAcetam  Solution      Review of Systems--  A 10-point review of systems was obtained.   unable to obtain     Review of systems otherwise negative except as previously noted.    Physical Examination--  Vital Signs: T(F): 98.6 (10-25-21 @ 12:00), Max: 98.9 (10-24-21 @ 16:00)  HR: 74 (10-25-21 @ 12:00)  BP: 126/63 (10-25-21 @ 12:00)  RR: 32 (10-25-21 @ 12:00)  SpO2: 99% (10-25-21 @ 12:00)  Wt(kg): --  General: Nontoxic-appearing Male in no acute distress.  HEENT: AT/NC. .  Neck: Not rigid. No sense of mass.  Nodes: None palpable.  Lungs: Clear bilaterally without rales, wheezing or rhonchi  Heart: Regular rate and rhythm. + Murmur.   abd soft nt  gu jo  Extremities: No cyanosis or clubbing. No edema.   Skin: Warm. Dry. Good turgor. No rash. No vasculitic stigmata.  Psychiatric: Appropriate affect and mood for situation.         Laboratory Studies--  CBC                        10.1   10.04 )-----------( 171      ( 25 Oct 2021 06:14 )             33.4       Chemistries  10-25    147<H>  |  113<H>  |  40<H>  ----------------------------<  92  4.2   |  29  |  0.77    Ca    9.5      25 Oct 2021 06:14  Phos  2.5     10-25  Mg     2.0     10-25    TPro  5.9<L>  /  Alb  1.9<L>  /  TBili  0.7  /  DBili  x   /  AST  40<H>  /  ALT  77  /  AlkPhos  70  10-25      Culture Data    Culture - Blood (collected 23 Oct 2021 16:27)  Source: .Blood Blood-Peripheral  Gram Stain (24 Oct 2021 11:23):    Growth in aerobic bottle: Gram Negative Rods    Growth in anaerobic bottle: Gram Negative Rods  Preliminary Report (25 Oct 2021 10:59):    Growth in aerobic and anaerobic bottles: Proteus mirabilis    See previous culture [id number]    Culture - Blood (collected 23 Oct 2021 16:27)  Source: .Blood Blood-Peripheral  Gram Stain (24 Oct 2021 08:14):    Growth in anaerobic bottle: Gram Negative Rods    Growth in aerobic bottle: Gram Negative Rods  Preliminary Report (25 Oct 2021 11:34):    Growth in aerobic and anaerobic bottles: Proteus mirabilis Susceptibility    to follow.    ***Blood Panel PCR results on this specimen are available    approximately 3 hours after the Gram stain result.***    Gram stain, PCR, and/or culture results may notalways    correspond due to difference in methodologies.    ************************************************************    This PCR assay was performed by multiplex PCR. This    Assay tests for 66 bacterial and resistance gene targets.    Please refer to the [facility] test directory    at [email or web address] for details.  Organism: Blood Culture PCR (24 Oct 2021 07:47)  Organism: Blood Culture PCR (24 Oct 2021 07:47)

## 2021-10-25 NOTE — PROGRESS NOTE ADULT - ASSESSMENT
70 yo M PMHx ALS, CAD (s/p MI 1 stent placed 2006 on DAPT), HTN, HLD, BPH p/w lethargy/urinary retention. Admitted to ICU for urosepsis requiring pressor support. Patient has since become hemodynamically stable.     1. Septic Shock  2. UTI (Proteus mirabilis)  3. MARLEY on CKD  4. ALS    NEURO:  -Cont home keppra for seizures (confirmed with outpt pharmacy, pt takes 500mg BID at home)  -Cont home lexapro  -Monitor mental status closely, avoid neurosuppresants.    CV:  -Shock resolved, off pressors  -Holding home antihypertensives  -Hx of MI, cont home plavix  -Cont home fenofibrate, statin for HLD  -elevated pro-BNP on admission, bedside echo showed good ventricular function, f/u formal TTE     RESP: No acute issues, satting well on RA, goal SpO2 >92%.    RENAL:  -MARLEY, obstructive due to clogged Shukla  -Kidney fxn improving  -HyperNa noted, d/c'd 1/2NS, will give free water through PEG  -trend lytes/Scr daily with BMP  -I's and O's, goal UOP 0.5 cc/kg/hr  -renal dose meds and avoid nephrotoxins     GI: No acute issues, cont tube feeds.     ID:  -Urosepsis, bacteremia 2/2 Proteus mirabilis pyelonephritis  -C/w ceftriaxone   -F/u bld cx from 10/24/21   -Leukocytosis improving, lactate normalized, pt afebrile, non toxic appearing    ENDO: No acute issues, routine FS within goal 140-180    HEME:   -cont lovenox,  -Hgb 10.1 today, down from 12 yesterday, most likely dilutional, trend CBC    DISPO: Full code 70 yo M PMHx ALS, CAD (s/p MI 1 stent placed 2006 on DAPT), HTN, HLD, BPH p/w lethargy/urinary retention. Admitted to ICU for urosepsis requiring pressor support. Patient has since become hemodynamically stable.     1. Septic Shock  2. UTI (Proteus mirabilis)  3. MARLEY on CKD  4. ALS    NEURO:  -Cont home keppra for seizures (confirmed with outpt pharmacy, pt takes 500mg BID at home)  -Cont home lexapro  -Monitor mental status closely, avoid neurosuppresants.    CV:  -Shock resolved, off pressors  -Holding home antihypertensives  -Hx of MI, cont home plavix  -Cont home fenofibrate, statin for HLD  -elevated pro-BNP on admission, bedside echo showed good ventricular function, f/u formal TTE     RESP: No acute issues, satting well on RA, goal SpO2 >92%.    RENAL:  -MARLEY, obstructive due to clogged Shukla  -Kidney fxn improving  -HyperNa noted, d/c'd 1/2NS, will give free water through PEG  -trend lytes/Scr daily with BMP  -I's and O's, goal UOP 0.5 cc/kg/hr  -renal dose meds and avoid nephrotoxins     GI: No acute issues, cont tube feeds.     ID:  -Urosepsis, bacteremia 2/2 Proteus mirabilis pyelonephritis  -C/w ceftriaxone   -F/u bld cx from 10/24/21   -Leukocytosis improving, lactate normalized, pt afebrile, non toxic appearing    ENDO: No acute issues, routine FS within goal 140-180    HEME:   -cont lovenox,  -Hgb 10.1 today, down from 12 yesterday, most likely dilutional, trend CBC    DISPO: Full code    Called Patient's son Eleazar Rivera [phone] to update on plan and made him aware of his father's current search for a bed on the general medical floor.

## 2021-10-25 NOTE — PROGRESS NOTE ADULT - ASSESSMENT
72 yo M PMHx ALS, CAD (s/p MI 1 stent placed 2006 on DAPT), HTN, HLD, BPH p/w lethargy/urinary retention. adm with increased lethargy, no output per jo catheter. with ~1000cc of dark urine drained after replacement, admitted with concern for pyelonephritis    RECOMMENDATIONS  leukocytosis -- down  amador - improving   off pressors   Proteus mirabilis bacteremia likely gu source  jo care     on Ceftriaxone pending sensitivities,   Urine in lab  rec repeat blood cultures for am 10/25 pending   urology noted no acute intervention

## 2021-10-25 NOTE — DISCHARGE NOTE PROVIDER - CARE PROVIDERS DIRECT ADDRESSES
[email or web address]

## 2021-10-25 NOTE — PROGRESS NOTE ADULT - SUBJECTIVE AND OBJECTIVE BOX
Patient is a 71y old  Male who presents with a chief complaint of blocked jo (25 Oct 2021 17:24)    PAST MEDICAL & SURGICAL HISTORY:  Myocardial infarct    Hypertension    Hyperlipidemia    BPH (benign prostatic hyperplasia)    ALS (amyotrophic lateral sclerosis)    S/P tonsillectomy    S/P coronary artery stent placement    History of hip surgery    History of hernia repair  &gt; 20 years ago    H/O shoulder surgery      SHARITA HOUGH   71y    Male    BRIEF HOSPITAL COURSE:    Review of Systems:  No SOB CP                      All other ROS are negative.    Allergies    fish (Short breath; Anaphylaxis)  No Known Drug Allergies    Intolerances          ICU Vital Signs Last 24 Hrs  T(C): 37.5 (25 Oct 2021 16:00), Max: 37.5 (25 Oct 2021 16:00)  T(F): 99.5 (25 Oct 2021 16:00), Max: 99.5 (25 Oct 2021 16:00)  HR: 82 (25 Oct 2021 21:00) (58 - 92)  BP: 147/70 (25 Oct 2021 21:00) (102/58 - 150/66)  BP(mean): 100 (25 Oct 2021 21:00) (77 - 117)  ABP: --  ABP(mean): --  RR: 31 (25 Oct 2021 21:00) (18 - 35)  SpO2: 94% (25 Oct 2021 21:00) (94% - 99%)    Physical Examination:    General: spastic movements     HEENT:  no JVD    PULM: bilateral BS    CVS:  s1 s2 reg    ABD:  soft + PEG    EXT: no edema     SKIN: warm    Neuro: alert shaky movements           LABS:                        10.1   10.04 )-----------( 171      ( 25 Oct 2021 06:14 )             33.4     10-25    147<H>  |  113<H>  |  40<H>  ----------------------------<  92  4.2   |  29  |  0.77    Ca    9.5      25 Oct 2021 06:14  Phos  2.5     10-25  Mg     2.0     10-25    TPro  5.9<L>  /  Alb  1.9<L>  /  TBili  0.7  /  DBili  x   /  AST  40<H>  /  ALT  77  /  AlkPhos  70  10-25          CAPILLARY BLOOD GLUCOSE        PT/INR - ( 25 Oct 2021 06:14 )   PT: 12.4 sec;   INR: 1.06 ratio         PTT - ( 25 Oct 2021 06:14 )  PTT:26.7 sec    CULTURES:  Culture Results:   Growth in aerobic and anaerobic bottles: Proteus mirabilis Susceptibility  to follow.  ***Blood Panel PCR results on this specimen are available  approximately 3 hours after the Gram stain result.***  Gram stain, PCR, and/or culture results may notalways  correspond due to difference in methodologies.  ************************************************************  This PCR assay was performed by multiplex PCR. This  Assay tests for 66 bacterial and resistance gene targets.  Please refer to the [facility] test directory  at [email or web address] for details. (10-23 @ 16:27)  Culture Results:   Growth in aerobic and anaerobic bottles: Proteus mirabilis  See previous culture [id number] (10-23 @ 16:27)  Rapid RVP Result: NotDetec (10-23 @ 12:06)      Medications:  MEDICATIONS  (STANDING):  atorvastatin 80 milliGRAM(s) Oral at bedtime  cefTRIAXone   IVPB 1000 milliGRAM(s) IV Intermittent every 24 hours  chlorhexidine 2% Cloths 1 Application(s) Topical <User Schedule>  chlorhexidine 4% Liquid 1 Application(s) Topical <User Schedule>  clopidogrel Tablet 75 milliGRAM(s) Oral daily  enoxaparin Injectable 40 milliGRAM(s) SubCutaneous daily  escitalopram 20 milliGRAM(s) Oral daily  fenofibrate Tablet 145 milliGRAM(s) Oral daily  lactobacillus acidophilus 1 Tablet(s) Oral daily  levETIRAcetam  Solution 500 milliGRAM(s) Oral two times a day  lisinopril 2.5 milliGRAM(s) Oral daily  metoprolol tartrate 50 milliGRAM(s) Oral daily    MEDICATIONS  (PRN):        10-24 @ 07:01  -  10-25 @ 07:00  --------------------------------------------------------  IN: 5240 mL / OUT: 2460 mL / NET: 2780 mL    10-25 @ 07:01  -  10-25 @ 21:44  --------------------------------------------------------  IN: 2095 mL / OUT: 1110 mL / NET: 985 mL        RADIOLOGY/IMAGING/ECHO    Critical care point of care ultrasound:      < from: CT Renal Stone Hunt (10.23.21 @ 12:41) >  IMPRESSION:  Left ureteral stent with mild left hydroureteronephrosis.    Subcentimeter nonobstructive calculi in the left kidney, distal left ureter, urinary bladder, and bladder diverticulum.        Assessment/Plan:    71M hx  ALS, CAD (Remote stent on DAPT), HTN, HLD, BPH  Admit 10/23 with septic shock MARLEY    P Mirabilis bacteremia  source  clinical improvement with CTX   sens P   S/P Shock  levo tapered off   MARLEY resolved  Hypernatremia   free water added via peg   L Hydroureteronephrosis  urology states no intervention needed     Improved    Transfer to [facility]

## 2021-10-25 NOTE — PROGRESS NOTE ADULT - SUBJECTIVE AND OBJECTIVE BOX
Patient is a 71y old  Male who presents with a chief complaint of blocked jo (25 Oct 2021 12:36)    24 hour events: Patient seen and examined at bedside. No acute overnight events. Patient most likely to be transferred to medical floor.     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      T(F): 98.6 (10-25-21 @ 12:00), Max: 98.9 (10-24-21 @ 16:00)  HR: 74 (10-25-21 @ 12:00) (58 - 87)  BP: 126/63 (10-25-21 @ 12:00) (102/58 - 135/62)  RR: 32 (10-25-21 @ 12:00) (18 - 34)  SpO2: 99% (10-25-21 @ 12:00) (95% - 99%)  Wt(kg): --            I&O's Summary    10-24 @ 07:01  -  10-25 @ 07:00  --------------------------------------------------------  IN: 5240 mL / OUT: 2460 mL / NET: 2780 mL    10-25 @ 07:01  -  10-25 @ 13:21  --------------------------------------------------------  IN: 765 mL / OUT: 450 mL / NET: 315 mL      PHYSICAL EXAM  General: Thin male, largely non-verbal 2/2 ALS but in NAD  Neuro: Awake, alert, responsive to verbal and physical stimuli  HEENT: NC/AT  Resp: CTAB, no wheezes or rales appreciated  CVS: RRR, S1 S2, no murmurs appreciated  Abd: Soft, nontender, nondistended  Skin: Warm, WP    MEDICATIONS  cefTRIAXone   IVPB IV Intermittent      atorvastatin Oral  fenofibrate Tablet Oral      escitalopram Oral  levETIRAcetam  Solution Oral      clopidogrel Tablet Oral  enoxaparin Injectable SubCutaneous            chlorhexidine 2% Cloths Topical  chlorhexidine 4% Liquid Topical    lactobacillus acidophilus Oral                          10.1   10.04 )-----------( 171      ( 25 Oct 2021 06:14 )             33.4       10-25    147<H>  |  113<H>  |  40<H>  ----------------------------<  92  4.2   |  29  |  0.77    Ca    9.5      25 Oct 2021 06:14  Phos  2.5     10-25  Mg     2.0     10-25    TPro  5.9<L>  /  Alb  1.9<L>  /  TBili  0.7  /  DBili  x   /  AST  40<H>  /  ALT  77  /  AlkPhos  70  10-25          PT/INR - ( 25 Oct 2021 06:14 )   PT: 12.4 sec;   INR: 1.06 ratio         PTT - ( 25 Oct 2021 06:14 )  PTT:26.7 sec    .Blood Blood-Peripheral   Growth in aerobic and anaerobic bottles: Proteus mirabilis Susceptibility  to follow.  ***Blood Panel PCR results on this specimen are available  approximately 3 hours after the Gram stain result.***  Gram stain, PCR, and/or culture results may notalways  correspond due to difference in methodologies.  ************************************************************  This PCR assay was performed by multiplex PCR. This  Assay tests for 66 bacterial and resistance gene targets.  Please refer to the [facility] test directory  at [email or web address] for details.   Growth in anaerobic bottle: Gram Negative Rods  Growth in aerobic bottle: Gram Negative Rods 10-23 @ 16:27      Rapid RVP Result: NotDetec (10-23 @ 12:06)    Radiology: No new studies in the past 24 hours  Bedside lung ultrasound: ***  Bedside ECHO: ***    CENTRAL LINE: N  JO: Y              A-LINE: N              GLOBAL ISSUE/BEST PRACTICE  Analgesia: N  Sedation: N  HOB elevation: yes  Stress ulcer prophylaxis: N  VTE prophylaxis: Y  Glycemic control: N  Nutrition: Y    CODE STATUS: Full code     Patient is a 71y old  Male who presents with a chief complaint of blocked jo (25 Oct 2021 12:36)    24 hour events: Patient seen and examined at bedside. No acute overnight events. Patient most likely to be transferred to medical floor. Called Patient's son Eleazar Rivera [phone] to update on plan and made him aware of his father's current search for a bed on the general medical floor.     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      T(F): 98.6 (10-25-21 @ 12:00), Max: 98.9 (10-24-21 @ 16:00)  HR: 74 (10-25-21 @ 12:00) (58 - 87)  BP: 126/63 (10-25-21 @ 12:00) (102/58 - 135/62)  RR: 32 (10-25-21 @ 12:00) (18 - 34)  SpO2: 99% (10-25-21 @ 12:00) (95% - 99%)  Wt(kg): --            I&O's Summary    10-24 @ 07:01  -  10-25 @ 07:00  --------------------------------------------------------  IN: 5240 mL / OUT: 2460 mL / NET: 2780 mL    10-25 @ 07:01  -  10-25 @ 13:21  --------------------------------------------------------  IN: 765 mL / OUT: 450 mL / NET: 315 mL      PHYSICAL EXAM  General: Thin male, largely non-verbal 2/2 ALS but in NAD  Neuro: Awake, alert, responsive to verbal and physical stimuli  HEENT: NC/AT  Resp: CTAB, no wheezes or rales appreciated  CVS: RRR, S1 S2, no murmurs appreciated  Abd: Soft, nontender, nondistended  Skin: Warm, WP    MEDICATIONS  cefTRIAXone   IVPB IV Intermittent      atorvastatin Oral  fenofibrate Tablet Oral      escitalopram Oral  levETIRAcetam  Solution Oral      clopidogrel Tablet Oral  enoxaparin Injectable SubCutaneous            chlorhexidine 2% Cloths Topical  chlorhexidine 4% Liquid Topical    lactobacillus acidophilus Oral                          10.1   10.04 )-----------( 171      ( 25 Oct 2021 06:14 )             33.4       10-25    147<H>  |  113<H>  |  40<H>  ----------------------------<  92  4.2   |  29  |  0.77    Ca    9.5      25 Oct 2021 06:14  Phos  2.5     10-25  Mg     2.0     10-25    TPro  5.9<L>  /  Alb  1.9<L>  /  TBili  0.7  /  DBili  x   /  AST  40<H>  /  ALT  77  /  AlkPhos  70  10-25          PT/INR - ( 25 Oct 2021 06:14 )   PT: 12.4 sec;   INR: 1.06 ratio         PTT - ( 25 Oct 2021 06:14 )  PTT:26.7 sec    .Blood Blood-Peripheral   Growth in aerobic and anaerobic bottles: Proteus mirabilis Susceptibility  to follow.  ***Blood Panel PCR results on this specimen are available  approximately 3 hours after the Gram stain result.***  Gram stain, PCR, and/or culture results may notalways  correspond due to difference in methodologies.  ************************************************************  This PCR assay was performed by multiplex PCR. This  Assay tests for 66 bacterial and resistance gene targets.  Please refer to the [facility] test directory  at [email or web address] for details.   Growth in anaerobic bottle: Gram Negative Rods  Growth in aerobic bottle: Gram Negative Rods 10-23 @ 16:27      Rapid RVP Result: NotDetec (10-23 @ 12:06)    Radiology: No new studies in the past 24 hours  Bedside lung ultrasound: ***  Bedside ECHO: ***    CENTRAL LINE: N  JO: Y              A-LINE: N              GLOBAL ISSUE/BEST PRACTICE  Analgesia: N  Sedation: N  HOB elevation: yes  Stress ulcer prophylaxis: N  VTE prophylaxis: Y  Glycemic control: N  Nutrition: Y    CODE STATUS: Full code     Patient is a 71y old  Male who presents with a chief complaint of blocked jo (25 Oct 2021 12:36)    24 hour events: Patient seen and examined at bedside. No acute overnight events.       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      T(F): 98.6 (10-25-21 @ 12:00), Max: 98.9 (10-24-21 @ 16:00)  HR: 74 (10-25-21 @ 12:00) (58 - 87)  BP: 126/63 (10-25-21 @ 12:00) (102/58 - 135/62)  RR: 32 (10-25-21 @ 12:00) (18 - 34)  SpO2: 99% (10-25-21 @ 12:00) (95% - 99%)  Wt(kg): --            I&O's Summary    10-24 @ 07:01  -  10-25 @ 07:00  --------------------------------------------------------  IN: 5240 mL / OUT: 2460 mL / NET: 2780 mL    10-25 @ 07:01  -  10-25 @ 13:21  --------------------------------------------------------  IN: 765 mL / OUT: 450 mL / NET: 315 mL      PHYSICAL EXAM  General: Thin male, largely non-verbal 2/2 ALS but in NAD  Neuro: Awake, alert, responsive to verbal and physical stimuli  HEENT: NC/AT  Resp: CTAB, no wheezes or rales appreciated  CVS: RRR, S1 S2, no murmurs appreciated  Abd: Soft, nontender, nondistended  Skin: Warm, WP    MEDICATIONS  cefTRIAXone   IVPB IV Intermittent      atorvastatin Oral  fenofibrate Tablet Oral      escitalopram Oral  levETIRAcetam  Solution Oral      clopidogrel Tablet Oral  enoxaparin Injectable SubCutaneous            chlorhexidine 2% Cloths Topical  chlorhexidine 4% Liquid Topical    lactobacillus acidophilus Oral                          10.1   10.04 )-----------( 171      ( 25 Oct 2021 06:14 )             33.4       10-25    147<H>  |  113<H>  |  40<H>  ----------------------------<  92  4.2   |  29  |  0.77    Ca    9.5      25 Oct 2021 06:14  Phos  2.5     10-25  Mg     2.0     10-25    TPro  5.9<L>  /  Alb  1.9<L>  /  TBili  0.7  /  DBili  x   /  AST  40<H>  /  ALT  77  /  AlkPhos  70  10-25          PT/INR - ( 25 Oct 2021 06:14 )   PT: 12.4 sec;   INR: 1.06 ratio         PTT - ( 25 Oct 2021 06:14 )  PTT:26.7 sec    .Blood Blood-Peripheral   Growth in aerobic and anaerobic bottles: Proteus mirabilis Susceptibility  to follow.  ***Blood Panel PCR results on this specimen are available  approximately 3 hours after the Gram stain result.***  Gram stain, PCR, and/or culture results may notalways  correspond due to difference in methodologies.  ************************************************************  This PCR assay was performed by multiplex PCR. This  Assay tests for 66 bacterial and resistance gene targets.  Please refer to the [facility] test directory  at [email or web address] for details.   Growth in anaerobic bottle: Gram Negative Rods  Growth in aerobic bottle: Gram Negative Rods 10-23 @ 16:27      Rapid RVP Result: NotDetec (10-23 @ 12:06)    Radiology: No new studies in the past 24 hours    CENTRAL LINE: N  JO: Y              A-LINE: N              GLOBAL ISSUE/BEST PRACTICE  Analgesia: N  Sedation: N  HOB elevation: yes  Stress ulcer prophylaxis: N  VTE prophylaxis: Y  Glycemic control: N  Nutrition: Y    CODE STATUS: Full code

## 2021-10-25 NOTE — PROGRESS NOTE ADULT - SUBJECTIVE AND OBJECTIVE BOX
[facility]    Santi Nair, Alfreda, Shara, Kobi, Chris, Ashley      [phone]    CHIEF COMPLAINT: Patient is a 71y old  Male who presents with a chief complaint of blocked jo (24 Oct 2021 21:21)      Follow Up: urosepsis     Interim history: No acute events overnight. Remains off levophed. Seen and examined at bedside this AM. Pt denies CP, palpitations SOB, lightheadedness, dizziness abdominal pain. Pt is asking when he can go home, feels better.     MEDICATIONS  (STANDING):  atorvastatin 80 milliGRAM(s) Oral at bedtime  cefTRIAXone   IVPB 1000 milliGRAM(s) IV Intermittent every 24 hours  chlorhexidine 2% Cloths 1 Application(s) Topical <User Schedule>  chlorhexidine 4% Liquid 1 Application(s) Topical <User Schedule>  clopidogrel Tablet 75 milliGRAM(s) Oral daily  enoxaparin Injectable 40 milliGRAM(s) SubCutaneous daily  escitalopram 20 milliGRAM(s) Oral daily  fenofibrate Tablet 145 milliGRAM(s) Oral daily  lactobacillus acidophilus 1 Tablet(s) Oral daily  levETIRAcetam  Solution 500 milliGRAM(s) Oral two times a day  sodium chloride 0.45%. 1000 milliLiter(s) (125 mL/Hr) IV Continuous <Continuous>    MEDICATIONS  (PRN):      REVIEW OF SYSTEMS:  eye, ent, GI, , allergic, dermatologic, musculoskeletal and neurologic are negative except as described above    Vital Signs Last 24 Hrs  T(C): 36.5 (25 Oct 2021 07:43), Max: 37.2 (24 Oct 2021 16:00)  T(F): 97.7 (25 Oct 2021 07:43), Max: 98.9 (24 Oct 2021 16:00)  HR: 58 (25 Oct 2021 09:00) (55 - 87)  BP: 106/57 (25 Oct 2021 09:00) (102/58 - 135/62)  BP(mean): 78 (25 Oct 2021 09:00) (77 - 91)  RR: 19 (25 Oct 2021 09:00) (18 - 34)  SpO2: 96% (25 Oct 2021 09:00) (95% - 98%)    I&O's Summary    24 Oct 2021 07:01  -  25 Oct 2021 07:00  --------------------------------------------------------  IN: 5240 mL / OUT: 2460 mL / NET: 2780 mL        Telemetry past 24h:    PHYSICAL EXAM:    Constitutional: NAD, nods head, does not follow commands  Lungs:  Non-labored, breath sounds are clear bilaterally, No wheezing, rales or rhonchi  Cardiovascular: RRR. S1 and S2 noted. No murmurs, rubs, gallops or clicks  Gastrointestinal: Bowel Sounds present, soft, nontender.   Lymph: No peripheral edema. No cervical lymphadenopathy.  Neurological: unable to assess  Psych:  unable to assess    LABS: All Labs Reviewed:                        10.1   10.04 )-----------( 171      ( 25 Oct 2021 06:14 )             33.4                         12.0   16.34 )-----------( 186      ( 24 Oct 2021 06:23 )             39.8                         15.9   36.59 )-----------( 324      ( 23 Oct 2021 12:04 )             50.7     25 Oct 2021 06:14    147    |  113    |  40     ----------------------------<  92     4.2     |  29     |  0.77   24 Oct 2021 06:23    152    |  118    |  55     ----------------------------<  150    4.2     |  30     |  1.20   23 Oct 2021 15:07    150    |  120    |  75     ----------------------------<  164    4.2     |  25     |  2.50     Ca    9.5        25 Oct 2021 06:14  Ca    9.7        24 Oct 2021 06:23  Ca    9.3        23 Oct 2021 15:07  Phos  2.5       25 Oct 2021 06:14  Phos  2.5       24 Oct 2021 06:23  Phos  2.4       23 Oct 2021 15:07  Mg     2.0       25 Oct 2021 06:14  Mg     2.4       24 Oct 2021 06:23  Mg     2.5       23 Oct 2021 15:07    TPro  5.9    /  Alb  1.9    /  TBili  0.7    /  DBili  x      /  AST  40     /  ALT  77     /  AlkPhos  70     25 Oct 2021 06:14  TPro  6.1    /  Alb  2.0    /  TBili  0.5    /  DBili  x      /  AST  27     /  ALT  54     /  AlkPhos  82     24 Oct 2021 06:23  TPro  6.2    /  Alb  2.2    /  TBili  1.0    /  DBili  x      /  AST  30     /  ALT  61     /  AlkPhos  74     23 Oct 2021 15:07    PT/INR - ( 25 Oct 2021 06:14 )   PT: 12.4 sec;   INR: 1.06 ratio         PTT - ( 25 Oct 2021 06:14 )  PTT:26.7 sec  CARDIAC MARKERS ( 23 Oct 2021 12:04 )  <.015 ng/mL / x     / 32 U/L / x     / x          Blood Culture: Organism Blood Culture PCR  Gram Stain Blood -- Gram Stain   Growth in anaerobic bottle: Gram Negative Rods  Growth in aerobic bottle: Gram Negative Rods  Specimen Source .Blood Blood-Peripheral  Culture-Blood --      10-23 @ 12:04  Pro Bnp [id number]        RADIOLOGY:    EXAM:  XR CHEST PORTABLE URGENT 1V                            PROCEDURE DATE:  10/23/2021          INTERPRETATION:  CLINICAL STATEMENT: Weakness. UTI    TECHNIQUE: AP view of the chest.      COMPARISON: 8/5/2021    Extreme apices collimated off the exam.    The cardiomediastinal silhouette is normal and the kayli are not enlarged. The trachea is midline. Suboptimal degree of inspiration with mild bronchovascular crowding. There is no focal infiltrate or pleural effusion. The osseous structures are intact. Clovis pins right head.    IMPRESSION:    Unremarkable frontal chest x ray        --- End of Report ---            CARY VYAS MD; Attending Radiologist  This document has been electronically signed. Oct 24 2021  4:30PM

## 2021-10-25 NOTE — DISCHARGE NOTE PROVIDER - PROVIDER TOKENS
PROVIDER:[TOKEN:[[id number]]],PROVIDER:[TOKEN:[[id number]]],PROVIDER:[TOKEN:[[id number]]]

## 2021-10-25 NOTE — DISCHARGE NOTE PROVIDER - HOSPITAL COURSE
FROM ADMISSION H+P:   HPI:  70 yo M p/w seen by VNS today, in urinary retention - jo placed by that nurse with ~ 1000 cc dark urine. Pt more lethargic than usual, found some hypotensions. No cough/ uri. No known covid exposures. No other acute co.  In ER patient was found to be in shock.  patient is being admitted for further work up and treatment. (23 Oct 2021 13:57)      ---  HOSPITAL COURSE:   70 yo M PMHx ALS, CAD (s/p MI 1 stent placed 2006 on DAPT), HTN, HLD, BPH p/w lethargy/urinary retention. Patient had been more lethargic the past couple of days, had his visiting RN come to his house today, who noticed no output of jo cathter. Jo catheter was replaced (drained ~1000cc of dark urine), and was told to come to ER. In the ER, patient was found to be hypotensive, tachycardic, but afebrile. Labs were significant for leukocytosis, and patient was found to be in [place] with an elevated creatinine. CT Abd/pelvis showed a left uretal stent in place with non-obstructive stones. Patient was transferred to ICU after he remained hypotensive despite fluid resuscitation efforts in the ED and was started on pressors. Patient's blood pressure was eventually stabilized and he was taken off pressors, and downgraded to General Medical floor. Patient's blood cultures would grow Proteus Mirabilis and patient was started on rocephin while sensitivies we pending. Once sensitivities returned, patient was started on______. Patient's clinical status improved over the course of his hospitalization.   ---  CONSULTANTS:   ICU - Dr. Ramos   ID- Dr. Cheema   Nephro- Dr. Ayers   Cardio - Dr. Ramirez   Urology- Dr. Ruiz  ---  TIME SPENT:  The total amount of time spent reviewing the hospital notes, laboratory values, imaging findings, assessing/counseling the patient, discussing with consultant physicians, social work, nursing staff was -- minutes    ---  Primary care provider was made aware of plan for discharge:      [  ] NO     [  ] YES   FROM ADMISSION H+P:   HPI:  72 yo M p/w seen by VNS today, in urinary retention - jo placed by that nurse with ~ 1000 cc dark urine. Pt more lethargic than usual, found some hypotensions. No cough/ uri. No known covid exposures. No other acute co.  In ER patient was found to be in shock.  patient is being admitted for further work up and treatment. (23 Oct 2021 13:57)      ---  HOSPITAL COURSE:   72 yo M PMHx ALS, CAD (s/p MI 1 stent placed 2006 on DAPT), HTN, HLD, BPH p/w lethargy/urinary retention. Patient had been more lethargic the past couple of days, had his visiting RN come to his house today, who noticed no output of jo cathter. Jo catheter was replaced (drained ~1000cc of dark urine), and was told to come to ER. In the ER, patient was found to be hypotensive, tachycardic, but afebrile. Labs were significant for leukocytosis, and patient was found to be in [place] with an elevated creatinine. CT Abd/pelvis showed a left uretal stent in place with non-obstructive stones. Patient was transferred to ICU after he remained hypotensive despite fluid resuscitation efforts in the ED and was started on pressors. Patient's blood pressure was eventually stabilized and he was taken off pressors, and downgraded to General Medical floor. Patient's blood cultures would grow Proteus Mirabilis and patient was started on rocephin while sensitivies we pending. Once sensitivities returned, patient was started on______. Patient's clinical status improved over the course of his hospitalization.   ---  CONSULTANTS:   ICU - Dr. Ramos   ID- Dr. Cheema   Nephro- Dr. Ayers   Cardio - Dr. Ramirez   Urology- Dr. Ruiz  ---  TIME SPENT:  The total amount of time spent reviewing the hospital notes, laboratory values, imaging findings, assessing/counseling the patient, discussing with consultant physicians, social work, nursing staff was -- minutes    ---  Primary care provider was made aware of plan for discharge:      [  ] NO     [ x ] YES   FROM ADMISSION H+P:   HPI:  70 yo M p/w seen by VNS today, in urinary retention - jo placed by that nurse with ~ 1000 cc dark urine. Pt more lethargic than usual, found some hypotensions. No cough/ uri. No known covid exposures. No other acute co.  In ER patient was found to be in shock.  patient is being admitted for further work up and treatment. (23 Oct 2021 13:57)      ---  HOSPITAL COURSE:   70 yo M PMHx ALS, CAD (s/p MI 1 stent placed 2006 on DAPT), HTN, HLD, BPH p/w lethargy/urinary retention. Patient had been more lethargic the past couple of days, had his visiting RN come to his house today, who noticed no output of jo cathter. Jo catheter was replaced (drained ~1000cc of dark urine), and was told to come to ER. In the ER, patient was found to be hypotensive, tachycardic, but afebrile. Labs were significant for leukocytosis, and patient was found to be in [place] with an elevated creatinine. CT Abd/pelvis showed a left uretal stent in place with non-obstructive stones. Patient was transferred to ICU after he remained hypotensive despite fluid resuscitation efforts in the ED and was started on pressors. Patient's blood pressure was eventually stabilized and he was taken off pressors, and downgraded to General Medical floor. Patient's blood cultures would grow Proteus Mirabilis and patient was started on rocephin while sensitivies we pending. Once sensitivities returned, patient was started on___rocephin___. Patient's clinical status improved over the course of his hospitalization.   ---  CONSULTANTS:   ICU - Dr. Ramos   ID- Dr. Cheema   Nephro- Dr. Ayers   Cardio - Dr. Ramirez   Urology- Dr. Ruiz  ---  TIME SPENT:  The total amount of time spent reviewing the hospital notes, laboratory values, imaging findings, assessing/counseling the patient, discussing with consultant physicians, social work, nursing staff was -- minutes    ---  Primary care provider was made aware of plan for discharge:      [  ] NO     [ x ] YES

## 2021-10-25 NOTE — PROGRESS NOTE ADULT - ASSESSMENT
71 year old male with ALS, CAD (s/p MI 1 stent placed 2006 previously on DAPT), HTN, HLD, BPH p/w lethargy and urinary retention. Cardiology following for urosepsis 2/2 proteus mirabilis pyelonephritis, now off pressors.     urosepsis 2/2 proteus mirabilis pyelonephritis (tachycardiac and hypotensive)   -off levophed, HR ~60, SBP low 100s much improved   -improved, lactate downtrended   -cont IVF  - IV abx as per ID     - history of cad s/p pci in 2006  -no sign of ischemia trop neg x 1  - cont plavix, statin    - hold metoprolol and lisinopril given hypotension, reassess tomorrow if can restart     - appears dry, despite elevated BNP    - trend creatinine and electrolytes. Keep K>4, Mg>2  - will follow with you       - Patient is not complaining of any cardiac symptoms at this time.  - No clear evidence of acute ischemia, trops negative x 2. Will follow up third set.  - His CKs are flat, suggesting against acute atherosclerotic plaque rupture.  - Biomarker trend is not consistent with plaque rupture but rather demand ischemia. Monitor closely for the development of anginal symptoms or clinical signs of ischemia.   - No acute changes on EKG compared to previous.  - No meaningful evidence of volume overload.  - Previous TTE shows ___.  - BP well controlled, monitor routine hemodynamics.  - Continue ___.  - Monitor and replete lytes, keep K>4, Mg>2.  - Strict I/Os, daily weights.  - Pt has no active ischemia, decompensated heart failure, unstable arrythmia, or severe stenotic valvular disease, and has __ cardiac risk factors. In the setting of low risk ___, he/she is optimized from cardiovascular standpoint to proceed with planned procedure with routine hemodynamic monitoring.   - Pt has no modifiable active cardiac risk factors and in the setting of low risk _____, patient is optimized as best as possible from cardiovascular standpoint to proceed with planned procedure with routine hemodynamic monitoring.   - Other cardiovascular workup will depend on clinical course.  - All other workup per primary team.  - Will continue to follow.             71 year old male with ALS, CAD (s/p MI 1 stent placed 2006 previously on DAPT), HTN, HLD, BPH p/w lethargy and urinary retention. Cardiology following for urosepsis 2/2 proteus mirabilis pyelonephritis, now off pressors.     urosepsis 2/2 proteus mirabilis pyelonephritis (tachycardiac and hypotensive)   -off levophed, HR ~60, SBP low 100s much improved   -improved, lactate downtrended   -cont IVF  - IV abx as per ID     - history of cad s/p pci in 2006  -no sign of ischemia trop neg x 1  - cont plavix, statin    - hold metoprolol and lisinopril given hypotension, reassess tomorrow if can restart     - appears dry, despite elevated BNP    - trend creatinine and electrolytes. Keep K>4, Mg>2  - will follow with you      71 year old male with ALS, CAD (s/p MI 1 stent placed 2006 previously on DAPT), HTN, HLD, BPH p/w lethargy and urinary retention. Cardiology following for urosepsis 2/2 proteus mirabilis pyelonephritis, now off pressors.     urosepsis 2/2 proteus mirabilis pyelonephritis (tachycardiac and hypotensive)   -off levophed, HR ~60, SBP low 100s much improved   -improved, lactate downtrended   -cont IVF  - IV abx as per ID   -hx of HTN, hold metoprolol and lisinopril given mild hypotension, reassess tomorrow if can restart     - history of cad s/p PCI in 2006  - no sign of ischemia trop neg x 1  -patient not complaining of any cardiac symptoms at this time   - cont plavix, statin    - trend creatinine and electrolytes. Keep K>4, Mg>2

## 2021-10-26 LAB
-  AMIKACIN: SIGNIFICANT CHANGE UP
-  AMPICILLIN/SULBACTAM: SIGNIFICANT CHANGE UP
-  AMPICILLIN: SIGNIFICANT CHANGE UP
-  AZTREONAM: SIGNIFICANT CHANGE UP
-  CEFAZOLIN: SIGNIFICANT CHANGE UP
-  CEFEPIME: SIGNIFICANT CHANGE UP
-  CEFOXITIN: SIGNIFICANT CHANGE UP
-  CEFTRIAXONE: SIGNIFICANT CHANGE UP
-  CIPROFLOXACIN: SIGNIFICANT CHANGE UP
-  ERTAPENEM: SIGNIFICANT CHANGE UP
-  GENTAMICIN: SIGNIFICANT CHANGE UP
-  LEVOFLOXACIN: SIGNIFICANT CHANGE UP
-  MEROPENEM: SIGNIFICANT CHANGE UP
-  PIPERACILLIN/TAZOBACTAM: SIGNIFICANT CHANGE UP
-  TOBRAMYCIN: SIGNIFICANT CHANGE UP
-  TRIMETHOPRIM/SULFAMETHOXAZOLE: SIGNIFICANT CHANGE UP
ALBUMIN SERPL ELPH-MCNC: 2 G/DL — LOW (ref 3.3–5)
ALP SERPL-CCNC: 77 U/L — SIGNIFICANT CHANGE UP (ref 40–120)
ALT FLD-CCNC: 84 U/L — HIGH (ref 12–78)
ANION GAP SERPL CALC-SCNC: 5 MMOL/L — SIGNIFICANT CHANGE UP (ref 5–17)
APTT BLD: 24.4 SEC — LOW (ref 27.5–35.5)
AST SERPL-CCNC: 39 U/L — HIGH (ref 15–37)
BASOPHILS # BLD AUTO: 0.01 K/UL — SIGNIFICANT CHANGE UP (ref 0–0.2)
BASOPHILS NFR BLD AUTO: 0.1 % — SIGNIFICANT CHANGE UP (ref 0–2)
BILIRUB SERPL-MCNC: 0.4 MG/DL — SIGNIFICANT CHANGE UP (ref 0.2–1.2)
BUN SERPL-MCNC: 26 MG/DL — HIGH (ref 7–23)
CALCIUM SERPL-MCNC: 9.7 MG/DL — SIGNIFICANT CHANGE UP (ref 8.5–10.1)
CHLORIDE SERPL-SCNC: 109 MMOL/L — HIGH (ref 96–108)
CO2 SERPL-SCNC: 27 MMOL/L — SIGNIFICANT CHANGE UP (ref 22–31)
CREAT SERPL-MCNC: 0.62 MG/DL — SIGNIFICANT CHANGE UP (ref 0.5–1.3)
CULTURE RESULTS: SIGNIFICANT CHANGE UP
EOSINOPHIL # BLD AUTO: 0.09 K/UL — SIGNIFICANT CHANGE UP (ref 0–0.5)
EOSINOPHIL NFR BLD AUTO: 1.3 % — SIGNIFICANT CHANGE UP (ref 0–6)
FOLATE SERPL-MCNC: 11.7 NG/ML — SIGNIFICANT CHANGE UP
GLUCOSE SERPL-MCNC: 116 MG/DL — HIGH (ref 70–99)
HCT VFR BLD CALC: 32.3 % — LOW (ref 39–50)
HGB BLD-MCNC: 10.3 G/DL — LOW (ref 13–17)
IMM GRANULOCYTES NFR BLD AUTO: 0.1 % — SIGNIFICANT CHANGE UP (ref 0–1.5)
INR BLD: 1.06 RATIO — SIGNIFICANT CHANGE UP (ref 0.88–1.16)
LYMPHOCYTES # BLD AUTO: 0.86 K/UL — LOW (ref 1–3.3)
LYMPHOCYTES # BLD AUTO: 12.1 % — LOW (ref 13–44)
MAGNESIUM SERPL-MCNC: 1.8 MG/DL — SIGNIFICANT CHANGE UP (ref 1.6–2.6)
MCHC RBC-ENTMCNC: 31.2 PG — SIGNIFICANT CHANGE UP (ref 27–34)
MCHC RBC-ENTMCNC: 31.9 GM/DL — LOW (ref 32–36)
MCV RBC AUTO: 97.9 FL — SIGNIFICANT CHANGE UP (ref 80–100)
METHOD TYPE: SIGNIFICANT CHANGE UP
MONOCYTES # BLD AUTO: 0.37 K/UL — SIGNIFICANT CHANGE UP (ref 0–0.9)
MONOCYTES NFR BLD AUTO: 5.2 % — SIGNIFICANT CHANGE UP (ref 2–14)
NEUTROPHILS # BLD AUTO: 5.78 K/UL — SIGNIFICANT CHANGE UP (ref 1.8–7.4)
NEUTROPHILS NFR BLD AUTO: 81.2 % — HIGH (ref 43–77)
NRBC # BLD: 0 /100 WBCS — SIGNIFICANT CHANGE UP (ref 0–0)
ORGANISM # SPEC MICROSCOPIC CNT: SIGNIFICANT CHANGE UP
PHOSPHATE SERPL-MCNC: 2.4 MG/DL — LOW (ref 2.5–4.5)
PLATELET # BLD AUTO: 164 K/UL — SIGNIFICANT CHANGE UP (ref 150–400)
POTASSIUM SERPL-MCNC: 4 MMOL/L — SIGNIFICANT CHANGE UP (ref 3.5–5.3)
POTASSIUM SERPL-SCNC: 4 MMOL/L — SIGNIFICANT CHANGE UP (ref 3.5–5.3)
PROT SERPL-MCNC: 5.9 G/DL — LOW (ref 6–8.3)
PROTHROM AB SERPL-ACNC: 12.4 SEC — SIGNIFICANT CHANGE UP (ref 10.6–13.6)
RBC # BLD: 3.3 M/UL — LOW (ref 4.2–5.8)
RBC # FLD: 13.2 % — SIGNIFICANT CHANGE UP (ref 10.3–14.5)
SODIUM SERPL-SCNC: 141 MMOL/L — SIGNIFICANT CHANGE UP (ref 135–145)
SPECIMEN SOURCE: SIGNIFICANT CHANGE UP
VIT B12 SERPL-MCNC: 654 PG/ML — SIGNIFICANT CHANGE UP (ref 232–1245)
WBC # BLD: 7.12 K/UL — SIGNIFICANT CHANGE UP (ref 3.8–10.5)
WBC # FLD AUTO: 7.12 K/UL — SIGNIFICANT CHANGE UP (ref 3.8–10.5)

## 2021-10-26 PROCEDURE — 99291 CRITICAL CARE FIRST HOUR: CPT

## 2021-10-26 PROCEDURE — 99233 SBSQ HOSP IP/OBS HIGH 50: CPT | Mod: GC

## 2021-10-26 RX ORDER — METOPROLOL TARTRATE 50 MG
25 TABLET ORAL
Refills: 0 | Status: DISCONTINUED | OUTPATIENT
Start: 2021-10-26 | End: 2021-10-28

## 2021-10-26 RX ORDER — SODIUM,POTASSIUM PHOSPHATES 278-250MG
1 POWDER IN PACKET (EA) ORAL
Refills: 0 | Status: COMPLETED | OUTPATIENT
Start: 2021-10-26 | End: 2021-10-27

## 2021-10-26 RX ORDER — MAGNESIUM SULFATE 500 MG/ML
2 VIAL (ML) INJECTION ONCE
Refills: 0 | Status: COMPLETED | OUTPATIENT
Start: 2021-10-26 | End: 2021-10-26

## 2021-10-26 RX ADMIN — LEVETIRACETAM 500 MILLIGRAM(S): 250 TABLET, FILM COATED ORAL at 10:00

## 2021-10-26 RX ADMIN — ATORVASTATIN CALCIUM 80 MILLIGRAM(S): 80 TABLET, FILM COATED ORAL at 21:49

## 2021-10-26 RX ADMIN — Medication 1 PACKET(S): at 16:17

## 2021-10-26 RX ADMIN — CEFTRIAXONE 100 MILLIGRAM(S): 500 INJECTION, POWDER, FOR SOLUTION INTRAMUSCULAR; INTRAVENOUS at 13:40

## 2021-10-26 RX ADMIN — CLOPIDOGREL BISULFATE 75 MILLIGRAM(S): 75 TABLET, FILM COATED ORAL at 12:16

## 2021-10-26 RX ADMIN — Medication 1 TABLET(S): at 12:12

## 2021-10-26 RX ADMIN — Medication 145 MILLIGRAM(S): at 12:11

## 2021-10-26 RX ADMIN — ESCITALOPRAM OXALATE 20 MILLIGRAM(S): 10 TABLET, FILM COATED ORAL at 12:12

## 2021-10-26 RX ADMIN — LEVETIRACETAM 500 MILLIGRAM(S): 250 TABLET, FILM COATED ORAL at 21:49

## 2021-10-26 RX ADMIN — Medication 1 PACKET(S): at 12:11

## 2021-10-26 RX ADMIN — Medication 50 MILLIGRAM(S): at 06:08

## 2021-10-26 RX ADMIN — Medication 1 PACKET(S): at 21:57

## 2021-10-26 RX ADMIN — ENOXAPARIN SODIUM 40 MILLIGRAM(S): 100 INJECTION SUBCUTANEOUS at 12:13

## 2021-10-26 RX ADMIN — CHLORHEXIDINE GLUCONATE 1 APPLICATION(S): 213 SOLUTION TOPICAL at 05:22

## 2021-10-26 RX ADMIN — LISINOPRIL 2.5 MILLIGRAM(S): 2.5 TABLET ORAL at 06:08

## 2021-10-26 RX ADMIN — Medication 25 MILLIGRAM(S): at 17:43

## 2021-10-26 RX ADMIN — Medication 50 GRAM(S): at 12:10

## 2021-10-26 NOTE — OCCUPATIONAL THERAPY INITIAL EVALUATION ADULT - BED MOBILITY LIMITATIONS, REHAB EVAL
decreased ability to use legs for bridging/pushing decreased ability to use arms for pushing/pulling/decreased ability to use legs for bridging/pushing

## 2021-10-26 NOTE — SWALLOW BEDSIDE ASSESSMENT ADULT - ADDITIONAL RECOMMENDATIONS
1. Ongoing oral care  2. Speech/swallowing therapy s/p d/c to maintain/compensate current speech/swallow function

## 2021-10-26 NOTE — OCCUPATIONAL THERAPY INITIAL EVALUATION ADULT - ADDITIONAL COMMENTS
Social info obtained from son via phone and EMR. Pt lives w/ his spouse and son in a house, + 2-3 steps to enter/exit only, patient stays on main floor. Pt ambulated using a RW with assist and requires assistance for ADLs as needed. Pt has aide 8hrs 7xwk and family able to assist as needed. Pt was home for 3-4 days after d/c from rehab. Social info obtained from son via phone and EMR. Pt lives w/ his spouse and son in a house, + 2-3 steps to enter/exit only, patient stays on main floor. Pt has a bathtub with sliding doors and grab bars. Pt ambulated using a RW with assist and requires assistance for ADLs as needed. Pt has aide 8hrs 7xwk and family able to assist as needed. Pt was home for 3-4 days after d/c from rehab. Pt requires assistance with ADL's and transfers due to decreased strength, decreased fine motor skills, decreased endurance and impaired sitting/standing balance.

## 2021-10-26 NOTE — PROGRESS NOTE ADULT - ASSESSMENT
71 year old male with ALS, CAD (s/p MI 1 stent placed 2006 previously on DAPT), HTN, HLD, BPH p/w lethargy and urinary retention. Cardiology following for urosepsis 2/2 proteus mirabilis pyelonephritis, now off pressors.     urosepsis 2/2 proteus mirabilis pyelonephritis (tachycardiac and hypotensive)   - off levophed, HR ~60, SBP low 100s much improved   - IV abx as per ID   - lisinopril and BB resumed.     - history of cad s/p PCI in 2006  - no sign of ischemia trop neg x 1  - cont [id number], statin    - trend creatinine and electrolytes. Keep K>4, Mg>2  - Further cardiac workup will depend on clinical course.   - All other workup per primary team. Will followup.

## 2021-10-26 NOTE — PHYSICAL THERAPY INITIAL EVALUATION ADULT - ADDITIONAL COMMENTS
Pt lives w/ his spouse and son in a house, + 2-3 steps to enter/exit only, otherwise patient stays on main floor. Pt ambulates w/ RW and assist and requires assistance or ADLs as needed. Pt has aide 8hrs 7xwk and family able to assist as needed. pt was home for 3-4 days after d/c from rehab. Information obtained from son via phone.

## 2021-10-26 NOTE — OCCUPATIONAL THERAPY INITIAL EVALUATION ADULT - GENERAL OBSERVATIONS, REHAB EVAL
Patient received supine in bed in ICU with +telemonitor and +Shukla catheter. Patient mostly non-verbal due to ALS.

## 2021-10-26 NOTE — OCCUPATIONAL THERAPY INITIAL EVALUATION ADULT - PHYSICAL ASSIST/NONPHYSICAL ASSIST: SIT/STAND, REHAB EVAL
"            MICHAEL COUCH is a 21 year old individual that uses pronouns He/Him/His/Himself that presents today for follow up of:  masculinizing hormone therapy. Gender identity: Male    Any special concerns today?  no current concerns    On hormones?  YES +++ Shot day of the week? Wednesday      Due for labs?  Yes      +++ Refills of meds needed?  No  ---    Past Surgical History:   Procedure Laterality Date     APPENDECTOMY         Patient Active Problem List   Diagnosis     Albinoidism (H)     Gender dysphoria in adolescent and adult     Congenital nystagmus     Depression, recurrent (H)     Anxiety     Hypermetropia of both eyes     Moderate vision impairment of both eyes     Ocular albinism (H)     PTSD (post-traumatic stress disorder)       Current Outpatient Prescriptions   Medication Sig Dispense Refill     needle, disp, 18G X 1\" MISC Use to draw up hormones once weekly 25 each 3     Needle, Disp, 25G X 1-1/2\" MISC Use once weekly for administering hormone IM 25 each 3     syringe, disposable, 1 ML MISC Use once weekly to draw up hormones 25 each 3     testosterone cypionate (DEPOTESTOTERONE) 200 MG/ML injection Inject 0.2 mLs (40 mg) into the muscle once a week 1 mL 1       History   Smoking Status     Never Smoker   Smokeless Tobacco     Never Used        No Known Allergies    Problem, Medication and Allergy Lists were reviewed and updated if needed..         Review of Systems:      General    Fat redistribution: no    Weight change: YES- 15 lb increase HEENT    Voice change: YES- deeper     Cardiovascular (CV)    Chest Pains: no    Shortness of breath: no Chest    Decreased exercise tolerance:  no    Breast changes/development: no     Gastrointestinal (GI)    Abdominal pain: no    Change in appetite: increased Skin    Acne or oily skin: no    Change in hair: no     Genitourinary ()    Abnormal vaginal bleeding: no     Decreased spontaneous erections: no    Change in libido: no    New sexual partners: " Initial Psychosocial Assessment     I have reviewed the chart, met with the patient, and developed pt Care Plan. Information for assessment was obtained from: pt and chart. Limited information was provided by the pt as he struggles to find the correct words.    Presenting Problem:  Pt is a 58 year old male transferred from [facility]. PT was brought in from his assisted living facility ([facility]) after he was found using a knife to cut his wrist.  Pt reports that he has had two strokes.  Pt has ongoing depression and reportedly thinks of way that he can die. Pt is in a wheelchair as a result of his strokes.  Pt receives the following services at [facility] (med mgmt., help with showering, escort to meals, overnight safety checks, blood sugar checks).    Pt was on a 72 hour hold, however signed in voluntarily.  History of Mental Health and Chemical Dependency:  Pt has a history of depression.  No prior  admissions. No issues with alcohol or drugs.    Significant Life Events (Illness, Abuse, Trauma, Death):  Not assessed.    Living Situation:  Pt is living at [facility].    Educational Background:  Unable to assess.     Occupational History:  Pt has worked in the past. Unknown what he did and where he worked.    Financial Status:  Unknown    Legal Issues:   Pt has a POA, Richie Ott.     Ethnic/Cultural Issues:   Unable to assess.     Spiritual Orientation:  Unable to assess.      Service History:  None     Social Functioning (organization, interests):  Unable to assess.     Current Treatment Providers are:  PCP:        Social Service Assessment/Plan:     Met with pt to do assessment.  He was calm and pleasant. He had a great deal of difficulty finding the words to answer questions which he attributes to his strokes. Pt would benefit from a support group for stroke survivors which might help him to better deal with his symptoms.    Hospital staff will provide a safe environment and a  "no Musculoskeletal    Leg pain or swelling: no     Psychiatric (Psych)    Depression: no    Anxiety/Panic: no    Mood:  \"okay\"                    Physical Exam:     Vitals:    09/18/18 1534   BP: 120/74   BP Location: Left arm   Patient Position: Chair   Cuff Size: Adult Regular   Pulse: 84   Resp: 20   Temp: 98.2  F (36.8  C)   TempSrc: Oral   SpO2: 97%   Weight: 174 lb (78.9 kg)     BMI= There is no height or weight on file to calculate BMI.   Wt Readings from Last 10 Encounters:   09/18/18 174 lb (78.9 kg) (93 %)*   06/13/18 163 lb (73.9 kg) (90 %)*   06/07/18 160 lb 6.4 oz (72.8 kg) (88 %)*     * Growth percentiles are based on [facility] 2-20 Years data.     Appearance: Male appearance and dress    GENERAL:: healthy, alert and no distress  NECK: no adenopathy, no asymmetry, no masses,  and thyroid normal to palpation, supple  RESP: lungs clear to auscultation - no rales, no rhonchi, no wheezes  CV: regular rates and rhythm, normal S1 S2, no S3 or S4 and no murmur, no click or rub -  ABDOMEN: soft, no tenderness, no  hepatosplenomegaly, no masses, normal bowel sounds  MS: extremities normal- no gross deformities noted, no edema  Psych: Alert and oriented times 3; coherent speech, normal rate and volume, able to articulate logical thoughts, able to abstract reason, no tangential thoughts, no hallucinations or delusions.  Affect: Appropriate/mood-congruent           Labs:   Labs pending    Assessment and Plan     1. Gender dysphoria in adolescent and adult    Stable, doing well.    Contraception:   Not desired. Minimal penetrative intercourse and would be happy to get pregnant.    Counselled patient about controlled substances: Yes.     Follow up:  Follow up in 6 months.  Results by mychart  Questions were elicited and answered.     Chinedu Kaur, DO    " 1 person assist therapeutic milieu. Pt will have psychiatric assessment and medication management by the psychiatrist. CTC will do individual inpatient treatment planning and after care planning. Staff will continue to assess pt as needed. Patient will participate in unit groups and activities. Pt will receive individual and group support on the unit.      Patient admitted for safety/stabilization of mood disorder sx's.  Medication will be reviewed, adjusted per MD's as indicated.    Will contact outpatient providers for care coordination.  Will discuss options for increased community supports.  Will continue to assess, coordinate care, and ensure appropriate f/u care is in place.

## 2021-10-26 NOTE — SWALLOW BEDSIDE ASSESSMENT ADULT - SLP PERTINENT HISTORY OF CURRENT PROBLEM
Per charting, "70 yo M PMHx ALS, CAD (s/p MI 1 stent placed 2006 on DAPT), HTN, HLD, BPH p/w lethargy/urinary retention. Admitted to ICU for urosepsis requiring pressor support. Patient has since become hemodynamically stable."

## 2021-10-26 NOTE — PROGRESS NOTE ADULT - SUBJECTIVE AND OBJECTIVE BOX
Patient is a 71y old  Male who presents with a chief complaint of blocked jo (24 Oct 2021 12:23)  Patient seen in follow up for MARLEY.        PAST MEDICAL HISTORY:  Myocardial infarct    Hypertension    Hyperlipidemia    BPH (benign prostatic hyperplasia)    ALS (amyotrophic lateral sclerosis)       MEDICATIONS  (STANDING):  atorvastatin 80 milliGRAM(s) Oral at bedtime  cefTRIAXone   IVPB 1000 milliGRAM(s) IV Intermittent every 24 hours  chlorhexidine 2% Cloths 1 Application(s) Topical <User Schedule>  chlorhexidine 4% Liquid 1 Application(s) Topical <User Schedule>  clopidogrel Tablet 75 milliGRAM(s) Oral daily  enoxaparin Injectable 40 milliGRAM(s) SubCutaneous daily  escitalopram 20 milliGRAM(s) Oral daily  fenofibrate Tablet 145 milliGRAM(s) Oral daily  lactobacillus acidophilus 1 Tablet(s) Oral daily  levETIRAcetam  Solution 500 milliGRAM(s) Oral two times a day  lisinopril 2.5 milliGRAM(s) Oral daily  magnesium sulfate  IVPB 2 Gram(s) IV Intermittent once  metoprolol tartrate 25 milliGRAM(s) Oral two times a day  potassium phosphate / sodium phosphate Powder (PHOS-NaK) 1 Packet(s) Oral four times a day before meals    MEDICATIONS  (PRN):    T(C): 37 (10-26-21 @ 07:37), Max: 37.5 (10-25-21 @ 16:00)  HR: 59 (10-26-21 @ 10:00) (49 - 92)  BP: 111/55 (10-26-21 @ 10:00) (102/58 - 154/75)  RR: 30 (10-26-21 @ 10:00)  SpO2: 99% (10-26-21 @ 10:00)  Wt(kg): --  I&O's Detail    25 Oct 2021 07:01  -  26 Oct 2021 07:00  --------------------------------------------------------  IN:    Enteral Tube Flush: 780 mL    Free Water: 750 mL    IV PiggyBack: 50 mL    Osmolite: 1560 mL    sodium chloride 0.45%: 375 mL  Total IN: 3515 mL    OUT:    Indwelling Catheter - Urethral (mL): 1950 mL  Total OUT: 1950 mL    Total NET: 1565 mL      26 Oct 2021 07:01  -  26 Oct 2021 10:58  --------------------------------------------------------  IN:    Enteral Tube Flush: 130 mL    Osmolite: 260 mL  Total IN: 390 mL    OUT:    Indwelling Catheter - Urethral (mL): 200 mL  Total OUT: 200 mL    Total NET: 190 mL                  PHYSICAL EXAM:  General: No distress  Respiratory: b/l air entry  Cardiovascular: S1 S2  Gastrointestinal: soft, PEG  Extremities:  no edema                           LABORATORY:                        10.3   7.12  )-----------( 164      ( 26 Oct 2021 05:20 )             32.3     10-26    141  |  109<H>  |  26<H>  ----------------------------<  116<H>  4.0   |  27  |  0.62    Ca    9.7      26 Oct 2021 05:20  Phos  2.4     10-26  Mg     1.8     10-26    TPro  5.9<L>  /  Alb  2.0<L>  /  TBili  0.4  /  DBili  x   /  AST  39<H>  /  ALT  84<H>  /  AlkPhos  77  10-26    Sodium, Serum: 141 mmol/L (10-26 @ 05:20)  Sodium, Serum: 147 mmol/L (10-25 @ 06:14)    Potassium, Serum: 4.0 mmol/L (10-26 @ 05:20)  Potassium, Serum: 4.2 mmol/L (10-25 @ 06:14)    Hemoglobin: 10.3 g/dL (10-26 @ 05:20)  Hemoglobin: 10.1 g/dL (10-25 @ 06:14)  Hemoglobin: 12.0 g/dL (10-24 @ 06:23)  Hemoglobin: 15.9 g/dL (10-23 @ 12:04)    Creatinine, Serum 0.62 (10-26 @ 05:20)  Creatinine, Serum 0.77 (10-25 @ 06:14)  Creatinine, Serum 1.20 (10-24 @ 06:23)  Creatinine, Serum 2.50 (10-23 @ 15:07)        LIVER FUNCTIONS - ( 26 Oct 2021 05:20 )  Alb: 2.0 g/dL / Pro: 5.9 g/dL / ALK PHOS: 77 U/L / ALT: 84 U/L / AST: 39 U/L / GGT: x

## 2021-10-26 NOTE — PROGRESS NOTE ADULT - ASSESSMENT
72 yo M PMHx ALS, CAD (s/p MI 1 stent placed 2006 on DAPT), HTN, HLD, BPH p/w lethargy/urinary retention. Admitted to ICU for urosepsis requiring pressor support. Patient has since become hemodynamically stable.     1. Septic Shock  2. UTI (Proteus mirabilis)  3. MARLEY on CKD  4. ALS    NEURO:  -Cont home keppra for seizures (confirmed with outpt pharmacy, pt takes 500mg BID at home)  -Cont home lexapro  -Monitor mental status closely, avoid neurosuppresants.    CV:  -Shock resolved, off pressors  -HTN: restarted on metoprolol 25mg BID and Lisinopril 2.5mg  -Hx of MI, cont home plavix statin, fenofibrate   -elevated pro-BNP on admission, bedside echo showed good ventricular function, official TTE read pending    RESP: No acute issues, satting well on RA, goal SpO2 >92%.    RENAL:  -MARLEY, obstructive due to clogged Shukla  -Kidney fxn improving  -Hypernatremia corrected, Na today 141, decreased free water to 150cc q6H   -trend lytes/Scr daily with BMP  -I's and O's, goal UOP 0.5 cc/kg/hr  -renal dose meds and avoid nephrotoxins     GI: No acute issues, cont tube feeds.     ID:  -Urosepsis, bacteremia 2/2 Proteus mirabilis pyelonephritis  -C/w ceftriaxone   -F/u bld cx from 10/24/21   -Leukocytosis improving, lactate normalized, pt afebrile, non toxic appearing    ENDO: No acute issues, routine FS within goal 140-180    HEME:   -cont lovenox,  -Hgb 10.3 today, will continue to trend   -No S&S of bleeding     DISPO: Full code    Called Patient's son Eleazar Rivera [phone] to update on plan and father's clinical condition as well as ongoing search for bed on general medical floor.

## 2021-10-26 NOTE — PROGRESS NOTE ADULT - ASSESSMENT
MARLEY: Prerenal azotemia, Septic ATN, Clogged jo, on ACEI  Sepsis, Shock on pressors  h/o Left UVJ calculus, s/p ureteral stent placement.   h/o ALS  Hypernatremia    Improved and renal indices. Continue jo drainage. ACEI resumed. Sodium levels improving. IV abx. Avoid nephrotoxic meds as possible.   Avoid ACEI, ARB, NSAIDs and IV contrast. Will follow electrolytes and renal function trend.

## 2021-10-26 NOTE — PROGRESS NOTE ADULT - SUBJECTIVE AND OBJECTIVE BOX
Patient is a 71y old  Male who presents with a chief complaint of blocked jo (26 Oct 2021 13:07)    24 hour events: No acute overnight events, continued search for bed on general medical floor. Patient seen and examined at bedside. Patient was restarted on home blood pressure medications by hospitalist.     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 but in NAD  Neuro: Awake, alert, responsive to verbal and physical stimuli  HEENT: NC/AT  Resp: CTAB, no wheezes or rales appreciated  CVS: RRR, S1 S2, no murmurs appreciated  Abd: Soft, nontender, nondistended  Skin: Warm, WP     MEDICATIONS  cefTRIAXone   IVPB IV Intermittent    lisinopril Oral  metoprolol tartrate Oral    atorvastatin Oral  fenofibrate Tablet Oral      escitalopram Oral  levETIRAcetam  Solution Oral      clopidogrel Tablet Oral  enoxaparin Injectable SubCutaneous        potassium phosphate / sodium phosphate Powder (PHOS-NaK) Oral      chlorhexidine 2% Cloths Topical  chlorhexidine 4% Liquid Topical    lactobacillus acidophilus Oral                          10.3   7.12  )-----------( 164      ( 26 Oct 2021 05:20 )             32.3       10-26    141  |  109<H>  |  26<H>  ----------------------------<  116<H>  4.0   |  27  |  0.62    Ca    9.7      26 Oct 2021 05:20  Phos  2.4     10-26  Mg     1.8     10-26    TPro  5.9<L>  /  Alb  2.0<L>  /  TBili  0.4  /  DBili  x   /  AST  39<H>  /  ALT  84<H>  /  AlkPhos  77  10-26          PT/INR - ( 26 Oct 2021 05:20 )   PT: 12.4 sec;   INR: 1.06 ratio         PTT - ( 26 Oct 2021 05:20 )  PTT:24.4 sec    .Blood Blood   No growth to date. -- 10-25 @ 08:45  .Blood Blood-Peripheral   Growth in aerobic and anaerobic bottles: Proteus mirabilis  ***Blood Panel PCR results on this specimen are available  approximately 3 hours after the Gram stain result.***  Gram stain, PCR, and/or culture results may not always  correspond due to difference in methodologies.  ************************************************************  This PCR assay was performed by multiplex PCR. This  Assay tests for 66 bacterial and resistance gene targets.  Please refer to the [facility] test directory  at [email or web address] for details.   Growth in anaerobic bottle: Gram Negative Rods  Growth in aerobic bottle: Gram Negative Rods 10-23 @ 16:27  Clean Catch Clean Catch (Midstream)   >=3 organisms. Probable collection contamination. -- 10-23 @ 16:18      Rapid RVP Result: NotDetec (10-23 @ 12:06)    Radiology: No new studies performed in the last 24 hours      CENTRAL LINE: N  JO: Y              A-LINE: N              GLOBAL ISSUE/BEST PRACTICE  Analgesia: N  Sedation: N  HOB elevation: yes  Stress ulcer prophylaxis: N  VTE prophylaxis: Y  Glycemic control: N  Nutrition: Y    CODE STATUS: Full code     Patient is a 71y old  Male who presents with a chief complaint of blocked jo (26 Oct 2021 13:07)    24 hour events: No acute overnight events, Patient seen and examined at bedside.       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      T(F): 97.9 (10-26-21 @ 12:12), Max: 99.5 (10-25-21 @ 16:00)  HR: 61 (10-26-21 @ 14:00) (49 - 92)  BP: 100/55 (10-26-21 @ 14:00) (100/55 - 154/75)  RR: 29 (10-26-21 @ 14:00) (22 - 33)  SpO2: 98% (10-26-21 @ 14:00) (91% - 99%)  Wt(kg): --            I&O's Summary    10-25 @ 07:01  -  10-26 @ 07:00  --------------------------------------------------------  IN: 3515 mL / OUT: 1950 mL / NET: 1565 mL    10-26 @ 07:01  -  10-26 @ 14:27  --------------------------------------------------------  IN: 390 mL / OUT: 200 mL / NET: 190 mL      PHYSICAL EXAM  General: Thin male, non-verbal 2/2 ALS but in NAD  Neuro: Awake, alert, responsive to verbal and physical stimuli  HEENT: NC/AT  Resp: CTAB, no wheezes or rales appreciated  CVS: RRR, S1 S2, no murmurs appreciated  Abd: Soft, nontender, nondistended  Skin: Warm, WP     MEDICATIONS  cefTRIAXone   IVPB IV Intermittent    lisinopril Oral  metoprolol tartrate Oral    atorvastatin Oral  fenofibrate Tablet Oral      escitalopram Oral  levETIRAcetam  Solution Oral      clopidogrel Tablet Oral  enoxaparin Injectable SubCutaneous        potassium phosphate / sodium phosphate Powder (PHOS-NaK) Oral      chlorhexidine 2% Cloths Topical  chlorhexidine 4% Liquid Topical    lactobacillus acidophilus Oral                          10.3   7.12  )-----------( 164      ( 26 Oct 2021 05:20 )             32.3       10-26    141  |  109<H>  |  26<H>  ----------------------------<  116<H>  4.0   |  27  |  0.62    Ca    9.7      26 Oct 2021 05:20  Phos  2.4     10-26  Mg     1.8     10-26    TPro  5.9<L>  /  Alb  2.0<L>  /  TBili  0.4  /  DBili  x   /  AST  39<H>  /  ALT  84<H>  /  AlkPhos  77  10-26          PT/INR - ( 26 Oct 2021 05:20 )   PT: 12.4 sec;   INR: 1.06 ratio         PTT - ( 26 Oct 2021 05:20 )  PTT:24.4 sec    .Blood Blood   No growth to date. -- 10-25 @ 08:45  .Blood Blood-Peripheral   Growth in aerobic and anaerobic bottles: Proteus mirabilis  ***Blood Panel PCR results on this specimen are available  approximately 3 hours after the Gram stain result.***  Gram stain, PCR, and/or culture results may not always  correspond due to difference in methodologies.  ************************************************************  This PCR assay was performed by multiplex PCR. This  Assay tests for 66 bacterial and resistance gene targets.  Please refer to the [facility] test directory  at [email or web address] for details.   Growth in anaerobic bottle: Gram Negative Rods  Growth in aerobic bottle: Gram Negative Rods 10-23 @ 16:27  Clean Catch Clean Catch (Midstream)   >=3 organisms. Probable collection contamination. -- 10-23 @ 16:18      Rapid RVP Result: NotDetec (10-23 @ 12:06)    Radiology: No new studies performed in the last 24 hours      CENTRAL LINE: N  JO: Y              A-LINE: N              GLOBAL ISSUE/BEST PRACTICE  Analgesia: N  Sedation: N  HOB elevation: yes  Stress ulcer prophylaxis: N  VTE prophylaxis: Y  Glycemic control: N  Nutrition: Y    CODE STATUS: Full code

## 2021-10-26 NOTE — PROGRESS NOTE ADULT - SUBJECTIVE AND OBJECTIVE BOX
[facility], Division of Infectious Diseases  DIOGO El Y. Patel, S. Shah  [phone]  after hours and weekends [phone]    Name: SHARITA HOUGH  Age: 71y  Gender: Male  MRN: [id number]    Interval History--  Notes reviewed      Allergies    fish (Short breath; Anaphylaxis)  No Known Drug Allergies    Intolerances        Medications--  Antibiotics:  cefTRIAXone   IVPB 1000 milliGRAM(s) IV Intermittent every 24 hours    Immunologic:    Other:  atorvastatin  chlorhexidine 2% Cloths  chlorhexidine 4% Liquid  clopidogrel Tablet  enoxaparin Injectable  escitalopram  fenofibrate Tablet  lactobacillus acidophilus  levETIRAcetam  Solution  lisinopril  metoprolol tartrate  potassium phosphate / sodium phosphate Powder (PHOS-NaK)      Review of Systems--  A 10-point review of systems was obtained.     Pertinent positives and negatives--  Constitutional: No fevers. No Chills. No Rigors.   Cardiovascular: No chest pain. No palpitations.  Respiratory: No shortness of breath. No cough.  Gastrointestinal: No nausea or vomiting. No diarrhea or constipation.   Psychiatric: Pleasant. Appropriate affect.    Review of systems otherwise negative except as previously noted.    Physical Examination--  Vital Signs: T(F): 97.9 (10-26-21 @ 12:12), Max: 99.5 (10-25-21 @ 16:00)  HR: 60 (10-26-21 @ 12:00)  BP: 114/58 (10-26-21 @ 12:00)  RR: 29 (10-26-21 @ 12:00)  SpO2: 99% (10-26-21 @ 12:00)  Wt(kg): --  General: Nontoxic-appearing Male in no acute distress.  HEENT: AT/NC.  Neck: Not rigid. No sense of mass.  Nodes: None palpable.  Lungs: Clear bilaterally without rales, wheezing or rhonchi  Heart: Regular rate and rhythm.   Abdomen: Bowel sounds present and normoactive. Soft. +peg  Extremities: No cyanosis or clubbing. No edema.   Skin: Warm. Dry. Good turgor. No rash. No vasculitic stigmata.  Psychiatric: Appropriate affect and mood for situation.   jo      Laboratory Studies--  CBC                        10.3   7.12  )-----------( 164      ( 26 Oct 2021 05:20 )             32.3       Chemistries  10-26    141  |  109<H>  |  26<H>  ----------------------------<  116<H>  4.0   |  27  |  0.62    Ca    9.7      26 Oct 2021 05:20  Phos  2.4     10-26  Mg     1.8     10-26    TPro  5.9<L>  /  Alb  2.0<L>  /  TBili  0.4  /  DBili  x   /  AST  39<H>  /  ALT  84<H>  /  AlkPhos  77  10-26      Culture Data    Culture - Blood (collected 25 Oct 2021 08:45)  Source: .Blood Blood-Venous  Preliminary Report (26 Oct 2021 09:01):    No growth to date.    Culture - Blood (collected 25 Oct 2021 08:45)  Source: .Blood Blood  Preliminary Report (26 Oct 2021 09:01):    No growth to date.    Culture - Blood (collected 23 Oct 2021 16:27)  Source: .Blood Blood-Peripheral  Gram Stain (24 Oct 2021 11:23):    Growth in aerobic bottle: Gram Negative Rods    Growth in anaerobic bottle: Gram Negative Rods  Final Report (26 Oct 2021 10:13):    Growth in aerobic and anaerobic bottles: Proteus mirabilis    See previous culture [id number]    Culture - Blood (collected 23 Oct 2021 16:27)  Source: .Blood Blood-Peripheral  Gram Stain (24 Oct 2021 08:14):    Growth in anaerobic bottle: Gram Negative Rods    Growth in aerobic bottle: Gram Negative Rods  Final Report (26 Oct 2021 09:57):    Growth in aerobic and anaerobic bottles: Proteus mirabilis    ***Blood Panel PCR results on this specimen are available    approximately 3 hours after the Gram stain result.***    Gram stain, PCR, and/or culture results may not always    correspond due to difference in methodologies.    ************************************************************    This PCR assay was performed by multiplex PCR. This    Assay tests for 66 bacterial and resistance gene targets.    Please refer to the [facility] Labs test directory    at [email or web address] for details.  Organism: Blood Culture PCR  Proteus mirabilis (26 Oct 2021 09:57)  Organism: Proteus mirabilis (26 Oct 2021 09:57)  Organism: Blood Culture PCR (26 Oct 2021 09:57)    Culture - Urine (collected 23 Oct 2021 16:18)  Source: Clean Catch Clean Catch (Midstream)  Final Report (26 Oct 2021 10:52):    >=3 organisms. Probable collection contamination.             [facility], Division of Infectious Diseases  DIOGO El Y. Patel, S. Shah  [phone]  after hours and weekends [phone]    Name: SHARITA HOUGH  Age: 71y  Gender: Male  MRN: [id number]    Interval History--  Notes reviewed  up in chair      Allergies    fish (Short breath; Anaphylaxis)  No Known Drug Allergies    Intolerances        Medications--  Antibiotics:  cefTRIAXone   IVPB 1000 milliGRAM(s) IV Intermittent every 24 hours    Immunologic:    Other:  atorvastatin  chlorhexidine 2% Cloths  chlorhexidine 4% Liquid  clopidogrel Tablet  enoxaparin Injectable  escitalopram  fenofibrate Tablet  lactobacillus acidophilus  levETIRAcetam  Solution  lisinopril  metoprolol tartrate  potassium phosphate / sodium phosphate Powder (PHOS-NaK)      Review of Systems--  A 10-point review of systems was obtained.     Pertinent positives and negatives--  Constitutional: No fevers. No Chills. No Rigors.   Cardiovascular: No chest pain. No palpitations.  Respiratory: No shortness of breath. No cough.  Gastrointestinal: No nausea or vomiting. No diarrhea or constipation.   Psychiatric: Pleasant. Appropriate affect.    Review of systems otherwise negative except as previously noted.    Physical Examination--  Vital Signs: T(F): 97.9 (10-26-21 @ 12:12), Max: 99.5 (10-25-21 @ 16:00)  HR: 60 (10-26-21 @ 12:00)  BP: 114/58 (10-26-21 @ 12:00)  RR: 29 (10-26-21 @ 12:00)  SpO2: 99% (10-26-21 @ 12:00)  Wt(kg): --  General: Nontoxic-appearing Male in no acute distress.  HEENT: AT/NC.  Neck: Not rigid. No sense of mass.  Nodes: None palpable.  Lungs: Clear bilaterally without rales, wheezing or rhonchi  Heart: Regular rate and rhythm.   Abdomen: Bowel sounds present and normoactive. Soft. +peg  Extremities: No cyanosis or clubbing. No edema.   Skin: Warm. Dry. Good turgor. No rash. No vasculitic stigmata.  Psychiatric: Appropriate affect and mood for situation.   jo      Laboratory Studies--  CBC                        10.3   7.12  )-----------( 164      ( 26 Oct 2021 05:20 )             32.3       Chemistries  10-26    141  |  109<H>  |  26<H>  ----------------------------<  116<H>  4.0   |  27  |  0.62    Ca    9.7      26 Oct 2021 05:20  Phos  2.4     10-26  Mg     1.8     10-26    TPro  5.9<L>  /  Alb  2.0<L>  /  TBili  0.4  /  DBili  x   /  AST  39<H>  /  ALT  84<H>  /  AlkPhos  77  10-26      Culture Data    Culture - Blood (collected 25 Oct 2021 08:45)  Source: .Blood Blood-Venous  Preliminary Report (26 Oct 2021 09:01):    No growth to date.    Culture - Blood (collected 25 Oct 2021 08:45)  Source: .Blood Blood  Preliminary Report (26 Oct 2021 09:01):    No growth to date.    Culture - Blood (collected 23 Oct 2021 16:27)  Source: .Blood Blood-Peripheral  Gram Stain (24 Oct 2021 11:23):    Growth in aerobic bottle: Gram Negative Rods    Growth in anaerobic bottle: Gram Negative Rods  Final Report (26 Oct 2021 10:13):    Growth in aerobic and anaerobic bottles: Proteus mirabilis    See previous culture [id number]    Culture - Blood (collected 23 Oct 2021 16:27)  Source: .Blood Blood-Peripheral  Gram Stain (24 Oct 2021 08:14):    Growth in anaerobic bottle: Gram Negative Rods    Growth in aerobic bottle: Gram Negative Rods  Final Report (26 Oct 2021 09:57):    Growth in aerobic and anaerobic bottles: Proteus mirabilis    ***Blood Panel PCR results on this specimen are available    approximately 3 hours after the Gram stain result.***    Gram stain, PCR, and/or culture results may not always    correspond due to difference in methodologies.    ************************************************************    This PCR assay was performed by multiplex PCR. This    Assay tests for 66 bacterial and resistance gene targets.    Please refer to the [facility] Labs test directory    at [email or web address] for details.  Organism: Blood Culture PCR  Proteus mirabilis (26 Oct 2021 09:57)  Organism: Proteus mirabilis (26 Oct 2021 09:57)  Organism: Blood Culture PCR (26 Oct 2021 09:57)    Culture - Urine (collected 23 Oct 2021 16:18)  Source: Clean Catch Clean Catch (Midstream)  Final Report (26 Oct 2021 10:52):    >=3 organisms. Probable collection contamination.

## 2021-10-26 NOTE — SWALLOW BEDSIDE ASSESSMENT ADULT - SWALLOW EVAL: DIAGNOSIS
Pt politely declined PO trials despite max cues of encouragement. Pt was anxious and continuously stated, "I can't swallow." SLP discussed MBS as a means of objectively assessing oropharyngeal swallow mechanism and determine PO candidacy; however, pt continued to politely decline. Recommend pt continue with PEG as means of nutrition/hydration/medication to ensure pt is meeting adequate daily caloric needs and reduce risk of aspiration. Please reconsult this service, as pt is agreeable.

## 2021-10-26 NOTE — PROGRESS NOTE ADULT - ASSESSMENT
72 yo M PMHx ALS, CAD (s/p MI 1 stent placed 2006 on DAPT), HTN, HLD, BPH p/w lethargy/urinary retention. adm with increased lethargy, no output per jo catheter. with ~1000cc of dark urine drained after replacement, admitted with concern for pyelonephritis    RECOMMENDATIONS  clincally improving   leukocytosis -- down  amador - improving   off pressors   Proteus mirabilis bacteremia likely gu source  jo care     on Ceftriaxone based on sensitivities   Urine in lab  repeat blood cultures for am 10/25 neg to date  urology noted no acute intervention

## 2021-10-26 NOTE — SWALLOW BEDSIDE ASSESSMENT ADULT - COMMENTS
Pt received sitting upright in chair, awake and cooperative. Pt p/w severely dysarthric speech marked by imprecise contact of labio-lingual articulators with reduced rate of production. Pt denied pain pre and post assessment.    Pt known to this service from a previous admission. MBS completed 8/5/21, at which time oral nutrition/hydration/medication was contraindicated. At time of study, pt was very anxious and only agreeable to minimal PO trials with significant pharyngeal dysphagia observed. Pt reported since previous d/c, he has solely been using PEG as means of nutrition/hydration/medication. Pt with conflicting report whether he was receiving swallowing therapy PTA.    CXR 10/23: "Unremarkable frontal chest x ray" Pt's WBC is WFL, no fever.

## 2021-10-26 NOTE — OCCUPATIONAL THERAPY INITIAL EVALUATION ADULT - PERTINENT HX OF CURRENT PROBLEM, REHAB EVAL
72 y/o male with ALS admitted 10/23/21 with sepsis and urinary retention due to blocked Shukla catheter.

## 2021-10-26 NOTE — OCCUPATIONAL THERAPY INITIAL EVALUATION ADULT - IADL RETRAINING, OT EVAL
Patient will increase standing tolerance to 5-7 minutes for toileting tasks and grooming at the sink in 3-5 sessions.

## 2021-10-26 NOTE — SWALLOW BEDSIDE ASSESSMENT ADULT - SWALLOW EVAL: CRITERIA FOR SKILLED INTERVENTION MET
pt refusing at this time, please reconsult as pt is agreeable/not appropriate for swallowing intervention

## 2021-10-26 NOTE — OCCUPATIONAL THERAPY INITIAL EVALUATION ADULT - RANGE OF MOTION EXAMINATION, UPPER EXTREMITY
Fine motor skills: appear minimally impaired/bilateral UE Active ROM was WFL  (within functional limits)

## 2021-10-26 NOTE — SWALLOW BEDSIDE ASSESSMENT ADULT - SWALLOW EVAL: RECOMMENDED DIET
continue with PEG as means of nutrition/hydration/medication to ensure pt is meeting adequate daily caloric needs and reduce risk of aspiration

## 2021-10-26 NOTE — OCCUPATIONAL THERAPY INITIAL EVALUATION ADULT - ORIENTATION, REHAB EVAL
due to patient mostly non-verbal. Patient able to shake his head "yes or no" in response to questions./person/unable to assess

## 2021-10-26 NOTE — PROGRESS NOTE ADULT - SUBJECTIVE AND OBJECTIVE BOX
Patient is a 71y old  Male who presents with a chief complaint of blocked jo (26 Oct 2021 14:27)      INTERVAL /OVERNIGHT EVENTS: alert awake, sitting in chair comfortably    MEDICATIONS  (STANDING):  atorvastatin 80 milliGRAM(s) Oral at bedtime  cefTRIAXone   IVPB 1000 milliGRAM(s) IV Intermittent every 24 hours  clopidogrel Tablet 75 milliGRAM(s) Oral daily  enoxaparin Injectable 40 milliGRAM(s) SubCutaneous daily  escitalopram 20 milliGRAM(s) Oral daily  fenofibrate Tablet 145 milliGRAM(s) Oral daily  lactobacillus acidophilus 1 Tablet(s) Oral daily  levETIRAcetam  Solution 500 milliGRAM(s) Oral two times a day  lisinopril 2.5 milliGRAM(s) Oral daily  metoprolol tartrate 25 milliGRAM(s) Oral two times a day  potassium phosphate / sodium phosphate Powder (PHOS-NaK) 1 Packet(s) Oral four times a day before meals    MEDICATIONS  (PRN):      Allergies    fish (Short breath; Anaphylaxis)  No Known Drug Allergies    Intolerances        REVIEW OF SYSTEMS:  denies    Vital Signs Last 24 Hrs  T(C): 36.8 (26 Oct 2021 15:31), Max: 37 (26 Oct 2021 07:37)  T(F): 98.2 (26 Oct 2021 15:31), Max: 98.6 (26 Oct 2021 07:37)  HR: 69 (26 Oct 2021 17:41) (49 - 92)  BP: 132/63 (26 Oct 2021 17:41) (100/55 - 154/75)  BP(mean): 90 (26 Oct 2021 17:41) (75 - 117)  RR: 31 (26 Oct 2021 17:41) (22 - 34)  SpO2: 98% (26 Oct 2021 17:41) (91% - 99%)    PHYSICAL EXAM:  GENERAL: NAD, well-groomed, well-developed  HEAD:  Atraumatic, Normocephalic  EYES: EOMI, PERRLA, conjunctiva and sclera clear  ENMT: No tonsillar erythema, exudates, or enlargement; Moist mucous membranes, Good dentition, No lesions  NECK: Supple, No JVD, Normal thyroid  NERVOUS SYSTEM:  Alert & Oriented X3, Good concentration  CHEST/LUNG: Clear to auscultation bilaterally; No rales, rhonchi, wheezing, or rubs  HEART: Regular rate and rhythm; No murmurs, rubs, or gallops  ABDOMEN: Soft, Nontender, Nondistended; Bowel sounds present  EXTREMITIES:  2+ Peripheral Pulses, No clubbing, cyanosis, or edema  LYMPH: No lymphadenopathy noted  SKIN: No rashes or lesions    LABS:                        10.3   7.12  )-----------( 164      ( 26 Oct 2021 05:20 )             32.3     26 Oct 2021 05:20    141    |  109    |  26     ----------------------------<  116    4.0     |  27     |  0.62     Ca    9.7        26 Oct 2021 05:20  Phos  2.4       26 Oct 2021 05:20  Mg     1.8       26 Oct 2021 05:20    TPro  5.9    /  Alb  2.0    /  TBili  0.4    /  DBili  x      /  AST  39     /  ALT  84     /  AlkPhos  77     26 Oct 2021 05:20    PT/INR - ( 26 Oct 2021 05:20 )   PT: 12.4 sec;   INR: 1.06 ratio         PTT - ( 26 Oct 2021 05:20 )  PTT:24.4 sec    CAPILLARY BLOOD GLUCOSE          RADIOLOGY & ADDITIONAL TESTS:    Notes Reviewed:  [x ] YES  [ ] NO    Care Discussed with Consultants/Other Providers [x ] YES  [ ] NO

## 2021-10-26 NOTE — PROGRESS NOTE ADULT - SUBJECTIVE AND OBJECTIVE BOX
[facility] -- Santi Nair, Shara Gant Pannella, Patel, Savella  Office # [phone]      Follow Up:  hypotesnion    Subjective/Observations: Patient seen and examined. Events noted. Resting comfortably in bed. Unable to provide meaningful information.     REVIEW OF SYSTEMS: Limited 2/2 comorbidities     PAST MEDICAL & SURGICAL HISTORY:  Myocardial infarct    Hypertension    Hyperlipidemia    BPH (benign prostatic hyperplasia)    ALS (amyotrophic lateral sclerosis)    S/P tonsillectomy    S/P coronary artery stent placement    History of hip surgery    History of hernia repair  &gt; 20 years ago    H/O shoulder surgery        MEDICATIONS  (STANDING):  atorvastatin 80 milliGRAM(s) Oral at bedtime  cefTRIAXone   IVPB 1000 milliGRAM(s) IV Intermittent every 24 hours  chlorhexidine 2% Cloths 1 Application(s) Topical <User Schedule>  chlorhexidine 4% Liquid 1 Application(s) Topical <User Schedule>  clopidogrel Tablet 75 milliGRAM(s) Oral daily  enoxaparin Injectable 40 milliGRAM(s) SubCutaneous daily  escitalopram 20 milliGRAM(s) Oral daily  fenofibrate Tablet 145 milliGRAM(s) Oral daily  lactobacillus acidophilus 1 Tablet(s) Oral daily  levETIRAcetam  Solution 500 milliGRAM(s) Oral two times a day  lisinopril 2.5 milliGRAM(s) Oral daily  metoprolol tartrate 25 milliGRAM(s) Oral two times a day  potassium phosphate / sodium phosphate Powder (PHOS-NaK) 1 Packet(s) Oral four times a day before meals    MEDICATIONS  (PRN):      Allergies    fish (Short breath; Anaphylaxis)  No Known Drug Allergies    Intolerances            Vital Signs Last 24 Hrs  T(C): 36.6 (26 Oct 2021 12:12), Max: 37.5 (25 Oct 2021 16:00)  T(F): 97.9 (26 Oct 2021 12:12), Max: 99.5 (25 Oct 2021 16:00)  HR: 60 (26 Oct 2021 12:00) (49 - 92)  BP: 114/58 (26 Oct 2021 12:00) (106/52 - 154/75)  BP(mean): 83 (26 Oct 2021 12:00) (75 - 117)  RR: 29 (26 Oct 2021 12:00) (22 - 35)  SpO2: 99% (26 Oct 2021 12:00) (91% - 99%)    I&O's Summary    25 Oct 2021 07:01  -  26 Oct 2021 07:00  --------------------------------------------------------  IN: 3515 mL / OUT: 1950 mL / NET: 1565 mL    26 Oct 2021 07:01  -  26 Oct 2021 12:44  --------------------------------------------------------  IN: 390 mL / OUT: 200 mL / NET: 190 mL          PHYSICAL EXAM:  TELE: SB-SR 45-80  Constitutional: NAD, awake    HEENT: Moist Mucous Membranes, Anicteric  Pulmonary: Decreased breath sounds b/l. No rales, crackles or wheeze appreciated.   Cardiovascular: Regular, S1 and S2, No murmurs, rubs, gallops or clicks  Gastrointestinal: Bowel Sounds present, soft, nontender.   Lymph: No peripheral edema. No lymphadenopathy.  Skin: No visible rashes or ulcers.  Psych: unable to assess     LABS: All Labs Reviewed:                        10.3   7.12  )-----------( 164      ( 26 Oct 2021 05:20 )             32.3                         10.1   10.04 )-----------( 171      ( 25 Oct 2021 06:14 )             33.4                         12.0   16.34 )-----------( 186      ( 24 Oct 2021 06:23 )             39.8     26 Oct 2021 05:20    141    |  109    |  26     ----------------------------<  116    4.0     |  27     |  0.62   25 Oct 2021 06:14    147    |  113    |  40     ----------------------------<  92     4.2     |  29     |  0.77   24 Oct 2021 06:23    152    |  118    |  55     ----------------------------<  150    4.2     |  30     |  1.20     Ca    9.7        26 Oct 2021 05:20  Ca    9.5        25 Oct 2021 06:14  Ca    9.7        24 Oct 2021 06:23  Phos  2.4       26 Oct 2021 05:20  Phos  2.5       25 Oct 2021 06:14  Phos  2.5       24 Oct 2021 06:23  Mg     1.8       26 Oct 2021 05:20  Mg     2.0       25 Oct 2021 06:14  Mg     2.4       24 Oct 2021 06:23    TPro  5.9    /  Alb  2.0    /  TBili  0.4    /  DBili  x      /  AST  39     /  ALT  84     /  AlkPhos  77     26 Oct 2021 05:20  TPro  5.9    /  Alb  1.9    /  TBili  0.7    /  DBili  x      /  AST  40     /  ALT  77     /  AlkPhos  70     25 Oct 2021 06:14  TPro  6.1    /  Alb  2.0    /  TBili  0.5    /  DBili  x      /  AST  27     /  ALT  54     /  AlkPhos  82     24 Oct 2021 06:23    PT/INR - ( 26 Oct 2021 05:20 )   PT: 12.4 sec;   INR: 1.06 ratio         PTT - ( 26 Oct 2021 05:20 )  PTT:24.4 sec

## 2021-10-27 LAB
ALBUMIN SERPL ELPH-MCNC: 2.1 G/DL — LOW (ref 3.3–5)
ALP SERPL-CCNC: 81 U/L — SIGNIFICANT CHANGE UP (ref 40–120)
ALT FLD-CCNC: 61 U/L — SIGNIFICANT CHANGE UP (ref 12–78)
ANION GAP SERPL CALC-SCNC: 6 MMOL/L — SIGNIFICANT CHANGE UP (ref 5–17)
APTT BLD: 29 SEC — SIGNIFICANT CHANGE UP (ref 27.5–35.5)
AST SERPL-CCNC: 20 U/L — SIGNIFICANT CHANGE UP (ref 15–37)
BASOPHILS # BLD AUTO: 0.02 K/UL — SIGNIFICANT CHANGE UP (ref 0–0.2)
BASOPHILS NFR BLD AUTO: 0.3 % — SIGNIFICANT CHANGE UP (ref 0–2)
BILIRUB SERPL-MCNC: 0.4 MG/DL — SIGNIFICANT CHANGE UP (ref 0.2–1.2)
BUN SERPL-MCNC: 23 MG/DL — SIGNIFICANT CHANGE UP (ref 7–23)
CALCIUM SERPL-MCNC: 9.5 MG/DL — SIGNIFICANT CHANGE UP (ref 8.5–10.1)
CHLORIDE SERPL-SCNC: 107 MMOL/L — SIGNIFICANT CHANGE UP (ref 96–108)
CO2 SERPL-SCNC: 28 MMOL/L — SIGNIFICANT CHANGE UP (ref 22–31)
CREAT SERPL-MCNC: 0.57 MG/DL — SIGNIFICANT CHANGE UP (ref 0.5–1.3)
EOSINOPHIL # BLD AUTO: 0.16 K/UL — SIGNIFICANT CHANGE UP (ref 0–0.5)
EOSINOPHIL NFR BLD AUTO: 2.5 % — SIGNIFICANT CHANGE UP (ref 0–6)
GLUCOSE SERPL-MCNC: 83 MG/DL — SIGNIFICANT CHANGE UP (ref 70–99)
HCT VFR BLD CALC: 33 % — LOW (ref 39–50)
HGB BLD-MCNC: 10.6 G/DL — LOW (ref 13–17)
IMM GRANULOCYTES NFR BLD AUTO: 0.5 % — SIGNIFICANT CHANGE UP (ref 0–1.5)
INR BLD: 1 RATIO — SIGNIFICANT CHANGE UP (ref 0.88–1.16)
LYMPHOCYTES # BLD AUTO: 1.1 K/UL — SIGNIFICANT CHANGE UP (ref 1–3.3)
LYMPHOCYTES # BLD AUTO: 17.5 % — SIGNIFICANT CHANGE UP (ref 13–44)
MAGNESIUM SERPL-MCNC: 2.3 MG/DL — SIGNIFICANT CHANGE UP (ref 1.6–2.6)
MCHC RBC-ENTMCNC: 31 PG — SIGNIFICANT CHANGE UP (ref 27–34)
MCHC RBC-ENTMCNC: 32.1 GM/DL — SIGNIFICANT CHANGE UP (ref 32–36)
MCV RBC AUTO: 96.5 FL — SIGNIFICANT CHANGE UP (ref 80–100)
MONOCYTES # BLD AUTO: 0.5 K/UL — SIGNIFICANT CHANGE UP (ref 0–0.9)
MONOCYTES NFR BLD AUTO: 7.9 % — SIGNIFICANT CHANGE UP (ref 2–14)
NEUTROPHILS # BLD AUTO: 4.49 K/UL — SIGNIFICANT CHANGE UP (ref 1.8–7.4)
NEUTROPHILS NFR BLD AUTO: 71.3 % — SIGNIFICANT CHANGE UP (ref 43–77)
NRBC # BLD: 0 /100 WBCS — SIGNIFICANT CHANGE UP (ref 0–0)
PHOSPHATE SERPL-MCNC: 3 MG/DL — SIGNIFICANT CHANGE UP (ref 2.5–4.5)
PLATELET # BLD AUTO: 205 K/UL — SIGNIFICANT CHANGE UP (ref 150–400)
POTASSIUM SERPL-MCNC: 4.2 MMOL/L — SIGNIFICANT CHANGE UP (ref 3.5–5.3)
POTASSIUM SERPL-SCNC: 4.2 MMOL/L — SIGNIFICANT CHANGE UP (ref 3.5–5.3)
PROT SERPL-MCNC: 6.1 G/DL — SIGNIFICANT CHANGE UP (ref 6–8.3)
PROTHROM AB SERPL-ACNC: 11.7 SEC — SIGNIFICANT CHANGE UP (ref 10.6–13.6)
RBC # BLD: 3.42 M/UL — LOW (ref 4.2–5.8)
RBC # FLD: 13.2 % — SIGNIFICANT CHANGE UP (ref 10.3–14.5)
SODIUM SERPL-SCNC: 141 MMOL/L — SIGNIFICANT CHANGE UP (ref 135–145)
WBC # BLD: 6.3 K/UL — SIGNIFICANT CHANGE UP (ref 3.8–10.5)
WBC # FLD AUTO: 6.3 K/UL — SIGNIFICANT CHANGE UP (ref 3.8–10.5)

## 2021-10-27 PROCEDURE — 99232 SBSQ HOSP IP/OBS MODERATE 35: CPT

## 2021-10-27 PROCEDURE — 99497 ADVNCD CARE PLAN 30 MIN: CPT

## 2021-10-27 RX ADMIN — CEFTRIAXONE 100 MILLIGRAM(S): 500 INJECTION, POWDER, FOR SOLUTION INTRAMUSCULAR; INTRAVENOUS at 14:26

## 2021-10-27 RX ADMIN — Medication 1 TABLET(S): at 19:02

## 2021-10-27 RX ADMIN — Medication 25 MILLIGRAM(S): at 05:37

## 2021-10-27 RX ADMIN — LEVETIRACETAM 500 MILLIGRAM(S): 250 TABLET, FILM COATED ORAL at 10:50

## 2021-10-27 RX ADMIN — LEVETIRACETAM 500 MILLIGRAM(S): 250 TABLET, FILM COATED ORAL at 21:42

## 2021-10-27 RX ADMIN — LISINOPRIL 2.5 MILLIGRAM(S): 2.5 TABLET ORAL at 05:37

## 2021-10-27 RX ADMIN — ENOXAPARIN SODIUM 40 MILLIGRAM(S): 100 INJECTION SUBCUTANEOUS at 14:25

## 2021-10-27 RX ADMIN — Medication 145 MILLIGRAM(S): at 14:26

## 2021-10-27 RX ADMIN — ATORVASTATIN CALCIUM 80 MILLIGRAM(S): 80 TABLET, FILM COATED ORAL at 21:41

## 2021-10-27 RX ADMIN — Medication 25 MILLIGRAM(S): at 19:02

## 2021-10-27 RX ADMIN — ESCITALOPRAM OXALATE 20 MILLIGRAM(S): 10 TABLET, FILM COATED ORAL at 14:26

## 2021-10-27 RX ADMIN — CLOPIDOGREL BISULFATE 75 MILLIGRAM(S): 75 TABLET, FILM COATED ORAL at 14:26

## 2021-10-27 RX ADMIN — Medication 1 PACKET(S): at 10:50

## 2021-10-27 NOTE — PROGRESS NOTE ADULT - SUBJECTIVE AND OBJECTIVE BOX
[facility] -- Santi Nair, Shara Gant, Chris Peace, Harvinder Ramirez: Office # [phone]    Follow Up:  Sepsis/ Tachycardia/ hypotensive    Subjective/Observations: Patient seen and examined, awake, alert, resting comfortably in bed, no complaints of chest pain, dyspnea, palpitations or dizziness, orthopnea and PND. Tolerating room air.   REVIEW OF SYSTEMS: All review of systems is negative for eye, ENT, GI, , allergic, dermatologic, musculoskeletal and neurologic except as described above    PAST MEDICAL & SURGICAL HISTORY:  Myocardial infarct  Hypertension  Hyperlipidemia  BPH (benign prostatic hyperplasia)  ALS (amyotrophic lateral sclerosis)  S/P tonsillectomy  S/P coronary artery stent placement  History of hip surgery  History of hernia repair  &gt; 20 years ago  H/O shoulder surgery        MEDICATIONS  (STANDING):  atorvastatin 80 milliGRAM(s) Oral at bedtime  cefTRIAXone   IVPB 1000 milliGRAM(s) IV Intermittent every 24 hours  clopidogrel Tablet 75 milliGRAM(s) Oral daily  enoxaparin Injectable 40 milliGRAM(s) SubCutaneous daily  escitalopram 20 milliGRAM(s) Oral daily  fenofibrate Tablet 145 milliGRAM(s) Oral daily  lactobacillus acidophilus 1 Tablet(s) Oral daily  levETIRAcetam  Solution 500 milliGRAM(s) Oral two times a day  lisinopril 2.5 milliGRAM(s) Oral daily  metoprolol tartrate 25 milliGRAM(s) Oral two times a day    MEDICATIONS  (PRN):    Allergies    fish (Short breath; Anaphylaxis)  No Known Drug Allergies    Intolerances      Vital Signs Last 24 Hrs  T(C): 36.3 (27 Oct 2021 05:00), Max: 36.8 (26 Oct 2021 15:31)  T(F): 97.4 (27 Oct 2021 05:00), Max: 98.2 (26 Oct 2021 15:31)  HR: 72 (27 Oct 2021 05:00) (59 - 72)  BP: 122/72 (27 Oct 2021 05:00) (100/55 - 134/61)  BP(mean): 87 (26 Oct 2021 18:00) (75 - 90)  RR: 18 (27 Oct 2021 05:00) (18 - 34)  SpO2: 95% (27 Oct 2021 05:00) (95% - 99%)  I&O's Summary    26 Oct 2021 07:01  -  27 Oct 2021 07:00  --------------------------------------------------------  IN: 2470 mL / OUT: 2660 mL / NET: -190 mL      TELE: Not on telemetry   PHYSICAL EXAM:  Appearance: NAD, no distress, alert,  HEENT: Moist Mucous Membranes, Anicteric  Cardiovascular: Regular rate and rhythm, Normal S1 S2, No JVD, No murmurs, No rubs, gallops or clicks  Respiratory: Non-labored, diminished on auscultation, No rales, No rhonchi, No wheezing.   Gastrointestinal:  Soft, Non-tender, + BS  Neurologic: Non-focal  Skin: Warm and dry, No visible rashes or ulcers, No ecchymosis, No cyanosis  Musculoskeletal: No clubbing, No cyanosis, No joint swelling/tenderness  Psychiatry: Mood & affect appropriate  Lymph: No peripheral edema.     LABS: All Labs Reviewed:                        10.6   6.30  )-----------( 205      ( 27 Oct 2021 09:36 )             33.0                         10.3   7.12  )-----------( 164      ( 26 Oct 2021 05:20 )             32.3                         10.1   10.04 )-----------( 171      ( 25 Oct 2021 06:14 )             33.4     27 Oct 2021 09:36    141    |  107    |  23     ----------------------------<  83     4.2     |  28     |  0.57   26 Oct 2021 05:20    141    |  109    |  26     ----------------------------<  116    4.0     |  27     |  0.62   25 Oct 2021 06:14    147    |  113    |  40     ----------------------------<  92     4.2     |  29     |  0.77     Ca    9.5        27 Oct 2021 09:36  Ca    9.7        26 Oct 2021 05:20  Ca    9.5        25 Oct 2021 06:14  Phos  3.0       27 Oct 2021 09:36  Phos  2.4       26 Oct 2021 05:20  Phos  2.5       25 Oct 2021 06:14  Mg     2.3       27 Oct 2021 09:36  Mg     1.8       26 Oct 2021 05:20  Mg     2.0       25 Oct 2021 06:14    TPro  6.1    /  Alb  2.1    /  TBili  0.4    /  DBili  x      /  AST  20     /  ALT  61     /  AlkPhos  81     27 Oct 2021 09:36  TPro  5.9    /  Alb  2.0    /  TBili  0.4    /  DBili  x      /  AST  39     /  ALT  84     /  AlkPhos  77     26 Oct 2021 05:20  TPro  5.9    /  Alb  1.9    /  TBili  0.7    /  DBili  x      /  AST  40     /  ALT  77     /  AlkPhos  70     25 Oct 2021 06:14    PT/INR - ( 27 Oct 2021 09:36 )   PT: 11.7 sec;   INR: 1.00 ratio         PTT - ( 27 Oct 2021 09:36 )  PTT:29.0 sec  Creatine Kinase, Serum: 32 U/L (10-23-21 @ 12:04)  Troponin I, Serum: <.015 ng/mL (10-23-21 @ 12:04)      12 Lead ECG:   Ventricular Rate 105 BPM  Atrial Rate 105 BPM  P-R Interval 128 ms  QRS Duration 76 ms  Q-T Interval 324 ms  QTC Calculation(Bazett) 428 ms  P Axis 47 degrees  R Axis 128 degrees  T Axis 37 degrees    Diagnosis Line Sinus tachycardia  Right axis deviation  Abnormal ECG  Confirmed by sonya Ramirez ([id number]) on 10/23/2021 3:40:27 PM (10-23-21 @ 11:53)    < from: CT Renal Stone Hunt (10.23.21 @ 12:41) >    EXAM:  CT RENAL STONE HUNT                            PROCEDURE DATE:  10/23/2021          INTERPRETATION:  CLINICAL INFORMATION: Fever, dysuria, history of kidney stone.    COMPARISON: CT abdomen pelvis dated 7/29/2021.    CONTRAST/COMPLICATIONS:  IV Contrast: NONE  0 cc administered   0 cc discarded  Oral Contrast: NONE  Complications: None reported at time of study completion    PROCEDURE:  CT of the Abdomen and Pelvis was performed.  Sagittal and coronal reformats were performed.    FINDINGS:  LOWER CHEST: Bibasilar subsegmental atelectasis. Coronary artery calcifications. Trace pericardial fluid.    LIVER: Within normal limits.  BILE DUCTS: Normal caliber.  GALLBLADDER: Contracted.  SPLEEN: Within normal limits.  PANCREAS: Within normal limits.  ADRENALS: Within normal limits.  KIDNEYS/URETERS: Mild left hydronephrosis. 2 mm nonobstructive calculus interpolar region left kidney. No right-sided calculi.  Left ureteral double-J stent in good position. Mild left ureteral dilatation with 5 mm calculus in the distal left ureter.    BLADDER: Shukla catheter. Air within the bladder lumen. 8 mm bladder calculus. Left-sided bladder diverticulum containing 6 mm calculus.  REPRODUCTIVE ORGANS: Enlarged prostate with dystrophic calcifications.    BOWEL: PEG tube. No bowel obstruction. Appendix normal.  PERITONEUM: No ascites.  VESSELS: Within normal limits.  RETROPERITONEUM/LYMPH NODES: No lymphadenopathy.  ABDOMINAL WALL: Fat-containing left inguinal hernia.  BONES: Within normal limits.    IMPRESSION:  Left ureteral stent with mild left hydroureteronephrosis.    Subcentimeter nonobstructive calculi in the left kidney, distal left ureter, urinary bladder, and bladder diverticulum.        --- End of Report ---            KYRA CLEVELAND; Attending Radiologist  This document has been electronically signed. Oct 23 2021  1:04PM    < end of copied text >  < from: Xray Chest 1 View- PORTABLE-Urgent (10.23.21 @ 12:21) >    EXAM:  XR CHEST PORTABLE URGENT 1V                            PROCEDURE DATE:  10/23/2021          INTERPRETATION:  CLINICAL STATEMENT: Weakness. UTI    TECHNIQUE: AP view of the chest.      COMPARISON: 8/5/2021    Extreme apices collimated off theexam.    The cardiomediastinal silhouette is normal and the kayli are not enlarged. The trachea is midline. Suboptimal degree of inspiration with mild bronchovascular crowding. There is no focal infiltrate or pleural effusion. The osseous structures are intact. [place] pins right head.    IMPRESSION:    Unremarkable frontal chest x ray        --- End of Report ---            CARY VYAS MD; Attending Radiologist  This document has been electronically signed. Oct 24 2021  4:30PM    < end of copied text >  < from: TTE Echo Complete w/o Contrast w/ Doppler (10.25.21 @ 03:00) >     EXAM:  ECHO TTE WO CON COMP W DOPP         PROCEDURE DATE:  10/25/2021        INTERPRETATION:  INDICATION: Atrial fibrillation  Sonographer AS    Blood Pressure 129/61    Height 165.1 cm     Weight 66.7 kg       BSA 1.7 sq m    Dimensions:  LA 3.2      Normal Values: 2.0 - 4.0 cm  Ao 3.0        Normal Values: 2.0 - 3.8 cm  SEPTUM 1.1       Normal Values: 0.6 - 1.2 cm  PWT 0.9       Normal Values: 0.6 - 1.1 cm  LVIDd 4.4         Normal Values: 3.0 - 5.6 cm  LVIDs 3.1         Normal Values: 1.8- 4.0 cm      OBSERVATIONS:  Technically difficult and limited study  Mitral Valve: normal, trace physiologic MR.  Aortic Valve/Aorta: Not well-visualized  Tricuspid Valve: normal with trace TR.  Pulmonic Valve: Not well-visualized  Left Atrium: normal  Right Atrium: Not well-visualized  Left Ventricle: normal LV size and systolic function, estimated LVEF of 60%.  Right Ventricle: Grossly normal size and systolic function.  Pericardium: no significant pericardial effusion.  IVC measures 1.17 cm  LV diastolic dysfunction is present        IMPRESSION:  Technically difficult and limited study  Normal left ventricular internal dimensions and systolic function, estimated LVEF of 60%.  Grossly normal RV size and systolic function.  The aortic valveis not well-visualized  Trace physiologic MR and TR.  No significant pericardial effusion.    --- End of Report ---              DANNY BLANCO MD; Attending Cardiologist  This document has been electronically signed. Oct 26 2021  5:25PM    < end of copied text >

## 2021-10-27 NOTE — PROGRESS NOTE ADULT - PROBLEM SELECTOR PROBLEM 1
MARLEY (acute kidney injury)

## 2021-10-27 NOTE — PROGRESS NOTE ADULT - SUBJECTIVE AND OBJECTIVE BOX
[facility], Division of Infectious Diseases  [place] Patel, S. [place]  [phone]  after hours and weekends [phone]    Name: SHARITA HOUGH  Age: 71y  Gender: Male  MRN: [id number]    Interval History--  Notes reviewed  pt sitting up in chair  states he feels lousy and would like to see his son      Allergies    fish (Short breath; Anaphylaxis)  No Known Drug Allergies    Intolerances        Medications--  Antibiotics:  cefTRIAXone   IVPB 1000 milliGRAM(s) IV Intermittent every 24 hours    Immunologic:    Other:  atorvastatin  clopidogrel Tablet  enoxaparin Injectable  escitalopram  fenofibrate Tablet  lactobacillus acidophilus  levETIRAcetam  Solution  lisinopril  metoprolol tartrate      Review of Systems--  A 10-point review of systems was obtained.     unable to fully obtain     Review of systems otherwise negative except as previously noted.    Physical Examination--  Vital Signs: T(F): 98.8 (10-27-21 @ 13:15), Max: 98.8 (10-27-21 @ 13:15)  HR: 68 (10-27-21 @ 13:15)  BP: 107/64 (10-27-21 @ 13:15)  RR: 19 (10-27-21 @ 13:15)  SpO2: 93% (10-27-21 @ 13:15)  Wt(kg): --  General: Nontoxic-appearing Male in no acute distress.  HEENT: AT/NC.  Nodes: None palpable.  Lungs: Clear bilaterally without rales, wheezing or rhonchi  Heart: Regular rate and rhythm.  Abdomen: Bowel sounds present and normoactive. Soft. Nondistended. Nontender. +peg  Back: No spinal tenderness. No costovertebral angle tenderness.   Extremities: No cyanosis or clubbing. No edema.   Skin: Warm. Dry. Good turgor. No rash. No vasculitic stigmata.  Psychiatric: Appropriate affect and mood for situation.         Laboratory Studies--  CBC                        10.6   6.30  )-----------( 205      ( 27 Oct 2021 09:36 )             33.0       Chemistries  10-27    141  |  107  |  23  ----------------------------<  83  4.2   |  28  |  0.57    Ca    9.5      27 Oct 2021 09:36  Phos  3.0     10-27  Mg     2.3     10-27    TPro  6.1  /  Alb  2.1<L>  /  TBili  0.4  /  DBili  x   /  AST  20  /  ALT  61  /  AlkPhos  81  10-27      Culture Data    Culture - Blood (collected 25 Oct 2021 08:45)  Source: .Blood Blood-Venous  Preliminary Report (26 Oct 2021 09:01):    No growth to date.    Culture - Blood (collected 25 Oct 2021 08:45)  Source: .Blood Blood  Preliminary Report (26 Oct 2021 09:01):    No growth to date.    Culture - Blood (collected 23 Oct 2021 16:27)  Source: .Blood Blood-Peripheral  Gram Stain (24 Oct 2021 11:23):    Growth in aerobic bottle: Gram Negative Rods    Growth in anaerobic bottle: Gram Negative Rods  Final Report (26 Oct 2021 10:13):    Growth in aerobic and anaerobic bottles: Proteus mirabilis    See previous culture [id number]    Culture - Blood (collected 23 Oct 2021 16:27)  Source: .Blood Blood-Peripheral  Gram Stain (24 Oct 2021 08:14):    Growth in anaerobic bottle: Gram Negative Rods    Growth in aerobic bottle: Gram Negative Rods  Final Report (26 Oct 2021 09:57):    Growth in aerobic and anaerobic bottles: Proteus mirabilis    ***Blood Panel PCR results on this specimen are available    approximately 3 hours after the Gram stain result.***    Gram stain, PCR, and/or culture results may not always    correspond due to difference in methodologies.    ************************************************************    This PCR assay was performed by multiplex PCR. This    Assay tests for 66 bacterial and resistance gene targets.    Please refer to the Health system Labs test directory    at [email or web address] for details.  Organism: Blood Culture PCR  Proteus mirabilis (26 Oct 2021 09:57)  Organism: Proteus mirabilis (26 Oct 2021 09:57)  Organism: Blood Culture PCR (26 Oct 2021 09:57)    Culture - Urine (collected 23 Oct 2021 16:18)  Source: Clean Catch Clean Catch (Midstream)  Final Report (26 Oct 2021 10:52):    >=3 organisms. Probable collection contamination.        < from: CT Renal Stone Hunt (10.23.21 @ 12:41) >  TIVE ORGANS: Enlarged prostate with dystrophic calcifications.    BOWEL: PEG tube. No bowel obstruction. Appendix normal.  PERITONEUM: No ascites.  VESSELS: Within normal limits.  RETROPERITONEUM/LYMPH NODES: No lymphadenopathy.  ABDOMINAL WALL: Fat-containing left inguinal hernia.  BONES: Within normal limits.    IMPRESSION:  Left ureteral stent with mild left hydroureteronephrosis.    Subcentimeter nonobstructive calculi in the left kidney, distal left ureter, urinary bladder, and bladder diverticulum.      < end of copied text >

## 2021-10-27 NOTE — PROGRESS NOTE ADULT - PROBLEM SELECTOR PLAN 3
serial CBC  pan culture  IV ABX
serial CBC  pan culture  IV ABX - rocephin  ID eval with Dr. hernández etal  repeat blood cx no growth to date
serial CBC  pan culture  IV ABX - rocephin  ID eval with Dr. hernández etal
serial CBC  pan culture  IV ABX   ID eval

## 2021-10-27 NOTE — GOALS OF CARE CONVERSATION - ADVANCED CARE PLANNING - CONVERSATION DETAILS
Writer met with .pt and his son Eleazar. Reviewed patient's medical and social history as well as events leading to patient's hospitalization. Writer discussed patient's current diagnosis urinary  medical condition and management  prognosis, and life expectancy. Inquired about patient's wishes regarding extent of medical care to be provided including escalation of medical care into the ICU use of vasopressor support and use of intubation with ventilatory support In addition, the writer inquired about thoughts regarding cardiopulmonary resuscitation, artificial nutrition and hydration including use of feeding tubes and IVF, antibiotics, and further investigative studies such as blood draws and radiology showed good insight into medical condition. Writer recommend pt make a decision about advance directives with his son present . Pt became very emotional and was unable to continue conversation. Agreed to meet again with pt and his son tomorrow. Writer met with .pt and his son Eleazar. Reviewed patient's medical and social history as well as events leading to patient's hospitalization. Writer discussed patient's current diagnosis (septic shock, bacteremia, MARLEY, hypotension, leukocytosis), medical condition and management  prognosis, and life expectancy. Inquired about patient's wishes regarding extent of medical care to be provided including escalation of medical care into the ICU use of vasopressor support and use of intubation with ventilatory support In addition, the writer inquired about thoughts regarding cardiopulmonary resuscitation, artificial nutrition and hydration including use of feeding tubes and IVF, antibiotics, and further investigative studies such as blood draws and radiology showed good insight into medical condition. Writer recommend pt make a decision about advance directives with his son present . Pt became very emotional and was unable to continue conversation. Agreed to meet again with pt and his son tomorrow.

## 2021-10-27 NOTE — PROGRESS NOTE ADULT - PROBLEM SELECTOR PLAN 2
multifactorial shock  S/P pressor support   improved / resolved  cardio follow up
multifactorial shock  S/P pressor support   improved / resolved  cardio follow up
multifactorial shock  pressor support as needed  ICU care
multifactorial shock  pressor support as needed  ICU care  improved

## 2021-10-27 NOTE — PROGRESS NOTE ADULT - ASSESSMENT
70 yo M PMHx ALS, CAD (s/p MI 1 stent placed 2006 on DAPT), HTN, HLD, BPH p/w lethargy/urinary retention. adm with increased lethargy, no output per jo catheter. with ~1000cc of dark urine drained after replacement, admitted with concern for pyelonephritis    RECOMMENDATIONS  clincally improving   leukocytosis -- down  amador - improving   off pressors   Proteus mirabilis bacteremia likely gu source  ct with nonobstructing stone and stent in place left ureter   jo care     on Ceftriaxone based on sensitivities   Urine in lab > 3 org   repeat blood cultures for am 10/25 neg to date  urology noted no acute intervention  will switch to oral ceftin 500 mg po bid on discharge to complete 14 days til 11/8

## 2021-10-27 NOTE — PROGRESS NOTE ADULT - PROBLEM SELECTOR PLAN 1
serial BMP  IVF  renal eval appreciated
serial BMP  IVF  renal eval appreciated  improved / resolved
serial BMP  IVF  renal eval appreciated  improved
serial BMP  IVF  renal eval appreciated  improved / resolved

## 2021-10-27 NOTE — PROGRESS NOTE ADULT - SUBJECTIVE AND OBJECTIVE BOX
Patient is a 71y old  Male who presents with a chief complaint of blocked jo (26 Oct 2021 14:27)  no complaints    INTERVAL HPI/OVERNIGHT EVENTS:  T(C): 36.3 (10-27-21 @ 05:00), Max: 36.8 (10-26-21 @ 15:31)  HR: 72 (10-27-21 @ 05:00) (59 - 72)  BP: 122/72 (10-27-21 @ 05:00) (100/55 - 134/61)  RR: 18 (10-27-21 @ 05:00) (18 - 34)  SpO2: 95% (10-27-21 @ 05:00) (95% - 99%)  Wt(kg): --  I&O's Summary    26 Oct 2021 07:01  -  27 Oct 2021 07:00  --------------------------------------------------------  IN: 2470 mL / OUT: 2660 mL / NET: -190 mL        LABS:                        10.3   7.12  )-----------( 164      ( 26 Oct 2021 05:20 )             32.3     10-26    141  |  109<H>  |  26<H>  ----------------------------<  116<H>  4.0   |  27  |  0.62    Ca    9.7      26 Oct 2021 05:20  Phos  2.4     10-26  Mg     1.8     10-26    TPro  5.9<L>  /  Alb  2.0<L>  /  TBili  0.4  /  DBili  x   /  AST  39<H>  /  ALT  84<H>  /  AlkPhos  77  10-26    PT/INR - ( 26 Oct 2021 05:20 )   PT: 12.4 sec;   INR: 1.06 ratio         PTT - ( 26 Oct 2021 05:20 )  PTT:24.4 sec    CAPILLARY BLOOD GLUCOSE                MEDICATIONS  (STANDING):  atorvastatin 80 milliGRAM(s) Oral at bedtime  cefTRIAXone   IVPB 1000 milliGRAM(s) IV Intermittent every 24 hours  clopidogrel Tablet 75 milliGRAM(s) Oral daily  enoxaparin Injectable 40 milliGRAM(s) SubCutaneous daily  escitalopram 20 milliGRAM(s) Oral daily  fenofibrate Tablet 145 milliGRAM(s) Oral daily  lactobacillus acidophilus 1 Tablet(s) Oral daily  levETIRAcetam  Solution 500 milliGRAM(s) Oral two times a day  lisinopril 2.5 milliGRAM(s) Oral daily  metoprolol tartrate 25 milliGRAM(s) Oral two times a day  potassium phosphate / sodium phosphate Powder (PHOS-NaK) 1 Packet(s) Oral four times a day before meals    MEDICATIONS  (PRN):      REVIEW OF SYSTEMS:  CONSTITUTIONAL: No fever, weight loss, or fatigue  EYES: No eye pain, visual disturbances, or discharge  ENMT:  No difficulty hearing, tinnitus, vertigo; No sinus or throat pain  NECK: No pain or stiffness  RESPIRATORY: No cough, wheezing, chills or hemoptysis; No shortness of breath  CARDIOVASCULAR: No chest pain, palpitations, dizziness, or leg swelling  GASTROINTESTINAL: No abdominal or epigastric pain. No nausea, vomiting, or hematemesis; No diarrhea or constipation. No melena or hematochezia.  GENITOURINARY: No dysuria, frequency, hematuria, or incontinence  NEUROLOGICAL: No headaches, memory loss, loss of strength, numbness, or tremors  SKIN: No itching, burning, rashes, or lesions   LYMPH NODES: No enlarged glands  ENDOCRINE: No heat or cold intolerance; No hair loss  MUSCULOSKELETAL: severe diffuse weakness secondary to ALS  PSYCHIATRIC: No depression, anxiety, mood swings, or difficulty sleeping  HEME/LYMPH: No easy bruising, or bleeding gums  ALLERY AND IMMUNOLOGIC: No hives or eczema    RADIOLOGY & ADDITIONAL TESTS:    Imaging Personally Reviewed:  [ ] YES  [ ] NO    Consultant(s) Notes Reviewed:  [ ] YES  [ ] NO    PHYSICAL EXAM:  GENERAL: NAD, well-groomed, well-developed  HEAD:  Atraumatic, Normocephalic  EYES: EOMI, PERRLA, conjunctiva and sclera clear  ENMT: No tonsillar erythema, exudates, or enlargement; Moist mucous membranes, Good dentition, No lesions  NECK: Supple, No JVD, Normal thyroid  NERVOUS SYSTEM:  Alert & Oriented X3, Good concentration; diffuse symmetric weakness 3/5  CHEST/LUNG: Clear to percussion bilaterally; No rales, rhonchi, wheezing, or rubs  HEART: Regular rate and rhythm; No murmurs, rubs, or gallops  ABDOMEN: Soft, Nontender, Nondistended; Bowel sounds present  EXTREMITIES:  2+ Peripheral Pulses, No clubbing, cyanosis, or edema  LYMPH: No lymphadenopathy noted  SKIN: No rashes or lesions    Care Discussed with Consultants/Other Providers [ ] YES  [ ] NO

## 2021-10-27 NOTE — PROGRESS NOTE ADULT - PROBLEM SELECTOR PLAN 4
cont lovenox for dvt prophylaxis
cont lovenox for dvt proph
cont lovenox for dvt prophylaxis
cont lovenox for dvt proph

## 2021-10-27 NOTE — PROGRESS NOTE ADULT - ASSESSMENT
71 year old male with ALS, CAD (s/p MI 1 stent placed 2006 previously on DAPT), HTN, HLD, BPH p/w lethargy and urinary retention. Cardiology following for urosepsis 2/2 proteus mirabilis pyelonephritis, now off pressors.     Urosepsis 2/2 proteus mirabilis pyelonephritis (tachycardiac and hypotensive)   - off levophed  - BP: 122/72 (10-27-21 @ 05:00) (100/55 - 134/61) much improved  - HR: 72 (10-27 @ 05:00) (59 - 72)  - ID following, on IV abx   - continue lisinopril and lopressor    Hx of CAD s/p PCI in 2006  - no sign of ischemia trop neg x 1  - cont GDMT, Plavix, Statin, BB, ACEi     - Monitor and replete Lytes. Keep K > 4 and Mg > 2  - All other medical needs as per primary team.  - Other cardiovascular workup will depend on clinical course.  - Will continue to follow.    Madie Murillo [facility]  Nurse Practitioner - Cardiology   Spectra #[id number]/ [phone]

## 2021-10-28 VITALS — DIASTOLIC BLOOD PRESSURE: 75 MMHG | SYSTOLIC BLOOD PRESSURE: 132 MMHG | HEART RATE: 70 BPM

## 2021-10-28 PROCEDURE — 96374 THER/PROPH/DIAG INJ IV PUSH: CPT

## 2021-10-28 PROCEDURE — 80053 COMPREHEN METABOLIC PANEL: CPT

## 2021-10-28 PROCEDURE — 85610 PROTHROMBIN TIME: CPT

## 2021-10-28 PROCEDURE — 99285 EMERGENCY DEPT VISIT HI MDM: CPT | Mod: 25

## 2021-10-28 PROCEDURE — 96375 TX/PRO/DX INJ NEW DRUG ADDON: CPT

## 2021-10-28 PROCEDURE — G1004: CPT

## 2021-10-28 PROCEDURE — 97116 GAIT TRAINING THERAPY: CPT

## 2021-10-28 PROCEDURE — 99497 ADVNCD CARE PLAN 30 MIN: CPT

## 2021-10-28 PROCEDURE — 87150 DNA/RNA AMPLIFIED PROBE: CPT

## 2021-10-28 PROCEDURE — 84100 ASSAY OF PHOSPHORUS: CPT

## 2021-10-28 PROCEDURE — 99232 SBSQ HOSP IP/OBS MODERATE 35: CPT

## 2021-10-28 PROCEDURE — 83605 ASSAY OF LACTIC ACID: CPT

## 2021-10-28 PROCEDURE — 93005 ELECTROCARDIOGRAM TRACING: CPT

## 2021-10-28 PROCEDURE — 83880 ASSAY OF NATRIURETIC PEPTIDE: CPT

## 2021-10-28 PROCEDURE — 36415 COLL VENOUS BLD VENIPUNCTURE: CPT

## 2021-10-28 PROCEDURE — 97166 OT EVAL MOD COMPLEX 45 MIN: CPT

## 2021-10-28 PROCEDURE — 84484 ASSAY OF TROPONIN QUANT: CPT

## 2021-10-28 PROCEDURE — 92610 EVALUATE SWALLOWING FUNCTION: CPT

## 2021-10-28 PROCEDURE — 87040 BLOOD CULTURE FOR BACTERIA: CPT

## 2021-10-28 PROCEDURE — 82746 ASSAY OF FOLIC ACID SERUM: CPT

## 2021-10-28 PROCEDURE — 85730 THROMBOPLASTIN TIME PARTIAL: CPT

## 2021-10-28 PROCEDURE — 83735 ASSAY OF MAGNESIUM: CPT

## 2021-10-28 PROCEDURE — 81001 URINALYSIS AUTO W/SCOPE: CPT

## 2021-10-28 PROCEDURE — 86769 SARS-COV-2 COVID-19 ANTIBODY: CPT

## 2021-10-28 PROCEDURE — 0225U NFCT DS DNA&RNA 21 SARSCOV2: CPT

## 2021-10-28 PROCEDURE — 74176 CT ABD & PELVIS W/O CONTRAST: CPT | Mod: MG

## 2021-10-28 PROCEDURE — 97161 PT EVAL LOW COMPLEX 20 MIN: CPT

## 2021-10-28 PROCEDURE — 87640 STAPH A DNA AMP PROBE: CPT

## 2021-10-28 PROCEDURE — 85025 COMPLETE CBC W/AUTO DIFF WBC: CPT

## 2021-10-28 PROCEDURE — 93306 TTE W/DOPPLER COMPLETE: CPT

## 2021-10-28 PROCEDURE — 85027 COMPLETE CBC AUTOMATED: CPT

## 2021-10-28 PROCEDURE — 82550 ASSAY OF CK (CPK): CPT

## 2021-10-28 PROCEDURE — 97535 SELF CARE MNGMENT TRAINING: CPT

## 2021-10-28 PROCEDURE — 87186 SC STD MICRODIL/AGAR DIL: CPT

## 2021-10-28 PROCEDURE — 97530 THERAPEUTIC ACTIVITIES: CPT

## 2021-10-28 PROCEDURE — 87086 URINE CULTURE/COLONY COUNT: CPT

## 2021-10-28 PROCEDURE — 71045 X-RAY EXAM CHEST 1 VIEW: CPT

## 2021-10-28 PROCEDURE — 87641 MR-STAPH DNA AMP PROBE: CPT

## 2021-10-28 PROCEDURE — 97110 THERAPEUTIC EXERCISES: CPT

## 2021-10-28 PROCEDURE — 82607 VITAMIN B-12: CPT

## 2021-10-28 PROCEDURE — 87077 CULTURE AEROBIC IDENTIFY: CPT

## 2021-10-28 RX ORDER — CEFUROXIME AXETIL 250 MG
1 TABLET ORAL
Qty: 20 | Refills: 0
Start: 2021-10-28 | End: 2021-11-06

## 2021-10-28 RX ADMIN — Medication 25 MILLIGRAM(S): at 05:19

## 2021-10-28 RX ADMIN — LEVETIRACETAM 500 MILLIGRAM(S): 250 TABLET, FILM COATED ORAL at 10:08

## 2021-10-28 RX ADMIN — ENOXAPARIN SODIUM 40 MILLIGRAM(S): 100 INJECTION SUBCUTANEOUS at 13:02

## 2021-10-28 RX ADMIN — LISINOPRIL 2.5 MILLIGRAM(S): 2.5 TABLET ORAL at 05:19

## 2021-10-28 RX ADMIN — Medication 25 MILLIGRAM(S): at 17:53

## 2021-10-28 RX ADMIN — CLOPIDOGREL BISULFATE 75 MILLIGRAM(S): 75 TABLET, FILM COATED ORAL at 13:02

## 2021-10-28 RX ADMIN — CEFTRIAXONE 100 MILLIGRAM(S): 500 INJECTION, POWDER, FOR SOLUTION INTRAMUSCULAR; INTRAVENOUS at 13:01

## 2021-10-28 RX ADMIN — ESCITALOPRAM OXALATE 20 MILLIGRAM(S): 10 TABLET, FILM COATED ORAL at 13:02

## 2021-10-28 RX ADMIN — Medication 145 MILLIGRAM(S): at 13:02

## 2021-10-28 RX ADMIN — Medication 1 TABLET(S): at 13:02

## 2021-10-28 NOTE — PROGRESS NOTE ADULT - ASSESSMENT
MARLEY: Prerenal azotemia, Septic ATN, Clogged jo, on ACEI  Sepsis, Shock on pressors  h/o Left UVJ calculus, s/p ureteral stent placement.   h/o ALS  Hypernatremia    Stable renal indices. Continue jo drainage. ACEI resumed. Sodium levels stable. IV abx. Avoid nephrotoxic meds as possible.   Avoid ACEI, ARB, NSAIDs and IV contrast. Will follow electrolytes and renal function trend.

## 2021-10-28 NOTE — PROGRESS NOTE ADULT - SUBJECTIVE AND OBJECTIVE BOX
Patient is a 71y old  Male who presents with a chief complaint of blocked jo (24 Oct 2021 12:23)  Patient seen in follow up for MARLEY.        PAST MEDICAL HISTORY:  Myocardial infarct    Hypertension    Hyperlipidemia    BPH (benign prostatic hyperplasia)    ALS (amyotrophic lateral sclerosis)       MEDICATIONS  (STANDING):  atorvastatin 80 milliGRAM(s) Oral at bedtime  cefTRIAXone   IVPB 1000 milliGRAM(s) IV Intermittent every 24 hours  clopidogrel Tablet 75 milliGRAM(s) Oral daily  enoxaparin Injectable 40 milliGRAM(s) SubCutaneous daily  escitalopram 20 milliGRAM(s) Oral daily  fenofibrate Tablet 145 milliGRAM(s) Oral daily  lactobacillus acidophilus 1 Tablet(s) Oral daily  levETIRAcetam  Solution 500 milliGRAM(s) Oral two times a day  lisinopril 2.5 milliGRAM(s) Oral daily  metoprolol tartrate 25 milliGRAM(s) Oral two times a day    MEDICATIONS  (PRN):    T(C): 36.7 (10-28-21 @ 12:37), Max: 37.1 (10-27-21 @ 13:15)  HR: 61 (10-28-21 @ 12:37) (59 - 73)  BP: 133/61 (10-28-21 @ 12:37) (100/55 - 134/86)  RR: 17 (10-28-21 @ 12:37)  SpO2: 92% (10-28-21 @ 12:37)  Wt(kg): --  I&O's Detail    27 Oct 2021 07:01  -  28 Oct 2021 07:00  --------------------------------------------------------  IN:    Enteral Tube Flush: 270 mL    Oral Fluid: 120 mL    Osmolite: 780 mL  Total IN: 1170 mL    OUT:    Indwelling Catheter - Urethral (mL): 2300 mL  Total OUT: 2300 mL    Total NET: -1130 mL              PHYSICAL EXAM:  General: No distress  Respiratory: b/l air entry  Cardiovascular: S1 S2  Gastrointestinal: soft, PEG  Extremities:  no edema            LABORATORY:                        10.6   6.30  )-----------( 205      ( 27 Oct 2021 09:36 )             33.0     10-27    141  |  107  |  23  ----------------------------<  83  4.2   |  28  |  0.57    Ca    9.5      27 Oct 2021 09:36  Phos  3.0     10-27  Mg     2.3     10-27    TPro  6.1  /  Alb  2.1<L>  /  TBili  0.4  /  DBili  x   /  AST  20  /  ALT  61  /  AlkPhos  81  10-27    Sodium, Serum: 141 mmol/L (10-27 @ 09:36)    Potassium, Serum: 4.2 mmol/L (10-27 @ 09:36)    Hemoglobin: 10.6 g/dL (10-27 @ 09:36)  Hemoglobin: 10.3 g/dL (10-26 @ 05:20)    Creatinine, Serum 0.57 (10-27 @ 09:36)  Creatinine, Serum 0.62 (10-26 @ 05:20)        LIVER FUNCTIONS - ( 27 Oct 2021 09:36 )  Alb: 2.1 g/dL / Pro: 6.1 g/dL / ALK PHOS: 81 U/L / ALT: 61 U/L / AST: 20 U/L / GGT: x

## 2021-10-28 NOTE — PROGRESS NOTE ADULT - SUBJECTIVE AND OBJECTIVE BOX
[facility] -- Santi Nair, Shara Gant, Chris Peace, Harvinder Ramirez: Office # [phone]    Follow Up:  Sepsis/ Tachycardia/ hypotensive    Subjective/Observations: Patient seen and examined, awake, alert, resting comfortably in bed, no complaints of chest pain, dyspnea, palpitations or dizziness, orthopnea and PND. Tolerating room air.     REVIEW OF SYSTEMS: All review of systems is negative for eye, ENT, GI, , allergic, dermatologic, musculoskeletal and neurologic except as described above    PAST MEDICAL & SURGICAL HISTORY:  Myocardial infarct  Hypertension  Hyperlipidemia  BPH (benign prostatic hyperplasia)  ALS (amyotrophic lateral sclerosis)  S/P tonsillectomy  S/P coronary artery stent placement  History of hip surgery  History of hernia repair  &gt; 20 years ago  H/O shoulder surgery        MEDICATIONS  (STANDING):  atorvastatin 80 milliGRAM(s) Oral at bedtime  cefTRIAXone   IVPB 1000 milliGRAM(s) IV Intermittent every 24 hours  clopidogrel Tablet 75 milliGRAM(s) Oral daily  enoxaparin Injectable 40 milliGRAM(s) SubCutaneous daily  escitalopram 20 milliGRAM(s) Oral daily  fenofibrate Tablet 145 milliGRAM(s) Oral daily  lactobacillus acidophilus 1 Tablet(s) Oral daily  levETIRAcetam  Solution 500 milliGRAM(s) Oral two times a day  lisinopril 2.5 milliGRAM(s) Oral daily  metoprolol tartrate 25 milliGRAM(s) Oral two times a day    MEDICATIONS  (PRN):    Allergies    fish (Short breath; Anaphylaxis)  No Known Drug Allergies    Intolerances      Vital Signs Last 24 Hrs  T(C): 36.7 (28 Oct 2021 12:37), Max: 36.7 (28 Oct 2021 12:37)  T(F): 98 (28 Oct 2021 12:37), Max: 98 (28 Oct 2021 12:37)  HR: 61 (28 Oct 2021 12:37) (61 - 73)  BP: 133/61 (28 Oct 2021 12:37) (102/88 - 134/86)  RR: 17 (28 Oct 2021 12:37) (17 - 17)  SpO2: 92% (28 Oct 2021 12:37) (92% - 95%)  I&O's Summary    27 Oct 2021 07:01  -  28 Oct 2021 07:00  --------------------------------------------------------  IN: 1170 mL / OUT: 2300 mL / NET: -1130 mL      Weight (kg): 66.7 (10-28 @ 11:36)    TELE: Not on telemetry   PHYSICAL EXAM:  Appearance: NAD, no distress, alert,  HEENT: Moist Mucous Membranes, Anicteric  Cardiovascular: Regular rate and rhythm, Normal S1 S2, No JVD, No murmurs, No rubs, gallops or clicks  Respiratory: Non-labored, diminished at bases, anterior + rhonchi, +wheezing.   Gastrointestinal:  Soft, Non-tender, + BS  Neurologic: Non-focal  Skin: Warm and dry, No visible rashes or ulcers, No ecchymosis, No cyanosis  Musculoskeletal: No clubbing, No cyanosis, No joint swelling/tenderness  Psychiatry: Mood & affect appropriate  Lymph: No peripheral edema.     LABS: All Labs Reviewed:                        10.6   6.30  )-----------( 205      ( 27 Oct 2021 09:36 )             33.0                         10.3   7.12  )-----------( 164      ( 26 Oct 2021 05:20 )             32.3     27 Oct 2021 09:36    141    |  107    |  23     ----------------------------<  83     4.2     |  28     |  0.57   26 Oct 2021 05:20    141    |  109    |  26     ----------------------------<  116    4.0     |  27     |  0.62     Ca    9.5        27 Oct 2021 09:36  Ca    9.7        26 Oct 2021 05:20  Phos  3.0       27 Oct 2021 09:36  Phos  2.4       26 Oct 2021 05:20  Mg     2.3       27 Oct 2021 09:36  Mg     1.8       26 Oct 2021 05:20    TPro  6.1    /  Alb  2.1    /  TBili  0.4    /  DBili  x      /  AST  20     /  ALT  61     /  AlkPhos  81     27 Oct 2021 09:36  TPro  5.9    /  Alb  2.0    /  TBili  0.4    /  DBili  x      /  AST  39     /  ALT  84     /  AlkPhos  77     26 Oct 2021 05:20    PT/INR - ( 27 Oct 2021 09:36 )   PT: 11.7 sec;   INR: 1.00 ratio         PTT - ( 27 Oct 2021 09:36 )  PTT:29.0 sec  Creatine Kinase, Serum: 32 U/L (10-23-21 @ 12:04)  Troponin I, Serum: <.015 ng/mL (10-23-21 @ 12:04)      12 Lead ECG:   Ventricular Rate 105 BPM  Atrial Rate 105 BPM  P-R Interval 128 ms  QRS Duration 76 ms  Q-T Interval 324 ms  QTC Calculation(Bazett) 428 ms  P Axis 47 degrees  R Axis 128 degrees  T Axis 37 degrees    Diagnosis Line Sinus tachycardia  Right axis deviation  Abnormal ECG  Confirmed by sonya Ramirez ([id number]) on 10/23/2021 3:40:27 PM (10-23-21 @ 11:53)    < from: [facility] (10.23.21 @ 12:41) >    EXAM:  CT RENAL STONE [facility]                            PROCEDURE DATE:  10/23/2021          INTERPRETATION:  CLINICAL INFORMATION: Fever, dysuria, history of kidney stone.    COMPARISON: CT abdomen pelvis dated 7/29/2021.    CONTRAST/COMPLICATIONS:  IV Contrast: NONE  0 cc administered   0 cc discarded  Oral Contrast: NONE  Complications: None reported at time of study completion    PROCEDURE:  CT of the Abdomen and Pelvis was performed.  Sagittal and coronal reformats were performed.    FINDINGS:  LOWER CHEST: Bibasilar subsegmental atelectasis. Coronary artery calcifications. Trace pericardial fluid.    LIVER: Within normal limits.  BILE DUCTS: Normal caliber.  GALLBLADDER: Contracted.  SPLEEN: Within normal limits.  PANCREAS: Within normal limits.  ADRENALS: Within normal limits.  KIDNEYS/URETERS: Mild left hydronephrosis. 2 mm nonobstructive calculus interpolar region left kidney. No right-sided calculi.  Left ureteral double-J stent in good position. Mild left ureteral dilatation with 5 mm calculus in the distal left ureter.    BLADDER: Shukla catheter. Air within the bladder lumen. 8 mm bladder calculus. Left-sided bladder diverticulum containing 6 mm calculus.  REPRODUCTIVE ORGANS: Enlarged prostate with dystrophic calcifications.    BOWEL: PEG tube. No bowel obstruction. Appendix normal.  PERITONEUM: No ascites.  VESSELS: Within normal limits.  RETROPERITONEUM/LYMPH NODES: No lymphadenopathy.  ABDOMINAL WALL: Fat-containing left inguinal hernia.  BONES: Within normal limits.    IMPRESSION:  Left ureteral stent with mild left hydroureteronephrosis.    Subcentimeter nonobstructive calculi in the left kidney, distal left ureter, urinary bladder, and bladder diverticulum.        --- End of Report ---            KYRA CLEVELAND; Attending Radiologist  This document has been electronically signed. Oct 23 2021  1:04PM    < end of copied text >  < from: Xray Chest 1 View- PORTABLE-Urgent (10.23.21 @ 12:21) >    EXAM:  XR CHEST PORTABLE URGENT 1V                            PROCEDURE DATE:  10/23/2021          INTERPRETATION:  CLINICAL STATEMENT: Weakness. UTI    TECHNIQUE: AP view of the chest.      COMPARISON: 8/5/2021    Extreme apices collimated off theexam.    The cardiomediastinal silhouette is normal and the kayli are not enlarged. The trachea is midline. Suboptimal degree of inspiration with mild bronchovascular crowding. There is no focal infiltrate or pleural effusion. The osseous structures are intact. [place] pins right head.    IMPRESSION:    Unremarkable frontal chest x ray        --- End of Report ---            CARY VYAS MD; Attending Radiologist  This document has been electronically signed. Oct 24 2021  4:30PM    < end of copied text >  < from: TTE Echo Complete w/o Contrast w/ Doppler (10.25.21 @ 03:00) >     EXAM:  ECHO TTE WO CON COMP W DOPP         PROCEDURE DATE:  10/25/2021        INTERPRETATION:  INDICATION: Atrial fibrillation  Sonographer AS    Blood Pressure 129/61    Height 165.1 cm     Weight 66.7 kg       BSA 1.7 sq m    Dimensions:  LA 3.2      Normal Values: 2.0 - 4.0 cm  Ao 3.0        Normal Values: 2.0 - 3.8 cm  SEPTUM 1.1       Normal Values: 0.6 - 1.2 cm  PWT 0.9       Normal Values: 0.6 - 1.1 cm  LVIDd 4.4         Normal Values: 3.0 - 5.6 cm  LVIDs 3.1         Normal Values: 1.8- 4.0 cm      OBSERVATIONS:  Technically difficult and limited study  Mitral Valve: normal, trace physiologic MR.  Aortic Valve/Aorta: Not well-visualized  Tricuspid Valve: normal with trace TR.  Pulmonic Valve: Not well-visualized  Left Atrium: normal  Right Atrium: Not well-visualized  Left Ventricle: normal LV size and systolic function, estimated LVEF of 60%.  Right Ventricle: Grossly normal size and systolic function.  Pericardium: no significant pericardial effusion.  IVC measures 1.17 cm  LV diastolic dysfunction is present        IMPRESSION:  Technically difficult and limited study  Normal left ventricular internal dimensions and systolic function, estimated LVEF of 60%.  Grossly normal RV size and systolic function.  The aortic valveis not well-visualized  Trace physiologic MR and TR.  No significant pericardial effusion.    --- End of Report ---              DANNY BLANCO MD; Attending Cardiologist  This document has been electronically signed. Oct 26 2021  5:25PM    < end of copied text >

## 2021-10-28 NOTE — PROGRESS NOTE ADULT - ASSESSMENT
71 year old male with ALS, CAD (s/p MI 1 stent placed 2006 previously on DAPT), HTN, HLD, BPH p/w lethargy and urinary retention. Cardiology following for urosepsis 2/2 proteus mirabilis pyelonephritis, now off pressors.     Urosepsis 2/2 proteus mirabilis pyelonephritis (tachycardiac and hypotensive)   - BP: 133/61 (10-28-21 @ 12:37) (102/88 - 134/86) much improved  - HR: 61 (10-28 @ 12:37) (61 - 73)  - continue lisinopril and lopressor  - ID following, on IV abx     Hx of CAD s/p PCI in 2006  - no sign of ischemia trop neg x 1  - cont GDMT, Plavix, Statin, BB, ACEi   - Monitor and replete Lytes. Keep K > 4 and Mg > 2    - All other medical needs as per primary team.  - Other cardiovascular workup will depend on clinical course.  - Will continue to follow.    Madie Murillo [facility]  Nurse Practitioner - Cardiology   Spectra #[phone]/ [phone]

## 2021-10-28 NOTE — PROGRESS NOTE ADULT - NUTRITIONAL ASSESSMENT
This patient has been assessed with a concern for Malnutrition and has been determined to have a diagnosis/diagnoses of Severe protein-calorie malnutrition.    This patient is being managed with:   Diet NPO with Tube Feed-  Tube Feeding Modality: Gastrostomy  Osmolite 1.5 Kelvin  Total Volume for 24 Hours (mL): 1560  Bolus  Total Volume of Bolus (mL):  260  Total # of Feeds: 5  Tube Feed Frequency: Every 4 hours   Tube Feed Start Time: 06:00  Bolus Feed Rate (mL per Hour): 260   Bolus Feed Duration (in Hours): 1  Bolus Feed Instructions:  start TF at 200 ml bolus feed every 4-5 hrsincrease to goal of 260 ml/feed.( total 1300 ml per day.) Flush with 750 total ml free water/day. Please instruct wife on bolus feeds.  Free Water Flush  Free Water Flush Instructions:  total free water needed/day ~750 ml  Entered: Oct 23 2021  5:53PM    
This patient has been assessed with a concern for Malnutrition and has been determined to have a diagnosis/diagnoses of Severe protein-calorie malnutrition.    This patient is being managed with:   Diet NPO with Tube Feed-  Tube Feeding Modality: Gastrostomy  Osmolite 1.5 Kelvin  Total Volume for 24 Hours (mL): 1560  Bolus  Total Volume of Bolus (mL):  260  Total # of Feeds: 5  Tube Feed Frequency: Every 4 hours   Tube Feed Start Time: 06:00  Bolus Feed Rate (mL per Hour): 260   Bolus Feed Duration (in Hours): 1  Bolus Feed Instructions:  start TF at 200 ml bolus feed every 4-5 hrs increase to goal of 260 ml/feed.( total 1300 ml per day.) Flush with 750 total ml free water/day. Please instruct wife on bolus feeds.  Free Water Flush  Free Water Flush Instructions:  total free water needed/day ~750 ml  Entered: Oct 23 2021  5:53PM
This patient has been assessed with a concern for Malnutrition and has been determined to have a diagnosis/diagnoses of Severe protein-calorie malnutrition.    This patient is being managed with:   Diet NPO with Tube Feed-  Tube Feeding Modality: Gastrostomy  Osmolite 1.5 Kelvin  Total Volume for 24 Hours (mL): 1560  Bolus  Total Volume of Bolus (mL):  260  Total # of Feeds: 5  Tube Feed Frequency: Every 4 hours   Tube Feed Start Time: 06:00  Bolus Feed Rate (mL per Hour): 260   Bolus Feed Duration (in Hours): 1  Bolus Feed Instructions:  start TF at 200 ml bolus feed every 4-5 hrs increase to goal of 260 ml/feed.( total 1300 ml per day.) Flush with 750 total ml free water/day. Please instruct wife on bolus feeds.  Free Water Flush  Free Water Flush Instructions:  total free water needed/day ~750 ml  Entered: Oct 23 2021  5:53PM

## 2021-10-28 NOTE — PROGRESS NOTE ADULT - ATTENDING COMMENTS
71M h/o ALS, CAD (s/p stent 2006 on DAPT), HTN, HLD, BPH, L renal stones s/p stent placement a/w septic shock 2/2 UTI and Proteus bacteremia    Neuro: metabolic encephalopathy improved, more alert today  - continue home lexapro, keppra  CV: shock state resolved, stable off levol dc midodrine  - currently on levophed 0.05 from ED, if remains on pressors post-bolus will start midodrine  - continue home fenofibrate, atorvastatin (therapeutic exchange for crestor)  Pulm: no acute issues  GI: enteral feeds via PEG  Renal: MARLEY likely from shock, resolved retention - now improving with resuscitation, continue to trend; lactate cleared  - free water and 1/2NS for hypernatremia (FWD = 3.4L)  ID: septic shock 2/2 UTI, culture pending and Proteus bacteremia - change cefepime to ceftriaxone per ID   - repeat surveillance cultures tomorrow AM; follow-up sensitivities  Heme: Hb 15-->12, initial admission Hb likely hemoconcentrated  - change HSQ to lovenox for dvt ppx    Stable for transfer to floor
Chart reviewed    Patient seen and examined    Agree with plan as outlined above    71 year old male with ALS, CAD (s/p MI 1 stent placed 2006 previously on DAPT), HTN, HLD, BPH p/w lethargy and urinary retention. Cardiology following for urosepsis 2/2 proteus mirabilis pyelonephritis, now off pressors.     Urosepsis 2/2 proteus mirabilis pyelonephritis (tachycardiac and hypotensive)   - off levophed  - BP: 122/72 (10-27-21 @ 05:00) (100/55 - 134/61) much improved  - HR: 72 (10-27 @ 05:00) (59 - 72)  - ID following, on IV abx   - continue lisinopril and lopressor    Hx of CAD s/p PCI in 2006  - no sign of ischemia trop neg x 1  - cont GDMT, Plavix, Statin, BB, ACEi
Seen/examined. agree with above.  doing much better, awake and alert  bp stable, off pressores
70 yo M Hx ALS, CAD (s/p stent 2006 on DAPT), HTN, HLD, BPH, L renal stones s/p stent placement, atonic bladder with chronic jo, with resolved septic shock 2/2 UTI and Proteus bacteremia in setting of bladder outlet obstruction (blocked jo).    --sz disorder, continue keppra  continue home lexapro  --CAD, continue plavix, fibrate and statin  f/u echo  --respiratory status stable  --MARLEY improved  hypernatremia improved, decrease free water via PEG  --dysphagia, continue TF via PEG  --proteus UTI, continue CTX  f/u surveillance BCx  --stable for floor
72 yo M Hx ALS, CAD (s/p stent 2006 on DAPT), HTN, HLD, BPH, L renal stones s/p stent placement, atonic bladder with chronic jo, with resolved septic shock 2/2 UTI and Proteus bacteremia in setting of bladder outlet obstruction (blocked jo).    --sz disorder, continue keppra  continue home lexapro  --CAD, continue plavix, fibrate and statin  shock resolved, check echo  --respiratory status stable  --MARLEY improved  hypernatremia, increase free water via PEG  --dysphagia, continue TF via PEG  --proteus UTI, continue CTX  f/u surveillance BCx  --anemia, check B12 and folate  --stable for floor
Patient s/p septic shock, now off pressors.  Would wait one more day before starting anti hypertensive medications given borderline BPs.  To follow.  At risk for abrupt decompensation .

## 2021-10-28 NOTE — PROGRESS NOTE ADULT - PROVIDER SPECIALTY LIST ADULT
Critical Care
Nephrology
Nephrology
Cardiology
Critical Care
Infectious Disease
Cardiology
Critical Care
Infectious Disease
Infectious Disease
Nephrology
Cardiology
Family Medicine
Nephrology
Critical Care
Family Medicine
Hospitalist
Hospitalist

## 2021-10-28 NOTE — GOALS OF CARE CONVERSATION - ADVANCED CARE PLANNING - CONVERSATION DETAILS
Writer met with .pt . Reviewed patient's medical and social history as well as events leading to patient's hospitalization. Writer discussed patient's current diagnosis (septic shock, bacteremia, MARLEY, hypotension, leukocytosis), medical condition and management  prognosis, and life expectancy. Spoke to pt about his wishes regarding intubation and vent support. Explained that pt would need to be trached and kept alive on a ventilator. .All questions answered . Pt asked that his wishes be told to his son Eleazar. Eleazar expressed an undersatnding

## 2021-10-28 NOTE — DISCHARGE NOTE NURSING/CASE MANAGEMENT/SOCIAL WORK - PATIENT PORTAL LINK FT
You can access the FollowMyHealth Patient Portal offered by [facility] by registering at the following website: [email or web address]. By joining [employer]’s FollowMyHealth portal, you will also be able to view your health information using other applications (apps) compatible with our system.

## 2021-10-28 NOTE — CHART NOTE - NSCHARTNOTEFT_GEN_A_CORE
: Marta    INDICATION: shock    PROCEDURE:  [x] LIMITED ECHO  [x] LIMITED CHEST  [ ] LIMITED RETROPERITONEAL  [x] LIMITED ABDOMINAL  [ ] LIMITED DVT  [ ] NEEDLE GUIDANCE VASCULAR  [ ] NEEDLE GUIDANCE THORACENTESIS  [ ] NEEDLE GUIDANCE PARACENTESIS  [ ] NEEDLE GUIDANCE PERICARDIOCENTESIS  [ ] OTHER    FINDINGS:  - poor cardiac windows; apical 4 showing grossly normal LV function with no RV dilation  - IVC fully collapsed with respiration  - A-line predominance anteriorly b/l; no consolidations or effusions b/l  - no hydronephrosis appreciated b/l; +R renal simple cyst    INTERPRETATION:  - shock state likely to be fluid responsive  - no evidence of obstructed hydro on POCUS
Do you have Advance Directives (HCP / LV / Organ donation / Documentation of oral advance Directive):   ( x   )  yes    (      )    NO                                                                            Do you have LV - Living will :                                                                                                                                             (   x )  yes    (      )   No    Do you have HCP - Health Care Proxy:                                                                                                                            (  x   )  yes   (       ) N0    Do you have DNR- Do Not Resuscitate :                                                                                                                           (      )  yes  (     x   )  No    Do you have DNI- Do Not intubate  :                                                                                                                               (      )  yes   (    x   ) No    Do you have MOLST - Medical orders for Life sustaining treatment  :                                                                    (      ) yes    (    x   ) No    Decision Maker :  (  x   ) Patient     (   x   )  HCA   (     ) Public Health Law Surrogate     (      ) Surrogate  (       ) Guardian    Goals of Care :  (  x    )   Complete Care     (       ) No Limitations                              (       )   Comfort Care       (       )  Hospice                               (      )   Limited medical Intervention / s    Medical Interventions :   (  x      )   CPR       (        )  DNR                                               (   x     )  Intubation with MV - Mechanical Ventilation  (   x   ) BIPAP/CPAP    (         )   DNI                                               (     x    )  Artificial Nutrition -  IVF, TPN / PPN, Tube Feeds             (         )   No Feeding Tube                                                (   x     ) Use Antibiotics                         (          ) No Antibiotics                                                (      x   ) Blood and Blood Products     (         )   No Blood or Blood products                                                (      x    )  Dialysis                                    (         )  No Dialysis                                                (          )  Medical Management only  (         )  No Invasive Interventions or Surgery  Time spent :                        (       ) upto 30 minutes                       (     x      )   more than 30 minutes  ACP and GOC reviewed and discussed
Nutrition Follow Up Note    Seen for malnutrition follow up    Source: EMR, RN    Chart reviewed, events noted.     Per chart, 71y Male with PMH of ALS, BPH, HLD, HTN, Myocardial infarction, Chronic PEG. Pt admitted on 10/23 complaining of urinary symptoms. Presenting with sepsis 2/2 blocked jo.     Diet :  Diet, NPO with Tube Feed:   Tube Feeding Modality: Gastrostomy  Osmolite 1.5 Kelvin  Total Volume for 24 Hours (mL): 1560  Bolus  Total Volume of Bolus (mL):  260  Total # of Feeds: 5  Tube Feed Frequency: Every 4 hours   Tube Feed Start Time: 06:00  Bolus Feed Rate (mL per Hour): 260   Bolus Feed Duration (in Hours): 1  Bolus Feed Instructions:  start TF at 200 ml bolus feed every 4-5 hrs,increase to goal of 260 ml/feed.( total 1300 ml per day.) Flush with 750 total ml free water/day. Please instruct wife on bolus feeds.  Free Water Flush  Free Water Flush Instructions:  total free water needed/day ~750 ml (10-23-21 @ 17:53)      Nutrition Events:   -Enteral: Per RN, pt receiving TF at goal bolus rate and tolerating   -GI: last BM noted 10/26   -Renal: indices WDL; MARLEY resolved   -Swallow: per SLP 10/26, continue nutrition via PEG     Pertinent Medications:  MEDICATIONS  (STANDING):  atorvastatin 80 milliGRAM(s) Oral at bedtime  cefTRIAXone   IVPB 1000 milliGRAM(s) IV Intermittent every 24 hours  clopidogrel Tablet 75 milliGRAM(s) Oral daily  enoxaparin Injectable 40 milliGRAM(s) SubCutaneous daily  escitalopram 20 milliGRAM(s) Oral daily  fenofibrate Tablet 145 milliGRAM(s) Oral daily  lactobacillus acidophilus 1 Tablet(s) Oral daily  levETIRAcetam  Solution 500 milliGRAM(s) Oral two times a day  lisinopril 2.5 milliGRAM(s) Oral daily  metoprolol tartrate 25 milliGRAM(s) Oral two times a day    MEDICATIONS  (PRN):        Pertinent Labs:  10-27 Na141 mmol/L Glu 83 mg/dL K+ 4.2 mmol/L Cr  0.57 mg/dL BUN 23 mg/dL 10-27 Phos 3.0 mg/dL 10-27 Alb 2.1 g/dL<L>     CAPILLARY BLOOD GLUCOSE           Pressure Injury per chart:   -Sacrum: stage 1  -L/R heel: stage 1     Edema per chart:   -1+ L/R foot    Estimated Needs:   [x] no change since previous assessment    Previous Nutrition Diagnosis: malnutrition   Nutrition Diagnosis is: ongoing      New Nutrition Diagnosis: N/A    Interventions and Recommendations  1) Continue Osmolite 1.5 via PEG  -Monitor and adjust if needed     Monitoring and Evaluation:     [X] PO intake [X] Tolerance to diet prescription [X] weight trends [x] skin integrity     RD remains available and will follow up per protocol.
: Marta    INDICATION: shock    PROCEDURE:  [] LIMITED ECHO  [x] LIMITED CHEST  [ ] LIMITED RETROPERITONEAL  [ ] LIMITED ABDOMINAL  [ ] LIMITED DVT  [ ] NEEDLE GUIDANCE VASCULAR  [ ] NEEDLE GUIDANCE THORACENTESIS  [ ] NEEDLE GUIDANCE PARACENTESIS  [ ] NEEDLE GUIDANCE PERICARDIOCENTESIS  [ ] OTHER    FINDINGS:  - a-line predominance; small RLL consolidation with trace b/l effusions  - poor cardiac windows  - IVC obtained laterally, ~1.2cm    INTERPRETATION:  - still volume down but hemodynamically stable - continue LR @ 100/hr

## 2021-10-28 NOTE — PROGRESS NOTE ADULT - REASON FOR ADMISSION
blocked jo
blocked oj
blocked jo

## 2021-10-30 LAB
CULTURE RESULTS: SIGNIFICANT CHANGE UP
CULTURE RESULTS: SIGNIFICANT CHANGE UP
SPECIMEN SOURCE: SIGNIFICANT CHANGE UP
SPECIMEN SOURCE: SIGNIFICANT CHANGE UP

## 2021-12-17 ENCOUNTER — OUTPATIENT (OUTPATIENT)
Dept: OUTPATIENT SERVICES | Facility: HOSPITAL | Age: 71
LOS: 1 days | End: 2021-12-17
Payer: MEDICARE

## 2021-12-17 VITALS
TEMPERATURE: 98 F | WEIGHT: 147.05 LBS | OXYGEN SATURATION: 96 % | HEIGHT: 68 IN | DIASTOLIC BLOOD PRESSURE: 60 MMHG | HEART RATE: 58 BPM | RESPIRATION RATE: 16 BRPM | SYSTOLIC BLOOD PRESSURE: 108 MMHG

## 2021-12-17 DIAGNOSIS — N20.1 CALCULUS OF URETER: ICD-10-CM

## 2021-12-17 DIAGNOSIS — Z98.890 OTHER SPECIFIED POSTPROCEDURAL STATES: Chronic | ICD-10-CM

## 2021-12-17 DIAGNOSIS — Z01.818 ENCOUNTER FOR OTHER PREPROCEDURAL EXAMINATION: ICD-10-CM

## 2021-12-17 DIAGNOSIS — Z95.5 PRESENCE OF CORONARY ANGIOPLASTY IMPLANT AND GRAFT: Chronic | ICD-10-CM

## 2021-12-17 DIAGNOSIS — Z78.9 OTHER SPECIFIED HEALTH STATUS: Chronic | ICD-10-CM

## 2021-12-17 DIAGNOSIS — Z90.89 ACQUIRED ABSENCE OF OTHER ORGANS: Chronic | ICD-10-CM

## 2021-12-17 LAB
ALBUMIN SERPL ELPH-MCNC: 3.2 G/DL — LOW (ref 3.3–5)
ALP SERPL-CCNC: 65 U/L — SIGNIFICANT CHANGE UP (ref 40–120)
ALT FLD-CCNC: 26 U/L — SIGNIFICANT CHANGE UP (ref 12–78)
ANION GAP SERPL CALC-SCNC: 4 MMOL/L — LOW (ref 5–17)
AST SERPL-CCNC: 15 U/L — SIGNIFICANT CHANGE UP (ref 15–37)
BILIRUB SERPL-MCNC: 0.6 MG/DL — SIGNIFICANT CHANGE UP (ref 0.2–1.2)
BUN SERPL-MCNC: 21 MG/DL — SIGNIFICANT CHANGE UP (ref 7–23)
CALCIUM SERPL-MCNC: 10.3 MG/DL — HIGH (ref 8.5–10.1)
CHLORIDE SERPL-SCNC: 102 MMOL/L — SIGNIFICANT CHANGE UP (ref 96–108)
CO2 SERPL-SCNC: 31 MMOL/L — SIGNIFICANT CHANGE UP (ref 22–31)
CREAT SERPL-MCNC: 0.92 MG/DL — SIGNIFICANT CHANGE UP (ref 0.5–1.3)
GLUCOSE SERPL-MCNC: 96 MG/DL — SIGNIFICANT CHANGE UP (ref 70–99)
HCT VFR BLD CALC: 40.1 % — SIGNIFICANT CHANGE UP (ref 39–50)
HGB BLD-MCNC: 13.7 G/DL — SIGNIFICANT CHANGE UP (ref 13–17)
MCHC RBC-ENTMCNC: 31.6 PG — SIGNIFICANT CHANGE UP (ref 27–34)
MCHC RBC-ENTMCNC: 34.2 GM/DL — SIGNIFICANT CHANGE UP (ref 32–36)
MCV RBC AUTO: 92.6 FL — SIGNIFICANT CHANGE UP (ref 80–100)
NRBC # BLD: 0 /100 WBCS — SIGNIFICANT CHANGE UP (ref 0–0)
PLATELET # BLD AUTO: 306 K/UL — SIGNIFICANT CHANGE UP (ref 150–400)
POTASSIUM SERPL-MCNC: 4.1 MMOL/L — SIGNIFICANT CHANGE UP (ref 3.5–5.3)
POTASSIUM SERPL-SCNC: 4.1 MMOL/L — SIGNIFICANT CHANGE UP (ref 3.5–5.3)
PROT SERPL-MCNC: 7.4 G/DL — SIGNIFICANT CHANGE UP (ref 6–8.3)
RBC # BLD: 4.33 M/UL — SIGNIFICANT CHANGE UP (ref 4.2–5.8)
RBC # FLD: 12.6 % — SIGNIFICANT CHANGE UP (ref 10.3–14.5)
SODIUM SERPL-SCNC: 137 MMOL/L — SIGNIFICANT CHANGE UP (ref 135–145)
WBC # BLD: 8.69 K/UL — SIGNIFICANT CHANGE UP (ref 3.8–10.5)
WBC # FLD AUTO: 8.69 K/UL — SIGNIFICANT CHANGE UP (ref 3.8–10.5)

## 2021-12-17 PROCEDURE — 86850 RBC ANTIBODY SCREEN: CPT

## 2021-12-17 PROCEDURE — G0463: CPT | Mod: 25

## 2021-12-17 PROCEDURE — 86901 BLOOD TYPING SEROLOGIC RH(D): CPT

## 2021-12-17 PROCEDURE — 86900 BLOOD TYPING SEROLOGIC ABO: CPT

## 2021-12-17 PROCEDURE — 85027 COMPLETE CBC AUTOMATED: CPT

## 2021-12-17 PROCEDURE — 80053 COMPREHEN METABOLIC PANEL: CPT

## 2021-12-17 PROCEDURE — 36415 COLL VENOUS BLD VENIPUNCTURE: CPT

## 2021-12-17 RX ORDER — LEVETIRACETAM 250 MG/1
5 TABLET, FILM COATED ORAL
Qty: 0 | Refills: 0 | DISCHARGE

## 2021-12-17 RX ORDER — ROSUVASTATIN CALCIUM 5 MG/1
1 TABLET ORAL
Qty: 0 | Refills: 0 | DISCHARGE

## 2021-12-17 NOTE — H&P PST ADULT - MUSCULOSKELETAL COMMENTS
ALS limited gross movement able to lift arms, mild hand squeeze, able to move feet unable to stand without assistance

## 2021-12-17 NOTE — H&P PST ADULT - HISTORY OF PRESENT ILLNESS
70 yo male with ALS Dysphagia with a feeding tube,  s/p Head Injury 9/21 s/p Covid with Monoclonal Antibody Treatment early September 2021 HTN s/p Cardiac Stent 2006 HTN HLD BPH and Renal Colic scheduled for Cystoscopy Left Ureteroscopy - Stone Extraction on 12/29/21 with Dr Ruiz.  Patient is nonverbal history and information given by his son Caesar.  Patient's son states patient was hospitalized during the summer for sepsis, pneumonia and renal stone. Patient had a stent inserted and went to [facility] after discharge.  Patient was treated for Head Injury with small bleed at [facility] after a fall.  In Early September 2021 patient was positive for Covid and treated with Monoclonal Antibodies.

## 2021-12-17 NOTE — H&P PST ADULT - NEUROLOGICAL COMMENTS
has ALS had a fall in September 2021 hospitalized at [facility] small bleed on Keppra

## 2021-12-17 NOTE — H&P PST ADULT - CONSTITUTIONAL COMMENTS
frail,  in wheel chair, unable to stand without assistance weakness in all extremities, nonverbal answer with nodding of head

## 2021-12-17 NOTE — H&P PST ADULT - NS PRO REFERRAL CMGT
has aids at home 10 hours per day for 4 days and  7 hours per day for 3 days/Significantly altered cognitive/functional ability/Receiving homecare prior to admission

## 2021-12-17 NOTE — H&P PST ADULT - NSICDXPASTSURGICALHX_GEN_ALL_CORE_FT
PAST SURGICAL HISTORY:  H/O shoulder surgery 2006    History of hernia repair > 30 years ago    On tube feeding diet inserted june 2021  began using July 2021    S/P coronary artery stent placement 2006    S/P knee surgery 2010    S/P tonsillectomy

## 2021-12-17 NOTE — H&P PST ADULT - NSICDXPASTMEDICALHX_GEN_ALL_CORE_FT
PAST MEDICAL HISTORY:  2019 novel coronavirus disease (COVID-19) Early September 2021 treated with Monoclonal Antibodies    ALS (amyotrophic lateral sclerosis) diagnosed 2020    BPH (benign prostatic hyperplasia)     Calculus of ureter     Head injury September 2021 s/p fall at Rehab hospitalized at [facility] for a couple of days - had a small bleed,  On Keppra s a precaution    Hyperlipidemia     Hypertension     Myocardial infarct 2006    Pneumonia Summer 2021    Renal colic with stent insertion  2021    Sepsis

## 2021-12-17 NOTE — H&P PST ADULT - NSICDXFAMILYHX_GEN_ALL_CORE_FT
FAMILY HISTORY:  FHx: hyperlipidemia, father    Father  Still living? Unknown  FHx: myocardial infarction, Age at diagnosis: Age Unknown

## 2021-12-22 PROBLEM — N23 UNSPECIFIED RENAL COLIC: Chronic | Status: ACTIVE | Noted: 2021-12-17

## 2021-12-22 PROBLEM — G12.21 AMYOTROPHIC LATERAL SCLEROSIS: Chronic | Status: ACTIVE | Noted: 2021-06-26

## 2021-12-22 PROBLEM — A41.9 SEPSIS, UNSPECIFIED ORGANISM: Chronic | Status: ACTIVE | Noted: 2021-12-17

## 2021-12-22 PROBLEM — J18.9 PNEUMONIA, UNSPECIFIED ORGANISM: Chronic | Status: ACTIVE | Noted: 2021-12-17

## 2021-12-22 PROBLEM — I21.9 ACUTE MYOCARDIAL INFARCTION, UNSPECIFIED: Chronic | Status: ACTIVE | Noted: 2020-11-09

## 2021-12-22 PROBLEM — S09.90XA UNSPECIFIED INJURY OF HEAD, INITIAL ENCOUNTER: Chronic | Status: ACTIVE | Noted: 2021-12-17

## 2021-12-22 PROBLEM — U07.1 COVID-19: Chronic | Status: ACTIVE | Noted: 2021-12-17

## 2021-12-22 PROBLEM — N20.1 CALCULUS OF URETER: Chronic | Status: ACTIVE | Noted: 2021-12-17

## 2021-12-26 ENCOUNTER — EMERGENCY (EMERGENCY)
Facility: HOSPITAL | Age: 71
LOS: 1 days | Discharge: HOME | End: 2021-12-26
Attending: EMERGENCY MEDICINE | Admitting: EMERGENCY MEDICINE
Payer: MEDICARE

## 2021-12-26 VITALS
HEART RATE: 117 BPM | RESPIRATION RATE: 17 BRPM | HEIGHT: 68 IN | DIASTOLIC BLOOD PRESSURE: 77 MMHG | SYSTOLIC BLOOD PRESSURE: 112 MMHG | TEMPERATURE: 98 F | WEIGHT: 145.06 LBS | OXYGEN SATURATION: 99 %

## 2021-12-26 VITALS
OXYGEN SATURATION: 94 % | HEART RATE: 94 BPM | RESPIRATION RATE: 16 BRPM | DIASTOLIC BLOOD PRESSURE: 78 MMHG | SYSTOLIC BLOOD PRESSURE: 129 MMHG | TEMPERATURE: 97 F

## 2021-12-26 DIAGNOSIS — Z98.890 OTHER SPECIFIED POSTPROCEDURAL STATES: Chronic | ICD-10-CM

## 2021-12-26 DIAGNOSIS — Z90.89 ACQUIRED ABSENCE OF OTHER ORGANS: Chronic | ICD-10-CM

## 2021-12-26 DIAGNOSIS — Z95.5 PRESENCE OF CORONARY ANGIOPLASTY IMPLANT AND GRAFT: Chronic | ICD-10-CM

## 2021-12-26 DIAGNOSIS — Z78.9 OTHER SPECIFIED HEALTH STATUS: Chronic | ICD-10-CM

## 2021-12-26 LAB
ALBUMIN SERPL ELPH-MCNC: 3.4 G/DL — SIGNIFICANT CHANGE UP (ref 3.3–5)
ALP SERPL-CCNC: 77 U/L — SIGNIFICANT CHANGE UP (ref 40–120)
ALT FLD-CCNC: 24 U/L — SIGNIFICANT CHANGE UP (ref 12–78)
ANION GAP SERPL CALC-SCNC: 7 MMOL/L — SIGNIFICANT CHANGE UP (ref 5–17)
APPEARANCE UR: ABNORMAL
AST SERPL-CCNC: 26 U/L — SIGNIFICANT CHANGE UP (ref 15–37)
BASOPHILS # BLD AUTO: 0.03 K/UL — SIGNIFICANT CHANGE UP (ref 0–0.2)
BASOPHILS NFR BLD AUTO: 0.4 % — SIGNIFICANT CHANGE UP (ref 0–2)
BILIRUB SERPL-MCNC: 0.5 MG/DL — SIGNIFICANT CHANGE UP (ref 0.2–1.2)
BILIRUB UR-MCNC: NEGATIVE — SIGNIFICANT CHANGE UP
BUN SERPL-MCNC: 31 MG/DL — HIGH (ref 7–23)
CALCIUM SERPL-MCNC: 10.5 MG/DL — HIGH (ref 8.5–10.1)
CHLORIDE SERPL-SCNC: 103 MMOL/L — SIGNIFICANT CHANGE UP (ref 96–108)
CO2 SERPL-SCNC: 29 MMOL/L — SIGNIFICANT CHANGE UP (ref 22–31)
COLOR SPEC: YELLOW — SIGNIFICANT CHANGE UP
CREAT SERPL-MCNC: 0.95 MG/DL — SIGNIFICANT CHANGE UP (ref 0.5–1.3)
DIFF PNL FLD: ABNORMAL
EOSINOPHIL # BLD AUTO: 0.11 K/UL — SIGNIFICANT CHANGE UP (ref 0–0.5)
EOSINOPHIL NFR BLD AUTO: 1.4 % — SIGNIFICANT CHANGE UP (ref 0–6)
GLUCOSE SERPL-MCNC: 104 MG/DL — HIGH (ref 70–99)
GLUCOSE UR QL: NEGATIVE — SIGNIFICANT CHANGE UP
HCT VFR BLD CALC: 41.5 % — SIGNIFICANT CHANGE UP (ref 39–50)
HGB BLD-MCNC: 14 G/DL — SIGNIFICANT CHANGE UP (ref 13–17)
IMM GRANULOCYTES NFR BLD AUTO: 0.2 % — SIGNIFICANT CHANGE UP (ref 0–1.5)
KETONES UR-MCNC: NEGATIVE — SIGNIFICANT CHANGE UP
LEUKOCYTE ESTERASE UR-ACNC: ABNORMAL
LIDOCAIN IGE QN: 101 U/L — SIGNIFICANT CHANGE UP (ref 73–393)
LYMPHOCYTES # BLD AUTO: 0.89 K/UL — LOW (ref 1–3.3)
LYMPHOCYTES # BLD AUTO: 10.9 % — LOW (ref 13–44)
MCHC RBC-ENTMCNC: 31 PG — SIGNIFICANT CHANGE UP (ref 27–34)
MCHC RBC-ENTMCNC: 33.7 GM/DL — SIGNIFICANT CHANGE UP (ref 32–36)
MCV RBC AUTO: 92 FL — SIGNIFICANT CHANGE UP (ref 80–100)
MONOCYTES # BLD AUTO: 0.56 K/UL — SIGNIFICANT CHANGE UP (ref 0–0.9)
MONOCYTES NFR BLD AUTO: 6.9 % — SIGNIFICANT CHANGE UP (ref 2–14)
NEUTROPHILS # BLD AUTO: 6.52 K/UL — SIGNIFICANT CHANGE UP (ref 1.8–7.4)
NEUTROPHILS NFR BLD AUTO: 80.2 % — HIGH (ref 43–77)
NITRITE UR-MCNC: NEGATIVE — SIGNIFICANT CHANGE UP
NRBC # BLD: 0 /100 WBCS — SIGNIFICANT CHANGE UP (ref 0–0)
PH UR: 7 — SIGNIFICANT CHANGE UP (ref 5–8)
PLATELET # BLD AUTO: 278 K/UL — SIGNIFICANT CHANGE UP (ref 150–400)
POTASSIUM SERPL-MCNC: 4.2 MMOL/L — SIGNIFICANT CHANGE UP (ref 3.5–5.3)
POTASSIUM SERPL-SCNC: 4.2 MMOL/L — SIGNIFICANT CHANGE UP (ref 3.5–5.3)
PROT SERPL-MCNC: 7.9 G/DL — SIGNIFICANT CHANGE UP (ref 6–8.3)
PROT UR-MCNC: 100
RBC # BLD: 4.51 M/UL — SIGNIFICANT CHANGE UP (ref 4.2–5.8)
RBC # FLD: 12.3 % — SIGNIFICANT CHANGE UP (ref 10.3–14.5)
SODIUM SERPL-SCNC: 139 MMOL/L — SIGNIFICANT CHANGE UP (ref 135–145)
SP GR SPEC: 1.01 — SIGNIFICANT CHANGE UP (ref 1.01–1.02)
UROBILINOGEN FLD QL: NEGATIVE — SIGNIFICANT CHANGE UP
WBC # BLD: 8.13 K/UL — SIGNIFICANT CHANGE UP (ref 3.8–10.5)
WBC # FLD AUTO: 8.13 K/UL — SIGNIFICANT CHANGE UP (ref 3.8–10.5)

## 2021-12-26 PROCEDURE — 96374 THER/PROPH/DIAG INJ IV PUSH: CPT | Mod: XU

## 2021-12-26 PROCEDURE — 96375 TX/PRO/DX INJ NEW DRUG ADDON: CPT | Mod: XU

## 2021-12-26 PROCEDURE — 81001 URINALYSIS AUTO W/SCOPE: CPT

## 2021-12-26 PROCEDURE — 83690 ASSAY OF LIPASE: CPT

## 2021-12-26 PROCEDURE — 49465 FLUORO EXAM OF G/COLON TUBE: CPT

## 2021-12-26 PROCEDURE — 99284 EMERGENCY DEPT VISIT MOD MDM: CPT | Mod: 25

## 2021-12-26 PROCEDURE — 80053 COMPREHEN METABOLIC PANEL: CPT

## 2021-12-26 PROCEDURE — 87086 URINE CULTURE/COLONY COUNT: CPT

## 2021-12-26 PROCEDURE — 85025 COMPLETE CBC W/AUTO DIFF WBC: CPT

## 2021-12-26 PROCEDURE — 36415 COLL VENOUS BLD VENIPUNCTURE: CPT

## 2021-12-26 PROCEDURE — 99284 EMERGENCY DEPT VISIT MOD MDM: CPT

## 2021-12-26 RX ORDER — ONDANSETRON 8 MG/1
1 TABLET, FILM COATED ORAL
Qty: 12 | Refills: 0
Start: 2021-12-26 | End: 2021-12-28

## 2021-12-26 RX ORDER — ONDANSETRON 8 MG/1
4 TABLET, FILM COATED ORAL ONCE
Refills: 0 | Status: COMPLETED | OUTPATIENT
Start: 2021-12-26 | End: 2021-12-26

## 2021-12-26 RX ORDER — IOHEXOL 300 MG/ML
50 INJECTION, SOLUTION INTRAVENOUS ONCE
Refills: 0 | Status: COMPLETED | OUTPATIENT
Start: 2021-12-26 | End: 2021-12-26

## 2021-12-26 RX ORDER — PANTOPRAZOLE SODIUM 20 MG/1
40 TABLET, DELAYED RELEASE ORAL ONCE
Refills: 0 | Status: COMPLETED | OUTPATIENT
Start: 2021-12-26 | End: 2021-12-26

## 2021-12-26 RX ORDER — SODIUM CHLORIDE 9 MG/ML
1000 INJECTION INTRAMUSCULAR; INTRAVENOUS; SUBCUTANEOUS ONCE
Refills: 0 | Status: COMPLETED | OUTPATIENT
Start: 2021-12-26 | End: 2021-12-26

## 2021-12-26 RX ADMIN — IOHEXOL 50 MILLILITER(S): 300 INJECTION, SOLUTION INTRAVENOUS at 13:01

## 2021-12-26 RX ADMIN — SODIUM CHLORIDE 1000 MILLILITER(S): 9 INJECTION INTRAMUSCULAR; INTRAVENOUS; SUBCUTANEOUS at 11:15

## 2021-12-26 RX ADMIN — ONDANSETRON 4 MILLIGRAM(S): 8 TABLET, FILM COATED ORAL at 11:15

## 2021-12-26 RX ADMIN — PANTOPRAZOLE SODIUM 40 MILLIGRAM(S): 20 TABLET, DELAYED RELEASE ORAL at 11:15

## 2021-12-26 NOTE — ED PROVIDER NOTE - OBJECTIVE STATEMENT
71 M ALS dysphagia with feeding tube, head injury sept 2021, covid s/p mab sept 2021, HTN, stent, HLD BIB son for vomiting the past 3-4 nights. Pt receives only tube feeds since August. Has ureteral stent, due for procedure by Dr. Ruiz 12/29/21, started on cefpodoxime yesterday. Pt received covid booster and flu shot 2 days ago. Pt has 1-2 BMs per week, has been baseline since he has been on only tube feedings, stools have been normal without melena or blood. Son states pt receives last meal around 6-6:30 PM, goes to bed around 8-9PM, vomiting starts sometimes around 12 midnight-2 AM. Pt does not sleep flat, has head of bed elevated. Pt denies having pain, CP, abd pain, trouble breathing.

## 2021-12-26 NOTE — ED ADULT TRIAGE NOTE - CHIEF COMPLAINT QUOTE
++ vomitting over night.  Has tube feedings.  Shukla cath (chronic).  Has appt for kidney stone surgery wednesday with Sara.

## 2021-12-26 NOTE — ED PROVIDER NOTE - CROS ED RESP ALL NEG
1. Caller Name: Margoth Hopkins                                           Call Back Number: [phone] (home)         Patient approves a detailed voicemail message: N\A    Patient is in [place] awaiting the arrival of her grandchild and is sick. Whole family is sick and is requesting an RX for tamiflu.      RX needs to be sent to a pharmacy in [place].    [facility] #[id number] - [place], [place] - [address] [place] [address]    [phone] (Phone)  [phone] (Fax)   negative...

## 2021-12-26 NOTE — ED PROVIDER NOTE - ATTENDING CONTRIBUTION TO CARE
72 yo white male with ALS here with nausea and vomiting, admixed with dark emesis but no luana blood. No melena or BRBPR per son. Exam revealed white male, thin in NAD, frail with flat, soft and non tender abdomen with GTT. I agree with plan and management outlined by PA.

## 2021-12-26 NOTE — ED PROVIDER NOTE - NSFOLLOWUPINSTRUCTIONS_ED_ALL_ED_FT
Was A Bandage Applied: Yes Continue antibiotics that were prescribed.  Follow up with your doctors.  Zofran as needed for nausea, vomiting.  Return to the Emergency Department for worsening or concerning symptoms.    ------------------------------------------------------------------------------------    Acute Nausea and Vomiting    WHAT YOU NEED TO KNOW:    Acute nausea and vomiting start suddenly, worsen quickly, and last a short time.    DISCHARGE INSTRUCTIONS:    Return to the emergency department if:   •You see blood in your vomit or your bowel movements.      •You have sudden, severe pain in your chest and upper abdomen after hard vomiting or retching.      •You have swelling in your neck and chest.       •You are dizzy, cold, and thirsty and your eyes and mouth are dry.      •You are urinating very little or not at all.      •You have muscle weakness, leg cramps, and trouble breathing.       •Your heart is beating much faster than normal.       •You continue to vomit for more than 48 hours.       Contact your healthcare provider if:   •You have frequent dry heaves (vomiting but nothing comes out).      •Your nausea and vomiting does not get better or go away after you use medicine.      •You have questions or concerns about your condition or treatment.      Medicines: You may need any of the following:   •Medicines may be given to calm your stomach and stop your vomiting. You may also need medicines to help you feel more relaxed or to stop nausea and vomiting caused by motion sickness.      •Gastrointestinal stimulants are used to help empty your stomach and bowels. This may help decrease nausea and vomiting.      •Take your medicine as directed. Contact your healthcare provider if you think your medicine is not helping or if you have side effects. Tell him or her if you are allergic to any medicine. Keep a list of the medicines, vitamins, and herbs you take. Include the amounts, and when and why you take them. Bring the list or the pill bottles to follow-up visits. Carry your medicine list with you in case of an emergency.      Prevent or manage acute nausea and vomiting:   •Do not drink alcohol. Alcohol may upset or irritate your stomach. Too much alcohol can also cause acute nausea and vomiting.      •Control stress. Headaches due to stress may cause nausea and vomiting. Find ways to relax and manage your stress. Get more rest and sleep.      •Drink more liquids as directed. Vomiting can lead to dehydration. It is important to drink more liquids to help replace lost body fluids. Ask your healthcare provider how much liquid to drink each day and which liquids are best for you. Your provider may recommend that you drink an oral rehydration solution (ORS). ORS contains water, salts, and sugar that are needed to replace the lost body fluids. Ask what kind of ORS to use, how much to drink, and where to get it.      •Eat smaller meals, more often. Eat small amounts of food every 2 to 3 hours, even if you are not hungry. Food in your stomach may decrease your nausea.      •Talk to your healthcare provider before you take over-the-counter (OTC) medicines. These medicines can cause serious problems if you use certain other medicines, or you have a medical condition. You may have problems if you use too much or use them for longer than the label says. Follow directions on the label carefully.       Follow up with your healthcare provider as directed: Write down your questions so you remember to ask them during your follow-up visits.

## 2021-12-26 NOTE — ED PROVIDER NOTE - PATIENT PORTAL LINK FT
You can access the FollowMyHealth Patient Portal offered by [facility] by registering at the following website: [email or web address]. By joining [employer]’s FollowMyHealth portal, you will also be able to view your health information using other applications (apps) compatible with our system.

## 2021-12-26 NOTE — ED PROVIDER NOTE - CARE PROVIDER_API CALL
Curtis Zavala)  Internal Medicine  [address] [place] [address]  Phone: [phone]  Fax: [phone]  Follow Up Time:     Clint Ruiz)  Urology  [address], [address]  [place] [address]  Phone: [phone]  Fax: [phone]  Follow Up Time:

## 2021-12-26 NOTE — ED ADULT NURSE NOTE - NSIMPLEMENTINTERV_GEN_ALL_ED
Implemented All Fall with Harm Risk Interventions:  [place] to call system. Call bell, personal items and telephone within reach. Instruct patient to call for assistance. Room bathroom lighting operational. Non-slip footwear when patient is off stretcher. Physically safe environment: no spills, clutter or unnecessary equipment. Stretcher in lowest position, wheels locked, appropriate side rails in place. Provide visual cue, wrist band, yellow gown, etc. Monitor gait and stability. Monitor for mental status changes and reorient to person, place, and time. Review medications for side effects contributing to fall risk. Reinforce activity limits and safety measures with patient and family. Provide visual clues: red socks.

## 2021-12-26 NOTE — ED PROVIDER NOTE - NSICDXPASTMEDICALHX_GEN_ALL_CORE_FT
PAST MEDICAL HISTORY:  2019 novel coronavirus disease (COVID-19) Early September 2021 treated with Monoclonal Antibodies    ALS (amyotrophic lateral sclerosis) diagnosed 2020    BPH (benign prostatic hyperplasia)     Calculus of ureter     Head injury September 2021 s/p fall at Rehab hospitalized at [facility] for a couple of days - had a small bleed,  On Keppra s a precaution    Hyperlipidemia     Hypertension     Myocardial infarct 2006    Pneumonia Summer 2021    Renal colic with stent insertion  2021    Sepsis

## 2021-12-26 NOTE — ED PROVIDER NOTE - PROGRESS NOTE DETAILS
Reevaluated patient at bedside.  Pt feeling well, no vomiting. Discussed the results of all diagnostic testing in ED with pt and son. An opportunity to ask questions was provided.  Discussed the importance of prompt, close medical follow-up.  Patient will return with any changes, concerns or persistent/worsening symptoms.  Understanding of all instructions verbalized.

## 2021-12-26 NOTE — ED ADULT NURSE NOTE - OBJECTIVE STATEMENT
Patient received c/o vomiting episodes that worsened last night with black like emesis x3~4 days. Patient has peg tube and indwelling catheter from home. Patient is AOx3, safety precautions in place, awaiting evaluation.

## 2021-12-26 NOTE — ED ADULT NURSE NOTE - ED COMFORT CARE
MEDICATIONS  (STANDING):  atorvastatin 40 milliGRAM(s) Oral at bedtime  BACItracin   Ointment 1 Application(s) Topical two times a day  cefepime   IVPB 1000 milliGRAM(s) IV Intermittent every 12 hours  chlorhexidine 0.12% Liquid 15 milliLiter(s) Oral Mucosa every 12 hours  dextrose 40% Gel 15 Gram(s) Oral once  dextrose 5% + sodium chloride 0.45%. 1000 milliLiter(s) (100 mL/Hr) IV Continuous <Continuous>  dextrose 5%. 1000 milliLiter(s) (50 mL/Hr) IV Continuous <Continuous>  dextrose 5%. 1000 milliLiter(s) (100 mL/Hr) IV Continuous <Continuous>  dextrose 50% Injectable 25 Gram(s) IV Push once  dextrose 50% Injectable 12.5 Gram(s) IV Push once  dextrose 50% Injectable 25 Gram(s) IV Push once  glucagon  Injectable 1 milliGRAM(s) IntraMuscular once  heparin   Injectable 5000 Unit(s) SubCutaneous every 12 hours  hydrALAZINE Injectable 10 milliGRAM(s) IV Push once  influenza  Vaccine (HIGH DOSE) 0.7 milliLiter(s) IntraMuscular once  insulin glargine Injectable (LANTUS) 20 Unit(s) SubCutaneous at bedtime  insulin lispro (ADMELOG) corrective regimen sliding scale   SubCutaneous every 6 hours  insulin lispro Injectable (ADMELOG) 10 Unit(s) SubCutaneous three times a day before meals  levothyroxine Injectable 60 MICROGram(s) IV Push at bedtime  polyethylene glycol 3350 17 Gram(s) Oral daily  propofol Infusion 25 MICROgram(s)/kG/Min (16.2 mL/Hr) IV Continuous <Continuous>  senna 2 Tablet(s) Oral at bedtime  sodium bicarbonate 325 milliGRAM(s) Oral two times a day
Patient informed/Family informed/Pillow/TV

## 2021-12-27 ENCOUNTER — OUTPATIENT (OUTPATIENT)
Dept: OUTPATIENT SERVICES | Facility: HOSPITAL | Age: 71
LOS: 1 days | End: 2021-12-27
Payer: MEDICARE

## 2021-12-27 DIAGNOSIS — Z95.5 PRESENCE OF CORONARY ANGIOPLASTY IMPLANT AND GRAFT: Chronic | ICD-10-CM

## 2021-12-27 DIAGNOSIS — Z90.89 ACQUIRED ABSENCE OF OTHER ORGANS: Chronic | ICD-10-CM

## 2021-12-27 DIAGNOSIS — Z98.890 OTHER SPECIFIED POSTPROCEDURAL STATES: Chronic | ICD-10-CM

## 2021-12-27 DIAGNOSIS — Z20.828 CONTACT WITH AND (SUSPECTED) EXPOSURE TO OTHER VIRAL COMMUNICABLE DISEASES: ICD-10-CM

## 2021-12-27 DIAGNOSIS — Z78.9 OTHER SPECIFIED HEALTH STATUS: Chronic | ICD-10-CM

## 2021-12-27 LAB
CULTURE RESULTS: SIGNIFICANT CHANGE UP
SARS-COV-2 RNA SPEC QL NAA+PROBE: SIGNIFICANT CHANGE UP
SPECIMEN SOURCE: SIGNIFICANT CHANGE UP

## 2021-12-27 PROCEDURE — U0005: CPT

## 2021-12-27 PROCEDURE — U0003: CPT

## 2021-12-28 NOTE — ASU PATIENT PROFILE, ADULT - NSICDXPASTMEDICALHX_GEN_ALL_CORE_FT
PAST MEDICAL HISTORY:  2019 novel coronavirus disease (COVID-19) Early September 2021 treated with Monoclonal Antibodies    ALS (amyotrophic lateral sclerosis) diagnosed 2020    BPH (benign prostatic hyperplasia)     Calculus of ureter     Head injury September 2021 s/p fall at Rehab hospitalized at [facility] for a couple of days - had a small bleed,  On Keppra s a precaution    Hyperlipidemia     Hypertension     Myocardial infarct 2006    Pneumonia Summer 2021    Renal colic with stent insertion  2021    Sepsis

## 2021-12-28 NOTE — ASU PATIENT PROFILE, ADULT - FALL HARM RISK - HARM RISK INTERVENTIONS

## 2021-12-29 ENCOUNTER — OUTPATIENT (OUTPATIENT)
Dept: OUTPATIENT SERVICES | Facility: HOSPITAL | Age: 71
LOS: 1 days | End: 2021-12-29
Payer: MEDICARE

## 2021-12-29 VITALS
HEART RATE: 56 BPM | OXYGEN SATURATION: 96 % | RESPIRATION RATE: 14 BRPM | DIASTOLIC BLOOD PRESSURE: 61 MMHG | TEMPERATURE: 97 F | HEIGHT: 68 IN | SYSTOLIC BLOOD PRESSURE: 112 MMHG | WEIGHT: 147.05 LBS

## 2021-12-29 VITALS
OXYGEN SATURATION: 97 % | DIASTOLIC BLOOD PRESSURE: 49 MMHG | HEART RATE: 59 BPM | SYSTOLIC BLOOD PRESSURE: 111 MMHG | RESPIRATION RATE: 21 BRPM

## 2021-12-29 DIAGNOSIS — N20.1 CALCULUS OF URETER: ICD-10-CM

## 2021-12-29 DIAGNOSIS — Z90.89 ACQUIRED ABSENCE OF OTHER ORGANS: Chronic | ICD-10-CM

## 2021-12-29 DIAGNOSIS — Z95.5 PRESENCE OF CORONARY ANGIOPLASTY IMPLANT AND GRAFT: Chronic | ICD-10-CM

## 2021-12-29 DIAGNOSIS — Z98.890 OTHER SPECIFIED POSTPROCEDURAL STATES: Chronic | ICD-10-CM

## 2021-12-29 DIAGNOSIS — Z78.9 OTHER SPECIFIED HEALTH STATUS: Chronic | ICD-10-CM

## 2021-12-29 PROCEDURE — 52352 CYSTOURETERO W/STONE REMOVE: CPT | Mod: LT

## 2021-12-29 PROCEDURE — 88300 SURGICAL PATH GROSS: CPT

## 2021-12-29 PROCEDURE — 82365 CALCULUS SPECTROSCOPY: CPT

## 2021-12-29 PROCEDURE — 76000 FLUOROSCOPY <1 HR PHYS/QHP: CPT

## 2021-12-29 PROCEDURE — C1769: CPT

## 2021-12-29 PROCEDURE — C1889: CPT

## 2021-12-29 PROCEDURE — C1758: CPT

## 2021-12-29 PROCEDURE — 52332 CYSTOSCOPY AND TREATMENT: CPT | Mod: LT

## 2021-12-29 PROCEDURE — C2617: CPT

## 2021-12-29 PROCEDURE — 88300 SURGICAL PATH GROSS: CPT | Mod: 26

## 2021-12-29 RX ORDER — MULTIVIT WITH MIN/MFOLATE/K2 340-15/3 G
0 POWDER (GRAM) ORAL
Qty: 0 | Refills: 0 | DISCHARGE

## 2021-12-29 RX ORDER — SODIUM CHLORIDE 9 MG/ML
1000 INJECTION, SOLUTION INTRAVENOUS
Refills: 0 | Status: DISCONTINUED | OUTPATIENT
Start: 2021-12-29 | End: 2021-12-29

## 2021-12-29 RX ORDER — ASPIRIN/CALCIUM CARB/MAGNESIUM 324 MG
1 TABLET ORAL
Qty: 0 | Refills: 0 | DISCHARGE

## 2021-12-29 RX ORDER — ROSUVASTATIN CALCIUM 5 MG/1
0 TABLET ORAL
Qty: 0 | Refills: 0 | DISCHARGE

## 2021-12-29 RX ORDER — CEFTRIAXONE 500 MG/1
1000 INJECTION, POWDER, FOR SOLUTION INTRAMUSCULAR; INTRAVENOUS ONCE
Refills: 0 | Status: DISCONTINUED | OUTPATIENT
Start: 2021-12-29 | End: 2021-12-29

## 2021-12-29 RX ORDER — FENOFIBRATE,MICRONIZED 130 MG
1 CAPSULE ORAL
Qty: 0 | Refills: 0 | DISCHARGE

## 2021-12-29 RX ORDER — METOPROLOL TARTRATE 50 MG
1 TABLET ORAL
Qty: 0 | Refills: 0 | DISCHARGE

## 2021-12-29 RX ORDER — LEVETIRACETAM 250 MG/1
2.5 TABLET, FILM COATED ORAL
Qty: 0 | Refills: 0 | DISCHARGE

## 2021-12-29 RX ORDER — HYDROMORPHONE HYDROCHLORIDE 2 MG/ML
0.5 INJECTION INTRAMUSCULAR; INTRAVENOUS; SUBCUTANEOUS
Refills: 0 | Status: DISCONTINUED | OUTPATIENT
Start: 2021-12-29 | End: 2021-12-29

## 2021-12-29 RX ORDER — LISINOPRIL 2.5 MG/1
1 TABLET ORAL
Qty: 0 | Refills: 0 | DISCHARGE

## 2021-12-29 RX ORDER — ESCITALOPRAM OXALATE 10 MG/1
1 TABLET, FILM COATED ORAL
Qty: 0 | Refills: 0 | DISCHARGE

## 2021-12-29 RX ORDER — CEFPODOXIME PROXETIL 100 MG
1 TABLET ORAL
Qty: 0 | Refills: 0 | DISCHARGE

## 2021-12-29 RX ORDER — CLOPIDOGREL BISULFATE 75 MG/1
1 TABLET, FILM COATED ORAL
Qty: 0 | Refills: 0 | DISCHARGE

## 2021-12-29 RX ORDER — CYCLOBENZAPRINE HYDROCHLORIDE 10 MG/1
0 TABLET, FILM COATED ORAL
Qty: 0 | Refills: 0 | DISCHARGE

## 2021-12-29 RX ORDER — HYDROMORPHONE HYDROCHLORIDE 2 MG/ML
1 INJECTION INTRAMUSCULAR; INTRAVENOUS; SUBCUTANEOUS
Refills: 0 | Status: DISCONTINUED | OUTPATIENT
Start: 2021-12-29 | End: 2021-12-29

## 2021-12-29 RX ORDER — ONDANSETRON 8 MG/1
4 TABLET, FILM COATED ORAL ONCE
Refills: 0 | Status: DISCONTINUED | OUTPATIENT
Start: 2021-12-29 | End: 2021-12-29

## 2021-12-29 RX ADMIN — SODIUM CHLORIDE 75 MILLILITER(S): 9 INJECTION, SOLUTION INTRAVENOUS at 10:52

## 2021-12-29 NOTE — BRIEF OPERATIVE NOTE - NSICDXBRIEFPROCEDURE_GEN_ALL_CORE_FT
PROCEDURES:  Ureteroscopy with removal of calculus 29-Dec-2021 08:46:19  Clint Ruiz  Cystoscopy, with retrograde pyelogram and ureteral stent insertion or replacement 29-Dec-2021 08:46:40  Clint Ruiz

## 2021-12-29 NOTE — ASU DISCHARGE PLAN (ADULT/PEDIATRIC) - NS MD DC FALL RISK RISK
For information on Fall & Injury Prevention, visit: [email or web address] OR  [email or web address] [place]  https://www.cdc.gov/steadi/patient.html

## 2021-12-29 NOTE — ASU DISCHARGE PLAN (ADULT/PEDIATRIC) - CARE PROVIDER_API CALL
Clint Ruiz)  Urology  [address], [address]  [place] [address]  Phone: [phone]  Fax: [phone]  Established Patient  Follow Up Time: 1 week

## 2022-01-01 NOTE — H&P ADULT - PROBLEM SELECTOR PROBLEM 4
ICN DAILY PROGRESS NOTE        Subjective :  Alex Royal is an Inborn    2475 g male infant admitted 2022  7:59 AM at Gestational Age: 34w0d now at age: 12 day old and whose birthday is 2020.     Corrected Gestational Age: 35w5d    Overnight events: No acute events overnight, mostly NG feeds. Gained 60g    Objective :    Vital Last Value 24 Hour Range   Temperature 98.4 °F (36.9 °C) (22 0800) Temp  Min: 98.2 °F (36.8 °C)  Max: 99 °F (37.2 °C)      Pulse 141 (22 0800) Pulse  Min: 130  Max: 160   Respiratory 40 (22 0800) Resp  Min: 40  Max: 58   Non-Invasive Blood Pressure 70/43 (22 2300) BP  Min: 67/29  Max: 70/43   Arterial Blood Pressure   No data recorded   Mean Arterial Pressure   No data recorded   Pulse Oximetry Sat 100 % (22 0800) SpO2  Min: 96 %  Max: 100 %     Vital Today Admitted   Weight 2510 g (22 020) Weight: 2475 g (Filed from Delivery Summary) (22)   Length  19.5\" (49.5 cm) (22 0000) Length: 19\" (48.3 cm) (Filed from Delivery Summary) (22)   Head Circumference 33 cm (12.99\") (22 0000) Head Circumference: 33.5 cm (13.19\") (Filed from Delivery Summary) (22)     Weight Change Over 24 Hours: Weight change: 60 g   Weight Change Over 7 Days: Weight change: 60 g  Weight Change Since Birth: 1%   Percent Weight Change Since Birth: 1.42 % (22)     Respiratory Settings Last Value   Nasal Cannula Flow RA    Mode     Rate     Peak Inspiratory Pressure     Tidal Volume     Inspiratory time     Pressure Support     PEEP/CPAC/EPAP     FiO2    Mean     Delta P     Hertz       Last Apnea:  ; Alarm Count Apnea: 1  Intervention: Self limiting (22)    Last Bradycardia: length each event lasted (in sec) over the past 24 hours: Bradycardia (secs): 10 secs (22); Alarm Count Bradycardia: 1  Interventions: Intervention: Self limiting (22  0441)      Early onset sepsis screen:     Clinical Exam:  <35 weeks, not done    Plan for EOS  - EOS Screen not performed: <35 weeks gestation at birth  - Blood culture and CBC on admission - Yes  - Baby was started on Ampicillin/Gentamicin x 36 hours pending Blood Cultures.    Lines, Tubes, & Drains  Peripheral IV 22 Left Hand 24 (Active)        PIV:  - no    NG/OG tube: - Yes    Fluids  Based off a Dosing Weight of  5 lb 7.3 oz (2475 g)     Intubation  Necessity/Indication: Not Intubated  Readiness for Extubation discussed - No 2022    Urinary Catheter  Necessity/Indication: Not Catheterized  Continued need for Urinary Catheter discussed - No 2022    Central Line  Type of Central Line: None  Necessity/Indication: No Central line in place  Continued need for Central Line discussed No 2022      Last 24H:   Intake/Output  Report       0700   0659  0700   0659    P.O. (mL/kg) 36 (14.34)     NG/GT (mL/kg) 314 (125.1)     Total Intake(mL/kg) 350 (139.44)     Net +350           Urine Occurrence 4 x     Stool Occurrence 6 x              Labs (Last 24 hours)  No results found for this or any previous visit (from the past 24 hour(s)).     Medications  Current Facility-Administered Medications   Medication   • pediatric multivitamin with iron (POLY-VI-SOL WITH IRON) oral solution 0.5 mL   • cholecalciferol (VITAMIN D) 5 mcg (200 units)/0.5 mL oral liquid 5 mcg        Physical Exam  Constitutional:       Appearance: Alert, Active, Normal appearance.   male infant   HENT:      Eyes: PERRL, Normal Conjunctiva. Red Reflex - Not assessed today, assessed previously by neonatologist      Head: Normocephalic. Anterior fontanelle is soft, open,flat. Caput/Moulding/Cephalhematoma - No     Mouth/Throat: Palate intact     Mouth: Mucous membranes are moist.   Cardiovascular:      Rate and Rhythm: Normal rate and regular rhythm.      Heart sounds: Heart murmur - No     Comments: Normal  peripheral pulses  Pulmonary:      Effort: Pulmonary effort is normal. Grunting/Retractions - No     Breath sounds: Normal breath sounds   Abdominal:      General: Abdomen is flat. Bowel sounds are normal. Anus patent     Palpations: Abdomen is soft.   Genitourinary:  GENITALIA: normal male genitalia with bilateral descended testes  Musculoskeletal: Normal range of motion. Clavicles intact. No Sacral dimple. No Hip clicks                              Normal Ortolani's and Normal Smalls's  Skin: General: Skin is warm,pink           Capillary Refill: Capillary refill takes less than 2 seconds.            Turgor: Normal.   Neurological:      General: No focal deficit present. No facial asymmetry. Gag Reflex normal. Normal position of Uvula.     Mental Status: alert.       Normal Yazmin, Suck, Swallow and Grasp reflex     Normal Passive tone and Active Tone - Yes      Former Gestational Age: 34w0d male infant, now corrected to 35w5d       Patient Active Problem List   Diagnosis   • Prematurity, birth weight 2,000-2,499 grams, with 34 completed weeks of gestation   • RDS (respiratory distress syndrome in the )   • Need for observation and evaluation of  for sepsis   • At risk for hyperbilirubinemia in    • Apnea of prematurity   • Oxygen desaturation     Assessment & Plan    RDS (respiratory distress syndrome in the )  A few minutes following admission to [facility], patient demonstrated desat to 70s with tachypnea. Placed on CPAP. After several hours observation on CPAP, patient began having increased O2 needs to a max of 40% FiO2 on CPAP 5. Surfactant administered at approx 2pm.     : Baby was s/p CPAP x6h, curosurf yesterday x 1 dose. Currently on HFNC 3L, 24%. No tachypnea or retractions.  Weaned to RA by 1800.  : Baby with oxygen desat around 0045, placed on 2L BNC 30%  : Infant with ABD spell at 00:39, currently on HFNC 2L 30%  : HFNC 2L 21%  --> weaned to RA 1300  : Stable on  RA    Plan:  -Monitor clinically  -Cardiorespiratory monitoring, Maintain O2 sat >90%  See \"Oxygen Desaturation\"    Need for observation and evaluation of  for sepsis  CBC reassuring, Bcx pending. Baby received amp and gent x 36h.  : BL cx NG so far.  : BCX NGTD  : blood culture NGTD  : BCx NG x 5 days    Plan: RESOLVED    Prematurity, birth weight 2,000-2,499 grams, with 34 completed weeks of gestation  A:  baby 34.0 weks born by C section  NPO + IVF: D10W @ TF 80  Mom prefers MBM/DBM once feeds initiated, donor consent obtained  Normal  screening tests per unit protocol  : Baby is on IVF @ 80cc/kg/d, NPO.   : MBM/DBM 10ml NG Q3hr, IVF @ 7ml/hr.  PO 0%.  TF 87, -35g.  BMP wnl.   : MBM/DBM 15ml q3 hr, IVF @7ml/hr, 0% PO, TF 90, lost 110g  : MBM/DBM 20ml q3 hr, IVF @7ml/hr, 0% PO, , lost 135g  : MBM/DBM 30ml q3 hr, IVF @6ml/hr, 0% PO, , gained 65g  : IV out in AM, feeds increased to MBM/DBM 45ml q3 hr. 0% PO, , gained 10g.  2/3: MBM/DBM 50ml q3h NG; 0% PO; ; +30g  : MBM/HLH45hko 50ml q3h PO/NG; 14% PO; ; +15g  5: MBM/ERY23zwe 50ml q3h PO/NG; 20% PO; ; +50g  /6: MBM/TTV75fce 50ml q3h PO/NG; 22% PO; ; +60g  7: MBM/RKY70mfl 50ml q3h PO/NG; 10% PO; ; +25g  8: MBM/XHP75bgs 50ml q3h PO/NG; 15% PO; ; +60g  P:   - Continue MBM/BYC29nbg 50ml q3h PO/NG; PO feed with cues  - Monitor PO intake and daily weight  - Continue vitamins of PVS+Fe 0.5ml and Vitamin D 0.5ml qd (started )    Discharge planning:  [x] RR: 22. Checked by Dr. Mariscal  [x] Hep B: Given on22  [ ] Hearing screen  [x] [place] Screen: 22, 22  [x] CHD passed   [ ] carseat test  -given Vit K and erythromycin ointment  -PMD will be Dr. Sales. : Dr. Peralta was notified.    At risk for hyperbilirubinemia in   A: Baby is  34.0 wks. Mom's Blood type A positive  Bili 5.9 @ 24HOL  Bili 8.9@48HOL  (LL>12)  Bili 11.1at 70 hr; 13.2 at 94HR LIR  : Bili 14.7@117 hr (LL>15) - triple photo started  : bili 6.7 @  142 HOL - photo DC'd  2/3: TSB 7@167 (rebound level)    P: RESOLVED    Apnea of prematurity  A: : Baby had one episode of apnea/bradycardia today morning, HR 77/min and O2 desat, required tactile stimulation  : Baby with total 2 spells on : spell x1  : spell x 2  : Bradycardia with O2 desat, self limiting episode    P:   Continue cardiorespiratory monitoring.  5 day spell watch before discharge.    Oxygen desaturation  A: : Baby is having low O2 sat 85-90% on RA around 0045 am. No tachypnea, no retractions.  Started on O2 by BNC, 2L, 30%.  In the morning baby noted to have tachypnea    : Infant currently on HFNC 2L 30%  : currently HFNC 2L 21% --> weaned to RA @ 1300  : Stable on RA  : Bradycardia with O2 desat x10 seconds, self limiting episode    Plan:  -Cardiorespiratory monitoring, Maintain O2 sat >90%  -5 day spell watch before discharge               Screenings & Procedures   Immunizations:   Most Recent Immunizations   Administered Date(s) Administered   • Hep B, adolescent or pediatric 2022      Hearing Test:    [place]: [place] Screen- date drawn (most recent results): 22 (22 1200)  Last 3 results: 22 (22 1200)  CCHD Screening:   Screening complete: Done (22 5775)  Right hand reading %: 99 %  Foot reading %: 99 %  CHD: Normal  Circumcision:   Pending  Car Seat Screen:        Parents plan to follow-up as an outpatient following discharge home with PCP: Negrita Sales -[phone]    I have spoken with the nursing staff, Neonatologist on-call, Dr. Gamino, and the healthcare team and reviewed findings and plan of management.    I have reviewed infant's current condition and plan of management with parents at the bedside.    Karol Fernandez MD  [facility]  22     Amyotrophic lateral sclerosis (ALS) MARLEY (acute kidney injury)

## 2022-01-04 LAB — NIDUS STONE QN: SIGNIFICANT CHANGE UP

## 2022-01-09 NOTE — ED PROVIDER NOTE - ATTENDING CONTRIBUTION TO CARE
71 yo male being considered for eval of ALS has hx of bph and ran out of his flomax med.  he was unable to void for past 4 days and presented today with severe abd pain and distended bladder  on eval he was very uncomfortable until rn placed jo he had over 2 liters out and feels btter.  labs found acute renal failure  pt has no dyspena n ocp no hx of same  on eval: comfortable male nad  heent cor chest normal  abd mild tender to palp  no cvat  ext no edema, and has arthritis  neuro not focal  plan admit for arf
no

## 2022-02-08 NOTE — ED PROVIDER NOTE - CPE EDP EYES NORM
CC: retinal lesion left eye; floaters    Interval hx: no change in vision; was scheduled for Eylea only right eye today but is concerned about the left eye and wants to have left retina examined. No change in VA    HPI:  Patient has a history of pituitary adenoma, s/p resection in 2007 and again in 2010 due to recurrence. Patient complains of flashes and floaters in the left eye. Dr. Neely saw retinal heme left eye and referred her. Saw Dr. Neely 6/22/20    Strong FH of AMD: mother diagnosed in her 60s, sister, and brother with wet AMD  Father with AMD diagnosed in his 80s    OCT 2/8/2022  Right eye resolved SRF; PEDs less elevated  Left eye stable    optos 12-15-21  Drusen each eye; much smaller left eye peripheral RPE/subretinal lesion      Assessment and Plan:  1.   Exudative AMD right eye    Recent conversion to wet form    Fovea is not involved and VA is 20/20   OCTA: SubRPE neovascular membrane nasal to fovea   Eylea injection 12/15/2021, 1/11/2022 and today for Eylea injection 3/3    R/B/A discussed with Horace in length    2. Pigment epithelium detachment, drusen each eye    drusenoid PED; stable compared to previous except for #1   DDX; peripheral CNV (not likely given FA finding) vs pigment epithelium adenoma vs peripheral PED   Not symptomatic now, but could be a harbinger of PECHR lesion   Lesion smaller or almost resolved 12/22/20 and 6/15/21:likely a peripheral CNV, self limited; s/s of CNV activation and bleeding discussed    No apparent change in left eye examination 1/11/2022   Will observe for now    3. Posterior Vitreous Detachment, both eyes. Retina attached.   Educated on signs and symptoms of a retinal detachment to be seen immediately.   No peripheral traction or tear in depressed exam today      4. Dry Age Related Macular Degeneration, both eyes.   Strong FH of wet AMD  Recommends ARED's II PO BID. Encouraged green leafy vegetables and healthy food and UV protection/No smoking.  Return  immediately if there are changes to amsler grid.    4.   Pituitary Adenoma  History of pituitary adenoma, s/p resection in 2007 and again in 2010 due to recurrence. Patient follows neurology.   Baseline visual field was done 2019. last VF within normal limits   Optic nerve head OCT was done 05/20/2020. No optic neuropathy seen on exam.  Continue monitoring yearly with VF and ONH OCT.     5.   Cataract, both eyes.   Not visually significant. Monitor        RTC  6 weeks for OCT and optos and DFE and Eylea    Complete documentation of historical and exam elements from today's encounter can be found in the full encounter summary report (not reduplicated in this progress note). I personally obtained the chief complaint(s) and history of present illness.  I confirmed and edited as necessary the review of systems, past medical/surgical history, family history, social history, and examination findings as documented by others; and I examined the patient myself. I personally reviewed the relevant tests, images, and reports as documented above. I formulated and edited as necessary the assessment and plan and discussed the findings and management plan with the patient and family.    Cameron Clemons MD  [facility]           normal...

## 2022-04-20 NOTE — ED ADULT TRIAGE NOTE - NSSEPSISSUSPECTED_ED_A_ED
Spoke with pt.    Pt informed of lab results. Pt verbalized understanding, no further questions at this time.   No

## 2022-05-17 NOTE — ED PROVIDER NOTE - CHIEF COMPLAINT
The patient is a 71y Male complaining of vomiting. 

## 2022-05-19 NOTE — ED PROVIDER NOTE - IV ALTEPLASE DOOR HIDDEN
show Full Thickness Lip Wedge Repair (Flap) Text: Given the location of the defect and the proximity to free margins a full thickness wedge repair was deemed most appropriate.  Using a sterile surgical marker, the appropriate repair was drawn incorporating the defect and placing the expected incisions perpendicular to the vermilion border.  The vermilion border was also meticulously outlined to ensure appropriate reapproximation during the repair.  The area thus outlined was incised through and through with a #15 scalpel blade.  The muscularis and dermis were reaproximated with deep sutures following hemostasis. Care was taken to realign the vermilion border before proceeding with the superficial closure.  Once the vermilion was realigned the superfical and mucosal closure was finished.

## 2022-07-10 NOTE — DISCHARGE NOTE NURSING/CASE MANAGEMENT/SOCIAL WORK - NSDCPETBCESMAN_GEN_ALL_CORE
Yes If you are a smoker, it is important for your health to stop smoking. Please be aware that second hand smoke is also harmful.

## 2022-08-23 NOTE — ASU PATIENT PROFILE, ADULT - DATE OF FIRST COVID-19 BOOSTER
2000: Bedside and Verbal shift change report given to CATHY/ Sandra 47 (oncoming nurse) by Rita Stoddard RN (offgoing nurse). Report included the following information SBAR, Intake/Output, MAR, Recent Results, Cardiac Rhythm V Paced, and Alarm Parameters . 4154: Dr. Dee Ramírez at bedside. Verbal orders to reduce Milrinone to 0.125. Wants to see how he tolerates the change with plans to pull swan and tx to ortho floor. 0800: Bedside and Verbal shift change report given to 1710 Mayo Sutherland (oncoming nurse) by Lanette Mullen RN (offgoing nurse). Report included the following information SBAR, Intake/Output, MAR, Recent Results, Cardiac Rhythm V Paced, and Alarm Parameters . 23-Dec-2021

## 2022-09-02 NOTE — ASU PREOP CHECKLIST - MEDICAL/PEDIATRIC CLEARANCE ON MEDICAL RECORD
If you are a smoker, it is important for your health to stop smoking. Please be aware that second hand smoke is also harmful.
cc

## 2022-12-05 NOTE — ED ADULT NURSE NOTE - THROAT
Azithromycin Pregnancy And Lactation Text: This medication is considered safe during pregnancy and is also secreted in breast milk. asymptomatic

## 2023-01-17 NOTE — ED PROVIDER NOTE - NS_EDPROVIDERDISPOUSERTYPE_ED_A_ED
Posterior Auricular Interpolation Flap Text: Due to location on free margin and exposed cartilage and to restore structure and function, a decision was made to reconstruct the defect utilizing an interpolation axial flap and a staged reconstruction.  A telfa template was made of the defect.  This telfa template was then used to outline the posterior auricular interpolation flap.  The donor area for the pedicle flap was then injected with anesthesia.  The flap was excised through the skin and subcutaneous tissue down to the layer of the underlying musculature.  The pedicle flap was carefully excised within this deep plane to maintain its blood supply.  The edges of the donor site were undermined.   The donor site was closed in a primary fashion.  The pedicle was then rotated into position and sutured.  Once the tube was sutured into place, adequate blood supply was confirmed with blanching and refill.  The pedicle was then wrapped with xeroform gauze and dressed appropriately with a telfa and gauze bandage to ensure continued blood supply and protect the attached pedicle. Attending Attestation (For Attendings USE Only)...

## 2023-04-24 NOTE — PATIENT PROFILE ADULT - FLU SEASON?
Yes... Manual Repair Warning Statement: We plan on removing the manually selected variable below in favor of our much easier automatic structured text blocks found in the previous tab. We decided to do this to help make the flow better and give you the full power of structured data. Manual selection is never going to be ideal in our platform and I would encourage you to avoid using manual selection from this point on, especially since I will be sunsetting this feature. It is important that you do one of two things with the customized text below. First, you can save all of the text in a word file so you can have it for future reference. Second, transfer the text to the appropriate area in the Library tab. Lastly, if there is a flap or graft type which we do not have you need to let us know right away so I can add it in before the variable is hidden. No need to panic, we plan to give you roughly 6 months to make the change.

## 2023-06-09 NOTE — OCCUPATIONAL THERAPY INITIAL EVALUATION ADULT - WEIGHT-BEARING RESTRICTIONS: TOILET, REHAB EVAL
I have seen the patient and reviewed this note, and there is no change in history and physical since the last exam. 
full weight-bearing

## 2023-07-11 NOTE — SWALLOW VFSS/MBS ASSESSMENT ADULT - INADEQUATE HYOLARYNGEAL ELEVATION
Mild/Moderate
Continue Regimen: Minoxidil 1.25 mg PO QD (pt has had success with this medication in the past) \\nSpironolactone 100mg PO QD\\nNutrafol Women’s QD (x 3-6 months minimum)
Render In Strict Bullet Format?: No
Detail Level: Zone

## 2024-05-30 NOTE — ED ADULT NURSE NOTE - RESPIRATORY ASSESSMENT
Bed/Stretcher in lowest position, wheels locked, appropriate side rails in place/Call bell, personal items and telephone in reach/Instruct patient to call for assistance before getting out of bed/chair/stretcher/Non-slip footwear applied when patient is off stretcher/[place] to call system/Physically safe environment - no spills, clutter or unnecessary equipment/Purposeful proactive rounding/Room/bathroom lighting operational, light cord in reach - - -

## 2024-08-08 NOTE — PHYSICAL THERAPY INITIAL EVALUATION ADULT - ORIENTATION, REHAB EVAL
Psych-Ed/Relapse Prevention Group Note        Date: August 8, 2024 Start Time: 11am  End Time: 11:45am      Number of Participants in Group & Unit Census:  8/21    Topic: Socialization skills    Goal of Group:Patient will demonstrate improved interpersonal skills      Comments:     Patient did not participate in Psych-Ed/Relapse Prevention group, despite staff encouragement and explanation of benefits.  Patient remain seclusive to self.  Q15 minute safety checks maintained for patient safety and will continue to encourage patient to attend unit programming.         Signature:  DEBORAH GodoyS     unable to assess

## 2024-09-26 NOTE — OCCUPATIONAL THERAPY INITIAL EVALUATION ADULT - PRECAUTIONS/LIMITATIONS, REHAB EVAL
Writer received voicemail from patient stating 9/25 appointment with Dr. Noriega was cancelled without notice.  Writer noted no encounter put in for cancellation/patient notification of cancellation .  Writer reached out to patient and left message to reschedule    NPO, +tube feeds only

## 2025-04-25 NOTE — SWALLOW BEDSIDE ASSESSMENT ADULT - MUCOSAL QUALITY
Oral cavity clear though dry. This clinician provided extensive oral care via cold water swabbing to hydrate tissue.
Opt out